# Patient Record
Sex: MALE | Race: OTHER
[De-identification: names, ages, dates, MRNs, and addresses within clinical notes are randomized per-mention and may not be internally consistent; named-entity substitution may affect disease eponyms.]

---

## 2020-01-08 ENCOUNTER — HOSPITAL ENCOUNTER (EMERGENCY)
Dept: HOSPITAL 8 - ED | Age: 65
Discharge: HOME | End: 2020-01-08
Payer: MEDICAID

## 2020-01-08 VITALS — DIASTOLIC BLOOD PRESSURE: 97 MMHG | SYSTOLIC BLOOD PRESSURE: 171 MMHG

## 2020-01-08 VITALS — HEIGHT: 71 IN | BODY MASS INDEX: 24.01 KG/M2 | WEIGHT: 171.52 LBS

## 2020-01-08 DIAGNOSIS — S70.11XA: ICD-10-CM

## 2020-01-08 DIAGNOSIS — S80.01XA: Primary | ICD-10-CM

## 2020-01-08 DIAGNOSIS — F17.200: ICD-10-CM
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
## 2020-01-08 DIAGNOSIS — I10: ICD-10-CM
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
## 2020-01-08 PROCEDURE — 99283 EMERGENCY DEPT VISIT LOW MDM: CPT

## 2020-01-15 ENCOUNTER — HOSPITAL ENCOUNTER (EMERGENCY)
Dept: HOSPITAL 8 - ED | Age: 65
Discharge: HOME | End: 2020-01-15
Payer: MEDICAID

## 2020-01-15 VITALS — BODY MASS INDEX: 23.58 KG/M2 | WEIGHT: 168.43 LBS | HEIGHT: 71 IN

## 2020-01-15 VITALS — SYSTOLIC BLOOD PRESSURE: 115 MMHG | DIASTOLIC BLOOD PRESSURE: 75 MMHG

## 2020-01-15 DIAGNOSIS — R19.7: ICD-10-CM

## 2020-01-15 DIAGNOSIS — R10.84: ICD-10-CM

## 2020-01-15 DIAGNOSIS — R11.2: Primary | ICD-10-CM

## 2020-01-15 DIAGNOSIS — I10: ICD-10-CM

## 2020-01-15 PROCEDURE — 99283 EMERGENCY DEPT VISIT LOW MDM: CPT

## 2020-01-15 NOTE — NUR
TO RM 19 FROM [facility]. C/O N/V/D SINCE SUNDAY. PT BELIEVES IT IS FROM THE MEAL HE 
AT THAT NIGHT.

## 2020-09-01 ENCOUNTER — HOSPITAL ENCOUNTER (OUTPATIENT)
Facility: MEDICAL CENTER | Age: 65
End: 2020-09-01
Attending: PHYSICIAN ASSISTANT
Payer: MEDICAID

## 2020-09-01 ENCOUNTER — OFFICE VISIT (OUTPATIENT)
Dept: URGENT CARE | Facility: CLINIC | Age: 65
End: 2020-09-01
Payer: MEDICAID

## 2020-09-01 VITALS
DIASTOLIC BLOOD PRESSURE: 74 MMHG | SYSTOLIC BLOOD PRESSURE: 106 MMHG | RESPIRATION RATE: 18 BRPM | HEART RATE: 108 BPM | TEMPERATURE: 96.6 F | OXYGEN SATURATION: 98 % | WEIGHT: 144 LBS | HEIGHT: 71 IN | BODY MASS INDEX: 20.16 KG/M2

## 2020-09-01 DIAGNOSIS — A53.9 SYPHILIS: ICD-10-CM

## 2020-09-01 DIAGNOSIS — R53.83 FATIGUE, UNSPECIFIED TYPE: ICD-10-CM

## 2020-09-01 DIAGNOSIS — Z20.2 POTENTIAL EXPOSURE TO STD: ICD-10-CM

## 2020-09-01 DIAGNOSIS — Z00.00 HEALTHCARE MAINTENANCE: ICD-10-CM

## 2020-09-01 DIAGNOSIS — R21 RASH OF GENITALIA: ICD-10-CM

## 2020-09-01 PROCEDURE — 99204 OFFICE O/P NEW MOD 45 MIN: CPT | Mod: 25 | Performed by: PHYSICIAN ASSISTANT

## 2020-09-01 PROCEDURE — 87529 HSV DNA AMP PROBE: CPT | Mod: 91

## 2020-09-01 SDOH — HEALTH STABILITY: MENTAL HEALTH: HOW OFTEN DO YOU HAVE A DRINK CONTAINING ALCOHOL?: NEVER

## 2020-09-01 ASSESSMENT — ENCOUNTER SYMPTOMS
CHILLS: 0
NAUSEA: 1
VOMITING: 0
CHANGE IN BOWEL HABIT: 0
COUGH: 0
FLANK PAIN: 0
FEVER: 0
FATIGUE: 1

## 2020-09-01 NOTE — PROGRESS NOTES
Subjective:   Alverto Duran is a 64 y.o. male who presents today with   Chief Complaint   Patient presents with   • Emesis     x 2 weeks nausea, diarrhea, chills,        Fatigue  This is a new problem. Episode onset: 2 weeks. Associated symptoms include fatigue and nausea. Pertinent negatives include no change in bowel habit, chest pain, chills, congestion, coughing, fever, urinary symptoms or vomiting. Nothing aggravates the symptoms. He has tried nothing for the symptoms. The treatment provided no relief.     Patient has multiple complaints today.  He does not have primary care set up at this time.  He states that he has had fatigue recently but also a lot of stress and anxiety secondary to being laid off and trying to find new lines of work.  He states that over the past couple of weeks he has had nausea with some diarrhea.  He attributes this to potentially the stress as he is not having any abdominal pain.  He denies any blood in the stool and no vomiting.  Patient states that he has been having unprotected sex and would also like to be checked for STDs today he states that he does have a genital ulcer at this time.  PMH:  has no past medical history on file.  MEDS:   Current Outpatient Medications:   •  mupirocin (BACTROBAN) 2 % Ointment, Apply 1 Application to affected area(s) 2 times a day., Disp: 22 g, Rfl: 0  ALLERGIES: No Known Allergies  SURGHX: No past surgical history on file.  SOCHX:  reports that he has been smoking cigarettes. He has never used smokeless tobacco. He reports current drug use. Drug: Methamphetamines. He reports that he does not drink alcohol.  FH: Reviewed with patient, not pertinent to this visit.       Review of Systems   Constitutional: Positive for fatigue. Negative for chills and fever.   HENT: Negative for congestion.    Respiratory: Negative for cough.    Cardiovascular: Negative for chest pain.   Gastrointestinal: Positive for nausea. Negative for change in bowel habit and  "vomiting.   Genitourinary: Negative for dysuria, flank pain, frequency, hematuria and urgency.   Skin:        Genital ulcer   All other systems reviewed and are negative.       Objective:   /74 (BP Location: Left arm, Patient Position: Sitting, BP Cuff Size: Adult)   Pulse (!) 108   Temp 35.9 °C (96.6 °F) (Temporal)   Resp 18   Ht 1.803 m (5' 11\")   Wt 65.3 kg (144 lb)   SpO2 98%   BMI 20.08 kg/m²   Physical Exam  Vitals signs and nursing note reviewed.   Constitutional:       General: He is not in acute distress.     Appearance: Normal appearance. He is well-developed. He is not ill-appearing or toxic-appearing.   HENT:      Head: Normocephalic and atraumatic.      Right Ear: Hearing, tympanic membrane and ear canal normal.      Left Ear: Hearing, tympanic membrane and ear canal normal.      Nose: Nose normal.      Mouth/Throat:      Mouth: Mucous membranes are moist.      Pharynx: No oropharyngeal exudate or posterior oropharyngeal erythema.   Eyes:      Pupils: Pupils are equal, round, and reactive to light.   Cardiovascular:      Rate and Rhythm: Normal rate and regular rhythm.      Heart sounds: Normal heart sounds.   Pulmonary:      Effort: Pulmonary effort is normal. No respiratory distress.      Breath sounds: No stridor. Wheezing (Mild diffuse) present. No rhonchi or rales.   Abdominal:      General: Bowel sounds are normal. There is no distension.      Tenderness: There is no abdominal tenderness. There is no guarding.   Genitourinary:         Comments: Genital ulcer to the right side of the shaft of the penis near the base of the head.  No drainage or discharge.  Musculoskeletal:      Comments: Normal movement in all 4 extremities   Skin:     General: Skin is warm and dry.   Neurological:      General: No focal deficit present.      Mental Status: He is alert.      Coordination: Coordination normal.   Psychiatric:      Comments: Tangential thought process today       Assessment/Plan: "   Assessment    1. Potential exposure to STD  - VIRAL CULTURE; Future  - HIV AG/AB COMBO ASSAY SCREENING; Future  - HEP C VIRUS ANTIBODY; Future  - CHLAMYDIA/GC PCR URINE OR SWAB; Future  - T.PALLIDUM AB EIA; Future    2. Healthcare maintenance  - REFERRAL TO FOLLOW-UP WITH PRIMARY CARE    3. Fatigue, unspecified type  - CBC WITH DIFFERENTIAL; Future  - Comp Metabolic Panel; Future  - TSH WITH REFLEX TO FT4; Future  - REFERRAL TO FOLLOW-UP WITH PRIMARY CARE    4. Rash of genitalia  - mupirocin (BACTROBAN) 2 % Ointment; Apply 1 Application to affected area(s) 2 times a day.  Dispense: 22 g; Refill: 0  We will follow-up with blood work and lab results and treat accordingly at that time.  Establish care with primary at this time for further evaluation.  Differential diagnosis, natural history, supportive care, and indications for immediate follow-up discussed.   Patient given instructions and understanding of medications and treatment.    If not improving in 3-5 days, F/U with PCP or return to UC if symptoms worsen.    Patient agreeable to plan.      Please note that this dictation was created using voice recognition software. I have made every reasonable attempt to correct obvious errors, but I expect that there are errors of grammar and possibly content that I did not discover before finalizing the note.    Param Weeks PA-C

## 2020-09-03 LAB
HSV1 DNA SPEC QL NAA+PROBE: NEGATIVE
HSV2 DNA SPEC QL NAA+PROBE: NEGATIVE
SPECIMEN SOURCE: NORMAL

## 2020-09-04 ENCOUNTER — HOSPITAL ENCOUNTER (OUTPATIENT)
Dept: LAB | Facility: MEDICAL CENTER | Age: 65
End: 2020-09-04
Attending: PHYSICIAN ASSISTANT
Payer: MEDICAID

## 2020-09-04 DIAGNOSIS — Z20.2 POTENTIAL EXPOSURE TO STD: ICD-10-CM

## 2020-09-04 DIAGNOSIS — R53.83 FATIGUE, UNSPECIFIED TYPE: ICD-10-CM

## 2020-09-04 LAB
ALBUMIN SERPL BCP-MCNC: 4.1 G/DL (ref 3.2–4.9)
ALBUMIN/GLOB SERPL: 1.4 G/DL
ALP SERPL-CCNC: 65 U/L (ref 30–99)
ALT SERPL-CCNC: 18 U/L (ref 2–50)
ANION GAP SERPL CALC-SCNC: 13 MMOL/L (ref 7–16)
AST SERPL-CCNC: 15 U/L (ref 12–45)
BASOPHILS # BLD AUTO: 0.7 % (ref 0–1.8)
BASOPHILS # BLD: 0.03 K/UL (ref 0–0.12)
BILIRUB SERPL-MCNC: 0.2 MG/DL (ref 0.1–1.5)
BUN SERPL-MCNC: 15 MG/DL (ref 8–22)
CALCIUM SERPL-MCNC: 9.8 MG/DL (ref 8.5–10.5)
CHLORIDE SERPL-SCNC: 106 MMOL/L (ref 96–112)
CO2 SERPL-SCNC: 23 MMOL/L (ref 20–33)
CREAT SERPL-MCNC: 1.29 MG/DL (ref 0.5–1.4)
EOSINOPHIL # BLD AUTO: 0.03 K/UL (ref 0–0.51)
EOSINOPHIL NFR BLD: 0.7 % (ref 0–6.9)
ERYTHROCYTE [DISTWIDTH] IN BLOOD BY AUTOMATED COUNT: 49.5 FL (ref 35.9–50)
GLOBULIN SER CALC-MCNC: 3 G/DL (ref 1.9–3.5)
GLUCOSE SERPL-MCNC: 108 MG/DL (ref 65–99)
HCT VFR BLD AUTO: 45.6 % (ref 42–52)
HCV AB SER QL: NORMAL
HGB BLD-MCNC: 14.3 G/DL (ref 14–18)
HIV 1+2 AB+HIV1 P24 AG SERPL QL IA: NORMAL
IMM GRANULOCYTES # BLD AUTO: 0.01 K/UL (ref 0–0.11)
IMM GRANULOCYTES NFR BLD AUTO: 0.2 % (ref 0–0.9)
LYMPHOCYTES # BLD AUTO: 1.72 K/UL (ref 1–4.8)
LYMPHOCYTES NFR BLD: 38.8 % (ref 22–41)
MCH RBC QN AUTO: 27 PG (ref 27–33)
MCHC RBC AUTO-ENTMCNC: 31.4 G/DL (ref 33.7–35.3)
MCV RBC AUTO: 86 FL (ref 81.4–97.8)
MONOCYTES # BLD AUTO: 0.4 K/UL (ref 0–0.85)
MONOCYTES NFR BLD AUTO: 9 % (ref 0–13.4)
NEUTROPHILS # BLD AUTO: 2.24 K/UL (ref 1.82–7.42)
NEUTROPHILS NFR BLD: 50.6 % (ref 44–72)
NRBC # BLD AUTO: 0 K/UL
NRBC BLD-RTO: 0 /100 WBC
PLATELET # BLD AUTO: 258 K/UL (ref 164–446)
PMV BLD AUTO: 11.6 FL (ref 9–12.9)
POTASSIUM SERPL-SCNC: 4.4 MMOL/L (ref 3.6–5.5)
PROT SERPL-MCNC: 7.1 G/DL (ref 6–8.2)
RBC # BLD AUTO: 5.3 M/UL (ref 4.7–6.1)
SODIUM SERPL-SCNC: 142 MMOL/L (ref 135–145)
TSH SERPL DL<=0.005 MIU/L-ACNC: 0.97 UIU/ML (ref 0.38–5.33)
WBC # BLD AUTO: 4.4 K/UL (ref 4.8–10.8)

## 2020-09-04 PROCEDURE — 86592 SYPHILIS TEST NON-TREP QUAL: CPT

## 2020-09-04 PROCEDURE — 87389 HIV-1 AG W/HIV-1&-2 AB AG IA: CPT

## 2020-09-04 PROCEDURE — 86803 HEPATITIS C AB TEST: CPT

## 2020-09-04 PROCEDURE — 87591 N.GONORRHOEAE DNA AMP PROB: CPT

## 2020-09-04 PROCEDURE — 85025 COMPLETE CBC W/AUTO DIFF WBC: CPT

## 2020-09-04 PROCEDURE — 80053 COMPREHEN METABOLIC PANEL: CPT

## 2020-09-04 PROCEDURE — 84443 ASSAY THYROID STIM HORMONE: CPT

## 2020-09-04 PROCEDURE — 86593 SYPHILIS TEST NON-TREP QUANT: CPT

## 2020-09-04 PROCEDURE — 87491 CHLMYD TRACH DNA AMP PROBE: CPT

## 2020-09-04 PROCEDURE — 36415 COLL VENOUS BLD VENIPUNCTURE: CPT

## 2020-09-04 PROCEDURE — 86780 TREPONEMA PALLIDUM: CPT

## 2020-09-05 ENCOUNTER — TELEPHONE (OUTPATIENT)
Dept: URGENT CARE | Facility: PHYSICIAN GROUP | Age: 65
End: 2020-09-05

## 2020-09-05 LAB
C TRACH DNA SPEC QL NAA+PROBE: NEGATIVE
N GONORRHOEA DNA SPEC QL NAA+PROBE: NEGATIVE
RPR SER QL: REACTIVE
RPR SER-TITR: NORMAL {TITER}
SPECIMEN SOURCE: NORMAL
TREPONEMA PALLIDUM IGG+IGM AB [PRESENCE] IN SERUM OR PLASMA BY IMMUNOASSAY: REACTIVE

## 2020-09-05 RX ORDER — DOXYCYCLINE HYCLATE 100 MG
100 TABLET ORAL 2 TIMES DAILY
Qty: 28 TAB | Refills: 0 | Status: SHIPPED | OUTPATIENT
Start: 2020-09-05 | End: 2020-09-19

## 2020-09-05 NOTE — TELEPHONE ENCOUNTER
Called and discussed result positive syphilis with patient and my recommendation is that he go to the health department for treatment at this time.  Given that it is a weekend and he would have to wait until Monday we will go ahead and start him on oral doxycycline at this time patient is agreeable with this plan.  He will follow-up with health department on Monday.  Patient is understanding and agreeable with this plan.

## 2021-01-14 ENCOUNTER — HOSPITAL ENCOUNTER (EMERGENCY)
Facility: MEDICAL CENTER | Age: 66
End: 2021-01-14
Attending: EMERGENCY MEDICINE
Payer: MEDICARE

## 2021-01-14 VITALS
BODY MASS INDEX: 23.1 KG/M2 | HEIGHT: 71 IN | TEMPERATURE: 98 F | SYSTOLIC BLOOD PRESSURE: 161 MMHG | WEIGHT: 165 LBS | DIASTOLIC BLOOD PRESSURE: 90 MMHG | OXYGEN SATURATION: 96 % | RESPIRATION RATE: 19 BRPM | HEART RATE: 78 BPM

## 2021-01-14 DIAGNOSIS — R11.2 NON-INTRACTABLE VOMITING WITH NAUSEA, UNSPECIFIED VOMITING TYPE: ICD-10-CM

## 2021-01-14 DIAGNOSIS — E86.0 DEHYDRATION: ICD-10-CM

## 2021-01-14 LAB
ALBUMIN SERPL BCP-MCNC: 4 G/DL (ref 3.2–4.9)
ALBUMIN/GLOB SERPL: 1.5 G/DL
ALP SERPL-CCNC: 64 U/L (ref 30–99)
ALT SERPL-CCNC: 20 U/L (ref 2–50)
ANION GAP SERPL CALC-SCNC: 7 MMOL/L (ref 7–16)
AST SERPL-CCNC: 20 U/L (ref 12–45)
BASOPHILS # BLD AUTO: 0.2 % (ref 0–1.8)
BASOPHILS # BLD: 0.01 K/UL (ref 0–0.12)
BILIRUB SERPL-MCNC: 0.4 MG/DL (ref 0.1–1.5)
BUN SERPL-MCNC: 21 MG/DL (ref 8–22)
CALCIUM SERPL-MCNC: 9.4 MG/DL (ref 8.5–10.5)
CHLORIDE SERPL-SCNC: 103 MMOL/L (ref 96–112)
CO2 SERPL-SCNC: 26 MMOL/L (ref 20–33)
CREAT SERPL-MCNC: 1.13 MG/DL (ref 0.5–1.4)
EKG IMPRESSION: NORMAL
EOSINOPHIL # BLD AUTO: 0.02 K/UL (ref 0–0.51)
EOSINOPHIL NFR BLD: 0.4 % (ref 0–6.9)
ERYTHROCYTE [DISTWIDTH] IN BLOOD BY AUTOMATED COUNT: 48 FL (ref 35.9–50)
GLOBULIN SER CALC-MCNC: 2.7 G/DL (ref 1.9–3.5)
GLUCOSE SERPL-MCNC: 119 MG/DL (ref 65–99)
HCT VFR BLD AUTO: 44.4 % (ref 42–52)
HGB BLD-MCNC: 13.9 G/DL (ref 14–18)
IMM GRANULOCYTES # BLD AUTO: 0.01 K/UL (ref 0–0.11)
IMM GRANULOCYTES NFR BLD AUTO: 0.2 % (ref 0–0.9)
LIPASE SERPL-CCNC: 18 U/L (ref 11–82)
LYMPHOCYTES # BLD AUTO: 0.42 K/UL (ref 1–4.8)
LYMPHOCYTES NFR BLD: 8.5 % (ref 22–41)
MCH RBC QN AUTO: 27.4 PG (ref 27–33)
MCHC RBC AUTO-ENTMCNC: 31.3 G/DL (ref 33.7–35.3)
MCV RBC AUTO: 87.6 FL (ref 81.4–97.8)
MONOCYTES # BLD AUTO: 0.25 K/UL (ref 0–0.85)
MONOCYTES NFR BLD AUTO: 5 % (ref 0–13.4)
NEUTROPHILS # BLD AUTO: 4.26 K/UL (ref 1.82–7.42)
NEUTROPHILS NFR BLD: 85.7 % (ref 44–72)
NRBC # BLD AUTO: 0 K/UL
NRBC BLD-RTO: 0 /100 WBC
PLATELET # BLD AUTO: 211 K/UL (ref 164–446)
PMV BLD AUTO: 10.8 FL (ref 9–12.9)
POTASSIUM SERPL-SCNC: 4 MMOL/L (ref 3.6–5.5)
PROT SERPL-MCNC: 6.7 G/DL (ref 6–8.2)
RBC # BLD AUTO: 5.07 M/UL (ref 4.7–6.1)
SODIUM SERPL-SCNC: 136 MMOL/L (ref 135–145)
WBC # BLD AUTO: 5 K/UL (ref 4.8–10.8)

## 2021-01-14 PROCEDURE — 93005 ELECTROCARDIOGRAM TRACING: CPT | Performed by: EMERGENCY MEDICINE

## 2021-01-14 PROCEDURE — 700105 HCHG RX REV CODE 258: Performed by: EMERGENCY MEDICINE

## 2021-01-14 PROCEDURE — 83690 ASSAY OF LIPASE: CPT

## 2021-01-14 PROCEDURE — 99284 EMERGENCY DEPT VISIT MOD MDM: CPT

## 2021-01-14 PROCEDURE — 85025 COMPLETE CBC W/AUTO DIFF WBC: CPT

## 2021-01-14 PROCEDURE — 80053 COMPREHEN METABOLIC PANEL: CPT

## 2021-01-14 PROCEDURE — 93005 ELECTROCARDIOGRAM TRACING: CPT

## 2021-01-14 RX ORDER — SODIUM CHLORIDE, SODIUM LACTATE, POTASSIUM CHLORIDE, CALCIUM CHLORIDE 600; 310; 30; 20 MG/100ML; MG/100ML; MG/100ML; MG/100ML
1000 INJECTION, SOLUTION INTRAVENOUS ONCE
Status: COMPLETED | OUTPATIENT
Start: 2021-01-14 | End: 2021-01-14

## 2021-01-14 RX ORDER — ONDANSETRON 4 MG/1
4 TABLET, FILM COATED ORAL EVERY 4 HOURS PRN
Qty: 15 TAB | Refills: 0 | Status: SHIPPED | OUTPATIENT
Start: 2021-01-14 | End: 2022-01-01

## 2021-01-14 RX ADMIN — SODIUM CHLORIDE, POTASSIUM CHLORIDE, SODIUM LACTATE AND CALCIUM CHLORIDE 1000 ML: 600; 310; 30; 20 INJECTION, SOLUTION INTRAVENOUS at 06:17

## 2021-01-14 ASSESSMENT — FIBROSIS 4 INDEX: FIB4 SCORE: 0.89

## 2021-01-14 NOTE — ED TRIAGE NOTES
"Chief Complaint   Patient presents with   • N/V     Pt arrives via EMS with complaint of N/V since 2030 last night. Pt reports 5 episodes of vomiting with chills overnight.     /81   Pulse 79   Temp 37.2 °C (98.9 °F) (Temporal)   Resp 18   Ht 1.803 m (5' 11\")   Wt 74.8 kg (165 lb)   SpO2 95%   BMI 23.01 kg/m²     "

## 2021-01-14 NOTE — ED PROVIDER NOTES
"ED Provider Note    Scribed for Layo Hewitt M.D. by Corky Marcos. 1/14/2021, 6:05 AM.    Primary care provider: Pcp Pt States None  Means of arrival: EMS  History obtained from: Patient  History limited by: None    CHIEF COMPLAINT  Chief Complaint   Patient presents with   • N/V       HPI  Alverto Duran is a 65 y.o. male who presents to the Emergency Department brought in by EMS for acute, moderate vomiting onset 4 hours ago. Patient reports that he last ate around 8PM last night, and earlier this morning started to experience significant nausea and vomiting. He was given Zofran by EMS which provided mild alleviation. Patient additionally reports right flank pain which has resolved and chills, but denies any chills, abdominal pain, or a headache.     REVIEW OF SYSTEMS  As above otherwise all other systems are negative    PAST MEDICAL HISTORY       SURGICAL HISTORY  patient denies any surgical history    SOCIAL HISTORY  Social History     Tobacco Use   • Smoking status: Current Every Day Smoker     Types: Cigarettes   • Smokeless tobacco: Never Used   Substance Use Topics   • Alcohol use: Never     Frequency: Never   • Drug use: Yes     Types: Methamphetamines      Social History     Substance and Sexual Activity   Drug Use Yes   • Types: Methamphetamines       FAMILY HISTORY  No family history on file.    CURRENT MEDICATIONS  Current Outpatient Medications   Medication Instructions   • mupirocin (BACTROBAN) 2 % Ointment 1 Application, Topical, 2 TIMES DAILY         ALLERGIES  No Known Allergies    PHYSICAL EXAM  VITAL SIGNS: /81   Pulse 79   Temp 37.2 °C (98.9 °F) (Temporal)   Resp 18   Ht 1.803 m (5' 11\")   Wt 74.8 kg (165 lb)   SpO2 95%   BMI 23.01 kg/m²     Constitutional: Well developed, Well nourished, No acute distress, Non-toxic appearance.   HENT: Dry mucous membranes. Normocephalic, Atraumatic, Bilateral external ears normal, No oral exudates, Nose normal.   Eyes:conjunctiva is normal, " there are no signs of exudate.   Neck: Supple, no meningeal signs..   Cardiovascular: Regular rate and rhythm without murmurs gallops or rubs.   Thorax & Lungs: No respiratory distress. Breathing comfortably. Lungs are clear to auscultation bilaterally, there are no wheezes no rales. Chest wall is nontender.  Abdomen: Soft, nontender, nondistended. Bowel sounds are present.   Skin: Warm, Dry, No erythema,   Back: No tenderness, No CVA tenderness.  Musculoskeletal: Good range of motion in all major joints. No tenderness to palpation or major deformities noted. Intact distal pulses, no clubbing, no cyanosis, no edema,   Neurologic: Alert & oriented x 3, Moving all extremities. No gross abnormalities.    Psychiatric: Affect normal, Judgment normal, Mood normal.     LABS  Results for orders placed or performed during the hospital encounter of 01/14/21   CBC WITH DIFFERENTIAL   Result Value Ref Range    WBC 5.0 4.8 - 10.8 K/uL    RBC 5.07 4.70 - 6.10 M/uL    Hemoglobin 13.9 (L) 14.0 - 18.0 g/dL    Hematocrit 44.4 42.0 - 52.0 %    MCV 87.6 81.4 - 97.8 fL    MCH 27.4 27.0 - 33.0 pg    MCHC 31.3 (L) 33.7 - 35.3 g/dL    RDW 48.0 35.9 - 50.0 fL    Platelet Count 211 164 - 446 K/uL    MPV 10.8 9.0 - 12.9 fL    Neutrophils-Polys 85.70 (H) 44.00 - 72.00 %    Lymphocytes 8.50 (L) 22.00 - 41.00 %    Monocytes 5.00 0.00 - 13.40 %    Eosinophils 0.40 0.00 - 6.90 %    Basophils 0.20 0.00 - 1.80 %    Immature Granulocytes 0.20 0.00 - 0.90 %    Nucleated RBC 0.00 /100 WBC    Neutrophils (Absolute) 4.26 1.82 - 7.42 K/uL    Lymphs (Absolute) 0.42 (L) 1.00 - 4.80 K/uL    Monos (Absolute) 0.25 0.00 - 0.85 K/uL    Eos (Absolute) 0.02 0.00 - 0.51 K/uL    Baso (Absolute) 0.01 0.00 - 0.12 K/uL    Immature Granulocytes (abs) 0.01 0.00 - 0.11 K/uL    NRBC (Absolute) 0.00 K/uL   COMP METABOLIC PANEL   Result Value Ref Range    Sodium 136 135 - 145 mmol/L    Potassium 4.0 3.6 - 5.5 mmol/L    Chloride 103 96 - 112 mmol/L    Co2 26 20 - 33 mmol/L     Anion Gap 7.0 7.0 - 16.0    Glucose 119 (H) 65 - 99 mg/dL    Bun 21 8 - 22 mg/dL    Creatinine 1.13 0.50 - 1.40 mg/dL    Calcium 9.4 8.5 - 10.5 mg/dL    AST(SGOT) 20 12 - 45 U/L    ALT(SGPT) 20 2 - 50 U/L    Alkaline Phosphatase 64 30 - 99 U/L    Total Bilirubin 0.4 0.1 - 1.5 mg/dL    Albumin 4.0 3.2 - 4.9 g/dL    Total Protein 6.7 6.0 - 8.2 g/dL    Globulin 2.7 1.9 - 3.5 g/dL    A-G Ratio 1.5 g/dL   LIPASE   Result Value Ref Range    Lipase 18 11 - 82 U/L   ESTIMATED GFR   Result Value Ref Range    GFR If African American >60 >60 mL/min/1.73 m 2    GFR If Non African American >60 >60 mL/min/1.73 m 2   EKG   Result Value Ref Range    Report       [facility] Emergency Dept.    Test Date:  2021  Pt Name:    JOSE POSADA                Department: ER  MRN:        [id number]                      Room:       [facility]  Gender:     Male                         Technician: [id number]  :        1955                   Requested By:ER TRIAGE PROTOCOL  Order #:    [id number]                    Reading MD:    Measurements  Intervals                                Axis  Rate:       82                           P:          69  MN:         172                          QRS:        -32  QRSD:       88                           T:          59  QT:         368  QTc:        430    Interpretive Statements  SINUS RHYTHM  PAIRED VENTRICULAR PREMATURE COMPLEXES  LEFT AXIS DEVIATION  ABNRM R PROG, CONSIDER ASMI OR LEAD PLACEMENT  No previous ECG available for comparison    6:16 AM SIGNED BY ALEX STRICKLAND M.D.         All labs reviewed by me.    EKG  Interpreted by me as indicated above.       COURSE & MEDICAL DECISION MAKING  Pertinent Labs & Imaging studies reviewed. (See chart for details)    6:05 AM - Patient seen and examined at bedside. Patient will be treated with LR bolus. Ordered CBC w/ diff, CMP, lipase, and EKG to evaluate his symptoms. The differential diagnoses include but are not limited to: food  poisoning, gastroenteritis.      7:12 AM - Patient was reevaluated at bedside; he reports feeling better and is tolerating oral fluids. Discussed lab results with the patient and informed them they are reassuring. Return precautions were discussed with the patient, and they were cleared for discharge at this time. Patient was understanding and agreeable to discharge.     HYDRATION: Based on the patient's presentation of Acute Vomiting and Dehydration the patient was given IV fluids. IV Hydration was used because oral hydration was not adequate alone. Upon recheck following hydration, the patient was feeling improved.    Decision Making:  She presents for evaluation.  Clinically the patient appears well he did get a fluid hydration as described above with nausea medications.  Laboratory studies were drawn and are un concerning on initial evaluation as well as the EKG for the potential of coronary causes for his nausea.  At this point the patient is other wise improved after the fluids and nausea medication he is able to tolerate p.o. fluids he could very well have been just food poisoning versus viral gastroenteritis.  This point given a prescription for nausea medications.  The patient is to follow-up with her primary care physician for recheck in 1 week return as needed.    The patient will return for new or worsening symptoms and is stable at the time of discharge.    The patient is referred to a primary physician for blood pressure management, diabetic screening, and for all other preventative health concerns.    DISPOSITION:  Patient will be discharged home in stable condition.    FOLLOW UP:  [address] [id number] [phone]        [address] [id number] [phone]          OUTPATIENT MEDICATIONS:  Discharge Medication List as of 1/14/2021  7:50 AM      START taking these medications    Details   ondansetron  (ZOFRAN) 4 MG Tab tablet Take 1 Tab by mouth every four hours as needed for Nausea/Vomiting., Disp-15 Tab, R-0, Normal              FINAL IMPRESSION  1. Non-intractable vomiting with nausea, unspecified vomiting type    2. Dehydration          Corky IRIZARRY (Scribe), am scribing for, and in the presence of, Layo Hewitt M.D..    Electronically signed by: Corky Marcos (Megibe), 1/14/2021    ILayo M.D. personally performed the services described in this documentation, as scribed by Corky Marcos in my presence, and it is both accurate and complete.    The note accurately reflects work and decisions made by me.  Layo Hewitt M.D.  1/14/2021  1:15 PM

## 2021-01-14 NOTE — ED NOTES
Pt given discharge instructions and instructed on where to  prescription and care at home. Pt verbalized understanding. RN to answer any questions pt had. Pt instructed how to call [facility] for a ride to the shelter. VSS. Pt ambulated out to front [facility].

## 2021-03-03 DIAGNOSIS — Z23 NEED FOR VACCINATION: ICD-10-CM

## 2021-03-16 ENCOUNTER — HOSPITAL ENCOUNTER (EMERGENCY)
Facility: MEDICAL CENTER | Age: 66
End: 2021-03-17
Attending: EMERGENCY MEDICINE
Payer: MEDICARE

## 2021-03-16 DIAGNOSIS — R20.2 PARESTHESIAS: ICD-10-CM

## 2021-03-16 LAB
ANION GAP SERPL CALC-SCNC: 6 MMOL/L (ref 7–16)
BASOPHILS # BLD AUTO: 0.5 % (ref 0–1.8)
BASOPHILS # BLD: 0.03 K/UL (ref 0–0.12)
BUN SERPL-MCNC: 18 MG/DL (ref 8–22)
CALCIUM SERPL-MCNC: 9.3 MG/DL (ref 8.5–10.5)
CHLORIDE SERPL-SCNC: 106 MMOL/L (ref 96–112)
CO2 SERPL-SCNC: 26 MMOL/L (ref 20–33)
CREAT SERPL-MCNC: 1.08 MG/DL (ref 0.5–1.4)
EOSINOPHIL # BLD AUTO: 0.07 K/UL (ref 0–0.51)
EOSINOPHIL NFR BLD: 1.2 % (ref 0–6.9)
ERYTHROCYTE [DISTWIDTH] IN BLOOD BY AUTOMATED COUNT: 48.5 FL (ref 35.9–50)
GLUCOSE SERPL-MCNC: 96 MG/DL (ref 65–99)
HCT VFR BLD AUTO: 40.9 % (ref 42–52)
HGB BLD-MCNC: 13 G/DL (ref 14–18)
IMM GRANULOCYTES # BLD AUTO: 0.01 K/UL (ref 0–0.11)
IMM GRANULOCYTES NFR BLD AUTO: 0.2 % (ref 0–0.9)
LYMPHOCYTES # BLD AUTO: 2.26 K/UL (ref 1–4.8)
LYMPHOCYTES NFR BLD: 38.5 % (ref 22–41)
MCH RBC QN AUTO: 27.8 PG (ref 27–33)
MCHC RBC AUTO-ENTMCNC: 31.8 G/DL (ref 33.7–35.3)
MCV RBC AUTO: 87.4 FL (ref 81.4–97.8)
MONOCYTES # BLD AUTO: 0.49 K/UL (ref 0–0.85)
MONOCYTES NFR BLD AUTO: 8.3 % (ref 0–13.4)
NEUTROPHILS # BLD AUTO: 3.01 K/UL (ref 1.82–7.42)
NEUTROPHILS NFR BLD: 51.3 % (ref 44–72)
NRBC # BLD AUTO: 0 K/UL
NRBC BLD-RTO: 0 /100 WBC
PLATELET # BLD AUTO: 262 K/UL (ref 164–446)
PMV BLD AUTO: 10.4 FL (ref 9–12.9)
POTASSIUM SERPL-SCNC: 4 MMOL/L (ref 3.6–5.5)
RBC # BLD AUTO: 4.68 M/UL (ref 4.7–6.1)
SODIUM SERPL-SCNC: 138 MMOL/L (ref 135–145)
WBC # BLD AUTO: 5.9 K/UL (ref 4.8–10.8)

## 2021-03-16 PROCEDURE — 85025 COMPLETE CBC W/AUTO DIFF WBC: CPT

## 2021-03-16 PROCEDURE — 80048 BASIC METABOLIC PNL TOTAL CA: CPT

## 2021-03-16 PROCEDURE — 99283 EMERGENCY DEPT VISIT LOW MDM: CPT

## 2021-03-16 PROCEDURE — 36415 COLL VENOUS BLD VENIPUNCTURE: CPT

## 2021-03-16 ASSESSMENT — LIFESTYLE VARIABLES
TOTAL SCORE: 0
HAVE PEOPLE ANNOYED YOU BY CRITICIZING YOUR DRINKING: NO
DO YOU DRINK ALCOHOL: YES
CONSUMPTION TOTAL: INCOMPLETE
HAVE YOU EVER FELT YOU SHOULD CUT DOWN ON YOUR DRINKING: NO
EVER FELT BAD OR GUILTY ABOUT YOUR DRINKING: NO
EVER HAD A DRINK FIRST THING IN THE MORNING TO STEADY YOUR NERVES TO GET RID OF A HANGOVER: NO

## 2021-03-16 ASSESSMENT — FIBROSIS 4 INDEX: FIB4 SCORE: 1.38

## 2021-03-17 VITALS
WEIGHT: 144.18 LBS | RESPIRATION RATE: 15 BRPM | TEMPERATURE: 97.3 F | DIASTOLIC BLOOD PRESSURE: 83 MMHG | HEART RATE: 64 BPM | BODY MASS INDEX: 20.19 KG/M2 | SYSTOLIC BLOOD PRESSURE: 125 MMHG | OXYGEN SATURATION: 97 % | HEIGHT: 71 IN

## 2021-03-17 NOTE — ED TRIAGE NOTES
"Chief Complaint   Patient presents with   • Numbness     pt has numbness in R handx2 weeks, pt has tingling in his fingers.         Pt walk in tonight for above complaint. Pt aox4, no signs of distress. Pt able to carry backpack into triage room. Pt able to close fist. Educated pt on triage process and to notify if there is any change.        /99   Pulse 80   Temp 36.2 °C (97.2 °F) (Temporal)   Resp 18   Ht 1.803 m (5' 11\")   Wt 65.4 kg (144 lb 2.9 oz)   SpO2 95%   BMI 20.11 kg/m²     "

## 2021-03-17 NOTE — ED NOTES
Discharge education provided. Patient verbalizes understanding. Patient A/Ox4 and ambulatory to lobby with a steady gait. Patient discharged home to self care.

## 2021-03-17 NOTE — ED PROVIDER NOTES
ER Provider Note     Scribed for Emory Bolaños M.D. by Sarah Lyman. 3/16/2021, 9:54 PM.    Primary Care Provider: Pcp Pt States None  Means of Arrival: walk in   History obtained from: Patient  History limited by: None     CHIEF COMPLAINT  Chief Complaint   Patient presents with    Numbness     pt has numbness in R handx2 weeks, pt has tingling in his fingers.       HPI  Alverto Duran is a 65 y.o. male who presents to the Emergency Department with numbness in his right hand onset 2 weeks ago. The patient states that his fingertips felt tingly and then numb. Patient states his hand feels weak and has difficulty grabbing things but denies any trauma to his hand.He endorses a cough that also started a couple weeks ago and occasionally produces yellow mucous. Patient endorses tactile fever today, chills, and mild shortness of breath.  Patient has a family history of diabetes but does not know if he is also diabetic. Patient denies any nausea, vomiting, diarrhea, or leg weakness. Patient does not smoke and drinks occasionally every week. Patient also uses meth occasionally.      PPE Note: I personally donned full PPE for all patient encounters during this visit, including being clean-shaven with an N95 respirator mask, gloves, gown, and goggles.     Scribe remained outside the patient's room and did not have any contact with the patient for the duration of patient encounter.       REVIEW OF SYSTEMS  See HPI for further details. All other systems are negative.     PAST MEDICAL HISTORY   has a past medical history of Glaucoma.    SURGICAL HISTORY  patient denies any surgical history    SOCIAL HISTORY  Social History     Tobacco Use    Smoking status: Current Every Day Smoker     Packs/day: 0.50     Types: Cigarettes    Smokeless tobacco: Never Used   Substance Use Topics    Alcohol use: Never    Drug use: Yes     Types: Methamphetamines, Inhaled     Comment: crystal meth      Social History     Substance and  "Sexual Activity   Drug Use Yes    Types: Methamphetamines, Inhaled    Comment: crystal meth       FAMILY HISTORY  History reviewed. No pertinent family history.    CURRENT MEDICATIONS  Home Medications    **Home medications have not yet been reviewed for this encounter**         ALLERGIES  No Known Allergies    PHYSICAL EXAM  VITAL SIGNS: /99   Pulse 80   Temp 36.2 °C (97.2 °F) (Temporal)   Resp 18   Ht 1.803 m (5' 11\")   Wt 65.4 kg (144 lb 2.9 oz)   SpO2 95%   BMI 20.11 kg/m²      Constitutional: Alert in no apparent distress.  HENT: No signs of trauma, Bilateral external ears normal, Nose normal.   Eyes: Pupils are equal and reactive, Conjunctiva normal, Non-icteric.   Neck: Normal range of motion, No tenderness, Supple, No stridor.   Lymphatic: No lymphadenopathy noted.   Cardiovascular: Regular rate and rhythm, no palpable thrill  Thorax & Lungs: No respiratory distress,  No chest tenderness.   Abdomen: Bowel sounds normal, Soft, No tenderness, No masses, No pulsatile masses. No peritoneal signs.  Skin: Warm, Dry, No erythema, No rash.   Back: No bony tenderness, No CVA tenderness.   Extremities: 5/5 strength and sensation to right hand, Normal radial, median, and ulnar distributions, No apparent trauma, pulses intact, No edema, No tenderness, No cyanosis.  Musculoskeletal: Good range of motion in all major joints. No tenderness to palpation or major deformities noted.   Neurologic: Alert , Normal motor function, Normal sensory function, No focal deficits noted.   Psychiatric: Affect normal, Judgment normal, Mood normal.     DIAGNOSTIC STUDIES / PROCEDURES      LABS  Labs Reviewed   CBC WITH DIFFERENTIAL - Abnormal; Notable for the following components:       Result Value    RBC 4.68 (*)     Hemoglobin 13.0 (*)     Hematocrit 40.9 (*)     MCHC 31.8 (*)     All other components within normal limits   BASIC METABOLIC PANEL - Abnormal; Notable for the following components:    Anion Gap 6.0 (*)     All " other components within normal limits   ESTIMATED GFR     All labs reviewed by me.      COURSE & MEDICAL DECISION MAKING  Pertinent Labs & Imaging studies reviewed. (See chart for details)    This is a 65 y.o. male that presents with what appears to be paresthesias.  There is no focal neurologic deficits.  There is no strength or sensation deficits.  We will get electrolytes to evaluate for this being because of potential paresthesia.  Will reassess after this..     9:54 PM - Patient seen and examined at bedside. Ordered CBC w/ Differential and BMP.      11:40 PM - Patient was reevaluated at bedside. Discussed lab results with the patient and informed them that they were stable for discharge. ED return precautions were discussed. Patient verbalizes understanding and agreement to this plan of care.     The patient will return for new or worsening symptoms and is stable at the time of discharge.    The patient is referred to a primary physician for blood pressure management, diabetic screening, and for all other preventative health concerns.    There is no electrolyte derangements.  The patient's paresthesias seem to have improved.  We will discharge the patient home with strict return precautions and follow-up.  I believe this is carpal tunnel syndrome there is no neck pathology.      DISPOSITION:  Patient will be discharged home in stable condition.    FOLLOW UP:  [address] [id number] [phone]  Go in 2 days        FINAL IMPRESSION  1. Paresthesias          Sarah IRIZARRY (Scribjennifer), am scribing for, and in the presence of, Emory Bolaños M.D..    Electronically signed by: Sarah Lyman (Marichuy), 3/16/2021    IEmory M.D. personally performed the services described in this documentation, as scribed by Sarah Lyman in my presence, and it is both accurate and complete. C    The note accurately reflects work and decisions made by me.  Emory DIAZ  SHARAD Bolaños  3/17/2021  2:29 AM

## 2022-01-01 ENCOUNTER — ANESTHESIA (OUTPATIENT)
Dept: SURGERY | Facility: MEDICAL CENTER | Age: 67
DRG: 023 | End: 2022-01-01
Payer: MEDICARE

## 2022-01-01 ENCOUNTER — APPOINTMENT (OUTPATIENT)
Dept: RADIOLOGY | Facility: MEDICAL CENTER | Age: 67
DRG: 023 | End: 2022-01-01
Attending: NURSE PRACTITIONER
Payer: MEDICARE

## 2022-01-01 ENCOUNTER — APPOINTMENT (OUTPATIENT)
Dept: RADIOLOGY | Facility: MEDICAL CENTER | Age: 67
DRG: 023 | End: 2022-01-01
Attending: STUDENT IN AN ORGANIZED HEALTH CARE EDUCATION/TRAINING PROGRAM
Payer: MEDICARE

## 2022-01-01 ENCOUNTER — PATIENT OUTREACH (OUTPATIENT)
Dept: HEALTH INFORMATION MANAGEMENT | Facility: OTHER | Age: 67
End: 2022-01-01

## 2022-01-01 ENCOUNTER — APPOINTMENT (OUTPATIENT)
Dept: RADIOLOGY | Facility: MEDICAL CENTER | Age: 67
DRG: 023 | End: 2022-01-01
Attending: INTERNAL MEDICINE
Payer: MEDICARE

## 2022-01-01 ENCOUNTER — APPOINTMENT (OUTPATIENT)
Dept: URGENT CARE | Facility: CLINIC | Age: 67
End: 2022-01-01
Payer: MEDICAID

## 2022-01-01 ENCOUNTER — APPOINTMENT (OUTPATIENT)
Dept: CARDIOLOGY | Facility: MEDICAL CENTER | Age: 67
DRG: 023 | End: 2022-01-01
Attending: STUDENT IN AN ORGANIZED HEALTH CARE EDUCATION/TRAINING PROGRAM
Payer: MEDICARE

## 2022-01-01 ENCOUNTER — HOSPITAL ENCOUNTER (INPATIENT)
Facility: MEDICAL CENTER | Age: 67
LOS: 28 days | DRG: 023 | End: 2022-07-19
Attending: EMERGENCY MEDICINE | Admitting: FAMILY MEDICINE
Payer: MEDICARE

## 2022-01-01 ENCOUNTER — APPOINTMENT (OUTPATIENT)
Dept: RADIOLOGY | Facility: MEDICAL CENTER | Age: 67
DRG: 023 | End: 2022-01-01
Attending: NEUROLOGICAL SURGERY
Payer: MEDICARE

## 2022-01-01 ENCOUNTER — APPOINTMENT (OUTPATIENT)
Dept: RADIOLOGY | Facility: MEDICAL CENTER | Age: 67
DRG: 023 | End: 2022-01-01
Attending: EMERGENCY MEDICINE
Payer: MEDICARE

## 2022-01-01 ENCOUNTER — ANESTHESIA EVENT (OUTPATIENT)
Dept: SURGERY | Facility: MEDICAL CENTER | Age: 67
DRG: 023 | End: 2022-01-01
Payer: MEDICARE

## 2022-01-01 ENCOUNTER — HOSPITAL ENCOUNTER (OUTPATIENT)
Dept: RADIOLOGY | Facility: MEDICAL CENTER | Age: 67
End: 2022-06-23
Attending: STUDENT IN AN ORGANIZED HEALTH CARE EDUCATION/TRAINING PROGRAM

## 2022-01-01 ENCOUNTER — APPOINTMENT (OUTPATIENT)
Dept: RADIOLOGY | Facility: MEDICAL CENTER | Age: 67
DRG: 023 | End: 2022-01-01
Attending: FAMILY MEDICINE
Payer: MEDICARE

## 2022-01-01 ENCOUNTER — OFFICE VISIT (OUTPATIENT)
Dept: URGENT CARE | Facility: CLINIC | Age: 67
End: 2022-01-01
Payer: MEDICARE

## 2022-01-01 ENCOUNTER — HOSPITAL ENCOUNTER (EMERGENCY)
Facility: MEDICAL CENTER | Age: 67
End: 2022-01-04
Attending: EMERGENCY MEDICINE
Payer: MEDICARE

## 2022-01-01 VITALS
TEMPERATURE: 97.9 F | HEART RATE: 76 BPM | BODY MASS INDEX: 20.62 KG/M2 | OXYGEN SATURATION: 96 % | SYSTOLIC BLOOD PRESSURE: 129 MMHG | DIASTOLIC BLOOD PRESSURE: 70 MMHG | WEIGHT: 147.27 LBS | RESPIRATION RATE: 18 BRPM | HEIGHT: 71 IN

## 2022-01-01 VITALS
WEIGHT: 151.68 LBS | SYSTOLIC BLOOD PRESSURE: 101 MMHG | HEIGHT: 70 IN | TEMPERATURE: 97.1 F | HEART RATE: 85 BPM | RESPIRATION RATE: 16 BRPM | DIASTOLIC BLOOD PRESSURE: 57 MMHG | BODY MASS INDEX: 21.71 KG/M2 | OXYGEN SATURATION: 95 %

## 2022-01-01 VITALS
HEART RATE: 106 BPM | DIASTOLIC BLOOD PRESSURE: 76 MMHG | WEIGHT: 150 LBS | OXYGEN SATURATION: 99 % | HEIGHT: 71 IN | TEMPERATURE: 97.3 F | RESPIRATION RATE: 14 BRPM | SYSTOLIC BLOOD PRESSURE: 134 MMHG | BODY MASS INDEX: 21 KG/M2

## 2022-01-01 DIAGNOSIS — L84 CORN OF FOOT: ICD-10-CM

## 2022-01-01 DIAGNOSIS — R20.2 PARESTHESIAS IN RIGHT HAND: ICD-10-CM

## 2022-01-01 DIAGNOSIS — R51.9 NONINTRACTABLE HEADACHE, UNSPECIFIED CHRONICITY PATTERN, UNSPECIFIED HEADACHE TYPE: ICD-10-CM

## 2022-01-01 DIAGNOSIS — M21.619 BUNION: ICD-10-CM

## 2022-01-01 DIAGNOSIS — I16.0 HYPERTENSIVE URGENCY: ICD-10-CM

## 2022-01-01 DIAGNOSIS — M79.671 BILATERAL FOOT PAIN: ICD-10-CM

## 2022-01-01 DIAGNOSIS — I63.542 ACUTE CEREBROVASCULAR ACCIDENT (CVA) DUE TO OCCLUSION OF LEFT CEREBELLAR ARTERY (HCC): ICD-10-CM

## 2022-01-01 DIAGNOSIS — E86.0 DEHYDRATION: ICD-10-CM

## 2022-01-01 DIAGNOSIS — R42 DIZZINESS: ICD-10-CM

## 2022-01-01 DIAGNOSIS — M79.672 BILATERAL FOOT PAIN: ICD-10-CM
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
## 2022-01-01 DIAGNOSIS — E87.6 HYPOKALEMIA: ICD-10-CM

## 2022-01-01 DIAGNOSIS — Z99.11 ON MECHANICALLY ASSISTED VENTILATION (HCC): ICD-10-CM

## 2022-01-01 DIAGNOSIS — E83.51 HYPOCALCEMIA: ICD-10-CM

## 2022-01-01 LAB
ABO + RH BLD: NORMAL
ABO GROUP BLD: NORMAL
ALBUMIN SERPL BCP-MCNC: 3.1 G/DL (ref 3.2–4.9)
ALBUMIN SERPL BCP-MCNC: 3.1 G/DL (ref 3.2–4.9)
ALBUMIN SERPL BCP-MCNC: 4.3 G/DL (ref 3.2–4.9)
ALBUMIN/GLOB SERPL: 1.1 G/DL
ALBUMIN/GLOB SERPL: 1.2 G/DL
ALBUMIN/GLOB SERPL: 1.5 G/DL
ALP SERPL-CCNC: 52 U/L (ref 30–99)
ALP SERPL-CCNC: 57 U/L (ref 30–99)
ALP SERPL-CCNC: 76 U/L (ref 30–99)
ALT SERPL-CCNC: 10 U/L (ref 2–50)
ALT SERPL-CCNC: 11 U/L (ref 2–50)
ALT SERPL-CCNC: 15 U/L (ref 2–50)
AMMONIA PLAS-SCNC: 13 UMOL/L (ref 11–45)
AMPHET UR QL SCN: POSITIVE
ANION GAP SERPL CALC-SCNC: 10 MMOL/L (ref 7–16)
ANION GAP SERPL CALC-SCNC: 11 MMOL/L (ref 7–16)
ANION GAP SERPL CALC-SCNC: 12 MMOL/L (ref 7–16)
ANION GAP SERPL CALC-SCNC: 13 MMOL/L (ref 7–16)
ANION GAP SERPL CALC-SCNC: 14 MMOL/L (ref 7–16)
ANION GAP SERPL CALC-SCNC: 7 MMOL/L (ref 7–16)
ANION GAP SERPL CALC-SCNC: 8 MMOL/L (ref 7–16)
ANION GAP SERPL CALC-SCNC: 9 MMOL/L (ref 7–16)
ANION GAP SERPL CALC-SCNC: 9 MMOL/L (ref 7–16)
APPEARANCE UR: CLEAR
APPEARANCE UR: CLEAR
APTT PPP: 27.6 SEC (ref 24.7–36)
APTT PPP: 30.4 SEC (ref 24.7–36)
AST SERPL-CCNC: 10 U/L (ref 12–45)
AST SERPL-CCNC: 12 U/L (ref 12–45)
AST SERPL-CCNC: 15 U/L (ref 12–45)
BACTERIA #/AREA URNS HPF: NEGATIVE /HPF
BACTERIA #/AREA URNS HPF: NEGATIVE /HPF
BACTERIA BLD CULT: NORMAL
BACTERIA CSF CULT: NORMAL
BACTERIA UR CULT: NORMAL
BACTERIA UR CULT: NORMAL
BARBITURATES UR QL SCN: NEGATIVE
BASE EXCESS BLDA CALC-SCNC: -1 MMOL/L (ref -4–3)
BASE EXCESS BLDA CALC-SCNC: -5 MMOL/L (ref -4–3)
BASE EXCESS BLDA CALC-SCNC: 0 MMOL/L (ref -4–3)
BASE EXCESS BLDA CALC-SCNC: 0 MMOL/L (ref -4–3)
BASE EXCESS BLDA CALC-SCNC: 1 MMOL/L (ref -4–3)
BASE EXCESS BLDA CALC-SCNC: 2 MMOL/L (ref -4–3)
BASOPHILS # BLD AUTO: 0.1 % (ref 0–1.8)
BASOPHILS # BLD AUTO: 0.2 % (ref 0–1.8)
BASOPHILS # BLD AUTO: 0.3 % (ref 0–1.8)
BASOPHILS # BLD AUTO: 0.4 % (ref 0–1.8)
BASOPHILS # BLD AUTO: 0.5 % (ref 0–1.8)
BASOPHILS # BLD AUTO: 0.7 % (ref 0–1.8)
BASOPHILS # BLD: 0.01 K/UL (ref 0–0.12)
BASOPHILS # BLD: 0.02 K/UL (ref 0–0.12)
BASOPHILS # BLD: 0.03 K/UL (ref 0–0.12)
BASOPHILS # BLD: 0.04 K/UL (ref 0–0.12)
BASOPHILS # BLD: 0.05 K/UL (ref 0–0.12)
BENZODIAZ UR QL SCN: NEGATIVE
BILIRUB SERPL-MCNC: 0.2 MG/DL (ref 0.1–1.5)
BILIRUB SERPL-MCNC: 0.2 MG/DL (ref 0.1–1.5)
BILIRUB SERPL-MCNC: 0.3 MG/DL (ref 0.1–1.5)
BILIRUB UR QL STRIP.AUTO: NEGATIVE
BILIRUB UR QL STRIP.AUTO: NEGATIVE
BLD GP AB SCN SERPL QL: NORMAL
BODY TEMPERATURE: ABNORMAL DEGREES
BREATHS SETTING VENT: 18
BREATHS SETTING VENT: 18
BREATHS SETTING VENT: 20
BREATHS SETTING VENT: 22
BUN SERPL-MCNC: 14 MG/DL (ref 8–22)
BUN SERPL-MCNC: 14 MG/DL (ref 8–22)
BUN SERPL-MCNC: 15 MG/DL (ref 8–22)
BUN SERPL-MCNC: 16 MG/DL (ref 8–22)
BUN SERPL-MCNC: 16 MG/DL (ref 8–22)
BUN SERPL-MCNC: 17 MG/DL (ref 8–22)
BUN SERPL-MCNC: 17 MG/DL (ref 8–22)
BUN SERPL-MCNC: 18 MG/DL (ref 8–22)
BUN SERPL-MCNC: 19 MG/DL (ref 8–22)
BUN SERPL-MCNC: 22 MG/DL (ref 8–22)
BUN SERPL-MCNC: 22 MG/DL (ref 8–22)
BUN SERPL-MCNC: 24 MG/DL (ref 8–22)
BUN SERPL-MCNC: 28 MG/DL (ref 8–22)
BUN SERPL-MCNC: 29 MG/DL (ref 8–22)
BUN SERPL-MCNC: 33 MG/DL (ref 8–22)
BUN SERPL-MCNC: 34 MG/DL (ref 8–22)
BUN SERPL-MCNC: 35 MG/DL (ref 8–22)
BURR CELLS/RBC NFR CSF MANUAL: <1 %
BZE UR QL SCN: NEGATIVE
CA-I SERPL-SCNC: 1.2 MMOL/L (ref 1.1–1.3)
CA-I SERPL-SCNC: 1.3 MMOL/L (ref 1.1–1.3)
CA-I SERPL-SCNC: 1.3 MMOL/L (ref 1.1–1.3)
CALCIUM SERPL-MCNC: 10 MG/DL (ref 8.5–10.5)
CALCIUM SERPL-MCNC: 7 MG/DL (ref 8.5–10.5)
CALCIUM SERPL-MCNC: 8.1 MG/DL (ref 8.5–10.5)
CALCIUM SERPL-MCNC: 8.2 MG/DL (ref 8.5–10.5)
CALCIUM SERPL-MCNC: 8.3 MG/DL (ref 8.5–10.5)
CALCIUM SERPL-MCNC: 8.4 MG/DL (ref 8.5–10.5)
CALCIUM SERPL-MCNC: 8.4 MG/DL (ref 8.5–10.5)
CALCIUM SERPL-MCNC: 8.5 MG/DL (ref 8.5–10.5)
CALCIUM SERPL-MCNC: 8.7 MG/DL (ref 8.5–10.5)
CALCIUM SERPL-MCNC: 8.8 MG/DL (ref 8.5–10.5)
CALCIUM SERPL-MCNC: 9 MG/DL (ref 8.5–10.5)
CALCIUM SERPL-MCNC: 9 MG/DL (ref 8.5–10.5)
CALCIUM SERPL-MCNC: 9.1 MG/DL (ref 8.5–10.5)
CALCIUM SERPL-MCNC: 9.1 MG/DL (ref 8.5–10.5)
CALCIUM SERPL-MCNC: 9.2 MG/DL (ref 8.5–10.5)
CALCIUM SERPL-MCNC: 9.3 MG/DL (ref 8.5–10.5)
CALCIUM SERPL-MCNC: 9.4 MG/DL (ref 8.5–10.5)
CALCIUM SERPL-MCNC: 9.5 MG/DL (ref 8.5–10.5)
CALCIUM SERPL-MCNC: 9.7 MG/DL (ref 8.5–10.5)
CALCIUM SERPL-MCNC: 9.8 MG/DL (ref 8.5–10.5)
CALCIUM SERPL-MCNC: 9.8 MG/DL (ref 8.5–10.5)
CALCIUM SERPL-MCNC: 9.9 MG/DL (ref 8.5–10.5)
CANNABINOIDS UR QL SCN: NEGATIVE
CHLORIDE SERPL-SCNC: 101 MMOL/L (ref 96–112)
CHLORIDE SERPL-SCNC: 101 MMOL/L (ref 96–112)
CHLORIDE SERPL-SCNC: 104 MMOL/L (ref 96–112)
CHLORIDE SERPL-SCNC: 106 MMOL/L (ref 96–112)
CHLORIDE SERPL-SCNC: 106 MMOL/L (ref 96–112)
CHLORIDE SERPL-SCNC: 107 MMOL/L (ref 96–112)
CHLORIDE SERPL-SCNC: 107 MMOL/L (ref 96–112)
CHLORIDE SERPL-SCNC: 109 MMOL/L (ref 96–112)
CHLORIDE SERPL-SCNC: 109 MMOL/L (ref 96–112)
CHLORIDE SERPL-SCNC: 110 MMOL/L (ref 96–112)
CHLORIDE SERPL-SCNC: 112 MMOL/L (ref 96–112)
CHLORIDE SERPL-SCNC: 113 MMOL/L (ref 96–112)
CHLORIDE SERPL-SCNC: 113 MMOL/L (ref 96–112)
CHLORIDE SERPL-SCNC: 114 MMOL/L (ref 96–112)
CHLORIDE SERPL-SCNC: 114 MMOL/L (ref 96–112)
CHLORIDE SERPL-SCNC: 115 MMOL/L (ref 96–112)
CHLORIDE SERPL-SCNC: 116 MMOL/L (ref 96–112)
CHLORIDE SERPL-SCNC: 116 MMOL/L (ref 96–112)
CHLORIDE SERPL-SCNC: 120 MMOL/L (ref 96–112)
CHLORIDE SERPL-SCNC: 121 MMOL/L (ref 96–112)
CHLORIDE SERPL-SCNC: 122 MMOL/L (ref 96–112)
CHLORIDE SERPL-SCNC: 98 MMOL/L (ref 96–112)
CHLORIDE SERPL-SCNC: 98 MMOL/L (ref 96–112)
CHOLEST SERPL-MCNC: 125 MG/DL (ref 100–199)
CLARITY CSF: ABNORMAL
CO2 BLDA-SCNC: 23 MMOL/L (ref 20–33)
CO2 BLDA-SCNC: 25 MMOL/L (ref 20–33)
CO2 BLDA-SCNC: 27 MMOL/L (ref 20–33)
CO2 SERPL-SCNC: 18 MMOL/L (ref 20–33)
CO2 SERPL-SCNC: 19 MMOL/L (ref 20–33)
CO2 SERPL-SCNC: 19 MMOL/L (ref 20–33)
CO2 SERPL-SCNC: 20 MMOL/L (ref 20–33)
CO2 SERPL-SCNC: 20 MMOL/L (ref 20–33)
CO2 SERPL-SCNC: 21 MMOL/L (ref 20–33)
CO2 SERPL-SCNC: 22 MMOL/L (ref 20–33)
CO2 SERPL-SCNC: 23 MMOL/L (ref 20–33)
CO2 SERPL-SCNC: 24 MMOL/L (ref 20–33)
CO2 SERPL-SCNC: 25 MMOL/L (ref 20–33)
CO2 SERPL-SCNC: 25 MMOL/L (ref 20–33)
CO2 SERPL-SCNC: 26 MMOL/L (ref 20–33)
CO2 SERPL-SCNC: 27 MMOL/L (ref 20–33)
COLOR CSF: ABNORMAL
COLOR SPUN CSF: COLORLESS
COLOR UR: YELLOW
COLOR UR: YELLOW
CREAT SERPL-MCNC: 0.66 MG/DL (ref 0.5–1.4)
CREAT SERPL-MCNC: 0.84 MG/DL (ref 0.5–1.4)
CREAT SERPL-MCNC: 0.84 MG/DL (ref 0.5–1.4)
CREAT SERPL-MCNC: 0.85 MG/DL (ref 0.5–1.4)
CREAT SERPL-MCNC: 0.86 MG/DL (ref 0.5–1.4)
CREAT SERPL-MCNC: 0.86 MG/DL (ref 0.5–1.4)
CREAT SERPL-MCNC: 0.87 MG/DL (ref 0.5–1.4)
CREAT SERPL-MCNC: 0.89 MG/DL (ref 0.5–1.4)
CREAT SERPL-MCNC: 0.9 MG/DL (ref 0.5–1.4)
CREAT SERPL-MCNC: 0.9 MG/DL (ref 0.5–1.4)
CREAT SERPL-MCNC: 0.91 MG/DL (ref 0.5–1.4)
CREAT SERPL-MCNC: 0.92 MG/DL (ref 0.5–1.4)
CREAT SERPL-MCNC: 0.93 MG/DL (ref 0.5–1.4)
CREAT SERPL-MCNC: 0.93 MG/DL (ref 0.5–1.4)
CREAT SERPL-MCNC: 0.94 MG/DL (ref 0.5–1.4)
CREAT SERPL-MCNC: 0.96 MG/DL (ref 0.5–1.4)
CREAT SERPL-MCNC: 1 MG/DL (ref 0.5–1.4)
CREAT SERPL-MCNC: 1.02 MG/DL (ref 0.5–1.4)
CREAT SERPL-MCNC: 1.03 MG/DL (ref 0.5–1.4)
CREAT SERPL-MCNC: 1.03 MG/DL (ref 0.5–1.4)
CREAT SERPL-MCNC: 1.05 MG/DL (ref 0.5–1.4)
CREAT SERPL-MCNC: 1.08 MG/DL (ref 0.5–1.4)
CREAT SERPL-MCNC: 1.12 MG/DL (ref 0.5–1.4)
CREAT SERPL-MCNC: 1.12 MG/DL (ref 0.5–1.4)
CREAT SERPL-MCNC: 1.18 MG/DL (ref 0.5–1.4)
CRP SERPL HS-MCNC: 1.65 MG/DL (ref 0–0.75)
CRP SERPL HS-MCNC: 5.36 MG/DL (ref 0–0.75)
CRP SERPL HS-MCNC: 7.87 MG/DL (ref 0–0.75)
CSF COMMENTS 1658: ABNORMAL
DELSYS IDSYS: ABNORMAL
EKG IMPRESSION: NORMAL
END TIDAL CARBON DIOXIDE IECO2: 29 MMHG
END TIDAL CARBON DIOXIDE IECO2: 30 MMHG
END TIDAL CARBON DIOXIDE IECO2: 38 MMHG
END TIDAL CARBON DIOXIDE IECO2: 41 MMHG
EOSINOPHIL # BLD AUTO: 0 K/UL (ref 0–0.51)
EOSINOPHIL # BLD AUTO: 0.12 K/UL (ref 0–0.51)
EOSINOPHIL # BLD AUTO: 0.15 K/UL (ref 0–0.51)
EOSINOPHIL # BLD AUTO: 0.21 K/UL (ref 0–0.51)
EOSINOPHIL NFR BLD: 0 % (ref 0–6.9)
EOSINOPHIL NFR BLD: 1.1 % (ref 0–6.9)
EOSINOPHIL NFR BLD: 1.9 % (ref 0–6.9)
EOSINOPHIL NFR BLD: 2 % (ref 0–6.9)
EPI CELLS #/AREA URNS HPF: NEGATIVE /HPF
EPI CELLS #/AREA URNS HPF: NEGATIVE /HPF
ERYTHROCYTE [DISTWIDTH] IN BLOOD BY AUTOMATED COUNT: 43 FL (ref 35.9–50)
ERYTHROCYTE [DISTWIDTH] IN BLOOD BY AUTOMATED COUNT: 43.3 FL (ref 35.9–50)
ERYTHROCYTE [DISTWIDTH] IN BLOOD BY AUTOMATED COUNT: 43.9 FL (ref 35.9–50)
ERYTHROCYTE [DISTWIDTH] IN BLOOD BY AUTOMATED COUNT: 43.9 FL (ref 35.9–50)
ERYTHROCYTE [DISTWIDTH] IN BLOOD BY AUTOMATED COUNT: 44.7 FL (ref 35.9–50)
ERYTHROCYTE [DISTWIDTH] IN BLOOD BY AUTOMATED COUNT: 44.8 FL (ref 35.9–50)
ERYTHROCYTE [DISTWIDTH] IN BLOOD BY AUTOMATED COUNT: 44.9 FL (ref 35.9–50)
ERYTHROCYTE [DISTWIDTH] IN BLOOD BY AUTOMATED COUNT: 45.4 FL (ref 35.9–50)
ERYTHROCYTE [DISTWIDTH] IN BLOOD BY AUTOMATED COUNT: 45.5 FL (ref 35.9–50)
ERYTHROCYTE [DISTWIDTH] IN BLOOD BY AUTOMATED COUNT: 45.9 FL (ref 35.9–50)
ERYTHROCYTE [DISTWIDTH] IN BLOOD BY AUTOMATED COUNT: 46.5 FL (ref 35.9–50)
ERYTHROCYTE [DISTWIDTH] IN BLOOD BY AUTOMATED COUNT: 46.9 FL (ref 35.9–50)
ERYTHROCYTE [DISTWIDTH] IN BLOOD BY AUTOMATED COUNT: 47.3 FL (ref 35.9–50)
ERYTHROCYTE [DISTWIDTH] IN BLOOD BY AUTOMATED COUNT: 47.7 FL (ref 35.9–50)
ETHANOL BLD-MCNC: <10.1 MG/DL
FLUAV RNA SPEC QL NAA+PROBE: NEGATIVE
FLUAV RNA SPEC QL NAA+PROBE: NEGATIVE
FLUBV RNA SPEC QL NAA+PROBE: NEGATIVE
FLUBV RNA SPEC QL NAA+PROBE: NEGATIVE
GFR SERPLBLD CREATININE-BSD FMLA CKD-EPI: 103 ML/MIN/1.73 M 2
GFR SERPLBLD CREATININE-BSD FMLA CKD-EPI: 68 ML/MIN/1.73 M 2
GFR SERPLBLD CREATININE-BSD FMLA CKD-EPI: 72 ML/MIN/1.73 M 2
GFR SERPLBLD CREATININE-BSD FMLA CKD-EPI: 72 ML/MIN/1.73 M 2
GFR SERPLBLD CREATININE-BSD FMLA CKD-EPI: 75 ML/MIN/1.73 M 2
GFR SERPLBLD CREATININE-BSD FMLA CKD-EPI: 78 ML/MIN/1.73 M 2
GFR SERPLBLD CREATININE-BSD FMLA CKD-EPI: 80 ML/MIN/1.73 M 2
GFR SERPLBLD CREATININE-BSD FMLA CKD-EPI: 80 ML/MIN/1.73 M 2
GFR SERPLBLD CREATININE-BSD FMLA CKD-EPI: 81 ML/MIN/1.73 M 2
GFR SERPLBLD CREATININE-BSD FMLA CKD-EPI: 83 ML/MIN/1.73 M 2
GFR SERPLBLD CREATININE-BSD FMLA CKD-EPI: 87 ML/MIN/1.73 M 2
GFR SERPLBLD CREATININE-BSD FMLA CKD-EPI: 89 ML/MIN/1.73 M 2
GFR SERPLBLD CREATININE-BSD FMLA CKD-EPI: 90 ML/MIN/1.73 M 2
GFR SERPLBLD CREATININE-BSD FMLA CKD-EPI: 90 ML/MIN/1.73 M 2
GFR SERPLBLD CREATININE-BSD FMLA CKD-EPI: 91 ML/MIN/1.73 M 2
GFR SERPLBLD CREATININE-BSD FMLA CKD-EPI: 93 ML/MIN/1.73 M 2
GFR SERPLBLD CREATININE-BSD FMLA CKD-EPI: 94 ML/MIN/1.73 M 2
GFR SERPLBLD CREATININE-BSD FMLA CKD-EPI: 95 ML/MIN/1.73 M 2
GFR SERPLBLD CREATININE-BSD FMLA CKD-EPI: 96 ML/MIN/1.73 M 2
GFR SERPLBLD CREATININE-BSD FMLA CKD-EPI: 96 ML/MIN/1.73 M 2
GLOBULIN SER CALC-MCNC: 2.1 G/DL (ref 1.9–3.5)
GLOBULIN SER CALC-MCNC: 2.8 G/DL (ref 1.9–3.5)
GLOBULIN SER CALC-MCNC: 3.5 G/DL (ref 1.9–3.5)
GLUCOSE BLD STRIP.AUTO-MCNC: 102 MG/DL (ref 65–99)
GLUCOSE BLD STRIP.AUTO-MCNC: 95 MG/DL (ref 65–99)
GLUCOSE BLD STRIP.AUTO-MCNC: 98 MG/DL (ref 65–99)
GLUCOSE CSF-MCNC: 86 MG/DL (ref 40–80)
GLUCOSE SERPL-MCNC: 105 MG/DL (ref 65–99)
GLUCOSE SERPL-MCNC: 111 MG/DL (ref 65–99)
GLUCOSE SERPL-MCNC: 114 MG/DL (ref 65–99)
GLUCOSE SERPL-MCNC: 116 MG/DL (ref 65–99)
GLUCOSE SERPL-MCNC: 117 MG/DL (ref 65–99)
GLUCOSE SERPL-MCNC: 119 MG/DL (ref 65–99)
GLUCOSE SERPL-MCNC: 119 MG/DL (ref 65–99)
GLUCOSE SERPL-MCNC: 121 MG/DL (ref 65–99)
GLUCOSE SERPL-MCNC: 125 MG/DL (ref 65–99)
GLUCOSE SERPL-MCNC: 125 MG/DL (ref 65–99)
GLUCOSE SERPL-MCNC: 126 MG/DL (ref 65–99)
GLUCOSE SERPL-MCNC: 131 MG/DL (ref 65–99)
GLUCOSE SERPL-MCNC: 132 MG/DL (ref 65–99)
GLUCOSE SERPL-MCNC: 134 MG/DL (ref 65–99)
GLUCOSE SERPL-MCNC: 136 MG/DL (ref 65–99)
GLUCOSE SERPL-MCNC: 137 MG/DL (ref 65–99)
GLUCOSE SERPL-MCNC: 140 MG/DL (ref 65–99)
GLUCOSE SERPL-MCNC: 149 MG/DL (ref 65–99)
GLUCOSE SERPL-MCNC: 154 MG/DL (ref 65–99)
GLUCOSE SERPL-MCNC: 154 MG/DL (ref 65–99)
GLUCOSE SERPL-MCNC: 160 MG/DL (ref 65–99)
GLUCOSE SERPL-MCNC: 161 MG/DL (ref 65–99)
GLUCOSE SERPL-MCNC: 98 MG/DL (ref 65–99)
GLUCOSE UR STRIP.AUTO-MCNC: NEGATIVE MG/DL
GLUCOSE UR STRIP.AUTO-MCNC: NEGATIVE MG/DL
GRAM STN SPEC: NORMAL
GRAM STN SPEC: NORMAL
HCO3 BLDA-SCNC: 21.6 MMOL/L (ref 17–25)
HCO3 BLDA-SCNC: 21.9 MMOL/L (ref 17–25)
HCO3 BLDA-SCNC: 22.1 MMOL/L (ref 17–25)
HCO3 BLDA-SCNC: 22.2 MMOL/L (ref 17–25)
HCO3 BLDA-SCNC: 24.2 MMOL/L (ref 17–25)
HCO3 BLDA-SCNC: 26.3 MMOL/L (ref 17–25)
HCT VFR BLD AUTO: 39.3 % (ref 42–52)
HCT VFR BLD AUTO: 40.3 % (ref 42–52)
HCT VFR BLD AUTO: 40.6 % (ref 42–52)
HCT VFR BLD AUTO: 41.3 % (ref 42–52)
HCT VFR BLD AUTO: 42.6 % (ref 42–52)
HCT VFR BLD AUTO: 42.9 % (ref 42–52)
HCT VFR BLD AUTO: 43.9 % (ref 42–52)
HCT VFR BLD AUTO: 44.7 % (ref 42–52)
HCT VFR BLD AUTO: 44.7 % (ref 42–52)
HCT VFR BLD AUTO: 45.5 % (ref 42–52)
HCT VFR BLD AUTO: 45.8 % (ref 42–52)
HCT VFR BLD AUTO: 46.8 % (ref 42–52)
HCT VFR BLD AUTO: 47.5 % (ref 42–52)
HCT VFR BLD AUTO: 48.4 % (ref 42–52)
HDLC SERPL-MCNC: 45 MG/DL
HGB BLD-MCNC: 12.4 G/DL (ref 14–18)
HGB BLD-MCNC: 13 G/DL (ref 14–18)
HGB BLD-MCNC: 13.1 G/DL (ref 14–18)
HGB BLD-MCNC: 13.3 G/DL (ref 14–18)
HGB BLD-MCNC: 13.7 G/DL (ref 14–18)
HGB BLD-MCNC: 13.8 G/DL (ref 14–18)
HGB BLD-MCNC: 14.2 G/DL (ref 14–18)
HGB BLD-MCNC: 14.3 G/DL (ref 14–18)
HGB BLD-MCNC: 14.4 G/DL (ref 14–18)
HGB BLD-MCNC: 14.6 G/DL (ref 14–18)
HGB BLD-MCNC: 14.8 G/DL (ref 14–18)
HGB BLD-MCNC: 15.3 G/DL (ref 14–18)
HGB BLD-MCNC: 15.7 G/DL (ref 14–18)
HGB BLD-MCNC: 16.3 G/DL (ref 14–18)
HIV 1+2 AB+HIV1 P24 AG SERPL QL IA: NORMAL
HOROWITZ INDEX BLDA+IHG-RTO: 337 MM[HG]
HOROWITZ INDEX BLDA+IHG-RTO: 347 MM[HG]
HOROWITZ INDEX BLDA+IHG-RTO: 353 MM[HG]
HOROWITZ INDEX BLDA+IHG-RTO: 363 MM[HG]
HOROWITZ INDEX BLDA+IHG-RTO: 367 MM[HG]
HOROWITZ INDEX BLDA+IHG-RTO: 403 MM[HG]
HYALINE CASTS #/AREA URNS LPF: ABNORMAL /LPF
IMM GRANULOCYTES # BLD AUTO: 0.02 K/UL (ref 0–0.11)
IMM GRANULOCYTES # BLD AUTO: 0.03 K/UL (ref 0–0.11)
IMM GRANULOCYTES # BLD AUTO: 0.05 K/UL (ref 0–0.11)
IMM GRANULOCYTES # BLD AUTO: 0.05 K/UL (ref 0–0.11)
IMM GRANULOCYTES # BLD AUTO: 0.06 K/UL (ref 0–0.11)
IMM GRANULOCYTES # BLD AUTO: 0.08 K/UL (ref 0–0.11)
IMM GRANULOCYTES # BLD AUTO: 0.08 K/UL (ref 0–0.11)
IMM GRANULOCYTES # BLD AUTO: 0.09 K/UL (ref 0–0.11)
IMM GRANULOCYTES NFR BLD AUTO: 0.3 % (ref 0–0.9)
IMM GRANULOCYTES NFR BLD AUTO: 0.4 % (ref 0–0.9)
IMM GRANULOCYTES NFR BLD AUTO: 0.5 % (ref 0–0.9)
IMM GRANULOCYTES NFR BLD AUTO: 0.6 % (ref 0–0.9)
IMM GRANULOCYTES NFR BLD AUTO: 0.7 % (ref 0–0.9)
INR PPP: 1.06 (ref 0.87–1.13)
INR PPP: 1.18 (ref 0.87–1.13)
INR PPP: 1.34 (ref 0.87–1.13)
KETONES UR STRIP.AUTO-MCNC: ABNORMAL MG/DL
KETONES UR STRIP.AUTO-MCNC: NEGATIVE MG/DL
LACTATE SERPL-SCNC: 2 MMOL/L (ref 0.5–2)
LACTATE SERPL-SCNC: 2.3 MMOL/L (ref 0.5–2)
LACTATE SERPL-SCNC: 2.4 MMOL/L (ref 0.5–2)
LDLC SERPL CALC-MCNC: 74 MG/DL
LEUKOCYTE ESTERASE UR QL STRIP.AUTO: ABNORMAL
LEUKOCYTE ESTERASE UR QL STRIP.AUTO: NEGATIVE
LIPASE SERPL-CCNC: 9 U/L (ref 11–82)
LV EJECT FRACT  99904: 70
LV EJECT FRACT MOD 4C 99902: 64.4
LYMPHOCYTES # BLD AUTO: 1.14 K/UL (ref 1–4.8)
LYMPHOCYTES # BLD AUTO: 1.27 K/UL (ref 1–4.8)
LYMPHOCYTES # BLD AUTO: 1.62 K/UL (ref 1–4.8)
LYMPHOCYTES # BLD AUTO: 1.64 K/UL (ref 1–4.8)
LYMPHOCYTES # BLD AUTO: 1.73 K/UL (ref 1–4.8)
LYMPHOCYTES # BLD AUTO: 1.96 K/UL (ref 1–4.8)
LYMPHOCYTES # BLD AUTO: 2.17 K/UL (ref 1–4.8)
LYMPHOCYTES # BLD AUTO: 2.22 K/UL (ref 1–4.8)
LYMPHOCYTES # BLD AUTO: 2.27 K/UL (ref 1–4.8)
LYMPHOCYTES # BLD AUTO: 2.58 K/UL (ref 1–4.8)
LYMPHOCYTES NFR BLD: 11 % (ref 22–41)
LYMPHOCYTES NFR BLD: 14.7 % (ref 22–41)
LYMPHOCYTES NFR BLD: 16.5 % (ref 22–41)
LYMPHOCYTES NFR BLD: 18 % (ref 22–41)
LYMPHOCYTES NFR BLD: 20 % (ref 22–41)
LYMPHOCYTES NFR BLD: 23 % (ref 22–41)
LYMPHOCYTES NFR BLD: 23.7 % (ref 22–41)
LYMPHOCYTES NFR BLD: 30.2 % (ref 22–41)
LYMPHOCYTES NFR BLD: 9.4 % (ref 22–41)
LYMPHOCYTES NFR BLD: 9.5 % (ref 22–41)
LYMPHOCYTES NFR CSF: 25 %
MAGNESIUM SERPL-MCNC: 1.6 MG/DL (ref 1.5–2.5)
MAGNESIUM SERPL-MCNC: 2 MG/DL (ref 1.5–2.5)
MAGNESIUM SERPL-MCNC: 2.2 MG/DL (ref 1.5–2.5)
MAGNESIUM SERPL-MCNC: 2.2 MG/DL (ref 1.5–2.5)
MAGNESIUM SERPL-MCNC: 2.3 MG/DL (ref 1.5–2.5)
MAGNESIUM SERPL-MCNC: 2.4 MG/DL (ref 1.5–2.5)
MAGNESIUM SERPL-MCNC: 2.4 MG/DL (ref 1.5–2.5)
MCH RBC QN AUTO: 27.1 PG (ref 27–33)
MCH RBC QN AUTO: 27.1 PG (ref 27–33)
MCH RBC QN AUTO: 27.2 PG (ref 27–33)
MCH RBC QN AUTO: 27.2 PG (ref 27–33)
MCH RBC QN AUTO: 27.4 PG (ref 27–33)
MCH RBC QN AUTO: 27.5 PG (ref 27–33)
MCH RBC QN AUTO: 27.5 PG (ref 27–33)
MCH RBC QN AUTO: 27.6 PG (ref 27–33)
MCH RBC QN AUTO: 27.6 PG (ref 27–33)
MCH RBC QN AUTO: 27.7 PG (ref 27–33)
MCH RBC QN AUTO: 27.7 PG (ref 27–33)
MCH RBC QN AUTO: 27.8 PG (ref 27–33)
MCHC RBC AUTO-ENTMCNC: 31.6 G/DL (ref 33.7–35.3)
MCHC RBC AUTO-ENTMCNC: 31.9 G/DL (ref 33.7–35.3)
MCHC RBC AUTO-ENTMCNC: 31.9 G/DL (ref 33.7–35.3)
MCHC RBC AUTO-ENTMCNC: 32 G/DL (ref 33.7–35.3)
MCHC RBC AUTO-ENTMCNC: 32.2 G/DL (ref 33.7–35.3)
MCHC RBC AUTO-ENTMCNC: 32.2 G/DL (ref 33.7–35.3)
MCHC RBC AUTO-ENTMCNC: 32.3 G/DL (ref 33.7–35.3)
MCHC RBC AUTO-ENTMCNC: 32.4 G/DL (ref 33.7–35.3)
MCHC RBC AUTO-ENTMCNC: 32.5 G/DL (ref 33.7–35.3)
MCHC RBC AUTO-ENTMCNC: 32.7 G/DL (ref 33.7–35.3)
MCHC RBC AUTO-ENTMCNC: 33.1 G/DL (ref 33.7–35.3)
MCHC RBC AUTO-ENTMCNC: 33.7 G/DL (ref 33.7–35.3)
MCV RBC AUTO: 82 FL (ref 81.4–97.8)
MCV RBC AUTO: 83.6 FL (ref 81.4–97.8)
MCV RBC AUTO: 84 FL (ref 81.4–97.8)
MCV RBC AUTO: 84.1 FL (ref 81.4–97.8)
MCV RBC AUTO: 84.1 FL (ref 81.4–97.8)
MCV RBC AUTO: 84.7 FL (ref 81.4–97.8)
MCV RBC AUTO: 85.1 FL (ref 81.4–97.8)
MCV RBC AUTO: 85.1 FL (ref 81.4–97.8)
MCV RBC AUTO: 85.2 FL (ref 81.4–97.8)
MCV RBC AUTO: 85.3 FL (ref 81.4–97.8)
MCV RBC AUTO: 86 FL (ref 81.4–97.8)
MCV RBC AUTO: 86.1 FL (ref 81.4–97.8)
MCV RBC AUTO: 86.3 FL (ref 81.4–97.8)
MCV RBC AUTO: 86.5 FL (ref 81.4–97.8)
METHADONE UR QL SCN: NEGATIVE
MICRO URNS: ABNORMAL
MICRO URNS: ABNORMAL
MODE IMODE: ABNORMAL
MONOCYTES # BLD AUTO: 0.56 K/UL (ref 0–0.85)
MONOCYTES # BLD AUTO: 0.64 K/UL (ref 0–0.85)
MONOCYTES # BLD AUTO: 0.86 K/UL (ref 0–0.85)
MONOCYTES # BLD AUTO: 0.95 K/UL (ref 0–0.85)
MONOCYTES # BLD AUTO: 1.29 K/UL (ref 0–0.85)
MONOCYTES # BLD AUTO: 1.46 K/UL (ref 0–0.85)
MONOCYTES # BLD AUTO: 1.47 K/UL (ref 0–0.85)
MONOCYTES # BLD AUTO: 1.53 K/UL (ref 0–0.85)
MONOCYTES # BLD AUTO: 1.57 K/UL (ref 0–0.85)
MONOCYTES # BLD AUTO: 1.85 K/UL (ref 0–0.85)
MONOCYTES NFR BLD AUTO: 10.4 % (ref 0–13.4)
MONOCYTES NFR BLD AUTO: 12.6 % (ref 0–13.4)
MONOCYTES NFR BLD AUTO: 13.1 % (ref 0–13.4)
MONOCYTES NFR BLD AUTO: 14.7 % (ref 0–13.4)
MONOCYTES NFR BLD AUTO: 15.1 % (ref 0–13.4)
MONOCYTES NFR BLD AUTO: 7.6 % (ref 0–13.4)
MONOCYTES NFR BLD AUTO: 7.9 % (ref 0–13.4)
MONOCYTES NFR BLD AUTO: 8 % (ref 0–13.4)
MONOCYTES NFR BLD AUTO: 8.7 % (ref 0–13.4)
MONOCYTES NFR BLD AUTO: 8.7 % (ref 0–13.4)
MONONUC CELLS NFR CSF: 8 %
NEUTROPHILS # BLD AUTO: 10.98 K/UL (ref 1.82–7.42)
NEUTROPHILS # BLD AUTO: 11.1 K/UL (ref 1.82–7.42)
NEUTROPHILS # BLD AUTO: 15.02 K/UL (ref 1.82–7.42)
NEUTROPHILS # BLD AUTO: 4.25 K/UL (ref 1.82–7.42)
NEUTROPHILS # BLD AUTO: 5.19 K/UL (ref 1.82–7.42)
NEUTROPHILS # BLD AUTO: 5.21 K/UL (ref 1.82–7.42)
NEUTROPHILS # BLD AUTO: 6.78 K/UL (ref 1.82–7.42)
NEUTROPHILS # BLD AUTO: 7.12 K/UL (ref 1.82–7.42)
NEUTROPHILS # BLD AUTO: 7.9 K/UL (ref 1.82–7.42)
NEUTROPHILS # BLD AUTO: 9.17 K/UL (ref 1.82–7.42)
NEUTROPHILS NFR BLD: 58 % (ref 44–72)
NEUTROPHILS NFR BLD: 61 % (ref 44–72)
NEUTROPHILS NFR BLD: 65.5 % (ref 44–72)
NEUTROPHILS NFR BLD: 68.2 % (ref 44–72)
NEUTROPHILS NFR BLD: 69.6 % (ref 44–72)
NEUTROPHILS NFR BLD: 73.7 % (ref 44–72)
NEUTROPHILS NFR BLD: 74.3 % (ref 44–72)
NEUTROPHILS NFR BLD: 75.6 % (ref 44–72)
NEUTROPHILS NFR BLD: 76.8 % (ref 44–72)
NEUTROPHILS NFR BLD: 82 % (ref 44–72)
NEUTROPHILS NFR CSF: 67 %
NITRITE UR QL STRIP.AUTO: NEGATIVE
NITRITE UR QL STRIP.AUTO: NEGATIVE
NRBC # BLD AUTO: 0 K/UL
NRBC BLD-RTO: 0 /100 WBC
O2/TOTAL GAS SETTING VFR VENT: 100 %
O2/TOTAL GAS SETTING VFR VENT: 30 %
OPIATES UR QL SCN: NEGATIVE
OXYCODONE UR QL SCN: NEGATIVE
PCO2 BLDA: 29 MMHG (ref 26–37)
PCO2 BLDA: 30.1 MMHG (ref 26–37)
PCO2 BLDA: 30.6 MMHG (ref 26–37)
PCO2 BLDA: 33.4 MMHG (ref 26–37)
PCO2 BLDA: 38.3 MMHG (ref 26–37)
PCO2 BLDA: 48.4 MMHG (ref 26–37)
PCO2 TEMP ADJ BLDA: 27.9 MMHG (ref 26–37)
PCO2 TEMP ADJ BLDA: 29.4 MMHG (ref 26–37)
PCO2 TEMP ADJ BLDA: 30 MMHG (ref 26–37)
PCO2 TEMP ADJ BLDA: 32.2 MMHG (ref 26–37)
PCO2 TEMP ADJ BLDA: 38 MMHG (ref 26–37)
PCO2 TEMP ADJ BLDA: 45.3 MMHG (ref 26–37)
PCP UR QL SCN: NEGATIVE
PEEP END EXPIRATORY PRESSURE IPEEP: 8 CMH20
PH BLDA: 7.26 [PH] (ref 7.4–7.5)
PH BLDA: 7.45 [PH] (ref 7.4–7.5)
PH BLDA: 7.46 [PH] (ref 7.4–7.5)
PH BLDA: 7.47 [PH] (ref 7.4–7.5)
PH BLDA: 7.47 [PH] (ref 7.4–7.5)
PH BLDA: 7.49 [PH] (ref 7.4–7.5)
PH TEMP ADJ BLDA: 7.29 [PH] (ref 7.4–7.5)
PH TEMP ADJ BLDA: 7.45 [PH] (ref 7.4–7.5)
PH TEMP ADJ BLDA: 7.46 [PH] (ref 7.4–7.5)
PH TEMP ADJ BLDA: 7.48 [PH] (ref 7.4–7.5)
PH TEMP ADJ BLDA: 7.48 [PH] (ref 7.4–7.5)
PH TEMP ADJ BLDA: 7.5 [PH] (ref 7.4–7.5)
PH UR STRIP.AUTO: 5 [PH] (ref 5–8)
PH UR STRIP.AUTO: 6 [PH] (ref 5–8)
PHOSPHATE SERPL-MCNC: 1.3 MG/DL (ref 2.5–4.5)
PHOSPHATE SERPL-MCNC: 2.1 MG/DL (ref 2.5–4.5)
PHOSPHATE SERPL-MCNC: 2.5 MG/DL (ref 2.5–4.5)
PHOSPHATE SERPL-MCNC: 2.6 MG/DL (ref 2.5–4.5)
PHOSPHATE SERPL-MCNC: 2.7 MG/DL (ref 2.5–4.5)
PHOSPHATE SERPL-MCNC: 2.8 MG/DL (ref 2.5–4.5)
PHOSPHATE SERPL-MCNC: 2.9 MG/DL (ref 2.5–4.5)
PLATELET # BLD AUTO: 223 K/UL (ref 164–446)
PLATELET # BLD AUTO: 224 K/UL (ref 164–446)
PLATELET # BLD AUTO: 234 K/UL (ref 164–446)
PLATELET # BLD AUTO: 246 K/UL (ref 164–446)
PLATELET # BLD AUTO: 246 K/UL (ref 164–446)
PLATELET # BLD AUTO: 275 K/UL (ref 164–446)
PLATELET # BLD AUTO: 311 K/UL (ref 164–446)
PLATELET # BLD AUTO: 376 K/UL (ref 164–446)
PLATELET # BLD AUTO: 415 K/UL (ref 164–446)
PLATELET # BLD AUTO: 422 K/UL (ref 164–446)
PLATELET # BLD AUTO: 474 K/UL (ref 164–446)
PLATELET # BLD AUTO: 480 K/UL (ref 164–446)
PLATELET # BLD AUTO: 520 K/UL (ref 164–446)
PLATELET # BLD AUTO: 522 K/UL (ref 164–446)
PMV BLD AUTO: 10.5 FL (ref 9–12.9)
PMV BLD AUTO: 10.8 FL (ref 9–12.9)
PMV BLD AUTO: 10.8 FL (ref 9–12.9)
PMV BLD AUTO: 10.9 FL (ref 9–12.9)
PMV BLD AUTO: 11 FL (ref 9–12.9)
PMV BLD AUTO: 11 FL (ref 9–12.9)
PMV BLD AUTO: 11.1 FL (ref 9–12.9)
PMV BLD AUTO: 11.3 FL (ref 9–12.9)
PMV BLD AUTO: 11.4 FL (ref 9–12.9)
PMV BLD AUTO: 11.6 FL (ref 9–12.9)
PMV BLD AUTO: 11.8 FL (ref 9–12.9)
PMV BLD AUTO: 11.9 FL (ref 9–12.9)
PMV BLD AUTO: 11.9 FL (ref 9–12.9)
PMV BLD AUTO: 12.1 FL (ref 9–12.9)
PO2 BLDA: 101 MMHG (ref 64–87)
PO2 BLDA: 104 MMHG (ref 64–87)
PO2 BLDA: 106 MMHG (ref 64–87)
PO2 BLDA: 109 MMHG (ref 64–87)
PO2 BLDA: 110 MMHG (ref 64–87)
PO2 BLDA: 403 MMHG (ref 64–87)
PO2 TEMP ADJ BLDA: 107 MMHG (ref 64–87)
PO2 TEMP ADJ BLDA: 108 MMHG (ref 64–87)
PO2 TEMP ADJ BLDA: 398 MMHG (ref 64–87)
PO2 TEMP ADJ BLDA: 97 MMHG (ref 64–87)
PO2 TEMP ADJ BLDA: 98 MMHG (ref 64–87)
PO2 TEMP ADJ BLDA: 99 MMHG (ref 64–87)
POTASSIUM SERPL-SCNC: 3.1 MMOL/L (ref 3.6–5.5)
POTASSIUM SERPL-SCNC: 3.6 MMOL/L (ref 3.6–5.5)
POTASSIUM SERPL-SCNC: 3.7 MMOL/L (ref 3.6–5.5)
POTASSIUM SERPL-SCNC: 3.7 MMOL/L (ref 3.6–5.5)
POTASSIUM SERPL-SCNC: 3.8 MMOL/L (ref 3.6–5.5)
POTASSIUM SERPL-SCNC: 3.9 MMOL/L (ref 3.6–5.5)
POTASSIUM SERPL-SCNC: 4 MMOL/L (ref 3.6–5.5)
POTASSIUM SERPL-SCNC: 4.1 MMOL/L (ref 3.6–5.5)
POTASSIUM SERPL-SCNC: 4.1 MMOL/L (ref 3.6–5.5)
POTASSIUM SERPL-SCNC: 4.2 MMOL/L (ref 3.6–5.5)
POTASSIUM SERPL-SCNC: 4.2 MMOL/L (ref 3.6–5.5)
POTASSIUM SERPL-SCNC: 4.3 MMOL/L (ref 3.6–5.5)
POTASSIUM SERPL-SCNC: 4.4 MMOL/L (ref 3.6–5.5)
POTASSIUM SERPL-SCNC: 4.5 MMOL/L (ref 3.6–5.5)
POTASSIUM SERPL-SCNC: 4.6 MMOL/L (ref 3.6–5.5)
POTASSIUM SERPL-SCNC: 4.9 MMOL/L (ref 3.6–5.5)
POTASSIUM SERPL-SCNC: 5.1 MMOL/L (ref 3.6–5.5)
PREALB SERPL-MCNC: 11.1 MG/DL (ref 18–38)
PREALB SERPL-MCNC: 18.6 MG/DL (ref 18–38)
PROCALCITONIN SERPL-MCNC: 1.44 NG/ML
PROPOXYPH UR QL SCN: NEGATIVE
PROT CSF-MCNC: 30 MG/DL (ref 15–45)
PROT SERPL-MCNC: 5.2 G/DL (ref 6–8.2)
PROT SERPL-MCNC: 5.9 G/DL (ref 6–8.2)
PROT SERPL-MCNC: 7.8 G/DL (ref 6–8.2)
PROT UR QL STRIP: 100 MG/DL
PROT UR QL STRIP: NEGATIVE MG/DL
PROTHROMBIN TIME: 13.5 SEC (ref 12–14.6)
PROTHROMBIN TIME: 14.7 SEC (ref 12–14.6)
PROTHROMBIN TIME: 16.3 SEC (ref 12–14.6)
PTH-INTACT SERPL-MCNC: 23.5 PG/ML (ref 14–72)
RBC # BLD AUTO: 4.57 M/UL (ref 4.7–6.1)
RBC # BLD AUTO: 4.67 M/UL (ref 4.7–6.1)
RBC # BLD AUTO: 4.77 M/UL (ref 4.7–6.1)
RBC # BLD AUTO: 4.85 M/UL (ref 4.7–6.1)
RBC # BLD AUTO: 5.03 M/UL (ref 4.7–6.1)
RBC # BLD AUTO: 5.03 M/UL (ref 4.7–6.1)
RBC # BLD AUTO: 5.17 M/UL (ref 4.7–6.1)
RBC # BLD AUTO: 5.19 M/UL (ref 4.7–6.1)
RBC # BLD AUTO: 5.22 M/UL (ref 4.7–6.1)
RBC # BLD AUTO: 5.38 M/UL (ref 4.7–6.1)
RBC # BLD AUTO: 5.41 M/UL (ref 4.7–6.1)
RBC # BLD AUTO: 5.57 M/UL (ref 4.7–6.1)
RBC # BLD AUTO: 5.68 M/UL (ref 4.7–6.1)
RBC # BLD AUTO: 5.9 M/UL (ref 4.7–6.1)
RBC # CSF: ABNORMAL CELLS/UL
RBC # URNS HPF: ABNORMAL /HPF
RBC # URNS HPF: ABNORMAL /HPF
RBC UR QL AUTO: ABNORMAL
RBC UR QL AUTO: NEGATIVE
RH BLD: NORMAL
RPR SER QL: REACTIVE
RPR SER-TITR: ABNORMAL {TITER}
RSV RNA SPEC QL NAA+PROBE: NEGATIVE
RSV RNA SPEC QL NAA+PROBE: NEGATIVE
SAO2 % BLDA: 100 % (ref 93–99)
SAO2 % BLDA: 97 % (ref 93–99)
SAO2 % BLDA: 98 % (ref 93–99)
SAO2 % BLDA: 98 % (ref 93–99)
SAO2 % BLDA: 99 % (ref 93–99)
SAO2 % BLDA: 99 % (ref 93–99)
SARS-COV-2 RNA RESP QL NAA+PROBE: NOTDETECTED
SARS-COV-2 RNA RESP QL NAA+PROBE: NOTDETECTED
SCCMEC + MECA PNL NOSE NAA+PROBE: POSITIVE
SIGNIFICANT IND 70042: NORMAL
SITE SITE: NORMAL
SODIUM SERPL-SCNC: 134 MMOL/L (ref 135–145)
SODIUM SERPL-SCNC: 135 MMOL/L (ref 135–145)
SODIUM SERPL-SCNC: 136 MMOL/L (ref 135–145)
SODIUM SERPL-SCNC: 137 MMOL/L (ref 135–145)
SODIUM SERPL-SCNC: 138 MMOL/L (ref 135–145)
SODIUM SERPL-SCNC: 140 MMOL/L (ref 135–145)
SODIUM SERPL-SCNC: 140 MMOL/L (ref 135–145)
SODIUM SERPL-SCNC: 141 MMOL/L (ref 135–145)
SODIUM SERPL-SCNC: 143 MMOL/L (ref 135–145)
SODIUM SERPL-SCNC: 144 MMOL/L (ref 135–145)
SODIUM SERPL-SCNC: 146 MMOL/L (ref 135–145)
SODIUM SERPL-SCNC: 147 MMOL/L (ref 135–145)
SODIUM SERPL-SCNC: 148 MMOL/L (ref 135–145)
SODIUM SERPL-SCNC: 149 MMOL/L (ref 135–145)
SODIUM SERPL-SCNC: 149 MMOL/L (ref 135–145)
SODIUM SERPL-SCNC: 151 MMOL/L (ref 135–145)
SOURCE SOURCE: NORMAL
SP GR UR STRIP.AUTO: 1.02
SP GR UR STRIP.AUTO: 1.02
SPECIMEN DRAWN FROM PATIENT: ABNORMAL
SPECIMEN SOURCE: NORMAL
SPECIMEN SOURCE: NORMAL
SPECIMEN VOL CSF: 6 ML
T PALLIDUM AB SER QL AGGL: REACTIVE
T PALLIDUM AB SER QL IA: REACTIVE
T4 FREE SERPL-MCNC: 0.81 NG/DL (ref 0.93–1.7)
TIDAL VOLUME IVT: 460 ML
TRIGL SERPL-MCNC: 31 MG/DL (ref 0–149)
TRIGL SERPL-MCNC: 45 MG/DL (ref 0–149)
TRIGL SERPL-MCNC: 65 MG/DL (ref 0–149)
TROPONIN T SERPL-MCNC: 20 NG/L (ref 6–19)
TROPONIN T SERPL-MCNC: <6 NG/L (ref 6–19)
TSH SERPL DL<=0.005 MIU/L-ACNC: 0.3 UIU/ML (ref 0.38–5.33)
TUBE # CSF: 1
UROBILINOGEN UR STRIP.AUTO-MCNC: 0.2 MG/DL
UROBILINOGEN UR STRIP.AUTO-MCNC: 1 MG/DL
VDRL CSF QL: NON REACTIVE
WBC # BLD AUTO: 10.9 K/UL (ref 4.8–10.8)
WBC # BLD AUTO: 11.3 K/UL (ref 4.8–10.8)
WBC # BLD AUTO: 12 K/UL (ref 4.8–10.8)
WBC # BLD AUTO: 13 K/UL (ref 4.8–10.8)
WBC # BLD AUTO: 14.1 K/UL (ref 4.8–10.8)
WBC # BLD AUTO: 14.3 K/UL (ref 4.8–10.8)
WBC # BLD AUTO: 14.7 K/UL (ref 4.8–10.8)
WBC # BLD AUTO: 14.8 K/UL (ref 4.8–10.8)
WBC # BLD AUTO: 18.3 K/UL (ref 4.8–10.8)
WBC # BLD AUTO: 7.1 K/UL (ref 4.8–10.8)
WBC # BLD AUTO: 7.3 K/UL (ref 4.8–10.8)
WBC # BLD AUTO: 8.5 K/UL (ref 4.8–10.8)
WBC # BLD AUTO: 9.1 K/UL (ref 4.8–10.8)
WBC # BLD AUTO: 9.9 K/UL (ref 4.8–10.8)
WBC # CSF: 24 CELLS/UL (ref 0–10)
WBC #/AREA URNS HPF: ABNORMAL /HPF
WBC #/AREA URNS HPF: ABNORMAL /HPF

## 2022-01-01 PROCEDURE — 99999 PR NO CHARGE: CPT | Performed by: FAMILY MEDICINE

## 2022-01-01 PROCEDURE — 0241U HCHG SARS-COV-2 COVID-19 NFCT DS RESP RNA 4 TRGT MIC: CPT

## 2022-01-01 PROCEDURE — 770020 HCHG ROOM/CARE - TELE (206)

## 2022-01-01 PROCEDURE — 86900 BLOOD TYPING SEROLOGIC ABO: CPT

## 2022-01-01 PROCEDURE — 99291 CRITICAL CARE FIRST HOUR: CPT | Performed by: NURSE PRACTITIONER

## 2022-01-01 PROCEDURE — 700105 HCHG RX REV CODE 258: Performed by: NURSE PRACTITIONER

## 2022-01-01 PROCEDURE — 99231 SBSQ HOSP IP/OBS SF/LOW 25: CPT | Performed by: INTERNAL MEDICINE

## 2022-01-01 PROCEDURE — 86140 C-REACTIVE PROTEIN: CPT

## 2022-01-01 PROCEDURE — 85730 THROMBOPLASTIN TIME PARTIAL: CPT

## 2022-01-01 PROCEDURE — 83735 ASSAY OF MAGNESIUM: CPT

## 2022-01-01 PROCEDURE — 700105 HCHG RX REV CODE 258: Performed by: INTERNAL MEDICINE

## 2022-01-01 PROCEDURE — 99231 SBSQ HOSP IP/OBS SF/LOW 25: CPT | Mod: GC | Performed by: FAMILY MEDICINE

## 2022-01-01 PROCEDURE — 700102 HCHG RX REV CODE 250 W/ 637 OVERRIDE(OP): Performed by: STUDENT IN AN ORGANIZED HEALTH CARE EDUCATION/TRAINING PROGRAM

## 2022-01-01 PROCEDURE — A9270 NON-COVERED ITEM OR SERVICE: HCPCS | Performed by: STUDENT IN AN ORGANIZED HEALTH CARE EDUCATION/TRAINING PROGRAM

## 2022-01-01 PROCEDURE — 84439 ASSAY OF FREE THYROXINE: CPT

## 2022-01-01 PROCEDURE — 99292 CRITICAL CARE ADDL 30 MIN: CPT | Performed by: INTERNAL MEDICINE

## 2022-01-01 PROCEDURE — A9270 NON-COVERED ITEM OR SERVICE: HCPCS | Performed by: NURSE PRACTITIONER

## 2022-01-01 PROCEDURE — 700111 HCHG RX REV CODE 636 W/ 250 OVERRIDE (IP): Performed by: INTERNAL MEDICINE

## 2022-01-01 PROCEDURE — A9270 NON-COVERED ITEM OR SERVICE: HCPCS | Performed by: FAMILY MEDICINE

## 2022-01-01 PROCEDURE — 87641 MR-STAPH DNA AMP PROBE: CPT

## 2022-01-01 PROCEDURE — 770022 HCHG ROOM/CARE - ICU (200)

## 2022-01-01 PROCEDURE — 700105 HCHG RX REV CODE 258: Performed by: STUDENT IN AN ORGANIZED HEALTH CARE EDUCATION/TRAINING PROGRAM

## 2022-01-01 PROCEDURE — 00U20KZ SUPPLEMENT DURA MATER WITH NONAUTOLOGOUS TISSUE SUBSTITUTE, OPEN APPROACH: ICD-10-PCS | Performed by: NEUROLOGICAL SURGERY

## 2022-01-01 PROCEDURE — 85025 COMPLETE CBC W/AUTO DIFF WBC: CPT

## 2022-01-01 PROCEDURE — 87389 HIV-1 AG W/HIV-1&-2 AB AG IA: CPT

## 2022-01-01 PROCEDURE — 770001 HCHG ROOM/CARE - MED/SURG/GYN PRIV*

## 2022-01-01 PROCEDURE — 85610 PROTHROMBIN TIME: CPT

## 2022-01-01 PROCEDURE — 96365 THER/PROPH/DIAG IV INF INIT: CPT

## 2022-01-01 PROCEDURE — 00210 ANES INTRACRANIAL PX NOS: CPT | Performed by: ANESTHESIOLOGY

## 2022-01-01 PROCEDURE — 82803 BLOOD GASES ANY COMBINATION: CPT

## 2022-01-01 PROCEDURE — 86780 TREPONEMA PALLIDUM: CPT | Mod: 91

## 2022-01-01 PROCEDURE — 84100 ASSAY OF PHOSPHORUS: CPT

## 2022-01-01 PROCEDURE — 700101 HCHG RX REV CODE 250

## 2022-01-01 PROCEDURE — 700101 HCHG RX REV CODE 250: Performed by: ANESTHESIOLOGY

## 2022-01-01 PROCEDURE — 99232 SBSQ HOSP IP/OBS MODERATE 35: CPT | Performed by: PSYCHIATRY & NEUROLOGY

## 2022-01-01 PROCEDURE — 51798 US URINE CAPACITY MEASURE: CPT

## 2022-01-01 PROCEDURE — 93005 ELECTROCARDIOGRAM TRACING: CPT | Performed by: INTERNAL MEDICINE

## 2022-01-01 PROCEDURE — C1729 CATH, DRAINAGE: HCPCS

## 2022-01-01 PROCEDURE — 84484 ASSAY OF TROPONIN QUANT: CPT

## 2022-01-01 PROCEDURE — 700111 HCHG RX REV CODE 636 W/ 250 OVERRIDE (IP): Performed by: NURSE PRACTITIONER

## 2022-01-01 PROCEDURE — 97530 THERAPEUTIC ACTIVITIES: CPT

## 2022-01-01 PROCEDURE — 99232 SBSQ HOSP IP/OBS MODERATE 35: CPT | Mod: GC | Performed by: FAMILY MEDICINE

## 2022-01-01 PROCEDURE — 94799 UNLISTED PULMONARY SVC/PX: CPT

## 2022-01-01 PROCEDURE — 03HY32Z INSERTION OF MONITORING DEVICE INTO UPPER ARTERY, PERCUTANEOUS APPROACH: ICD-10-PCS | Performed by: STUDENT IN AN ORGANIZED HEALTH CARE EDUCATION/TRAINING PROGRAM

## 2022-01-01 PROCEDURE — 700102 HCHG RX REV CODE 250 W/ 637 OVERRIDE(OP): Performed by: NURSE PRACTITIONER

## 2022-01-01 PROCEDURE — 700102 HCHG RX REV CODE 250 W/ 637 OVERRIDE(OP): Performed by: INTERNAL MEDICINE

## 2022-01-01 PROCEDURE — 99233 SBSQ HOSP IP/OBS HIGH 50: CPT | Performed by: PSYCHIATRY & NEUROLOGY

## 2022-01-01 PROCEDURE — 74230 X-RAY XM SWLNG FUNCJ C+: CPT

## 2022-01-01 PROCEDURE — 80048 BASIC METABOLIC PNL TOTAL CA: CPT

## 2022-01-01 PROCEDURE — 82962 GLUCOSE BLOOD TEST: CPT

## 2022-01-01 PROCEDURE — 86592 SYPHILIS TEST NON-TREP QUAL: CPT

## 2022-01-01 PROCEDURE — 81001 URINALYSIS AUTO W/SCOPE: CPT

## 2022-01-01 PROCEDURE — 700111 HCHG RX REV CODE 636 W/ 250 OVERRIDE (IP): Performed by: STUDENT IN AN ORGANIZED HEALTH CARE EDUCATION/TRAINING PROGRAM

## 2022-01-01 PROCEDURE — 99233 SBSQ HOSP IP/OBS HIGH 50: CPT | Performed by: INTERNAL MEDICINE

## 2022-01-01 PROCEDURE — 36556 INSERT NON-TUNNEL CV CATH: CPT | Mod: RT | Performed by: NURSE PRACTITIONER

## 2022-01-01 PROCEDURE — 37799 UNLISTED PX VASCULAR SURGERY: CPT

## 2022-01-01 PROCEDURE — 700102 HCHG RX REV CODE 250 W/ 637 OVERRIDE(OP): Performed by: EMERGENCY MEDICINE

## 2022-01-01 PROCEDURE — A9270 NON-COVERED ITEM OR SERVICE: HCPCS | Performed by: INTERNAL MEDICINE

## 2022-01-01 PROCEDURE — 00NC0ZZ RELEASE CEREBELLUM, OPEN APPROACH: ICD-10-PCS | Performed by: NEUROLOGICAL SURGERY

## 2022-01-01 PROCEDURE — 70496 CT ANGIOGRAPHY HEAD: CPT

## 2022-01-01 PROCEDURE — 96375 TX/PRO/DX INJ NEW DRUG ADDON: CPT

## 2022-01-01 PROCEDURE — 99285 EMERGENCY DEPT VISIT HI MDM: CPT

## 2022-01-01 PROCEDURE — 93306 TTE W/DOPPLER COMPLETE: CPT | Mod: 26 | Performed by: INTERNAL MEDICINE

## 2022-01-01 PROCEDURE — 700101 HCHG RX REV CODE 250: Performed by: NURSE PRACTITIONER

## 2022-01-01 PROCEDURE — 70450 CT HEAD/BRAIN W/O DYE: CPT

## 2022-01-01 PROCEDURE — 160031 HCHG SURGERY MINUTES - 1ST 30 MINS LEVEL 5: Performed by: NEUROLOGICAL SURGERY

## 2022-01-01 PROCEDURE — 700117 HCHG RX CONTRAST REV CODE 255: Performed by: NURSE PRACTITIONER

## 2022-01-01 PROCEDURE — 71045 X-RAY EXAM CHEST 1 VIEW: CPT

## 2022-01-01 PROCEDURE — 93970 EXTREMITY STUDY: CPT | Mod: 26,GZ | Performed by: INTERNAL MEDICINE

## 2022-01-01 PROCEDURE — 99292 CRITICAL CARE ADDL 30 MIN: CPT | Performed by: NURSE PRACTITIONER

## 2022-01-01 PROCEDURE — 83605 ASSAY OF LACTIC ACID: CPT

## 2022-01-01 PROCEDURE — 82330 ASSAY OF CALCIUM: CPT

## 2022-01-01 PROCEDURE — 87040 BLOOD CULTURE FOR BACTERIA: CPT | Mod: 91

## 2022-01-01 PROCEDURE — C9803 HOPD COVID-19 SPEC COLLECT: HCPCS | Performed by: STUDENT IN AN ORGANIZED HEALTH CARE EDUCATION/TRAINING PROGRAM

## 2022-01-01 PROCEDURE — 93010 ELECTROCARDIOGRAM REPORT: CPT | Performed by: INTERNAL MEDICINE

## 2022-01-01 PROCEDURE — 99497 ADVNCD CARE PLAN 30 MIN: CPT | Performed by: INTERNAL MEDICINE

## 2022-01-01 PROCEDURE — 93880 EXTRACRANIAL BILAT STUDY: CPT | Mod: 26 | Performed by: INTERNAL MEDICINE

## 2022-01-01 PROCEDURE — 110454 HCHG SHELL REV 250: Performed by: NEUROLOGICAL SURGERY

## 2022-01-01 PROCEDURE — 99233 SBSQ HOSP IP/OBS HIGH 50: CPT | Performed by: STUDENT IN AN ORGANIZED HEALTH CARE EDUCATION/TRAINING PROGRAM

## 2022-01-01 PROCEDURE — 700102 HCHG RX REV CODE 250 W/ 637 OVERRIDE(OP): Performed by: FAMILY MEDICINE

## 2022-01-01 PROCEDURE — 31500 INSERT EMERGENCY AIRWAY: CPT | Performed by: STUDENT IN AN ORGANIZED HEALTH CARE EDUCATION/TRAINING PROGRAM

## 2022-01-01 PROCEDURE — 36415 COLL VENOUS BLD VENIPUNCTURE: CPT

## 2022-01-01 PROCEDURE — 31500 INSERT EMERGENCY AIRWAY: CPT

## 2022-01-01 PROCEDURE — 70553 MRI BRAIN STEM W/O & W/DYE: CPT

## 2022-01-01 PROCEDURE — 99232 SBSQ HOSP IP/OBS MODERATE 35: CPT | Mod: GC | Performed by: STUDENT IN AN ORGANIZED HEALTH CARE EDUCATION/TRAINING PROGRAM

## 2022-01-01 PROCEDURE — 160009 HCHG ANES TIME/MIN: Performed by: NEUROLOGICAL SURGERY

## 2022-01-01 PROCEDURE — 80061 LIPID PANEL: CPT

## 2022-01-01 PROCEDURE — 700105 HCHG RX REV CODE 258: Performed by: ANESTHESIOLOGY

## 2022-01-01 PROCEDURE — 84134 ASSAY OF PREALBUMIN: CPT

## 2022-01-01 PROCEDURE — 61107 TDH PNXR IMPLT VENTR CATH: CPT

## 2022-01-01 PROCEDURE — 92610 EVALUATE SWALLOWING FUNCTION: CPT

## 2022-01-01 PROCEDURE — 99231 SBSQ HOSP IP/OBS SF/LOW 25: CPT | Performed by: PSYCHIATRY & NEUROLOGY

## 2022-01-01 PROCEDURE — A9576 INJ PROHANCE MULTIPACK: HCPCS | Performed by: STUDENT IN AN ORGANIZED HEALTH CARE EDUCATION/TRAINING PROGRAM

## 2022-01-01 PROCEDURE — 93306 TTE W/DOPPLER COMPLETE: CPT

## 2022-01-01 PROCEDURE — 80048 BASIC METABOLIC PNL TOTAL CA: CPT | Mod: 91

## 2022-01-01 PROCEDURE — 97535 SELF CARE MNGMENT TRAINING: CPT

## 2022-01-01 PROCEDURE — 99291 CRITICAL CARE FIRST HOUR: CPT | Performed by: STUDENT IN AN ORGANIZED HEALTH CARE EDUCATION/TRAINING PROGRAM

## 2022-01-01 PROCEDURE — 80053 COMPREHEN METABOLIC PANEL: CPT

## 2022-01-01 PROCEDURE — 99233 SBSQ HOSP IP/OBS HIGH 50: CPT | Mod: GC | Performed by: FAMILY MEDICINE

## 2022-01-01 PROCEDURE — 700101 HCHG RX REV CODE 250: Performed by: STUDENT IN AN ORGANIZED HEALTH CARE EDUCATION/TRAINING PROGRAM

## 2022-01-01 PROCEDURE — 99152 MOD SED SAME PHYS/QHP 5/>YRS: CPT

## 2022-01-01 PROCEDURE — 94003 VENT MGMT INPAT SUBQ DAY: CPT

## 2022-01-01 PROCEDURE — 82140 ASSAY OF AMMONIA: CPT

## 2022-01-01 PROCEDURE — 99232 SBSQ HOSP IP/OBS MODERATE 35: CPT | Performed by: NURSE PRACTITIONER

## 2022-01-01 PROCEDURE — 84145 PROCALCITONIN (PCT): CPT

## 2022-01-01 PROCEDURE — 87205 SMEAR GRAM STAIN: CPT

## 2022-01-01 PROCEDURE — 00U207Z SUPPLEMENT DURA MATER WITH AUTOLOGOUS TISSUE SUBSTITUTE, OPEN APPROACH: ICD-10-PCS | Performed by: NEUROLOGICAL SURGERY

## 2022-01-01 PROCEDURE — 36620 INSERTION CATHETER ARTERY: CPT | Performed by: ANESTHESIOLOGY

## 2022-01-01 PROCEDURE — 160048 HCHG OR STATISTICAL LEVEL 1-5: Performed by: NEUROLOGICAL SURGERY

## 2022-01-01 PROCEDURE — 99223 1ST HOSP IP/OBS HIGH 75: CPT | Performed by: INTERNAL MEDICINE

## 2022-01-01 PROCEDURE — 97162 PT EVAL MOD COMPLEX 30 MIN: CPT

## 2022-01-01 PROCEDURE — A9576 INJ PROHANCE MULTIPACK: HCPCS | Performed by: NURSE PRACTITIONER

## 2022-01-01 PROCEDURE — 84478 ASSAY OF TRIGLYCERIDES: CPT

## 2022-01-01 PROCEDURE — 82077 ASSAY SPEC XCP UR&BREATH IA: CPT

## 2022-01-01 PROCEDURE — 700111 HCHG RX REV CODE 636 W/ 250 OVERRIDE (IP): Performed by: NEUROLOGICAL SURGERY

## 2022-01-01 PROCEDURE — 700111 HCHG RX REV CODE 636 W/ 250 OVERRIDE (IP): Performed by: EMERGENCY MEDICINE

## 2022-01-01 PROCEDURE — 700111 HCHG RX REV CODE 636 W/ 250 OVERRIDE (IP): Performed by: FAMILY MEDICINE

## 2022-01-01 PROCEDURE — 700101 HCHG RX REV CODE 250: Performed by: INTERNAL MEDICINE

## 2022-01-01 PROCEDURE — 86593 SYPHILIS TEST NON-TREP QUANT: CPT

## 2022-01-01 PROCEDURE — 92526 ORAL FUNCTION THERAPY: CPT

## 2022-01-01 PROCEDURE — 87086 URINE CULTURE/COLONY COUNT: CPT

## 2022-01-01 PROCEDURE — 93970 EXTREMITY STUDY: CPT | Mod: 26 | Performed by: INTERNAL MEDICINE

## 2022-01-01 PROCEDURE — 70498 CT ANGIOGRAPHY NECK: CPT

## 2022-01-01 PROCEDURE — 84443 ASSAY THYROID STIM HORMONE: CPT

## 2022-01-01 PROCEDURE — 93005 ELECTROCARDIOGRAM TRACING: CPT

## 2022-01-01 PROCEDURE — 86850 RBC ANTIBODY SCREEN: CPT

## 2022-01-01 PROCEDURE — 93880 EXTRACRANIAL BILAT STUDY: CPT

## 2022-01-01 PROCEDURE — 009630Z DRAINAGE OF CEREBRAL VENTRICLE WITH DRAINAGE DEVICE, PERCUTANEOUS APPROACH: ICD-10-PCS | Performed by: NEUROLOGICAL SURGERY

## 2022-01-01 PROCEDURE — 87070 CULTURE OTHR SPECIMN AEROBIC: CPT

## 2022-01-01 PROCEDURE — 99292 CRITICAL CARE ADDL 30 MIN: CPT | Mod: 25 | Performed by: STUDENT IN AN ORGANIZED HEALTH CARE EDUCATION/TRAINING PROGRAM

## 2022-01-01 PROCEDURE — 92611 MOTION FLUOROSCOPY/SWALLOW: CPT

## 2022-01-01 PROCEDURE — 5A1945Z RESPIRATORY VENTILATION, 24-96 CONSECUTIVE HOURS: ICD-10-PCS | Performed by: STUDENT IN AN ORGANIZED HEALTH CARE EDUCATION/TRAINING PROGRAM

## 2022-01-01 PROCEDURE — 99282 EMERGENCY DEPT VISIT SF MDM: CPT

## 2022-01-01 PROCEDURE — 92950 HEART/LUNG RESUSCITATION CPR: CPT

## 2022-01-01 PROCEDURE — 89051 BODY FLUID CELL COUNT: CPT

## 2022-01-01 PROCEDURE — 110371 HCHG SHELL REV 272: Performed by: NEUROLOGICAL SURGERY

## 2022-01-01 PROCEDURE — 93970 EXTREMITY STUDY: CPT

## 2022-01-01 PROCEDURE — 99291 CRITICAL CARE FIRST HOUR: CPT | Performed by: PSYCHIATRY & NEUROLOGY

## 2022-01-01 PROCEDURE — 99232 SBSQ HOSP IP/OBS MODERATE 35: CPT | Mod: 25 | Performed by: INTERNAL MEDICINE

## 2022-01-01 PROCEDURE — 80307 DRUG TEST PRSMV CHEM ANLYZR: CPT

## 2022-01-01 PROCEDURE — 86901 BLOOD TYPING SEROLOGIC RH(D): CPT

## 2022-01-01 PROCEDURE — 99153 MOD SED SAME PHYS/QHP EA: CPT

## 2022-01-01 PROCEDURE — 160042 HCHG SURGERY MINUTES - EA ADDL 1 MIN LEVEL 5: Performed by: NEUROLOGICAL SURGERY

## 2022-01-01 PROCEDURE — C9803 HOPD COVID-19 SPEC COLLECT: HCPCS | Performed by: INTERNAL MEDICINE

## 2022-01-01 PROCEDURE — 99291 CRITICAL CARE FIRST HOUR: CPT | Performed by: INTERNAL MEDICINE

## 2022-01-01 PROCEDURE — 94760 N-INVAS EAR/PLS OXIMETRY 1: CPT

## 2022-01-01 PROCEDURE — 94002 VENT MGMT INPAT INIT DAY: CPT

## 2022-01-01 PROCEDURE — 84157 ASSAY OF PROTEIN OTHER: CPT

## 2022-01-01 PROCEDURE — 83970 ASSAY OF PARATHORMONE: CPT

## 2022-01-01 PROCEDURE — 93005 ELECTROCARDIOGRAM TRACING: CPT | Performed by: EMERGENCY MEDICINE

## 2022-01-01 PROCEDURE — 85027 COMPLETE CBC AUTOMATED: CPT

## 2022-01-01 PROCEDURE — 82945 GLUCOSE OTHER FLUID: CPT

## 2022-01-01 PROCEDURE — 97166 OT EVAL MOD COMPLEX 45 MIN: CPT

## 2022-01-01 PROCEDURE — 72125 CT NECK SPINE W/O DYE: CPT

## 2022-01-01 PROCEDURE — 86780 TREPONEMA PALLIDUM: CPT

## 2022-01-01 PROCEDURE — 99213 OFFICE O/P EST LOW 20 MIN: CPT | Performed by: PHYSICIAN ASSISTANT

## 2022-01-01 PROCEDURE — 36556 INSERT NON-TUNNEL CV CATH: CPT

## 2022-01-01 PROCEDURE — 770000 HCHG ROOM/CARE - INTERMEDIATE ICU *

## 2022-01-01 PROCEDURE — 83690 ASSAY OF LIPASE: CPT

## 2022-01-01 PROCEDURE — 700117 HCHG RX CONTRAST REV CODE 255: Performed by: STUDENT IN AN ORGANIZED HEALTH CARE EDUCATION/TRAINING PROGRAM

## 2022-01-01 PROCEDURE — 93005 ELECTROCARDIOGRAM TRACING: CPT | Performed by: STUDENT IN AN ORGANIZED HEALTH CARE EDUCATION/TRAINING PROGRAM

## 2022-01-01 PROCEDURE — 700111 HCHG RX REV CODE 636 W/ 250 OVERRIDE (IP): Performed by: ANESTHESIOLOGY

## 2022-01-01 PROCEDURE — 02HV33Z INSERTION OF INFUSION DEVICE INTO SUPERIOR VENA CAVA, PERCUTANEOUS APPROACH: ICD-10-PCS | Performed by: STUDENT IN AN ORGANIZED HEALTH CARE EDUCATION/TRAINING PROGRAM

## 2022-01-01 PROCEDURE — 700105 HCHG RX REV CODE 258: Performed by: EMERGENCY MEDICINE

## 2022-01-01 PROCEDURE — A9270 NON-COVERED ITEM OR SERVICE: HCPCS | Performed by: EMERGENCY MEDICINE

## 2022-01-01 PROCEDURE — 97163 PT EVAL HIGH COMPLEX 45 MIN: CPT

## 2022-01-01 PROCEDURE — 36620 INSERTION CATHETER ARTERY: CPT

## 2022-01-01 PROCEDURE — 99291 CRITICAL CARE FIRST HOUR: CPT | Mod: 25 | Performed by: STUDENT IN AN ORGANIZED HEALTH CARE EDUCATION/TRAINING PROGRAM

## 2022-01-01 PROCEDURE — 700101 HCHG RX REV CODE 250: Performed by: NEUROLOGICAL SURGERY

## 2022-01-01 PROCEDURE — 0BH18EZ INSERTION OF ENDOTRACHEAL AIRWAY INTO TRACHEA, VIA NATURAL OR ARTIFICIAL OPENING ENDOSCOPIC: ICD-10-PCS | Performed by: STUDENT IN AN ORGANIZED HEALTH CARE EDUCATION/TRAINING PROGRAM

## 2022-01-01 PROCEDURE — 36620 INSERTION CATHETER ARTERY: CPT | Performed by: NURSE PRACTITIONER

## 2022-01-01 PROCEDURE — 302214 INTUBATION BOX: Performed by: STUDENT IN AN ORGANIZED HEALTH CARE EDUCATION/TRAINING PROGRAM

## 2022-01-01 PROCEDURE — 83605 ASSAY OF LACTIC ACID: CPT | Mod: 91

## 2022-01-01 PROCEDURE — 700105 HCHG RX REV CODE 258: Performed by: NEUROLOGICAL SURGERY

## 2022-01-01 DEVICE — SEALANT DURAL AUTOSPRAY ADHERUS (5EA/PK): Type: IMPLANTABLE DEVICE | Site: CRANIAL | Status: FUNCTIONAL

## 2022-01-01 DEVICE — DUREPAIR 4X5: Type: IMPLANTABLE DEVICE | Site: CRANIAL | Status: FUNCTIONAL

## 2022-01-01 RX ORDER — LISINOPRIL 10 MG/1
10 TABLET ORAL ONCE
Status: COMPLETED | OUTPATIENT
Start: 2022-01-01 | End: 2022-01-01

## 2022-01-01 RX ORDER — MANNITOL 20 G/100ML
INJECTION, SOLUTION INTRAVENOUS PRN
Status: DISCONTINUED | OUTPATIENT
Start: 2022-01-01 | End: 2022-01-01 | Stop reason: SURG

## 2022-01-01 RX ORDER — POLYETHYLENE GLYCOL 3350 17 G/17G
1 POWDER, FOR SOLUTION ORAL
Status: DISCONTINUED | OUTPATIENT
Start: 2022-01-01 | End: 2022-01-01

## 2022-01-01 RX ORDER — CALCIUM GLUCONATE 20 MG/ML
2 INJECTION, SOLUTION INTRAVENOUS ONCE
Status: COMPLETED | OUTPATIENT
Start: 2022-01-01 | End: 2022-01-01

## 2022-01-01 RX ORDER — ONDANSETRON 4 MG/1
4 TABLET, ORALLY DISINTEGRATING ORAL EVERY 4 HOURS PRN
Status: DISCONTINUED | OUTPATIENT
Start: 2022-01-01 | End: 2022-01-01

## 2022-01-01 RX ORDER — BISACODYL 10 MG
10 SUPPOSITORY, RECTAL RECTAL
Status: DISCONTINUED | OUTPATIENT
Start: 2022-01-01 | End: 2022-01-01

## 2022-01-01 RX ORDER — HYDRALAZINE HYDROCHLORIDE 20 MG/ML
20 INJECTION INTRAMUSCULAR; INTRAVENOUS EVERY 6 HOURS PRN
Status: DISCONTINUED | OUTPATIENT
Start: 2022-01-01 | End: 2022-01-01

## 2022-01-01 RX ORDER — METOPROLOL TARTRATE 1 MG/ML
5 INJECTION, SOLUTION INTRAVENOUS
Status: DISCONTINUED | OUTPATIENT
Start: 2022-01-01 | End: 2022-01-01

## 2022-01-01 RX ORDER — SODIUM CHLORIDE 1 G/1
2 TABLET ORAL
Status: DISCONTINUED | OUTPATIENT
Start: 2022-01-01 | End: 2022-01-01

## 2022-01-01 RX ORDER — ACETAMINOPHEN 500 MG
1000 TABLET ORAL ONCE
Status: COMPLETED | OUTPATIENT
Start: 2022-01-01 | End: 2022-01-01

## 2022-01-01 RX ORDER — TAMSULOSIN HYDROCHLORIDE 0.4 MG/1
0.4 CAPSULE ORAL
Status: DISCONTINUED | OUTPATIENT
Start: 2022-01-01 | End: 2022-01-01

## 2022-01-01 RX ORDER — METOPROLOL TARTRATE 1 MG/ML
INJECTION, SOLUTION INTRAVENOUS
Status: COMPLETED
Start: 2022-01-01 | End: 2022-01-01

## 2022-01-01 RX ORDER — AMOXICILLIN AND CLAVULANATE POTASSIUM 875; 125 MG/1; MG/1
1 TABLET, FILM COATED ORAL EVERY 12 HOURS
Status: DISCONTINUED | OUTPATIENT
Start: 2022-01-01 | End: 2022-01-01

## 2022-01-01 RX ORDER — HYDRALAZINE HYDROCHLORIDE 20 MG/ML
20 INJECTION INTRAMUSCULAR; INTRAVENOUS ONCE
Status: ACTIVE | OUTPATIENT
Start: 2022-01-01 | End: 2022-01-01

## 2022-01-01 RX ORDER — LISINOPRIL 20 MG/1
40 TABLET ORAL
Status: DISCONTINUED | OUTPATIENT
Start: 2022-01-01 | End: 2022-01-01

## 2022-01-01 RX ORDER — SODIUM CHLORIDE 9 MG/ML
INJECTION, SOLUTION INTRAVENOUS CONTINUOUS
Status: DISCONTINUED | OUTPATIENT
Start: 2022-01-01 | End: 2022-01-01

## 2022-01-01 RX ORDER — NICOTINE 21 MG/24HR
21 PATCH, TRANSDERMAL 24 HOURS TRANSDERMAL
Status: DISCONTINUED | OUTPATIENT
Start: 2022-01-01 | End: 2022-01-01

## 2022-01-01 RX ORDER — OXYCODONE HYDROCHLORIDE 5 MG/1
2.5 TABLET ORAL
Status: DISCONTINUED | OUTPATIENT
Start: 2022-01-01 | End: 2022-01-01

## 2022-01-01 RX ORDER — FAMOTIDINE 20 MG/1
20 TABLET, FILM COATED ORAL EVERY 12 HOURS
Status: DISCONTINUED | OUTPATIENT
Start: 2022-01-01 | End: 2022-01-01

## 2022-01-01 RX ORDER — 3% SODIUM CHLORIDE 3 G/100ML
500 INJECTION, SOLUTION INTRAVENOUS ONCE
Status: COMPLETED | OUTPATIENT
Start: 2022-01-01 | End: 2022-01-01

## 2022-01-01 RX ORDER — ASPIRIN 81 MG/1
81 TABLET, CHEWABLE ORAL DAILY
Status: DISCONTINUED | OUTPATIENT
Start: 2022-01-01 | End: 2022-01-01

## 2022-01-01 RX ORDER — ATORVASTATIN CALCIUM 40 MG/1
40 TABLET, FILM COATED ORAL EVERY EVENING
Status: DISCONTINUED | OUTPATIENT
Start: 2022-01-01 | End: 2022-01-01

## 2022-01-01 RX ORDER — ROCURONIUM BROMIDE 10 MG/ML
INJECTION, SOLUTION INTRAVENOUS PRN
Status: DISCONTINUED | OUTPATIENT
Start: 2022-01-01 | End: 2022-01-01 | Stop reason: SURG

## 2022-01-01 RX ORDER — SODIUM CHLORIDE 9 MG/ML
1000 INJECTION, SOLUTION INTRAVENOUS ONCE
Status: COMPLETED | OUTPATIENT
Start: 2022-01-01 | End: 2022-01-01

## 2022-01-01 RX ORDER — POTASSIUM CHLORIDE 20 MEQ/1
40 TABLET, EXTENDED RELEASE ORAL ONCE
Status: COMPLETED | OUTPATIENT
Start: 2022-01-01 | End: 2022-01-01

## 2022-01-01 RX ORDER — SODIUM CHLORIDE, SODIUM LACTATE, POTASSIUM CHLORIDE, CALCIUM CHLORIDE 600; 310; 30; 20 MG/100ML; MG/100ML; MG/100ML; MG/100ML
INJECTION, SOLUTION INTRAVENOUS CONTINUOUS
Status: DISCONTINUED | OUTPATIENT
Start: 2022-01-01 | End: 2022-01-01

## 2022-01-01 RX ORDER — SODIUM CHLORIDE, SODIUM LACTATE, POTASSIUM CHLORIDE, CALCIUM CHLORIDE 600; 310; 30; 20 MG/100ML; MG/100ML; MG/100ML; MG/100ML
1000 INJECTION, SOLUTION INTRAVENOUS ONCE
Status: COMPLETED | OUTPATIENT
Start: 2022-01-01 | End: 2022-01-01

## 2022-01-01 RX ORDER — CEFAZOLIN SODIUM 1 G/3ML
INJECTION, POWDER, FOR SOLUTION INTRAMUSCULAR; INTRAVENOUS PRN
Status: DISCONTINUED | OUTPATIENT
Start: 2022-01-01 | End: 2022-01-01 | Stop reason: SURG

## 2022-01-01 RX ORDER — FUROSEMIDE 10 MG/ML
20 INJECTION INTRAMUSCULAR; INTRAVENOUS
Status: DISCONTINUED | OUTPATIENT
Start: 2022-01-01 | End: 2022-01-01

## 2022-01-01 RX ORDER — CLOTRIMAZOLE 1 %
1 CREAM (GRAM) TOPICAL 2 TIMES DAILY
Qty: 28 G | Refills: 2 | Status: SHIPPED | OUTPATIENT
Start: 2022-01-01 | End: 2022-01-01

## 2022-01-01 RX ORDER — MANNITOL 250 MG/ML
100 INJECTION, SOLUTION INTRAVENOUS ONCE
Status: COMPLETED | OUTPATIENT
Start: 2022-01-01 | End: 2022-01-01

## 2022-01-01 RX ORDER — DOXAZOSIN 2 MG/1
1 TABLET ORAL
Status: DISCONTINUED | OUTPATIENT
Start: 2022-01-01 | End: 2022-01-01

## 2022-01-01 RX ORDER — ATORVASTATIN CALCIUM 80 MG/1
80 TABLET, FILM COATED ORAL EVERY EVENING
Status: DISCONTINUED | OUTPATIENT
Start: 2022-01-01 | End: 2022-01-01

## 2022-01-01 RX ORDER — PANTOPRAZOLE SODIUM 40 MG/10ML
40 INJECTION, POWDER, LYOPHILIZED, FOR SOLUTION INTRAVENOUS DAILY
Status: DISCONTINUED | OUTPATIENT
Start: 2022-01-01 | End: 2022-01-01

## 2022-01-01 RX ORDER — OXYCODONE HYDROCHLORIDE 5 MG/1
5 TABLET ORAL
Status: DISCONTINUED | OUTPATIENT
Start: 2022-01-01 | End: 2022-01-01

## 2022-01-01 RX ORDER — LORAZEPAM 2 MG/ML
1 INJECTION INTRAMUSCULAR
Status: DISCONTINUED | OUTPATIENT
Start: 2022-01-01 | End: 2022-01-01 | Stop reason: HOSPADM

## 2022-01-01 RX ORDER — METOPROLOL TARTRATE 1 MG/ML
5 INJECTION, SOLUTION INTRAVENOUS ONCE
Status: COMPLETED | OUTPATIENT
Start: 2022-01-01 | End: 2022-01-01

## 2022-01-01 RX ORDER — OMEPRAZOLE 20 MG/1
20 CAPSULE, DELAYED RELEASE ORAL DAILY
Status: DISCONTINUED | OUTPATIENT
Start: 2022-01-01 | End: 2022-01-01

## 2022-01-01 RX ORDER — SODIUM CHLORIDE 1 G/1
2 TABLET ORAL 2 TIMES DAILY WITH MEALS
Status: DISCONTINUED | OUTPATIENT
Start: 2022-01-01 | End: 2022-01-01

## 2022-01-01 RX ORDER — HYDRALAZINE HYDROCHLORIDE 20 MG/ML
INJECTION INTRAMUSCULAR; INTRAVENOUS
Status: ACTIVE
Start: 2022-01-01 | End: 2022-01-01

## 2022-01-01 RX ORDER — LORAZEPAM 2 MG/ML
2-4 INJECTION INTRAMUSCULAR EVERY 4 HOURS PRN
Status: DISCONTINUED | OUTPATIENT
Start: 2022-01-01 | End: 2022-01-01

## 2022-01-01 RX ORDER — AMLODIPINE BESYLATE 5 MG/1
5 TABLET ORAL
Status: DISCONTINUED | OUTPATIENT
Start: 2022-01-01 | End: 2022-01-01

## 2022-01-01 RX ORDER — 3% SODIUM CHLORIDE 3 G/100ML
150 INJECTION, SOLUTION INTRAVENOUS CONTINUOUS
Status: ACTIVE | OUTPATIENT
Start: 2022-01-01 | End: 2022-01-01

## 2022-01-01 RX ORDER — METOPROLOL SUCCINATE 50 MG/1
50 TABLET, EXTENDED RELEASE ORAL
Status: DISCONTINUED | OUTPATIENT
Start: 2022-01-01 | End: 2022-01-01

## 2022-01-01 RX ORDER — ACETAMINOPHEN 325 MG/1
650 TABLET ORAL EVERY 6 HOURS PRN
Status: DISCONTINUED | OUTPATIENT
Start: 2022-01-01 | End: 2022-01-01

## 2022-01-01 RX ORDER — LEVOTHYROXINE SODIUM 0.05 MG/1
50 TABLET ORAL
Status: DISCONTINUED | OUTPATIENT
Start: 2022-01-01 | End: 2022-01-01

## 2022-01-01 RX ORDER — ONDANSETRON 4 MG/1
8 TABLET, ORALLY DISINTEGRATING ORAL EVERY 8 HOURS PRN
Status: DISCONTINUED | OUTPATIENT
Start: 2022-01-01 | End: 2022-01-01 | Stop reason: HOSPADM

## 2022-01-01 RX ORDER — ATORVASTATIN CALCIUM 40 MG/1
80 TABLET, FILM COATED ORAL EVERY EVENING
Status: DISCONTINUED | OUTPATIENT
Start: 2022-01-01 | End: 2022-01-01

## 2022-01-01 RX ORDER — NOREPINEPHRINE BITARTRATE 0.03 MG/ML
0-30 INJECTION, SOLUTION INTRAVENOUS CONTINUOUS
Status: DISCONTINUED | OUTPATIENT
Start: 2022-01-01 | End: 2022-01-01

## 2022-01-01 RX ORDER — ENALAPRILAT 1.25 MG/ML
1.25 INJECTION INTRAVENOUS EVERY 6 HOURS PRN
Status: DISCONTINUED | OUTPATIENT
Start: 2022-01-01 | End: 2022-01-01

## 2022-01-01 RX ORDER — NOREPINEPHRINE BITARTRATE 0.03 MG/ML
INJECTION, SOLUTION INTRAVENOUS
Status: ACTIVE
Start: 2022-01-01 | End: 2022-01-01

## 2022-01-01 RX ORDER — LORAZEPAM 2 MG/ML
1 INJECTION INTRAMUSCULAR ONCE
Status: COMPLETED | OUTPATIENT
Start: 2022-01-01 | End: 2022-01-01

## 2022-01-01 RX ORDER — POLYVINYL ALCOHOL 14 MG/ML
2 SOLUTION/ DROPS OPHTHALMIC EVERY 6 HOURS PRN
Status: DISCONTINUED | OUTPATIENT
Start: 2022-01-01 | End: 2022-01-01 | Stop reason: HOSPADM

## 2022-01-01 RX ORDER — FUROSEMIDE 40 MG/1
40 TABLET ORAL
Status: DISCONTINUED | OUTPATIENT
Start: 2022-01-01 | End: 2022-01-01

## 2022-01-01 RX ORDER — 3% SODIUM CHLORIDE 3 G/100ML
500 INJECTION, SOLUTION INTRAVENOUS CONTINUOUS
Status: DISCONTINUED | OUTPATIENT
Start: 2022-01-01 | End: 2022-01-01

## 2022-01-01 RX ORDER — AMOXICILLIN 250 MG
2 CAPSULE ORAL 2 TIMES DAILY
Status: DISCONTINUED | OUTPATIENT
Start: 2022-01-01 | End: 2022-01-01

## 2022-01-01 RX ORDER — ONDANSETRON 2 MG/ML
4 INJECTION INTRAMUSCULAR; INTRAVENOUS EVERY 4 HOURS PRN
Status: DISCONTINUED | OUTPATIENT
Start: 2022-01-01 | End: 2022-01-01

## 2022-01-01 RX ORDER — HYDRALAZINE HYDROCHLORIDE 20 MG/ML
10 INJECTION INTRAMUSCULAR; INTRAVENOUS EVERY 4 HOURS PRN
Status: DISCONTINUED | OUTPATIENT
Start: 2022-01-01 | End: 2022-01-01

## 2022-01-01 RX ORDER — MORPHINE SULFATE 100 MG/5ML
10 SOLUTION ORAL
Status: DISCONTINUED | OUTPATIENT
Start: 2022-01-01 | End: 2022-01-01 | Stop reason: HOSPADM

## 2022-01-01 RX ORDER — METOPROLOL TARTRATE 1 MG/ML
5 INJECTION, SOLUTION INTRAVENOUS
Status: COMPLETED | OUTPATIENT
Start: 2022-01-01 | End: 2022-01-01

## 2022-01-01 RX ORDER — SODIUM CHLORIDE 1 G/1
2 TABLET ORAL DAILY
Status: ACTIVE | OUTPATIENT
Start: 2022-01-01 | End: 2022-01-01

## 2022-01-01 RX ORDER — ROCURONIUM BROMIDE 10 MG/ML
50 INJECTION, SOLUTION INTRAVENOUS ONCE
Status: COMPLETED | OUTPATIENT
Start: 2022-01-01 | End: 2022-01-01

## 2022-01-01 RX ORDER — ATROPINE SULFATE 10 MG/ML
2 SOLUTION/ DROPS OPHTHALMIC EVERY 4 HOURS PRN
Status: DISCONTINUED | OUTPATIENT
Start: 2022-01-01 | End: 2022-01-01 | Stop reason: HOSPADM

## 2022-01-01 RX ORDER — LORAZEPAM 2 MG/ML
1 INJECTION INTRAMUSCULAR EVERY 4 HOURS PRN
Status: DISCONTINUED | OUTPATIENT
Start: 2022-01-01 | End: 2022-01-01

## 2022-01-01 RX ORDER — DILTIAZEM HYDROCHLORIDE 5 MG/ML
0.25 INJECTION INTRAVENOUS ONCE
Status: COMPLETED | OUTPATIENT
Start: 2022-01-01 | End: 2022-01-01

## 2022-01-01 RX ORDER — DOXYCYCLINE 100 MG/1
100 TABLET ORAL EVERY 12 HOURS
Status: DISCONTINUED | OUTPATIENT
Start: 2022-01-01 | End: 2022-01-01

## 2022-01-01 RX ORDER — PROTAMINE SULFATE 10 MG/ML
40 INJECTION, SOLUTION INTRAVENOUS ONCE
Status: DISPENSED | OUTPATIENT
Start: 2022-01-01 | End: 2022-01-01

## 2022-01-01 RX ORDER — PHENYLEPHRINE HCL IN 0.9% NACL 0.5 MG/5ML
SYRINGE (ML) INTRAVENOUS PRN
Status: DISCONTINUED | OUTPATIENT
Start: 2022-01-01 | End: 2022-01-01 | Stop reason: SURG

## 2022-01-01 RX ORDER — SODIUM CHLORIDE 9 MG/ML
INJECTION, SOLUTION INTRAVENOUS
Status: DISCONTINUED | OUTPATIENT
Start: 2022-01-01 | End: 2022-01-01 | Stop reason: SURG

## 2022-01-01 RX ORDER — METOCLOPRAMIDE 10 MG/1
10 TABLET ORAL ONCE
Status: COMPLETED | OUTPATIENT
Start: 2022-01-01 | End: 2022-01-01

## 2022-01-01 RX ORDER — SCOLOPAMINE TRANSDERMAL SYSTEM 1 MG/1
1 PATCH, EXTENDED RELEASE TRANSDERMAL
Status: DISCONTINUED | OUTPATIENT
Start: 2022-01-01 | End: 2022-01-01 | Stop reason: HOSPADM

## 2022-01-01 RX ORDER — DOCUSATE SODIUM 100 MG/1
100 CAPSULE, LIQUID FILLED ORAL EVERY 12 HOURS
Status: DISCONTINUED | OUTPATIENT
Start: 2022-01-01 | End: 2022-01-01 | Stop reason: HOSPADM

## 2022-01-01 RX ORDER — ENOXAPARIN SODIUM 100 MG/ML
40 INJECTION SUBCUTANEOUS DAILY
Status: DISCONTINUED | OUTPATIENT
Start: 2022-01-01 | End: 2022-01-01

## 2022-01-01 RX ORDER — SODIUM CHLORIDE 1 G/1
2 TABLET ORAL DAILY
Status: DISCONTINUED | OUTPATIENT
Start: 2022-01-01 | End: 2022-01-01

## 2022-01-01 RX ORDER — HALOPERIDOL 5 MG/ML
5 INJECTION INTRAMUSCULAR
Status: COMPLETED | OUTPATIENT
Start: 2022-01-01 | End: 2022-01-01

## 2022-01-01 RX ORDER — LORAZEPAM 2 MG/ML
1-2 INJECTION INTRAMUSCULAR ONCE
Status: COMPLETED | OUTPATIENT
Start: 2022-01-01 | End: 2022-01-01

## 2022-01-01 RX ORDER — ETOMIDATE 2 MG/ML
20 INJECTION INTRAVENOUS ONCE
Status: COMPLETED | OUTPATIENT
Start: 2022-01-01 | End: 2022-01-01

## 2022-01-01 RX ORDER — ACETAMINOPHEN 325 MG/1
650 TABLET ORAL EVERY 6 HOURS PRN
Status: DISCONTINUED | OUTPATIENT
Start: 2022-01-01 | End: 2022-01-01 | Stop reason: HOSPADM

## 2022-01-01 RX ORDER — SODIUM CHLORIDE, SODIUM LACTATE, POTASSIUM CHLORIDE, AND CALCIUM CHLORIDE .6; .31; .03; .02 G/100ML; G/100ML; G/100ML; G/100ML
30 INJECTION, SOLUTION INTRAVENOUS
Status: DISCONTINUED | OUTPATIENT
Start: 2022-01-01 | End: 2022-01-01

## 2022-01-01 RX ORDER — LORAZEPAM 2 MG/ML
1 CONCENTRATE ORAL
Status: DISCONTINUED | OUTPATIENT
Start: 2022-01-01 | End: 2022-01-01 | Stop reason: HOSPADM

## 2022-01-01 RX ORDER — LISINOPRIL 20 MG/1
20 TABLET ORAL
Status: DISCONTINUED | OUTPATIENT
Start: 2022-01-01 | End: 2022-01-01

## 2022-01-01 RX ORDER — HYDROMORPHONE HYDROCHLORIDE 1 MG/ML
0.25 INJECTION, SOLUTION INTRAMUSCULAR; INTRAVENOUS; SUBCUTANEOUS
Status: DISCONTINUED | OUTPATIENT
Start: 2022-01-01 | End: 2022-01-01

## 2022-01-01 RX ORDER — POTASSIUM CHLORIDE 20 MEQ/1
40 TABLET, EXTENDED RELEASE ORAL ONCE
Status: DISCONTINUED | OUTPATIENT
Start: 2022-01-01 | End: 2022-01-01

## 2022-01-01 RX ORDER — NICOTINE 21 MG/24HR
14 PATCH, TRANSDERMAL 24 HOURS TRANSDERMAL
Status: DISCONTINUED | OUTPATIENT
Start: 2022-01-01 | End: 2022-01-01

## 2022-01-01 RX ORDER — BUPIVACAINE HYDROCHLORIDE AND EPINEPHRINE 5; 5 MG/ML; UG/ML
INJECTION, SOLUTION EPIDURAL; INTRACAUDAL; PERINEURAL
Status: DISCONTINUED | OUTPATIENT
Start: 2022-01-01 | End: 2022-01-01 | Stop reason: HOSPADM

## 2022-01-01 RX ORDER — ONDANSETRON 2 MG/ML
8 INJECTION INTRAMUSCULAR; INTRAVENOUS EVERY 8 HOURS PRN
Status: DISCONTINUED | OUTPATIENT
Start: 2022-01-01 | End: 2022-01-01 | Stop reason: HOSPADM

## 2022-01-01 RX ORDER — SODIUM CHLORIDE 1 G/1
1 TABLET ORAL
Status: DISCONTINUED | OUTPATIENT
Start: 2022-01-01 | End: 2022-01-01

## 2022-01-01 RX ORDER — METOPROLOL TARTRATE 50 MG/1
50 TABLET, FILM COATED ORAL EVERY 8 HOURS
Status: DISCONTINUED | OUTPATIENT
Start: 2022-01-01 | End: 2022-01-01

## 2022-01-01 RX ADMIN — ENALAPRILAT 1.25 MG: 1.25 INJECTION INTRAVENOUS at 08:31

## 2022-01-01 RX ADMIN — SODIUM CHLORIDE 4 MILLION UNITS: 9 INJECTION, SOLUTION INTRAVENOUS at 21:04

## 2022-01-01 RX ADMIN — DIBASIC SODIUM PHOSPHATE, MONOBASIC POTASSIUM PHOSPHATE AND MONOBASIC SODIUM PHOSPHATE 500 MG: 852; 155; 130 TABLET ORAL at 13:03

## 2022-01-01 RX ADMIN — NYSTATIN 500000 UNITS: 100000 SUSPENSION ORAL at 09:11

## 2022-01-01 RX ADMIN — SENNOSIDES AND DOCUSATE SODIUM 2 TABLET: 50; 8.6 TABLET ORAL at 17:12

## 2022-01-01 RX ADMIN — POTASSIUM BICARBONATE 50 MEQ: 978 TABLET, EFFERVESCENT ORAL at 09:10

## 2022-01-01 RX ADMIN — SODIUM CHLORIDE 2 G: 1 TABLET ORAL at 13:03

## 2022-01-01 RX ADMIN — NICARDIPINE HYDROCHLORIDE 5 MG/HR: 25 INJECTION, SOLUTION INTRAVENOUS at 04:09

## 2022-01-01 RX ADMIN — CALCIUM GLUCONATE 2 G: 20 INJECTION, SOLUTION INTRAVENOUS at 10:27

## 2022-01-01 RX ADMIN — NICARDIPINE HYDROCHLORIDE 7.5 MG/HR: 25 INJECTION, SOLUTION INTRAVENOUS at 20:52

## 2022-01-01 RX ADMIN — SENNOSIDES AND DOCUSATE SODIUM 2 TABLET: 50; 8.6 TABLET ORAL at 17:50

## 2022-01-01 RX ADMIN — MAGNESIUM CITRATE 296 ML: 1.75 LIQUID ORAL at 15:09

## 2022-01-01 RX ADMIN — NICARDIPINE HYDROCHLORIDE 5 MG/HR: 25 INJECTION, SOLUTION INTRAVENOUS at 10:37

## 2022-01-01 RX ADMIN — APIXABAN 5 MG: 5 TABLET, FILM COATED ORAL at 17:50

## 2022-01-01 RX ADMIN — FENTANYL CITRATE 50 MCG: 0.05 INJECTION, SOLUTION INTRAMUSCULAR; INTRAVENOUS at 07:50

## 2022-01-01 RX ADMIN — ACETAMINOPHEN 1000 MG: 500 TABLET, FILM COATED ORAL at 11:04

## 2022-01-01 RX ADMIN — METOPROLOL TARTRATE 25 MG: 25 TABLET, FILM COATED ORAL at 17:50

## 2022-01-01 RX ADMIN — METOPROLOL TARTRATE 25 MG: 25 TABLET, FILM COATED ORAL at 17:40

## 2022-01-01 RX ADMIN — METOPROLOL TARTRATE 50 MG: 50 TABLET, FILM COATED ORAL at 14:09

## 2022-01-01 RX ADMIN — SODIUM CHLORIDE 2 G: 1 TABLET ORAL at 17:46

## 2022-01-01 RX ADMIN — ATORVASTATIN CALCIUM 40 MG: 40 TABLET, FILM COATED ORAL at 16:53

## 2022-01-01 RX ADMIN — AMLODIPINE BESYLATE 5 MG: 5 TABLET ORAL at 05:08

## 2022-01-01 RX ADMIN — SODIUM CHLORIDE 2 G: 1 TABLET ORAL at 17:59

## 2022-01-01 RX ADMIN — Medication 50 MCG/HR: at 22:41

## 2022-01-01 RX ADMIN — SENNOSIDES AND DOCUSATE SODIUM 2 TABLET: 50; 8.6 TABLET ORAL at 06:12

## 2022-01-01 RX ADMIN — PROPOFOL 20 MCG/KG/MIN: 10 INJECTION, EMULSION INTRAVENOUS at 20:07

## 2022-01-01 RX ADMIN — SODIUM CHLORIDE 4 MILLION UNITS: 9 INJECTION, SOLUTION INTRAVENOUS at 14:07

## 2022-01-01 RX ADMIN — ENALAPRILAT 1.25 MG: 1.25 INJECTION INTRAVENOUS at 20:32

## 2022-01-01 RX ADMIN — SODIUM CHLORIDE 4 MILLION UNITS: 9 INJECTION, SOLUTION INTRAVENOUS at 10:34

## 2022-01-01 RX ADMIN — SENNOSIDES AND DOCUSATE SODIUM 2 TABLET: 50; 8.6 TABLET ORAL at 05:50

## 2022-01-01 RX ADMIN — PROPOFOL 40 MCG/KG/MIN: 10 INJECTION, EMULSION INTRAVENOUS at 16:13

## 2022-01-01 RX ADMIN — SODIUM CHLORIDE 4 MILLION UNITS: 9 INJECTION, SOLUTION INTRAVENOUS at 02:28

## 2022-01-01 RX ADMIN — ENALAPRILAT 1.25 MG: 1.25 INJECTION INTRAVENOUS at 16:09

## 2022-01-01 RX ADMIN — SODIUM CHLORIDE 1000 ML: 9 INJECTION, SOLUTION INTRAVENOUS at 09:56

## 2022-01-01 RX ADMIN — SODIUM CHLORIDE 2 G: 1 TABLET ORAL at 17:00

## 2022-01-01 RX ADMIN — SODIUM CHLORIDE 4 MILLION UNITS: 9 INJECTION, SOLUTION INTRAVENOUS at 17:46

## 2022-01-01 RX ADMIN — METOPROLOL TARTRATE 50 MG: 50 TABLET, FILM COATED ORAL at 14:00

## 2022-01-01 RX ADMIN — SODIUM CHLORIDE 4 MILLION UNITS: 9 INJECTION, SOLUTION INTRAVENOUS at 21:17

## 2022-01-01 RX ADMIN — METOPROLOL TARTRATE 50 MG: 50 TABLET, FILM COATED ORAL at 22:00

## 2022-01-01 RX ADMIN — FUROSEMIDE 20 MG: 10 INJECTION, SOLUTION INTRAMUSCULAR; INTRAVENOUS at 17:50

## 2022-01-01 RX ADMIN — ATORVASTATIN CALCIUM 80 MG: 80 TABLET, FILM COATED ORAL at 17:09

## 2022-01-01 RX ADMIN — NICOTINE 14 MG: 14 PATCH TRANSDERMAL at 12:27

## 2022-01-01 RX ADMIN — NYSTATIN 500000 UNITS: 100000 SUSPENSION ORAL at 16:39

## 2022-01-01 RX ADMIN — SODIUM CHLORIDE 4 MILLION UNITS: 9 INJECTION, SOLUTION INTRAVENOUS at 22:12

## 2022-01-01 RX ADMIN — FENTANYL CITRATE 100 MCG: 0.05 INJECTION, SOLUTION INTRAMUSCULAR; INTRAVENOUS at 17:27

## 2022-01-01 RX ADMIN — LORAZEPAM 1 MG: 2 INJECTION INTRAMUSCULAR; INTRAVENOUS at 16:59

## 2022-01-01 RX ADMIN — METOPROLOL TARTRATE 25 MG: 25 TABLET, FILM COATED ORAL at 05:22

## 2022-01-01 RX ADMIN — METOPROLOL TARTRATE 50 MG: 25 TABLET, FILM COATED ORAL at 15:41

## 2022-01-01 RX ADMIN — SODIUM CHLORIDE 500 ML: 3 INJECTION, SOLUTION INTRAVENOUS at 09:53

## 2022-01-01 RX ADMIN — FUROSEMIDE 20 MG: 10 INJECTION, SOLUTION INTRAMUSCULAR; INTRAVENOUS at 05:09

## 2022-01-01 RX ADMIN — FENTANYL CITRATE 50 MCG: 0.05 INJECTION, SOLUTION INTRAMUSCULAR; INTRAVENOUS at 16:53

## 2022-01-01 RX ADMIN — SODIUM CHLORIDE 4 MILLION UNITS: 9 INJECTION, SOLUTION INTRAVENOUS at 01:35

## 2022-01-01 RX ADMIN — NYSTATIN 500000 UNITS: 100000 SUSPENSION ORAL at 22:09

## 2022-01-01 RX ADMIN — SODIUM CHLORIDE 4 MILLION UNITS: 9 INJECTION, SOLUTION INTRAVENOUS at 14:18

## 2022-01-01 RX ADMIN — SODIUM CHLORIDE 4 MILLION UNITS: 9 INJECTION, SOLUTION INTRAVENOUS at 18:00

## 2022-01-01 RX ADMIN — GADOTERIDOL 15 ML: 279.3 INJECTION, SOLUTION INTRAVENOUS at 17:49

## 2022-01-01 RX ADMIN — SODIUM CHLORIDE 4 MILLION UNITS: 9 INJECTION, SOLUTION INTRAVENOUS at 02:11

## 2022-01-01 RX ADMIN — FENTANYL CITRATE 100 MCG: 50 INJECTION, SOLUTION INTRAMUSCULAR; INTRAVENOUS at 10:41

## 2022-01-01 RX ADMIN — NYSTATIN 500000 UNITS: 100000 SUSPENSION ORAL at 13:27

## 2022-01-01 RX ADMIN — METOPROLOL TARTRATE 50 MG: 25 TABLET, FILM COATED ORAL at 05:16

## 2022-01-01 RX ADMIN — METOPROLOL TARTRATE 5 MG: 1 INJECTION, SOLUTION INTRAVENOUS at 03:23

## 2022-01-01 RX ADMIN — SODIUM CHLORIDE 4 MILLION UNITS: 9 INJECTION, SOLUTION INTRAVENOUS at 05:16

## 2022-01-01 RX ADMIN — FAMOTIDINE 20 MG: 20 TABLET ORAL at 17:07

## 2022-01-01 RX ADMIN — LISINOPRIL 20 MG: 20 TABLET ORAL at 06:01

## 2022-01-01 RX ADMIN — METOPROLOL TARTRATE 25 MG: 25 TABLET, FILM COATED ORAL at 17:46

## 2022-01-01 RX ADMIN — SENNOSIDES AND DOCUSATE SODIUM 2 TABLET: 50; 8.6 TABLET ORAL at 05:44

## 2022-01-01 RX ADMIN — SODIUM CHLORIDE 4 MILLION UNITS: 9 INJECTION, SOLUTION INTRAVENOUS at 10:01

## 2022-01-01 RX ADMIN — FUROSEMIDE 20 MG: 10 INJECTION, SOLUTION INTRAMUSCULAR; INTRAVENOUS at 18:17

## 2022-01-01 RX ADMIN — ENALAPRILAT 1.25 MG: 1.25 INJECTION INTRAVENOUS at 04:00

## 2022-01-01 RX ADMIN — METOPROLOL TARTRATE 25 MG: 25 TABLET, FILM COATED ORAL at 17:12

## 2022-01-01 RX ADMIN — SODIUM CHLORIDE 4 MILLION UNITS: 9 INJECTION, SOLUTION INTRAVENOUS at 14:09

## 2022-01-01 RX ADMIN — LISINOPRIL 20 MG: 20 TABLET ORAL at 06:33

## 2022-01-01 RX ADMIN — METOPROLOL TARTRATE 25 MG: 25 TABLET, FILM COATED ORAL at 05:33

## 2022-01-01 RX ADMIN — NYSTATIN 500000 UNITS: 100000 SUSPENSION ORAL at 10:01

## 2022-01-01 RX ADMIN — SODIUM CHLORIDE 2 G: 1 TABLET ORAL at 05:12

## 2022-01-01 RX ADMIN — SODIUM CHLORIDE 2 G: 1 TABLET ORAL at 06:34

## 2022-01-01 RX ADMIN — FENTANYL CITRATE 100 MCG: 0.05 INJECTION, SOLUTION INTRAMUSCULAR; INTRAVENOUS at 04:19

## 2022-01-01 RX ADMIN — LISINOPRIL 40 MG: 20 TABLET ORAL at 05:40

## 2022-01-01 RX ADMIN — METOPROLOL TARTRATE 5 MG: 1 INJECTION, SOLUTION INTRAVENOUS at 20:03

## 2022-01-01 RX ADMIN — NYSTATIN 500000 UNITS: 100000 SUSPENSION ORAL at 16:37

## 2022-01-01 RX ADMIN — SODIUM CHLORIDE, POTASSIUM CHLORIDE, SODIUM LACTATE AND CALCIUM CHLORIDE 1000 ML: 600; 310; 30; 20 INJECTION, SOLUTION INTRAVENOUS at 11:17

## 2022-01-01 RX ADMIN — APIXABAN 5 MG: 5 TABLET, FILM COATED ORAL at 17:45

## 2022-01-01 RX ADMIN — FUROSEMIDE 20 MG: 10 INJECTION, SOLUTION INTRAMUSCULAR; INTRAVENOUS at 06:01

## 2022-01-01 RX ADMIN — SODIUM CHLORIDE 2 G: 1 TABLET ORAL at 06:35

## 2022-01-01 RX ADMIN — NICOTINE 14 MG: 14 PATCH TRANSDERMAL at 06:16

## 2022-01-01 RX ADMIN — SODIUM CHLORIDE 4 MILLION UNITS: 9 INJECTION, SOLUTION INTRAVENOUS at 10:33

## 2022-01-01 RX ADMIN — SODIUM CHLORIDE 2 G: 1 TABLET ORAL at 16:53

## 2022-01-01 RX ADMIN — SENNOSIDES AND DOCUSATE SODIUM 2 TABLET: 50; 8.6 TABLET ORAL at 17:40

## 2022-01-01 RX ADMIN — SODIUM CHLORIDE 4 MILLION UNITS: 9 INJECTION, SOLUTION INTRAVENOUS at 21:34

## 2022-01-01 RX ADMIN — METOPROLOL TARTRATE 50 MG: 50 TABLET, FILM COATED ORAL at 13:20

## 2022-01-01 RX ADMIN — SENNOSIDES AND DOCUSATE SODIUM 2 TABLET: 50; 8.6 TABLET ORAL at 06:14

## 2022-01-01 RX ADMIN — METOPROLOL TARTRATE 5 MG: 1 INJECTION, SOLUTION INTRAVENOUS at 19:19

## 2022-01-01 RX ADMIN — ROCURONIUM BROMIDE 20 MG: 10 INJECTION, SOLUTION INTRAVENOUS at 12:10

## 2022-01-01 RX ADMIN — SODIUM CHLORIDE 2 G: 1 TABLET ORAL at 11:40

## 2022-01-01 RX ADMIN — LISINOPRIL 40 MG: 20 TABLET ORAL at 05:08

## 2022-01-01 RX ADMIN — SODIUM CHLORIDE: 9 INJECTION, SOLUTION INTRAVENOUS at 17:09

## 2022-01-01 RX ADMIN — FENTANYL CITRATE 50 MCG: 0.05 INJECTION, SOLUTION INTRAMUSCULAR; INTRAVENOUS at 10:40

## 2022-01-01 RX ADMIN — NICARDIPINE HYDROCHLORIDE 5 MG/HR: 25 INJECTION, SOLUTION INTRAVENOUS at 20:54

## 2022-01-01 RX ADMIN — SODIUM CHLORIDE 4 MILLION UNITS: 9 INJECTION, SOLUTION INTRAVENOUS at 22:42

## 2022-01-01 RX ADMIN — ACETAMINOPHEN 650 MG: 325 TABLET, FILM COATED ORAL at 08:20

## 2022-01-01 RX ADMIN — NOREPINEPHRINE BITARTRATE 10 MCG/MIN: 1 INJECTION INTRAVENOUS at 21:00

## 2022-01-01 RX ADMIN — SODIUM CHLORIDE 4 MILLION UNITS: 9 INJECTION, SOLUTION INTRAVENOUS at 09:54

## 2022-01-01 RX ADMIN — LISINOPRIL 40 MG: 20 TABLET ORAL at 06:14

## 2022-01-01 RX ADMIN — SODIUM CHLORIDE 4 MILLION UNITS: 9 INJECTION, SOLUTION INTRAVENOUS at 17:40

## 2022-01-01 RX ADMIN — FENTANYL CITRATE 100 MCG: 0.05 INJECTION, SOLUTION INTRAMUSCULAR; INTRAVENOUS at 23:06

## 2022-01-01 RX ADMIN — SENNOSIDES AND DOCUSATE SODIUM 2 TABLET: 50; 8.6 TABLET ORAL at 18:36

## 2022-01-01 RX ADMIN — ACETAMINOPHEN 650 MG: 325 TABLET, FILM COATED ORAL at 09:10

## 2022-01-01 RX ADMIN — AMLODIPINE BESYLATE 5 MG: 5 TABLET ORAL at 06:14

## 2022-01-01 RX ADMIN — POLYETHYLENE GLYCOL 3350 1 PACKET: 17 POWDER, FOR SOLUTION ORAL at 13:25

## 2022-01-01 RX ADMIN — AMLODIPINE BESYLATE 5 MG: 5 TABLET ORAL at 05:22

## 2022-01-01 RX ADMIN — SODIUM CHLORIDE 4 MILLION UNITS: 9 INJECTION, SOLUTION INTRAVENOUS at 06:35

## 2022-01-01 RX ADMIN — SENNOSIDES AND DOCUSATE SODIUM 2 TABLET: 50; 8.6 TABLET ORAL at 05:32

## 2022-01-01 RX ADMIN — Medication 100 MCG: at 12:13

## 2022-01-01 RX ADMIN — SENNOSIDES AND DOCUSATE SODIUM 2 TABLET: 50; 8.6 TABLET ORAL at 17:56

## 2022-01-01 RX ADMIN — SODIUM CHLORIDE 4 MILLION UNITS: 9 INJECTION, SOLUTION INTRAVENOUS at 01:50

## 2022-01-01 RX ADMIN — AMLODIPINE BESYLATE 5 MG: 5 TABLET ORAL at 06:08

## 2022-01-01 RX ADMIN — ATORVASTATIN CALCIUM 80 MG: 80 TABLET, FILM COATED ORAL at 17:41

## 2022-01-01 RX ADMIN — LORAZEPAM 2 MG: 2 INJECTION INTRAMUSCULAR; INTRAVENOUS at 15:43

## 2022-01-01 RX ADMIN — SODIUM CHLORIDE 200 MEQ: 4 INJECTION, SOLUTION, CONCENTRATE INTRAVENOUS at 20:59

## 2022-01-01 RX ADMIN — NICARDIPINE HYDROCHLORIDE 7.5 MG/HR: 25 INJECTION, SOLUTION INTRAVENOUS at 05:58

## 2022-01-01 RX ADMIN — FENTANYL CITRATE 100 MCG: 0.05 INJECTION, SOLUTION INTRAMUSCULAR; INTRAVENOUS at 10:05

## 2022-01-01 RX ADMIN — FUROSEMIDE 20 MG: 10 INJECTION, SOLUTION INTRAMUSCULAR; INTRAVENOUS at 06:10

## 2022-01-01 RX ADMIN — NYSTATIN 500000 UNITS: 100000 SUSPENSION ORAL at 21:37

## 2022-01-01 RX ADMIN — NYSTATIN 500000 UNITS: 100000 SUSPENSION ORAL at 20:57

## 2022-01-01 RX ADMIN — FENTANYL CITRATE 100 MCG: 0.05 INJECTION, SOLUTION INTRAMUSCULAR; INTRAVENOUS at 23:05

## 2022-01-01 RX ADMIN — FENTANYL CITRATE 100 MCG: 0.05 INJECTION, SOLUTION INTRAMUSCULAR; INTRAVENOUS at 14:25

## 2022-01-01 RX ADMIN — SENNOSIDES AND DOCUSATE SODIUM 2 TABLET: 50; 8.6 TABLET ORAL at 06:08

## 2022-01-01 RX ADMIN — LISINOPRIL 20 MG: 20 TABLET ORAL at 05:15

## 2022-01-01 RX ADMIN — SODIUM CHLORIDE 4 MILLION UNITS: 9 INJECTION, SOLUTION INTRAVENOUS at 09:29

## 2022-01-01 RX ADMIN — SODIUM CHLORIDE 4 MILLION UNITS: 9 INJECTION, SOLUTION INTRAVENOUS at 13:46

## 2022-01-01 RX ADMIN — SODIUM CHLORIDE 4 MILLION UNITS: 9 INJECTION, SOLUTION INTRAVENOUS at 21:00

## 2022-01-01 RX ADMIN — NYSTATIN 500000 UNITS: 100000 SUSPENSION ORAL at 16:58

## 2022-01-01 RX ADMIN — METOPROLOL TARTRATE 5 MG: 1 INJECTION, SOLUTION INTRAVENOUS at 05:15

## 2022-01-01 RX ADMIN — ATORVASTATIN CALCIUM 40 MG: 40 TABLET, FILM COATED ORAL at 18:36

## 2022-01-01 RX ADMIN — NYSTATIN 500000 UNITS: 100000 SUSPENSION ORAL at 21:58

## 2022-01-01 RX ADMIN — APIXABAN 5 MG: 5 TABLET, FILM COATED ORAL at 05:51

## 2022-01-01 RX ADMIN — FUROSEMIDE 20 MG: 10 INJECTION, SOLUTION INTRAMUSCULAR; INTRAVENOUS at 06:08

## 2022-01-01 RX ADMIN — LISINOPRIL 40 MG: 20 TABLET ORAL at 05:22

## 2022-01-01 RX ADMIN — ACETAMINOPHEN 650 MG: 325 TABLET, FILM COATED ORAL at 14:23

## 2022-01-01 RX ADMIN — NYSTATIN 500000 UNITS: 100000 SUSPENSION ORAL at 16:52

## 2022-01-01 RX ADMIN — METOPROLOL TARTRATE 25 MG: 25 TABLET, FILM COATED ORAL at 06:15

## 2022-01-01 RX ADMIN — CEFAZOLIN 2 G: 330 INJECTION, POWDER, FOR SOLUTION INTRAMUSCULAR; INTRAVENOUS at 10:45

## 2022-01-01 RX ADMIN — TAMSULOSIN HYDROCHLORIDE 0.4 MG: 0.4 CAPSULE ORAL at 11:31

## 2022-01-01 RX ADMIN — OMEPRAZOLE 40 MG: KIT at 06:22

## 2022-01-01 RX ADMIN — NICARDIPINE HYDROCHLORIDE 7.5 MG/HR: 25 INJECTION, SOLUTION INTRAVENOUS at 02:11

## 2022-01-01 RX ADMIN — HYDRALAZINE HYDROCHLORIDE 10 MG: 20 INJECTION INTRAMUSCULAR; INTRAVENOUS at 16:27

## 2022-01-01 RX ADMIN — NYSTATIN 500000 UNITS: 100000 SUSPENSION ORAL at 14:56

## 2022-01-01 RX ADMIN — LISINOPRIL 40 MG: 20 TABLET ORAL at 05:20

## 2022-01-01 RX ADMIN — SODIUM CHLORIDE 4 MILLION UNITS: 9 INJECTION, SOLUTION INTRAVENOUS at 14:31

## 2022-01-01 RX ADMIN — SODIUM CHLORIDE 4 MILLION UNITS: 9 INJECTION, SOLUTION INTRAVENOUS at 02:46

## 2022-01-01 RX ADMIN — GADOTERIDOL 15 ML: 279.3 INJECTION, SOLUTION INTRAVENOUS at 16:41

## 2022-01-01 RX ADMIN — METOPROLOL TARTRATE 25 MG: 25 TABLET, FILM COATED ORAL at 05:44

## 2022-01-01 RX ADMIN — ATORVASTATIN CALCIUM 40 MG: 40 TABLET, FILM COATED ORAL at 17:46

## 2022-01-01 RX ADMIN — POTASSIUM BICARBONATE 25 MEQ: 978 TABLET, EFFERVESCENT ORAL at 07:53

## 2022-01-01 RX ADMIN — POTASSIUM PHOSPHATE, MONOBASIC AND POTASSIUM PHOSPHATE, DIBASIC 30 MMOL: 224; 236 INJECTION, SOLUTION, CONCENTRATE INTRAVENOUS at 15:52

## 2022-01-01 RX ADMIN — ROCURONIUM BROMIDE 50 MG: 10 INJECTION, SOLUTION INTRAVENOUS at 10:41

## 2022-01-01 RX ADMIN — METOPROLOL TARTRATE 50 MG: 50 TABLET, FILM COATED ORAL at 06:34

## 2022-01-01 RX ADMIN — ROCURONIUM BROMIDE 50 MG: 10 INJECTION, SOLUTION INTRAVENOUS at 19:54

## 2022-01-01 RX ADMIN — SODIUM CHLORIDE 4 MILLION UNITS: 9 INJECTION, SOLUTION INTRAVENOUS at 02:32

## 2022-01-01 RX ADMIN — FUROSEMIDE 40 MG: 40 TABLET ORAL at 06:17

## 2022-01-01 RX ADMIN — PROPOFOL 30 MCG/KG/MIN: 10 INJECTION, EMULSION INTRAVENOUS at 23:04

## 2022-01-01 RX ADMIN — NYSTATIN 500000 UNITS: 100000 SUSPENSION ORAL at 17:51

## 2022-01-01 RX ADMIN — FENTANYL CITRATE 50 MCG: 0.05 INJECTION, SOLUTION INTRAMUSCULAR; INTRAVENOUS at 02:34

## 2022-01-01 RX ADMIN — LISINOPRIL 40 MG: 20 TABLET ORAL at 06:08

## 2022-01-01 RX ADMIN — NICARDIPINE HYDROCHLORIDE 7.5 MG/HR: 25 INJECTION, SOLUTION INTRAVENOUS at 17:40

## 2022-01-01 RX ADMIN — ENALAPRILAT 1.25 MG: 1.25 INJECTION INTRAVENOUS at 01:27

## 2022-01-01 RX ADMIN — SODIUM CHLORIDE 3 MILLION UNITS: 9 INJECTION, SOLUTION INTRAVENOUS at 18:40

## 2022-01-01 RX ADMIN — FENTANYL CITRATE 50 MCG: 0.05 INJECTION, SOLUTION INTRAMUSCULAR; INTRAVENOUS at 00:04

## 2022-01-01 RX ADMIN — SODIUM CHLORIDE 4 MILLION UNITS: 9 INJECTION, SOLUTION INTRAVENOUS at 21:53

## 2022-01-01 RX ADMIN — SODIUM CHLORIDE 4 MILLION UNITS: 9 INJECTION, SOLUTION INTRAVENOUS at 06:01

## 2022-01-01 RX ADMIN — OXYCODONE 5 MG: 5 TABLET ORAL at 17:56

## 2022-01-01 RX ADMIN — NICOTINE TRANSDERMAL SYSTEM 21 MG: 21 PATCH, EXTENDED RELEASE TRANSDERMAL at 04:14

## 2022-01-01 RX ADMIN — LISINOPRIL 40 MG: 20 TABLET ORAL at 06:17

## 2022-01-01 RX ADMIN — ENOXAPARIN SODIUM 40 MG: 40 INJECTION SUBCUTANEOUS at 18:40

## 2022-01-01 RX ADMIN — METOPROLOL TARTRATE 5 MG: 1 INJECTION, SOLUTION INTRAVENOUS at 04:09

## 2022-01-01 RX ADMIN — ACETAMINOPHEN 650 MG: 325 TABLET, FILM COATED ORAL at 17:46

## 2022-01-01 RX ADMIN — METOPROLOL TARTRATE 25 MG: 25 TABLET, FILM COATED ORAL at 17:07

## 2022-01-01 RX ADMIN — SODIUM CHLORIDE 150 ML: 3 INJECTION, SOLUTION INTRAVENOUS at 08:27

## 2022-01-01 RX ADMIN — NYSTATIN 500000 UNITS: 100000 SUSPENSION ORAL at 17:40

## 2022-01-01 RX ADMIN — AMLODIPINE BESYLATE 5 MG: 5 TABLET ORAL at 06:18

## 2022-01-01 RX ADMIN — SODIUM CHLORIDE 4 MILLION UNITS: 9 INJECTION, SOLUTION INTRAVENOUS at 05:32

## 2022-01-01 RX ADMIN — SODIUM CHLORIDE 4 MILLION UNITS: 9 INJECTION, SOLUTION INTRAVENOUS at 23:25

## 2022-01-01 RX ADMIN — SODIUM CHLORIDE 4 MILLION UNITS: 9 INJECTION, SOLUTION INTRAVENOUS at 06:19

## 2022-01-01 RX ADMIN — METOCLOPRAMIDE 10 MG: 10 TABLET ORAL at 15:45

## 2022-01-01 RX ADMIN — FENTANYL CITRATE 100 MCG: 0.05 INJECTION, SOLUTION INTRAMUSCULAR; INTRAVENOUS at 21:03

## 2022-01-01 RX ADMIN — FAMOTIDINE 20 MG: 20 TABLET ORAL at 05:32

## 2022-01-01 RX ADMIN — NICARDIPINE HYDROCHLORIDE 7.5 MG/HR: 25 INJECTION, SOLUTION INTRAVENOUS at 00:12

## 2022-01-01 RX ADMIN — Medication 50 MCG/HR: at 22:40

## 2022-01-01 RX ADMIN — METOPROLOL TARTRATE 25 MG: 25 TABLET, FILM COATED ORAL at 16:53

## 2022-01-01 RX ADMIN — FUROSEMIDE 20 MG: 10 INJECTION, SOLUTION INTRAMUSCULAR; INTRAVENOUS at 17:45

## 2022-01-01 RX ADMIN — NICARDIPINE HYDROCHLORIDE 7.5 MG/HR: 25 INJECTION, SOLUTION INTRAVENOUS at 13:58

## 2022-01-01 RX ADMIN — SENNOSIDES AND DOCUSATE SODIUM 2 TABLET: 50; 8.6 TABLET ORAL at 06:34

## 2022-01-01 RX ADMIN — SODIUM CHLORIDE 4 MILLION UNITS: 9 INJECTION, SOLUTION INTRAVENOUS at 21:58

## 2022-01-01 RX ADMIN — ENALAPRILAT 1.25 MG: 1.25 INJECTION INTRAVENOUS at 16:20

## 2022-01-01 RX ADMIN — METOPROLOL TARTRATE 25 MG: 25 TABLET, FILM COATED ORAL at 05:40

## 2022-01-01 RX ADMIN — DIBASIC SODIUM PHOSPHATE, MONOBASIC POTASSIUM PHOSPHATE AND MONOBASIC SODIUM PHOSPHATE 500 MG: 852; 155; 130 TABLET ORAL at 10:09

## 2022-01-01 RX ADMIN — METOPROLOL TARTRATE 5 MG: 1 INJECTION, SOLUTION INTRAVENOUS at 21:00

## 2022-01-01 RX ADMIN — METOPROLOL TARTRATE 50 MG: 50 TABLET, FILM COATED ORAL at 06:01

## 2022-01-01 RX ADMIN — METOPROLOL TARTRATE 50 MG: 50 TABLET, FILM COATED ORAL at 14:06

## 2022-01-01 RX ADMIN — METOPROLOL TARTRATE 5 MG: 1 INJECTION, SOLUTION INTRAVENOUS at 03:45

## 2022-01-01 RX ADMIN — METOPROLOL TARTRATE 50 MG: 50 TABLET, FILM COATED ORAL at 22:42

## 2022-01-01 RX ADMIN — SENNOSIDES AND DOCUSATE SODIUM 2 TABLET: 50; 8.6 TABLET ORAL at 17:08

## 2022-01-01 RX ADMIN — METOPROLOL TARTRATE 25 MG: 25 TABLET, FILM COATED ORAL at 16:38

## 2022-01-01 RX ADMIN — NICOTINE 14 MG: 14 PATCH TRANSDERMAL at 05:21

## 2022-01-01 RX ADMIN — SODIUM CHLORIDE 4 MILLION UNITS: 9 INJECTION, SOLUTION INTRAVENOUS at 17:59

## 2022-01-01 RX ADMIN — SODIUM CHLORIDE 4 MILLION UNITS: 9 INJECTION, SOLUTION INTRAVENOUS at 09:12

## 2022-01-01 RX ADMIN — POTASSIUM CHLORIDE 40 MEQ: 1500 TABLET, EXTENDED RELEASE ORAL at 10:27

## 2022-01-01 RX ADMIN — METOPROLOL TARTRATE 25 MG: 25 TABLET, FILM COATED ORAL at 16:37

## 2022-01-01 RX ADMIN — SODIUM CHLORIDE 1 G: 1 TABLET ORAL at 11:51

## 2022-01-01 RX ADMIN — FAMOTIDINE 20 MG: 20 TABLET ORAL at 17:09

## 2022-01-01 RX ADMIN — FUROSEMIDE 20 MG: 10 INJECTION, SOLUTION INTRAMUSCULAR; INTRAVENOUS at 06:13

## 2022-01-01 RX ADMIN — NICARDIPINE HYDROCHLORIDE 2.5 MG/HR: 25 INJECTION, SOLUTION INTRAVENOUS at 22:51

## 2022-01-01 RX ADMIN — NYSTATIN 500000 UNITS: 100000 SUSPENSION ORAL at 21:53

## 2022-01-01 RX ADMIN — SODIUM CHLORIDE 4 MILLION UNITS: 9 INJECTION, SOLUTION INTRAVENOUS at 13:21

## 2022-01-01 RX ADMIN — LISINOPRIL 10 MG: 10 TABLET ORAL at 10:11

## 2022-01-01 RX ADMIN — SODIUM CHLORIDE 4 MILLION UNITS: 9 INJECTION, SOLUTION INTRAVENOUS at 05:09

## 2022-01-01 RX ADMIN — SODIUM CHLORIDE 4 MILLION UNITS: 9 INJECTION, SOLUTION INTRAVENOUS at 06:12

## 2022-01-01 RX ADMIN — DOCUSATE SODIUM 100 MG: 100 CAPSULE, LIQUID FILLED ORAL at 17:13

## 2022-01-01 RX ADMIN — NICARDIPINE HYDROCHLORIDE 7.5 MG/HR: 25 INJECTION, SOLUTION INTRAVENOUS at 00:34

## 2022-01-01 RX ADMIN — SODIUM CHLORIDE 2 G: 1 TABLET ORAL at 11:39

## 2022-01-01 RX ADMIN — NICARDIPINE HYDROCHLORIDE 7.5 MG/HR: 25 INJECTION, SOLUTION INTRAVENOUS at 10:42

## 2022-01-01 RX ADMIN — LORAZEPAM 1 MG: 2 INJECTION INTRAMUSCULAR; INTRAVENOUS at 02:21

## 2022-01-01 RX ADMIN — SODIUM CHLORIDE 1 G: 1 TABLET ORAL at 17:07

## 2022-01-01 RX ADMIN — SODIUM CHLORIDE 4 MILLION UNITS: 9 INJECTION, SOLUTION INTRAVENOUS at 14:56

## 2022-01-01 RX ADMIN — SENNOSIDES AND DOCUSATE SODIUM 2 TABLET: 50; 8.6 TABLET ORAL at 05:13

## 2022-01-01 RX ADMIN — LORAZEPAM 1 MG: 2 INJECTION INTRAMUSCULAR; INTRAVENOUS at 05:22

## 2022-01-01 RX ADMIN — FUROSEMIDE 40 MG: 40 TABLET ORAL at 05:51

## 2022-01-01 RX ADMIN — ACETAMINOPHEN 650 MG: 325 TABLET, FILM COATED ORAL at 12:46

## 2022-01-01 RX ADMIN — LISINOPRIL 20 MG: 20 TABLET ORAL at 11:09

## 2022-01-01 RX ADMIN — ETOMIDATE 20 MG: 2 INJECTION INTRAVENOUS at 19:54

## 2022-01-01 RX ADMIN — SENNOSIDES AND DOCUSATE SODIUM 2 TABLET: 50; 8.6 TABLET ORAL at 17:46

## 2022-01-01 RX ADMIN — SENNOSIDES AND DOCUSATE SODIUM 2 TABLET: 50; 8.6 TABLET ORAL at 05:15

## 2022-01-01 RX ADMIN — POTASSIUM BICARBONATE 25 MEQ: 978 TABLET, EFFERVESCENT ORAL at 08:22

## 2022-01-01 RX ADMIN — NICARDIPINE HYDROCHLORIDE 2.5 MG/HR: 25 INJECTION, SOLUTION INTRAVENOUS at 06:10

## 2022-01-01 RX ADMIN — NICOTINE 14 MG: 14 PATCH TRANSDERMAL at 05:49

## 2022-01-01 RX ADMIN — SODIUM CHLORIDE 4 MILLION UNITS: 9 INJECTION, SOLUTION INTRAVENOUS at 02:07

## 2022-01-01 RX ADMIN — ATORVASTATIN CALCIUM 80 MG: 80 TABLET, FILM COATED ORAL at 17:00

## 2022-01-01 RX ADMIN — APIXABAN 5 MG: 5 TABLET, FILM COATED ORAL at 06:18

## 2022-01-01 RX ADMIN — FENTANYL CITRATE 100 MCG: 0.05 INJECTION, SOLUTION INTRAMUSCULAR; INTRAVENOUS at 19:05

## 2022-01-01 RX ADMIN — DIBASIC SODIUM PHOSPHATE, MONOBASIC POTASSIUM PHOSPHATE AND MONOBASIC SODIUM PHOSPHATE 500 MG: 852; 155; 130 TABLET ORAL at 18:00

## 2022-01-01 RX ADMIN — FENTANYL CITRATE 50 MCG: 0.05 INJECTION, SOLUTION INTRAMUSCULAR; INTRAVENOUS at 19:55

## 2022-01-01 RX ADMIN — SODIUM CHLORIDE 2 G: 1 TABLET ORAL at 17:40

## 2022-01-01 RX ADMIN — SODIUM CHLORIDE 4 MILLION UNITS: 9 INJECTION, SOLUTION INTRAVENOUS at 02:13

## 2022-01-01 RX ADMIN — SODIUM CHLORIDE 4 MILLION UNITS: 9 INJECTION, SOLUTION INTRAVENOUS at 14:53

## 2022-01-01 RX ADMIN — ATORVASTATIN CALCIUM 40 MG: 40 TABLET, FILM COATED ORAL at 17:50

## 2022-01-01 RX ADMIN — METOPROLOL TARTRATE 25 MG: 25 TABLET, FILM COATED ORAL at 18:36

## 2022-01-01 RX ADMIN — SENNOSIDES AND DOCUSATE SODIUM 2 TABLET: 50; 8.6 TABLET ORAL at 05:22

## 2022-01-01 RX ADMIN — SODIUM CHLORIDE 4 MILLION UNITS: 9 INJECTION, SOLUTION INTRAVENOUS at 17:00

## 2022-01-01 RX ADMIN — ATORVASTATIN CALCIUM 80 MG: 80 TABLET, FILM COATED ORAL at 18:00

## 2022-01-01 RX ADMIN — SODIUM CHLORIDE 4 MILLION UNITS: 9 INJECTION, SOLUTION INTRAVENOUS at 02:12

## 2022-01-01 RX ADMIN — SODIUM CHLORIDE 4 MILLION UNITS: 9 INJECTION, SOLUTION INTRAVENOUS at 06:14

## 2022-01-01 RX ADMIN — NYSTATIN 500000 UNITS: 100000 SUSPENSION ORAL at 08:19

## 2022-01-01 RX ADMIN — ALTEPLASE 1 MG: 2.2 INJECTION, POWDER, LYOPHILIZED, FOR SOLUTION INTRAVENOUS at 16:07

## 2022-01-01 RX ADMIN — SODIUM CHLORIDE 3 MILLION UNITS: 9 INJECTION, SOLUTION INTRAVENOUS at 11:59

## 2022-01-01 RX ADMIN — ATORVASTATIN CALCIUM 40 MG: 40 TABLET, FILM COATED ORAL at 17:12

## 2022-01-01 RX ADMIN — ACETAMINOPHEN 650 MG: 325 TABLET, FILM COATED ORAL at 08:31

## 2022-01-01 RX ADMIN — IOHEXOL 70 ML: 350 INJECTION, SOLUTION INTRAVENOUS at 05:33

## 2022-01-01 RX ADMIN — METOPROLOL TARTRATE 25 MG: 25 TABLET, FILM COATED ORAL at 05:08

## 2022-01-01 RX ADMIN — METOPROLOL TARTRATE 25 MG: 25 TABLET, FILM COATED ORAL at 05:50

## 2022-01-01 RX ADMIN — SODIUM CHLORIDE 4 MILLION UNITS: 9 INJECTION, SOLUTION INTRAVENOUS at 10:12

## 2022-01-01 RX ADMIN — AMLODIPINE BESYLATE 5 MG: 5 TABLET ORAL at 05:51

## 2022-01-01 RX ADMIN — SODIUM CHLORIDE 2 G: 1 TABLET ORAL at 10:12

## 2022-01-01 RX ADMIN — SODIUM CHLORIDE: 9 INJECTION, SOLUTION INTRAVENOUS at 11:41

## 2022-01-01 RX ADMIN — HYDRALAZINE HYDROCHLORIDE 20 MG: 20 INJECTION INTRAMUSCULAR; INTRAVENOUS at 00:49

## 2022-01-01 RX ADMIN — METOPROLOL TARTRATE 25 MG: 25 TABLET, FILM COATED ORAL at 06:17

## 2022-01-01 RX ADMIN — SENNOSIDES AND DOCUSATE SODIUM 2 TABLET: 50; 8.6 TABLET ORAL at 17:49

## 2022-01-01 RX ADMIN — SODIUM CHLORIDE 4 MILLION UNITS: 9 INJECTION, SOLUTION INTRAVENOUS at 14:05

## 2022-01-01 RX ADMIN — FUROSEMIDE 20 MG: 10 INJECTION, SOLUTION INTRAMUSCULAR; INTRAVENOUS at 10:40

## 2022-01-01 RX ADMIN — LORAZEPAM 1 MG: 2 INJECTION INTRAMUSCULAR; INTRAVENOUS at 21:35

## 2022-01-01 RX ADMIN — MANNITOL 50 G: 20 INJECTION, SOLUTION INTRAVENOUS at 12:10

## 2022-01-01 RX ADMIN — LISINOPRIL 40 MG: 20 TABLET ORAL at 05:12

## 2022-01-01 RX ADMIN — ATORVASTATIN CALCIUM 40 MG: 40 TABLET, FILM COATED ORAL at 16:38

## 2022-01-01 RX ADMIN — METOPROLOL TARTRATE 50 MG: 50 TABLET, FILM COATED ORAL at 21:34

## 2022-01-01 RX ADMIN — FENTANYL CITRATE 50 MCG: 0.05 INJECTION, SOLUTION INTRAMUSCULAR; INTRAVENOUS at 00:11

## 2022-01-01 RX ADMIN — ATORVASTATIN CALCIUM 80 MG: 80 TABLET, FILM COATED ORAL at 17:49

## 2022-01-01 RX ADMIN — SODIUM CHLORIDE 4 MILLION UNITS: 9 INJECTION, SOLUTION INTRAVENOUS at 21:40

## 2022-01-01 RX ADMIN — FUROSEMIDE 20 MG: 10 INJECTION, SOLUTION INTRAMUSCULAR; INTRAVENOUS at 05:44

## 2022-01-01 RX ADMIN — APIXABAN 5 MG: 5 TABLET, FILM COATED ORAL at 17:12

## 2022-01-01 RX ADMIN — FENTANYL CITRATE 100 MCG: 0.05 INJECTION, SOLUTION INTRAMUSCULAR; INTRAVENOUS at 00:41

## 2022-01-01 RX ADMIN — NICARDIPINE HYDROCHLORIDE 5 MG/HR: 25 INJECTION, SOLUTION INTRAVENOUS at 06:42

## 2022-01-01 RX ADMIN — METOPROLOL TARTRATE 25 MG: 25 TABLET, FILM COATED ORAL at 06:08

## 2022-01-01 RX ADMIN — LORAZEPAM 1 MG: 2 INJECTION INTRAMUSCULAR; INTRAVENOUS at 15:03

## 2022-01-01 RX ADMIN — PROPOFOL 50 MG: 10 INJECTION, EMULSION INTRAVENOUS at 10:41

## 2022-01-01 RX ADMIN — SODIUM CHLORIDE 4 MILLION UNITS: 9 INJECTION, SOLUTION INTRAVENOUS at 17:09

## 2022-01-01 RX ADMIN — SODIUM CHLORIDE 3 MILLION UNITS: 9 INJECTION, SOLUTION INTRAVENOUS at 15:27

## 2022-01-01 RX ADMIN — SODIUM CHLORIDE 4 MILLION UNITS: 9 INJECTION, SOLUTION INTRAVENOUS at 10:39

## 2022-01-01 RX ADMIN — NICARDIPINE HYDROCHLORIDE 5 MG/HR: 25 INJECTION, SOLUTION INTRAVENOUS at 16:06

## 2022-01-01 RX ADMIN — LORAZEPAM 1 MG: 2 INJECTION INTRAMUSCULAR; INTRAVENOUS at 00:49

## 2022-01-01 RX ADMIN — ATORVASTATIN CALCIUM 40 MG: 40 TABLET, FILM COATED ORAL at 17:40

## 2022-01-01 RX ADMIN — ENALAPRILAT 1.25 MG: 1.25 INJECTION INTRAVENOUS at 16:03

## 2022-01-01 RX ADMIN — SENNOSIDES AND DOCUSATE SODIUM 2 TABLET: 50; 8.6 TABLET ORAL at 17:09

## 2022-01-01 RX ADMIN — NYSTATIN 500000 UNITS: 100000 SUSPENSION ORAL at 12:34

## 2022-01-01 RX ADMIN — LISINOPRIL 40 MG: 20 TABLET ORAL at 05:50

## 2022-01-01 RX ADMIN — OMEPRAZOLE 20 MG: 20 CAPSULE, DELAYED RELEASE ORAL at 05:50

## 2022-01-01 RX ADMIN — SODIUM CHLORIDE 4 MILLION UNITS: 9 INJECTION, SOLUTION INTRAVENOUS at 05:13

## 2022-01-01 RX ADMIN — ATORVASTATIN CALCIUM 40 MG: 40 TABLET, FILM COATED ORAL at 16:58

## 2022-01-01 RX ADMIN — SENNOSIDES AND DOCUSATE SODIUM 2 TABLET: 50; 8.6 TABLET ORAL at 16:53

## 2022-01-01 RX ADMIN — SODIUM CHLORIDE 4 MILLION UNITS: 9 INJECTION, SOLUTION INTRAVENOUS at 21:39

## 2022-01-01 RX ADMIN — SENNOSIDES AND DOCUSATE SODIUM 2 TABLET: 50; 8.6 TABLET ORAL at 17:51

## 2022-01-01 RX ADMIN — ENOXAPARIN SODIUM 40 MG: 40 INJECTION SUBCUTANEOUS at 17:56

## 2022-01-01 RX ADMIN — FENTANYL CITRATE 50 MCG: 0.05 INJECTION, SOLUTION INTRAMUSCULAR; INTRAVENOUS at 12:01

## 2022-01-01 RX ADMIN — SENNOSIDES AND DOCUSATE SODIUM 2 TABLET: 50; 8.6 TABLET ORAL at 05:08

## 2022-01-01 RX ADMIN — SODIUM CHLORIDE 4 MILLION UNITS: 9 INJECTION, SOLUTION INTRAVENOUS at 14:24

## 2022-01-01 RX ADMIN — NYSTATIN 500000 UNITS: 100000 SUSPENSION ORAL at 12:27

## 2022-01-01 RX ADMIN — Medication 100 MCG: at 12:08

## 2022-01-01 RX ADMIN — SODIUM CHLORIDE 2 G: 1 TABLET ORAL at 10:41

## 2022-01-01 RX ADMIN — METOPROLOL TARTRATE 50 MG: 50 TABLET, FILM COATED ORAL at 05:12

## 2022-01-01 RX ADMIN — METOPROLOL TARTRATE 25 MG: 25 TABLET, FILM COATED ORAL at 16:58

## 2022-01-01 RX ADMIN — SENNOSIDES AND DOCUSATE SODIUM 2 TABLET: 50; 8.6 TABLET ORAL at 16:37

## 2022-01-01 RX ADMIN — LISINOPRIL 10 MG: 10 TABLET ORAL at 11:50

## 2022-01-01 RX ADMIN — SODIUM CHLORIDE 4 MILLION UNITS: 9 INJECTION, SOLUTION INTRAVENOUS at 05:22

## 2022-01-01 RX ADMIN — SODIUM CHLORIDE 2 G: 1 TABLET ORAL at 17:45

## 2022-01-01 RX ADMIN — SCOPOLAMINE 1 PATCH: 1.5 PATCH, EXTENDED RELEASE TRANSDERMAL at 17:36

## 2022-01-01 RX ADMIN — POTASSIUM BICARBONATE 25 MEQ: 978 TABLET, EFFERVESCENT ORAL at 10:34

## 2022-01-01 RX ADMIN — FUROSEMIDE 20 MG: 10 INJECTION, SOLUTION INTRAMUSCULAR; INTRAVENOUS at 06:34

## 2022-01-01 RX ADMIN — PROPOFOL 20 MCG/KG/MIN: 10 INJECTION, EMULSION INTRAVENOUS at 04:35

## 2022-01-01 RX ADMIN — SODIUM CHLORIDE 500 ML: 3 INJECTION, SOLUTION INTRAVENOUS at 21:38

## 2022-01-01 RX ADMIN — SENNOSIDES AND DOCUSATE SODIUM 2 TABLET: 50; 8.6 TABLET ORAL at 17:41

## 2022-01-01 RX ADMIN — NICOTINE 14 MG: 14 PATCH TRANSDERMAL at 05:40

## 2022-01-01 RX ADMIN — DILTIAZEM HYDROCHLORIDE 16.35 MG: 5 INJECTION INTRAVENOUS at 06:32

## 2022-01-01 RX ADMIN — FAMOTIDINE 20 MG: 20 TABLET ORAL at 06:12

## 2022-01-01 RX ADMIN — SODIUM CHLORIDE 1 G: 1 TABLET ORAL at 08:22

## 2022-01-01 RX ADMIN — FENTANYL CITRATE 50 MCG: 0.05 INJECTION, SOLUTION INTRAMUSCULAR; INTRAVENOUS at 20:32

## 2022-01-01 RX ADMIN — SODIUM CHLORIDE 4 MILLION UNITS: 9 INJECTION, SOLUTION INTRAVENOUS at 11:12

## 2022-01-01 RX ADMIN — Medication 100 MCG: at 12:04

## 2022-01-01 RX ADMIN — SODIUM CHLORIDE 4 MILLION UNITS: 9 INJECTION, SOLUTION INTRAVENOUS at 02:42

## 2022-01-01 RX ADMIN — FENTANYL CITRATE 100 MCG: 50 INJECTION, SOLUTION INTRAMUSCULAR; INTRAVENOUS at 20:16

## 2022-01-01 RX ADMIN — SODIUM CHLORIDE 4 MILLION UNITS: 9 INJECTION, SOLUTION INTRAVENOUS at 02:24

## 2022-01-01 RX ADMIN — LISINOPRIL 40 MG: 20 TABLET ORAL at 06:10

## 2022-01-01 RX ADMIN — SODIUM CHLORIDE 1 G: 1 TABLET ORAL at 11:04

## 2022-01-01 RX ADMIN — SENNOSIDES AND DOCUSATE SODIUM 2 TABLET: 50; 8.6 TABLET ORAL at 16:58

## 2022-01-01 RX ADMIN — LISINOPRIL 40 MG: 20 TABLET ORAL at 05:49

## 2022-01-01 RX ADMIN — AMLODIPINE BESYLATE 5 MG: 5 TABLET ORAL at 05:40

## 2022-01-01 RX ADMIN — ATORVASTATIN CALCIUM 80 MG: 80 TABLET, FILM COATED ORAL at 17:45

## 2022-01-01 RX ADMIN — AMLODIPINE BESYLATE 5 MG: 5 TABLET ORAL at 12:03

## 2022-01-01 RX ADMIN — METOPROLOL TARTRATE 50 MG: 50 TABLET, FILM COATED ORAL at 06:10

## 2022-01-01 RX ADMIN — FENTANYL CITRATE 100 MCG: 0.05 INJECTION, SOLUTION INTRAMUSCULAR; INTRAVENOUS at 01:50

## 2022-01-01 RX ADMIN — FUROSEMIDE 40 MG: 40 TABLET ORAL at 05:49

## 2022-01-01 RX ADMIN — HALOPERIDOL LACTATE 5 MG: 5 INJECTION, SOLUTION INTRAMUSCULAR at 02:22

## 2022-01-01 RX ADMIN — SENNOSIDES AND DOCUSATE SODIUM 2 TABLET: 50; 8.6 TABLET ORAL at 17:13

## 2022-01-01 RX ADMIN — SODIUM CHLORIDE 4 MILLION UNITS: 9 INJECTION, SOLUTION INTRAVENOUS at 17:45

## 2022-01-01 RX ADMIN — ATORVASTATIN CALCIUM 80 MG: 80 TABLET, FILM COATED ORAL at 17:07

## 2022-01-01 RX ADMIN — SODIUM CHLORIDE 4 MILLION UNITS: 9 INJECTION, SOLUTION INTRAVENOUS at 05:44

## 2022-01-01 RX ADMIN — SODIUM CHLORIDE: 9 INJECTION, SOLUTION INTRAVENOUS at 10:39

## 2022-01-01 RX ADMIN — FUROSEMIDE 20 MG: 10 INJECTION, SOLUTION INTRAMUSCULAR; INTRAVENOUS at 05:12

## 2022-01-01 RX ADMIN — SENNOSIDES AND DOCUSATE SODIUM 2 TABLET: 50; 8.6 TABLET ORAL at 06:17

## 2022-01-01 RX ADMIN — FUROSEMIDE 20 MG: 10 INJECTION, SOLUTION INTRAMUSCULAR; INTRAVENOUS at 05:22

## 2022-01-01 RX ADMIN — NICARDIPINE HYDROCHLORIDE 5 MG/HR: 25 INJECTION, SOLUTION INTRAVENOUS at 18:06

## 2022-01-01 RX ADMIN — POTASSIUM CHLORIDE 40 MEQ: 1500 TABLET, EXTENDED RELEASE ORAL at 06:34

## 2022-01-01 RX ADMIN — FAMOTIDINE 20 MG: 10 INJECTION INTRAVENOUS at 05:44

## 2022-01-01 RX ADMIN — ENALAPRILAT 1.25 MG: 1.25 INJECTION INTRAVENOUS at 11:33

## 2022-01-01 RX ADMIN — SODIUM CHLORIDE 4 MILLION UNITS: 9 INJECTION, SOLUTION INTRAVENOUS at 11:04

## 2022-01-01 RX ADMIN — METOPROLOL TARTRATE 50 MG: 50 TABLET, FILM COATED ORAL at 22:12

## 2022-01-01 RX ADMIN — METOPROLOL TARTRATE 25 MG: 25 TABLET, FILM COATED ORAL at 17:43

## 2022-01-01 RX ADMIN — METOPROLOL TARTRATE 25 MG: 25 TABLET, FILM COATED ORAL at 05:20

## 2022-01-01 RX ADMIN — SENNOSIDES AND DOCUSATE SODIUM 2 TABLET: 50; 8.6 TABLET ORAL at 17:59

## 2022-01-01 RX ADMIN — ATORVASTATIN CALCIUM 40 MG: 40 TABLET, FILM COATED ORAL at 16:37

## 2022-01-01 RX ADMIN — SODIUM CHLORIDE 4 MILLION UNITS: 9 INJECTION, SOLUTION INTRAVENOUS at 17:49

## 2022-01-01 RX ADMIN — MANNITOL 100 G: 12.5 INJECTION, SOLUTION INTRAVENOUS at 20:00

## 2022-01-01 RX ADMIN — METOPROLOL TARTRATE 25 MG: 25 TABLET, FILM COATED ORAL at 21:40

## 2022-01-01 ASSESSMENT — COGNITIVE AND FUNCTIONAL STATUS - GENERAL
SUGGESTED CMS G CODE MODIFIER DAILY ACTIVITY: CK
EATING MEALS: TOTAL
EATING MEALS: TOTAL
DRESSING REGULAR UPPER BODY CLOTHING: A LOT
CLIMB 3 TO 5 STEPS WITH RAILING: TOTAL
HELP NEEDED FOR BATHING: A LOT
DRESSING REGULAR UPPER BODY CLOTHING: A LITTLE
STANDING UP FROM CHAIR USING ARMS: A LOT
TURNING FROM BACK TO SIDE WHILE IN FLAT BAD: A LITTLE
SUGGESTED CMS G CODE MODIFIER DAILY ACTIVITY: CL
STANDING UP FROM CHAIR USING ARMS: A LOT
WALKING IN HOSPITAL ROOM: TOTAL
STANDING UP FROM CHAIR USING ARMS: A LOT
HELP NEEDED FOR BATHING: A LITTLE
PERSONAL GROOMING: A LITTLE
DRESSING REGULAR UPPER BODY CLOTHING: A LITTLE
MOBILITY SCORE: 14
TURNING FROM BACK TO SIDE WHILE IN FLAT BAD: A LITTLE
TURNING FROM BACK TO SIDE WHILE IN FLAT BAD: A LOT
DAILY ACTIVITIY SCORE: 6
TOILETING: A LITTLE
WALKING IN HOSPITAL ROOM: A LOT
EATING MEALS: A LOT
MOVING TO AND FROM BED TO CHAIR: A LITTLE
TOILETING: A LOT
HELP NEEDED FOR BATHING: TOTAL
WALKING IN HOSPITAL ROOM: A LOT
DRESSING REGULAR UPPER BODY CLOTHING: A LITTLE
SUGGESTED CMS G CODE MODIFIER MOBILITY: CL
DRESSING REGULAR LOWER BODY CLOTHING: A LOT
SUGGESTED CMS G CODE MODIFIER DAILY ACTIVITY: CL
MOVING TO AND FROM BED TO CHAIR: UNABLE
EATING MEALS: A LITTLE
MOBILITY SCORE: 8
SUGGESTED CMS G CODE MODIFIER MOBILITY: CL
TOILETING: A LOT
EATING MEALS: TOTAL
HELP NEEDED FOR BATHING: A LOT
DAILY ACTIVITIY SCORE: 14
MOBILITY SCORE: 9
WALKING IN HOSPITAL ROOM: TOTAL
MOVING FROM LYING ON BACK TO SITTING ON SIDE OF FLAT BED: UNABLE
STANDING UP FROM CHAIR USING ARMS: A LITTLE
DRESSING REGULAR UPPER BODY CLOTHING: A LITTLE
PERSONAL GROOMING: A LITTLE
PERSONAL GROOMING: TOTAL
DRESSING REGULAR LOWER BODY CLOTHING: TOTAL
CLIMB 3 TO 5 STEPS WITH RAILING: TOTAL
CLIMB 3 TO 5 STEPS WITH RAILING: TOTAL
SUGGESTED CMS G CODE MODIFIER MOBILITY: CL
STANDING UP FROM CHAIR USING ARMS: TOTAL
MOVING FROM LYING ON BACK TO SITTING ON SIDE OF FLAT BED: UNABLE
SUGGESTED CMS G CODE MODIFIER DAILY ACTIVITY: CK
PERSONAL GROOMING: A LITTLE
SUGGESTED CMS G CODE MODIFIER DAILY ACTIVITY: CN
SUGGESTED CMS G CODE MODIFIER MOBILITY: CM
PERSONAL GROOMING: A LITTLE
DAILY ACTIVITIY SCORE: 16
MOBILITY SCORE: 12
DRESSING REGULAR UPPER BODY CLOTHING: TOTAL
DRESSING REGULAR LOWER BODY CLOTHING: A LOT
MOVING FROM LYING ON BACK TO SITTING ON SIDE OF FLAT BED: A LITTLE
DRESSING REGULAR LOWER BODY CLOTHING: A LOT
MOBILITY SCORE: 12
DAILY ACTIVITIY SCORE: 12
MOBILITY SCORE: 13
DRESSING REGULAR LOWER BODY CLOTHING: A LOT
TOILETING: A LITTLE
STANDING UP FROM CHAIR USING ARMS: A LITTLE
WALKING IN HOSPITAL ROOM: A LOT
SUGGESTED CMS G CODE MODIFIER DAILY ACTIVITY: CK
WALKING IN HOSPITAL ROOM: A LOT
MOVING FROM LYING ON BACK TO SITTING ON SIDE OF FLAT BED: UNABLE
SUGGESTED CMS G CODE MODIFIER MOBILITY: CL
MOVING TO AND FROM BED TO CHAIR: A LOT
HELP NEEDED FOR BATHING: A LOT
CLIMB 3 TO 5 STEPS WITH RAILING: A LOT
HELP NEEDED FOR BATHING: A LOT
MOVING TO AND FROM BED TO CHAIR: A LOT
SUGGESTED CMS G CODE MODIFIER MOBILITY: CM
MOVING TO AND FROM BED TO CHAIR: UNABLE
CLIMB 3 TO 5 STEPS WITH RAILING: TOTAL
TOILETING: TOTAL
TURNING FROM BACK TO SIDE WHILE IN FLAT BAD: A LITTLE
MOVING FROM LYING ON BACK TO SITTING ON SIDE OF FLAT BED: UNABLE
MOVING FROM LYING ON BACK TO SITTING ON SIDE OF FLAT BED: UNABLE
MOVING TO AND FROM BED TO CHAIR: A LOT
TOILETING: A LOT
PERSONAL GROOMING: A LITTLE
DRESSING REGULAR LOWER BODY CLOTHING: A LITTLE
DAILY ACTIVITIY SCORE: 19
CLIMB 3 TO 5 STEPS WITH RAILING: TOTAL
DAILY ACTIVITIY SCORE: 13

## 2022-01-01 ASSESSMENT — PAIN DESCRIPTION - PAIN TYPE
TYPE: ACUTE PAIN
TYPE: ACUTE PAIN;SURGICAL PAIN
TYPE: ACUTE PAIN
TYPE: ACUTE PAIN;CHRONIC PAIN;SURGICAL PAIN
TYPE: ACUTE PAIN
TYPE: ACUTE PAIN;CHRONIC PAIN
TYPE: ACUTE PAIN
TYPE: ACUTE PAIN;SURGICAL PAIN
TYPE: ACUTE PAIN
TYPE: ACUTE PAIN;CHRONIC PAIN
TYPE: ACUTE PAIN
TYPE: SURGICAL PAIN
TYPE: ACUTE PAIN
TYPE: SURGICAL PAIN
TYPE: ACUTE PAIN
TYPE: ACUTE PAIN;CHRONIC PAIN
TYPE: ACUTE PAIN

## 2022-01-01 ASSESSMENT — FIBROSIS 4 INDEX
FIB4 SCORE: 1.07
FIB4 SCORE: 0.67
FIB4 SCORE: 0.48
FIB4 SCORE: 0.81
FIB4 SCORE: 0.48
FIB4 SCORE: 0.6
FIB4 SCORE: 0.67
FIB4 SCORE: 1.12
FIB4 SCORE: 1.13
FIB4 SCORE: 0.48
FIB4 SCORE: 0.48
FIB4 SCORE: 1.13
FIB4 SCORE: 0.48
FIB4 SCORE: 0.6
FIB4 SCORE: 1.13

## 2022-01-01 ASSESSMENT — CHA2DS2 SCORE
SEX: MALE
HYPERTENSION: YES
CHA2DS2 VASC SCORE: 4
VASCULAR DISEASE: NO
PRIOR STROKE OR TIA OR THROMBOEMBOLISM: YES
AGE 75 OR GREATER: NO
CHF OR LEFT VENTRICULAR DYSFUNCTION: NO
AGE 65 TO 74: YES
DIABETES: NO

## 2022-01-01 ASSESSMENT — ENCOUNTER SYMPTOMS
VOMITING: 0
WEAKNESS: 0
NAUSEA: 0
CHILLS: 0
ABDOMINAL PAIN: 0
DIARRHEA: 0
ARTHRALGIAS: 1
SENSORY CHANGE: 0
SHORTNESS OF BREATH: 0
TINGLING: 0
FEVER: 0
NUMBNESS: 0
COUGH: 0

## 2022-01-01 ASSESSMENT — LIFESTYLE VARIABLES
ALCOHOL_USE: NO
ON A TYPICAL DAY WHEN YOU DRINK ALCOHOL HOW MANY DRINKS DO YOU HAVE: 0
TOTAL SCORE: 0
HOW MANY TIMES IN THE PAST YEAR HAVE YOU HAD 5 OR MORE DRINKS IN A DAY: 0
EVER HAD A DRINK FIRST THING IN THE MORNING TO STEADY YOUR NERVES TO GET RID OF A HANGOVER: NO
TOTAL SCORE: 0
HAVE YOU EVER FELT YOU SHOULD CUT DOWN ON YOUR DRINKING: NO
AVERAGE NUMBER OF DAYS PER WEEK YOU HAVE A DRINK CONTAINING ALCOHOL: 0
EVER FELT BAD OR GUILTY ABOUT YOUR DRINKING: NO
TOTAL SCORE: 0
CONSUMPTION TOTAL: NEGATIVE
HAVE PEOPLE ANNOYED YOU BY CRITICIZING YOUR DRINKING: NO

## 2022-01-01 ASSESSMENT — PAIN SCALES - PAIN ASSESSMENT IN ADVANCED DEMENTIA (PAINAD)
BREATHING: NORMAL
BODYLANGUAGE: TENSE, DISTRESSED PACING, FIDGETING
CONSOLABILITY: DISTRACTED OR REASSURED BY VOICE/TOUCH
BODYLANGUAGE: TENSE, DISTRESSED PACING, FIDGETING
CONSOLABILITY: DISTRACTED OR REASSURED BY VOICE/TOUCH
FACIALEXPRESSION: FACIAL GRIMACING
TOTALSCORE: 5
NEGVOCALIZATION: OCCASIONAL MOAN/GROAN, LOW SPEECH, NEGATIVE/DISAPPROVING QUALITY
BODYLANGUAGE: TENSE, DISTRESSED PACING, FIDGETING
TOTALSCORE: 3
BREATHING: NORMAL
CONSOLABILITY: NO NEED TO CONSOLE
TOTALSCORE: 1
TOTALSCORE: 5
NEGVOCALIZATION: OCCASIONAL MOAN/GROAN, LOW SPEECH, NEGATIVE/DISAPPROVING QUALITY
BODYLANGUAGE: TENSE, DISTRESSED PACING, FIDGETING
NEGVOCALIZATION: OCCASIONAL MOAN/GROAN, LOW SPEECH, NEGATIVE/DISAPPROVING QUALITY
NEGVOCALIZATION: OCCASIONAL MOAN/GROAN, LOW SPEECH, NEGATIVE/DISAPPROVING QUALITY
BREATHING: NORMAL
FACIALEXPRESSION: SMILING OR INEXPRESSIVE
FACIALEXPRESSION: FACIAL GRIMACING
CONSOLABILITY: DISTRACTED OR REASSURED BY VOICE/TOUCH
CONSOLABILITY: NO NEED TO CONSOLE
TOTALSCORE: 4
BREATHING: NORMAL
FACIALEXPRESSION: SMILING OR INEXPRESSIVE
BODYLANGUAGE: TENSE, DISTRESSED PACING, FIDGETING
BREATHING: NORMAL
FACIALEXPRESSION: FACIAL GRIMACING

## 2022-01-01 ASSESSMENT — GAIT ASSESSMENTS
GAIT LEVEL OF ASSIST: UNABLE TO PARTICIPATE
GAIT LEVEL OF ASSIST: REFUSED
GAIT LEVEL OF ASSIST: UNABLE TO PARTICIPATE

## 2022-01-01 ASSESSMENT — PAIN SCALES - GENERAL: PAIN_LEVEL: 0

## 2022-01-04 NOTE — ED TRIAGE NOTES
"Chief Complaint   Patient presents with   • Numbness     x9 months he has been having right hand and right foot numbness. He does not have access to primary care       Patient to triage ambulatory with a steady gait, AAOx4, Appropriate precautions in place.     Explained wait time and triage process. Placed back in lobby. Told to notify ED tech or RN of any changes, verbalized understanding.    /77   Pulse 78   Temp 36.4 °C (97.6 °F) (Temporal)   Resp 16   Ht 1.803 m (5' 11\")   Wt 66.8 kg (147 lb 4.3 oz)   SpO2 100%   BMI 20.54 kg/m²     "

## 2022-01-05 NOTE — ED PROVIDER NOTES
ED Provider Note    Chief Complaint:   Right hand tingling, right hand weakness, right foot pain    HPI:  Alverto Duran is a very pleasant 66-year-old gentleman who presents to the emergency department for evaluation of right hand paresthesias and weakness.  Symptoms been going on for several months, he was seen in March 2022 for similar symptoms.  He describes intermittent paresthesias localized to the right index and right long finger as well as the thumb.  He does have good , but states at times he has to grasp objects a little more firmly to make sure that he can hold them securely.  He did try to follow-up with a primary care physician, and he would prefer to go to [facility], however he said difficulty securing an appointment there.  Additionally he has a small bunion or corn localized to the third digit on the right foot.  He states he has tried scraping the dried skin off, but this does cause some pain.  He is uncertain if he may be better shoes.  The symptoms been present for several months as well.  He does not have any new or acute symptoms, he is also requesting assistance with finding a primary care physician.  He states that the last visit she had was located on the other side of [facility] for where he lives, was not easily accessible by public transit.  He does have some repetitive use of the right upper extremity, as he works as a  at the [employer], but he also is left-hand-dominant and states that he uses his left hand little more than his right.     Review of Systems:  See HPI for pertinent positives and negatives.     Past Medical History:   has a past medical history of Glaucoma.    Social History:  Social History     Tobacco Use   • Smoking status: Current Every Day Smoker     Packs/day: 0.50     Types: Cigarettes   • Smokeless tobacco: Never Used   Vaping Use   • Vaping Use: Never used   Substance and Sexual Activity   • Alcohol use: Never   • Drug use: Yes  "    Types: Methamphetamines, Inhaled     Comment: crystal meth   • Sexual activity: Yes     Partners: Female       Surgical History:  patient denies any surgical history    Current Medications:  Home Medications     Reviewed by Ching Zaragoza R.N. (Registered Nurse) on 01/04/22 at 1338  Med List Status: Partial   Medication Last Dose Status   mupirocin (BACTROBAN) 2 % Ointment  Active   ondansetron (ZOFRAN) 4 MG Tab tablet  Active                Allergies:  No Known Allergies    Physical Exam:  Vital Signs: /70   Pulse 76   Temp 36.6 °C (97.9 °F) (Temporal)   Resp 18   Ht 1.803 m (5' 11\")   Wt 66.8 kg (147 lb 4.3 oz)   SpO2 96%   BMI 20.54 kg/m²   Constitutional: Alert, no acute distress  HENT: Atraumatic  Neck: Normal range of motion  Cardiovascular: Right hand warm, well perfused, 2+ radial pulse, normal capillary refill time in all 5 digits.  Right foot is warm and well perfused, 1+ DP pulse  Pulmonary: Normal work of breathing  Skin: Right hand with normal appearing overlying skin, no redness, no cellulitis, no evidence of abscess, no lacerations nor abrasions.  Right third toe with a small area of callused skin, possibly due to rubbing on his shoes.  There is some surrounding skin cracking, possibly consistent with an associated fungal infection.  Musculoskeletal: No bony deformity of the right hand or right foot, soft compartments throughout the right upper and right lower extremity.  Neurologic: Right upper and right lower extremity with with normal sensory and motor function    Medical records reviewed for continuity of care.  Mr. Duran was seen in this emergency department 3/16/2021 for evaluation of numbness in the right hand.  He also reported a subjective fever at this visit.  No acute findings identified, work-up was reassuring.  He was discharged home in stable condition.    MDM:  Mr. Duran presents to the emergency department today for evaluation of right hand wrist lesions and a right " foot callus as documented above.  Neither of these symptoms are acute, however has had some difficulty obtaining follow-up care.    I discussed his situation with our community health worker, tomorrow morning she will work to get him a follow-up appointment at [facility] which is just a few blocks from where he lives.    With a reduced hand pain, I did recommend wearing a splint, as his symptoms are consistent with carpal tunnel given the distribution of paresthesias.  Have also provided him with follow-up information for [facility], he is counseled to call the clinic in the morning schedule a follow-up appointment as well.  It appears that he may have an underlying fungal infection on his foot, he was prescribed Lotrimin, counseled to use this daily, and to follow-up with primary care to evaluate for improvement. Return precautions were discussed with the patient, and provided in written form with the patient's discharge instructions.     Personal protective equipment including N95 surgical respirator, goggles, and gloves were used during this encounter.       Disposition:  Discharge home in stable condition    Final Impression:  1. Bunion    2. Paresthesias in right hand        Electronically signed by: Sherie Bowles MD, 1/4/2022 5:29 PM

## 2022-01-05 NOTE — DISCHARGE INSTRUCTIONS
Please use the antifungal cream on your foot, the prescription was sent to your pharmacy at [facility].  Please let us know if you need that prescription changed.  Our community health worker is working on setting up a follow-up appointment for you at [facility].  If they cannot set you up with an appointment within the next 30 minutes, they will call tomorrow morning to set up an appointment for you at that time.  Please call the [facility] tomorrow to set up a follow-up appointment.  If you cannot set up a follow-up appointment tomorrow, please call the emergency department for assistance.  Return to the emergency department if you develop any new or worsening symptoms including worsening pain, numbness, weakness, or any further concerns.  With regard to your hand numbness, you may have carpal tunnel syndrome.  A brace may help your symptoms.  Please review your symptoms with your primary care physician, additionally, you may contact the orthopedic clinic listed above for a follow-up appointment to look at your hand.

## 2022-01-05 NOTE — ED NOTES
.Patient discharged in stable condition per order. Oral and written discharge instructions reviewed. Medications sent to home pharmacy. New medications reviewed. All belongings accounted for and taken with patient. Wristband cut off per protocol. Questions answered, and patient agrees with discharge plan. Patient escorted to [facility]. Instructed to [facility] with [facility]. Dressing placed to foot before DC

## 2022-05-11 NOTE — PROGRESS NOTES
"Subjective     Alverto Tyshawn Duran is a 66 y.o. male who presents with Foot Problem (X 1 year on bilateral feet.  He is only to stand on feet 3-4 hours.  Pt. Needs a referral to a PCP. )            Foot Problem  This is a chronic problem. Episode onset: > 1 year  The problem occurs constantly (they are interfering with his ability to work ). The problem has been unchanged. Associated symptoms include arthralgias (bilateral foot pain ). Pertinent negatives include no abdominal pain, chills, coughing, fever, nausea, numbness, vomiting or weakness. Exacerbated by: standing on his feet- bearing weight  He has tried nothing for the symptoms.    The patient is also needed a Physical. He Is interested in establishing with a PCP.     Past Medical History:   Diagnosis Date   • Glaucoma        No past surgical history on file.    No family history on file.    No Known Allergies    Medications, Allergies, and current problem list reviewed today in Epic      Review of Systems   Constitutional: Negative for chills, fever and malaise/fatigue.   Respiratory: Negative for cough and shortness of breath.    Gastrointestinal: Negative for abdominal pain, diarrhea, nausea and vomiting.   Musculoskeletal: Positive for arthralgias (bilateral foot pain ) and joint pain (bilateral foot pain ).   Neurological: Negative for tingling, sensory change, weakness and numbness.         All other systems reviewed and are negative.         Objective     /76   Pulse (!) 106   Temp 36.3 °C (97.3 °F) (Temporal)   Resp 14   Ht 1.803 m (5' 11\")   Wt 68 kg (150 lb)   SpO2 99%   BMI 20.92 kg/m²      Physical Exam  Constitutional:       General: He is not in acute distress.     Appearance: He is not ill-appearing.   HENT:      Head: Normocephalic and atraumatic.   Eyes:      Conjunctiva/sclera: Conjunctivae normal.   Cardiovascular:      Rate and Rhythm: Normal rate and regular rhythm.   Pulmonary:      Effort: Pulmonary effort is normal. No " respiratory distress.      Breath sounds: No stridor. No wheezing.   Musculoskeletal:        Feet:    Skin:     General: Skin is warm and dry.   Neurological:      General: No focal deficit present.      Mental Status: He is alert and oriented to person, place, and time.   Psychiatric:         Mood and Affect: Mood normal.         Behavior: Behavior normal.         Thought Content: Thought content normal.         Judgment: Judgment normal.                             Assessment & Plan        1. Bilateral foot pain  Referral to establish with [facility] PCP    Referral to Podiatry       2. [place] of foot  Referral to establish with [facility] PCP    Referral to Podiatry           Patient referred to Podiatry and to establish with PCP for Physical and healthcare maintenance.     Differential diagnoses, Supportive care, and indications for immediate follow-up discussed with patient.   Pathogenesis of diagnosis discussed including typical length and natural progression.   Instructed to return to clinic or nearest emergency department for any change in condition, further concerns, or worsening of symptoms.    The patient demonstrated a good understanding and agreed with the treatment plan.    Kathe Sebastian P.A.-C.

## 2022-06-21 PROBLEM — I16.0 HYPERTENSIVE URGENCY: Status: ACTIVE | Noted: 2022-01-01

## 2022-06-21 PROBLEM — Z72.0 TOBACCO USE: Status: ACTIVE | Noted: 2022-01-01

## 2022-06-21 PROBLEM — E87.6 HYPOKALEMIA: Status: ACTIVE | Noted: 2022-01-01

## 2022-06-21 PROBLEM — F15.10 METHAMPHETAMINE USE (HCC): Status: ACTIVE | Noted: 2022-01-01

## 2022-06-21 PROBLEM — R26.81 GAIT INSTABILITY: Status: ACTIVE | Noted: 2022-01-01

## 2022-06-21 NOTE — ED PROVIDER NOTES
ED Provider Note    ER PROVIDER NOTE        CHIEF COMPLAINT  Chief Complaint   Patient presents with   • T-5000 GLF     Pt developed sudden onset of dizziness yesterday and has had multiple falls. Seen at [place] for same s/s yesterday discharged with DX of headache/htn    • Dizziness   • Neck Pain   • Headache       HPI  Alverto Duran is a 66 y.o. male who presents to the emergency department complaining of headache, neck pain and dizziness.  Patient reports that he has had some issues with balance and dizziness where he feels like the room is spinning, he thinks over the last 3-4 days although is not sure.  2 days ago he fell because of this and hit his head.  Since that point he has had headache as well as neck pain that is not improving.  He reports no LOC, no nausea or vomiting although he does report he has not been eating or drinking very well, no focal weakness numbness or tingling although he does report some generalized weakness.  No visual symptoms.  No recent fevers chills cough or congestion.  No chest pain, palpitations or shortness of breath.    He reports he does not take any medications or blood thinners on a regular basis    REVIEW OF SYSTEMS  Pertinent positives include headache, neck pain, fall. Pertinent negatives include no vomiting. See HPI for details. All other systems reviewed and are negative.    PAST MEDICAL HISTORY   has a past medical history of Glaucoma.    SURGICAL HISTORY  patient denies any surgical history    FAMILY HISTORY  No family history on file.    SOCIAL HISTORY  Social History     Socioeconomic History   • Marital status: Single   Tobacco Use   • Smoking status: Current Every Day Smoker     Packs/day: 0.50     Types: Cigarettes   • Smokeless tobacco: Never Used   Vaping Use   • Vaping Use: Never used   Substance and Sexual Activity   • Alcohol use: Never   • Drug use: Yes     Types: Methamphetamines, Inhaled     Comment: crystal meth   • Sexual activity: Yes      "Partners: Female      Social History     Substance and Sexual Activity   Drug Use Yes   • Types: Methamphetamines, Inhaled    Comment: crystal meth       CURRENT MEDICATIONS  Home Medications     Reviewed by Ezequiel Valentine (Pharmacy Tech) on 06/21/22 at 1123  Med List Status: Complete   Medication Last Dose Status        Patient Edgar Taking any Medications                       ALLERGIES  No Known Allergies    PHYSICAL EXAM  VITAL SIGNS: BP (!) 182/81   Pulse (!) 55   Temp 36.7 °C (98 °F) (Temporal)   Resp 12   Ht 1.803 m (5' 11\")   Wt 70.3 kg (155 lb)   SpO2 99%   BMI 21.62 kg/m²   Pulse ox interpretation: I interpret this pulse ox as normal.    Constitutional: Alert in no apparent distress.  HENT: No signs of trauma, Bilateral external ears normal, Nose normal.  Mucous membranes mildly dry  Eyes: Pupils are equal and reactive, Conjunctiva normal, Non-icteric.   Neck: Normal range of motion, No tenderness, Supple, No stridor. .   Cardiovascular: Regular rate and rhythm, no murmurs.   Thorax & Lungs: Normal breath sounds, No respiratory distress, No wheezing, No chest tenderness.   Abdomen: Bowel sounds normal, Soft, No tenderness, No masses, No pulsatile masses. No peritoneal signs.  Skin: Warm, Dry, No erythema, No rash.   Back: No bony tenderness, No CVA tenderness.   Extremities: Intact distal pulses, No edema, No tenderness, No cyanosis, Negative Cynthia's sign.  Musculoskeletal: Good range of motion in all major joints. No tenderness to palpation or major deformities noted.   Neurologic: Alert, cranial nerves intact, speech is appropriate or not slurred, upper extremities bilaterally exhibit no drift, no nystagmus, 5 out of 5 strength with bilateral bicep/tricep/, sensation intact to light touch throughout upper extremities. Lower extremities strength 5 out of 5 thigh extension/flexion/abduction/adduction, knee extension/flexion, dorsiflexion plantar flexion. No clonus.  2+ patella reflexes.  " sensation intact to light touch.  Patient is unable to ambulate NIH 0  Psychiatric: Affect normal, Judgment normal, Mood normal.       DIAGNOSTIC STUDIES / PROCEDURES    EKG  Results for orders placed or performed during the hospital encounter of 06/21/22   CBC WITH DIFFERENTIAL   Result Value Ref Range    WBC 7.1 4.8 - 10.8 K/uL    RBC 4.77 4.70 - 6.10 M/uL    Hemoglobin 13.1 (L) 14.0 - 18.0 g/dL    Hematocrit 40.6 (L) 42.0 - 52.0 %    MCV 85.1 81.4 - 97.8 fL    MCH 27.5 27.0 - 33.0 pg    MCHC 32.3 (L) 33.7 - 35.3 g/dL    RDW 45.4 35.9 - 50.0 fL    Platelet Count 246 164 - 446 K/uL    MPV 10.8 9.0 - 12.9 fL    Neutrophils-Polys 73.70 (H) 44.00 - 72.00 %    Lymphocytes 18.00 (L) 22.00 - 41.00 %    Monocytes 7.90 0.00 - 13.40 %    Eosinophils 0.00 0.00 - 6.90 %    Basophils 0.10 0.00 - 1.80 %    Immature Granulocytes 0.30 0.00 - 0.90 %    Nucleated RBC 0.00 /100 WBC    Neutrophils (Absolute) 5.19 1.82 - 7.42 K/uL    Lymphs (Absolute) 1.27 1.00 - 4.80 K/uL    Monos (Absolute) 0.56 0.00 - 0.85 K/uL    Eos (Absolute) 0.00 0.00 - 0.51 K/uL    Baso (Absolute) 0.01 0.00 - 0.12 K/uL    Immature Granulocytes (abs) 0.02 0.00 - 0.11 K/uL    NRBC (Absolute) 0.00 K/uL   COMP METABOLIC PANEL   Result Value Ref Range    Sodium 141 135 - 145 mmol/L    Potassium 3.1 (L) 3.6 - 5.5 mmol/L    Chloride 110 96 - 112 mmol/L    Co2 21 20 - 33 mmol/L    Anion Gap 10.0 7.0 - 16.0    Glucose 105 (H) 65 - 99 mg/dL    Bun 16 8 - 22 mg/dL    Creatinine 0.66 0.50 - 1.40 mg/dL    Calcium 7.0 (L) 8.5 - 10.5 mg/dL    AST(SGOT) 10 (L) 12 - 45 U/L    ALT(SGPT) 11 2 - 50 U/L    Alkaline Phosphatase 52 30 - 99 U/L    Total Bilirubin 0.2 0.1 - 1.5 mg/dL    Albumin 3.1 (L) 3.2 - 4.9 g/dL    Total Protein 5.2 (L) 6.0 - 8.2 g/dL    Globulin 2.1 1.9 - 3.5 g/dL    A-G Ratio 1.5 g/dL   TROPONIN   Result Value Ref Range    Troponin T <6 6 - 19 ng/L   LIPASE   Result Value Ref Range    Lipase 9 (L) 11 - 82 U/L   MAGNESIUM   Result Value Ref Range    Magnesium  1.6 1.5 - 2.5 mg/dL   ESTIMATED GFR   Result Value Ref Range    GFR (CKD-EPI) 103 >60 mL/min/1.73 m 2   EKG   Result Value Ref Range    Report       [facility] Emergency Dept.    Test Date:  2022  Pt Name:    JOSE POSADA                Department: ER  MRN:        [id number]                      Room:       [facility]  Gender:     Male                         Technician: [id number]  :        1955                   Requested By:ER TRIAGE PROTOCOL  Order #:    [id number]                    Reading MD: KEISHA HUBBARD MD    Measurements  Intervals                                Axis  Rate:       61                           P:          73  VA:         168                          QRS:        16  QRSD:       94                           T:          51  QT:         428  QTc:        431    Interpretive Statements  SINUS RHYTHM  Compared to ECG 2021 05:51:13  Ventricular premature complex(es) no longer present  Left-axis deviation no longer present  Electronically Signed On 2022 8:24:40 PDT by KEISHA HUBBARD MD           RADIOLOGY  CT-HEAD W/O   Final Result      1.  No evidence of acute hemorrhage, mass or large territorial infarction   2.  LEFT cerebellar and RIGHT frontal encephalomalacia   3.  Mild atrophy   4.  Mild white matter changes         CT-CSPINE WITHOUT PLUS RECONS   Final Result      1.  No acute fracture or traumatic listhesis in the cervical spine.   2.  Emphysema in the lung apices.      MR-BRAIN-WITH & W/O    (Results Pending)   US-CAROTID DOPPLER BILAT    (Results Pending)   EC-ECHOCARDIOGRAM COMPLETE W/O CONT    (Results Pending)     The radiologist's interpretation of all radiological studies have been reviewed and images independently viewed by me.    COURSE & MEDICAL DECISION MAKING  Nursing notes, VS, PMSFHx reviewed in chart.    8:06 AM Patient seen and examined at bedside.  Ordered for CT, cbc, cmp, mg, troponin, ECG to evaluate his symptoms.     9:03  AM  Patient's metabolic panel has returned, with significant hypocalcemia as well as mild hypok.  Have ordered for repletion    11:13 AM  Patient is reevaluated, c-collar is cleared, he is still hypertensive, although he still reports no history, will start on lisinopril, he is somewhat unsteady on his feet will be given fluids, reevaluation    Given the need for IV fluids and his unsteady gait at this time, patient will be placed in ED observation at 11:14 AM on 6/21/2022.  For fluid replacement and ambulatory trial    1:15 PM  Patient is reevaluated, he is still having difficulty ambulating and with his balance, at this point we will plan for hospitalization    1:40 PM  Case is discussed with hospitalist for admission    Patient is discharged from ED observation at 1:40 PM on 6/21/2022 with disposition of admission to the hospital in stable condition      HYDRATION: Based on the patient's presentation of Dehydration the patient was given IV fluids. IV Hydration was used because oral hydration was not as rapid as required. Upon recheck following hydration, the patient was unchanged.    Decision Making:  This is a 66 y.o. male presenting with dizziness and balance issues over the last 3 days as well as a fall resulting in headache and neck pain.  Regarding his fall, with his headache and neck pain I did obtain CT which shows no evidence of traumatic intracranial injury or spinal fracture intracranial bleed or other acute process at this time, he does have some encephalomalacia.  He has however had persistent dizziness and difficulty walking preceding the fall.  At this point given his persistent symptoms, some concern for potential central process such as CVA and patient will be hospitalized for further evaluation as he cannot walk.  He would be well out of any window for alteplase or IR intervention as he has had symptoms over 3 days.  He was mildly hypokalemic and given oral potassium, as well as hypocalcemic and  given calcium replacement as well    Patient is admitted in stable condition    FINAL IMPRESSION  1. Fall, initial encounter    2. Dehydration    3. Nonintractable headache, unspecified chronicity pattern, unspecified headache type    4. Dizziness    5. Hypokalemia    6. Hypocalcemia          The note accurately reflects work and decisions made by me.  Timo Lino M.D.  6/21/2022  3:07 PM

## 2022-06-21 NOTE — H&P
[facility] MEDICINE HISTORY AND PHYSICAL     PATIENT ID:  NAME:  Alverto Duran  MRN:               [id number]  YOB: 1955    Date of Admission:   6/21/2022     Attending:   Leatha Timmons MD    Resident:   Roro Still MD PGY-2    Primary Care Physician:    None    CC:    Dizziness, headaches    HPI:   Alverto Duran is a 66 y.o. male without documented medical history who presented with dizziness and frequent falls with associated head trauma, as well as severe headache. History limited as patient was not cooperative with line of questioning and rather somnolent. Per chart review, he went to [place] ER two days ago for left-sided headache that was deemed to be tension headache. No imaging was completed at that time. He was discharged home from the ER. Patient returns today, brought in by ambulance, complaining of multiple falls and headache. He reports no LOC, but headache has been present since this morning. Denies N/V/D, chest pain.    ERCourse:  - CT Head shows no acute abnormalities, evidence of encephalomalacia in left cerebellar and right frontal areas  - CT neck without acute fracture. Emphysema noted in lung apices  - Labs show hypokalemia to 3.1, repleted by ERP with 40meq  - VS notable for BP of 200s/90s, bradycardic to 40s-50s. EKG with sinus rhythm prior to bradycardia.  - Meds given: Lisinopril 10mg, 1L LR bolus, 40 mEq potassium chloride, 2g IV calcium gluconate, tylenol 1g  - The plan was to send patient home from ER as lab work and imaging was relatively unremarkable; however, patient showed persistent gait instability with 5 falls, 4 of which were stabilized by RN, 1 of which resulted in light head trauma. Therefore, inpatient admission was recommended for CVA workup.    REVIEW OF SYSTEMS:   Ten systems reviewed and were negative except as noted in the HPI.                PAST MEDICAL HISTORY:  Past Medical History:   Diagnosis Date   • Glaucoma        PAST SURGICAL  HISTORY:  History reviewed. No pertinent surgical history.    FAMILY HISTORY:  No family history on file.    SOCIAL HISTORY:   Per chart review, works as a  at the [employer]  Lives with male romantic partner  Denies EtOH use, drug use  Smokes 10 cigarettes a day    DIET:   Orders Placed This Encounter   Procedures   • Diet Order Diet: Regular     Standing Status:   Standing     Number of Occurrences:   1     Order Specific Question:   Diet:     Answer:   Regular [1]       ALLERGIES:  No Known Allergies    OUTPATIENT MEDICATIONS:    Current Facility-Administered Medications:   •  potassium chloride SA (Kdur) tablet 40 mEq, 40 mEq, Oral, Once, Roro Still M.D.  •  senna-docusate (PERICOLACE or SENOKOT S) 8.6-50 MG per tablet 2 Tablet, 2 Tablet, Oral, BID **AND** polyethylene glycol/lytes (MIRALAX) PACKET 1 Packet, 1 Packet, Oral, QDAY PRN **AND** magnesium hydroxide (MILK OF MAGNESIA) suspension 30 mL, 30 mL, Oral, QDAY PRN **AND** bisacodyl (DULCOLAX) suppository 10 mg, 10 mg, Rectal, QDAY PRN, Leatha Timmons M.D.  •  enoxaparin (Lovenox) inj 40 mg, 40 mg, Subcutaneous, DAILY AT 1800, Leatha Timmons M.D.  •  hydrALAZINE (APRESOLINE) injection 10 mg, 10 mg, Intravenous, Q4HRS PRN, Leatha Timmons M.D.  •  enalaprilat (Vasotec) injection 1.25 mg 1 mL, 1.25 mg, Intravenous, Q6HRS PRN, Leatha Timmons M.D.  •  ondansetron (ZOFRAN) syringe/vial injection 4 mg, 4 mg, Intravenous, Q4HRS PRN, Leatha Timmons M.D.  •  ondansetron (ZOFRAN ODT) dispertab 4 mg, 4 mg, Oral, Q4HRS PRN, Leatha Timmons M.D.  •  nicotine (NICODERM) 21 MG/24HR 21 mg, 21 mg, Transdermal, Daily-0600 **AND** Nicotine Replacement Patient Education Materials, , , Once **AND** nicotine polacrilex (NICORETTE) 2 MG piece 2 mg, 2 mg, Oral, Q HOUR PRN, Leatha Timmons M.D.  No current outpatient medications on file.    PHYSICAL EXAM:  Vitals:    06/21/22 1117 06/21/22 1201 06/21/22 1301 06/21/22 1401    BP: (!) 196/82 (!) 194/84 (!) 186/87 (!) 182/81   Pulse: (!) 50 (!) 42 (!) 47 (!) 55   Resp: (!) 9 (!) 31 17 12   Temp:       TempSrc:       SpO2: 98% 96% 97% 99%   Weight:       Height:       , Temp (24hrs), Av.7 °C (98 °F), Min:36.7 °C (98 °F), Max:36.7 °C (98 °F)  , Pulse Oximetry: 99 %, O2 Delivery Device: None - Room Air      General: Pt resting in NAD, somnolent, not cooperative with questions  Skin:  Pink, warm and dry.  No rashes  HEENT: NC/AT. PERRL. EOMI. MMM. No nasal discharge. Oropharynx nonerythematous without exudate/plaques  Neck:  Supple without lymphadenopathy or rigidity. No JVD   Lungs:  Symmetrical.  CTAB with no W/R/R.  Good air movement   Cardiovascular:  S1/S2 RRR without M/R/G.  Abdomen:  Abdomen is soft NT/ND. +BS. No masses noted.  Extremities:  Full range of motion. No gross deformities noted. 2+ pulses in all extremities. No C/C/E       LAB TESTS:   Recent Labs     22  0753   WBC 7.1   RBC 4.77   HEMOGLOBIN 13.1*   HEMATOCRIT 40.6*   MCV 85.1   MCH 27.5   RDW 45.4   PLATELETCT 246   MPV 10.8   NEUTSPOLYS 73.70*   LYMPHOCYTES 18.00*   MONOCYTES 7.90   EOSINOPHILS 0.00   BASOPHILS 0.10         Recent Labs     22  0753   SODIUM 141   POTASSIUM 3.1*   CHLORIDE 110   CO2 21   BUN 16   CREATININE 0.66   CALCIUM 7.0*   MAGNESIUM 1.6   ALBUMIN 3.1*       CULTURES:   Results     Procedure Component Value Units Date/Time    COV-2, FLU A/B, AND RSV BY PCR (2-4 HOURS CEPHEID): Collect NP swab in [facility] [[id number]]     Order Status: Sent Specimen: Respirate from Nasopharyngeal           IMAGING:   CT-HEAD W/O   Final Result      1.  No evidence of acute hemorrhage, mass or large territorial infarction   2.  LEFT cerebellar and RIGHT frontal encephalomalacia   3.  Mild atrophy   4.  Mild white matter changes         CT-CSPINE WITHOUT PLUS RECONS   Final Result      1.  No acute fracture or traumatic listhesis in the cervical spine.   2.  Emphysema in the lung apices.      MR-BRAIN-WITH  & W/O    (Results Pending)   US-CAROTID DOPPLER BILAT    (Results Pending)   EC-ECHOCARDIOGRAM COMPLETE W/O CONT    (Results Pending)       CONSULTS:   None    ASSESSMENT/PLAN:   66 y.o. male without significant medical history admitted for CVA workup. Methamphetamine use evident on UDS. Gait instability.    # Dizziness  # Gait instability   - No reported syncope. Limited history as patient not cooperative. Reported falls with ataxic/unsteady gait in the ER. Patient was initially paced on ED observation for fluid replacement and ambulatory trial, which he failed with recurrent fall.  - Differential at this time is wide-ranging including TIA, CVA, alcohol abuse with Wernicke's, dehydration secondary to poor PO intake, drug abuse, BPPV, acute infection  - CT head shows right frontal and left cerebellar encephalomalacia likely from head trauma. 2019 CT head shows this right frontal old infarct, but no left cerebellar findings. CT spine without acute trauma    Plan:  - Diagnostic EtOH  - Echo  - Covid testing  - More comprehensive physical exam tomorrow as it was initially limited by lack of cooperation   - MRI brain  - US carotids  - Lipid panel  - Fall precautions    # Methamphetamine Use  - Patient denied drug use when questioned. UDS positive for methamphetamines. This is certainly contributing to above and below sxs.  - Offer drug counseling when patient is more lucid    # Hypertensive Urgency  # Headache  No history of HTN in referencing previous ER notes. Patient without established primary care. Went to [place] ER 2 days ago and sent home with dx of tension headache. No CT imaging completed at that time.  - Allow for permissive HTN to SBP of 180, anti-hypertensives for SBP >180 in case acute CVA  - He would be well out of any window for alteplase or IR intervention as he has had symptoms for over 3 days  - Consider neurology consult if persistent ataxic gait    - PRN analgesics    # Hypocalcemia  Corrected to  7.7 with albumin of 3.1. S/p 2g calcium gluconate IV in ER. No evidence of CKD.  - Recheck tomorrow  - Order PTH    # Hypokalemia  Repleted in ER. Secondary to insensible losses? Magnesium normal. EKG without acute changes.  - CTM    # Tobacco Use, 1/2 pack a day  - CT neck with evidence of emphysema   - Nicotine patch ordered  - Clarify pack years tomorrow    # Hx of positive syphilis, 2020  # RPR titer of 1:1 in 2020  - Patient reports he was not treated, but according to documentation, he was given course of doxycycline. Unclear if PCN allergy  - Re-order HIV, RPR    Core Measures:  Lines: PIV  Diet: Regular  Abx: None  DVT Ppx: Lovenox  GI Ppx: None  PCP: None   CODE STATUS: Full    Dispo: Inpatient for gait instability, workup for CVA.    Roro Still M.D.  PGY-2  [facility]

## 2022-06-21 NOTE — ED NOTES
Patient attempted to walk to the bathroom but is unsteady on his feet. He was assisted back to bed by RN and is using urinal while sitting on side of bed

## 2022-06-21 NOTE — ED NOTES
Patient was attempting to get up to use urinal with RN assistance, he started to fall forward, RN took hold of patient but he did fall forward and hit his head on the ground. No LOC. Patient was immediately helped back to bed and instructed that he will not be getting out of bed and he will have to use urinal in stretcher. VSS, he is AAOx4 at this time, no signs of trauma noted

## 2022-06-21 NOTE — ED NOTES
Fluids finished on patient. We attempted to have patient stand and walk, he remains unsteady on his feet and started to fall. Patient was caught by staff and assisted back into be. CAMILA Lino made aware

## 2022-06-21 NOTE — ED TRIAGE NOTES
Pt to rm R8 bib remsa .  Chief Complaint   Patient presents with   • T-5000 GLF     Pt developed sudden onset of dizziness yesterday and has had multiple falls. Seen at [place] for same s/s yesterday discharged with DX of headache/htn    • Dizziness   • Neck Pain   • Headache

## 2022-06-22 PROBLEM — A53.9 SYPHILIS: Status: ACTIVE | Noted: 2022-01-01

## 2022-06-22 PROBLEM — Z71.89 GOALS OF CARE, COUNSELING/DISCUSSION: Status: ACTIVE | Noted: 2022-01-01

## 2022-06-22 PROBLEM — Z99.11 ON MECHANICALLY ASSISTED VENTILATION (HCC): Status: ACTIVE | Noted: 2022-01-01

## 2022-06-22 PROBLEM — I63.542: Status: ACTIVE | Noted: 2022-01-01

## 2022-06-22 NOTE — PROGRESS NOTES
"Haldol 5mg IVP dose at 0222 only had a sedating effect for 15 minutes, and then patient became more increasingly agitated.     0400: Patient stopped answering orientation questions, and replied with \"I don't know.\" On-call resident notified.     0522: 1mg Ativan IVP given secondary to increasing agitation and patient constantly trying to get out of restraints.    "

## 2022-06-22 NOTE — CARE PLAN
The patient is Stable - Low risk of patient condition declining or worsening    Shift Goals  Clinical Goals: Safety, free from falls  Patient Goals: GLENDY  Family Goals: GLENDY    Progress made toward(s) clinical / shift goals:        Problem: Knowledge Deficit - Standard  Goal: Patient and family/care givers will demonstrate understanding of plan of care, disease process/condition, diagnostic tests and medications  Outcome: Progressing     Problem: Pain - Standard  Goal: Alleviation of pain or a reduction in pain to the patient’s comfort goal  Outcome: Progressing     Problem: Fall Risk  Goal: Patient will remain free from falls  Outcome: Progressing     Problem: Safety - Medical Restraint  Goal: Remains free of injury from restraints (Restraint for Interference with Medical Device)  Outcome: Progressing     Problem: Skin Integrity  Goal: Skin integrity is maintained or improved  Outcome: Progressing     Patient is not progressing towards the following goals:

## 2022-06-22 NOTE — PROGRESS NOTES
[facility] MEDICINE PROGRESS NOTE     Attending:   Emory Jorge MD    Resident:   Roro Still MD    PATIENT:   Alverto Duran; [id number]; 1955    ID:   66 y.o. male without significant medical history admitted for CVA workup. Methamphetamine use evident on UDS. Patient now unresponsive, possibly oversedated. Will treat for neurosyphilis as history is very limited.     SUBJECTIVE:   Patient very agitated overnight, 1 dose of 5mg Haldol given, which increased agitation. Soft restraints and 1:1 sitter ordered. 1mg ativan given.    Patient completely unresponsive this morning. Unarousable even with sternal rub.    Spoke to friend Timo, with whom he lives, who was able to provide limited supplementary history:   - Timo states that the patient does smoke meth, but unclear how often  - Patient went to [facility] ER 3 days ago and after coming home, he was very unsteady on his feet and Timo had to grab him under his armpits to steady him. Afterward, patient slept for 2 days straight. Apparently this is very atypical behavior for patient, and Timo has no clue what could have precipitated this.  - Timo denies that patient drinks alcohol    OBJECTIVE:  Vitals:    06/21/22 1947 06/22/22 0020 06/22/22 0414 06/22/22 0822   BP: (!) 176/67 (!) 171/84 (!) 174/84 (!) 177/86   Pulse: 74 (!) 57 (!) 56 (!) 54   Resp: 16 16 16 17   Temp: 36.9 °C (98.5 °F) 37.1 °C (98.7 °F) 37.3 °C (99.2 °F) 37.4 °C (99.3 °F)   TempSrc: Temporal Temporal Temporal Temporal   SpO2: 96% 96% 95% 96%   Weight:       Height:           Intake/Output Summary (Last 24 hours) at 6/22/2022 1137  Last data filed at 6/22/2022 0400  Gross per 24 hour   Intake 1100 ml   Output 500 ml   Net 600 ml       PHYSICAL EXAM:  General: Un-arousable, in soft restraints, occasionally sits up and groans, eyes closed  HEENT: Sluggish pupillary reflex  Cardiovascular: RRR without murmurs, rubs, heaves. Normal capillary refill   Respiratory: CTAB, no tachypnea or  retractions   Abdomen: normal bowel sounds, soft, nontender, nondistended, no masses, no organomegaly   EXT:  TORRES, no edema  Skin: No erythema/lesions   Neuro: As above, obtunded       LABS:  Recent Labs     06/21/22  0753   WBC 7.1   RBC 4.77   HEMOGLOBIN 13.1*   HEMATOCRIT 40.6*   MCV 85.1   MCH 27.5   RDW 45.4   PLATELETCT 246   MPV 10.8   NEUTSPOLYS 73.70*   LYMPHOCYTES 18.00*   MONOCYTES 7.90   EOSINOPHILS 0.00   BASOPHILS 0.10     Recent Labs     06/21/22  0753 06/22/22  0150   SODIUM 141 134*   POTASSIUM 3.1* 3.8   CHLORIDE 110 98   CO2 21 24   BUN 16 15   CREATININE 0.66 0.86   CALCIUM 7.0* 10.0   MAGNESIUM 1.6  --    ALBUMIN 3.1* 4.3     Estimated GFR/CRCL = Estimated Creatinine Clearance: 76.1 mL/min (by C-G formula based on SCr of 0.86 mg/dL).  Recent Labs     06/21/22  0753 06/22/22  0150   GLUCOSE 105* 121*     Recent Labs     06/21/22  0753 06/22/22  0150   ASTSGOT 10* 15   ALTSGPT 11 15   TBILIRUBIN 0.2 0.3   ALKPHOSPHAT 52 76   GLOBULIN 2.1 3.5             No results for input(s): INR, APTT, FIBRINOGEN in the last 72 hours.    Invalid input(s): DIMER    MICROBIOLOGY:   No results found for: BLOODCULTU, BLDCULT, BCHOLD     IMAGING:   CT-HEAD W/O   Final Result      1.  No evidence of acute hemorrhage, mass or large territorial infarction   2.  LEFT cerebellar and RIGHT frontal encephalomalacia   3.  Mild atrophy   4.  Mild white matter changes         CT-CSPINE WITHOUT PLUS RECONS   Final Result      1.  No acute fracture or traumatic listhesis in the cervical spine.   2.  Emphysema in the lung apices.      US-CAROTID DOPPLER BILAT    (Results Pending)   EC-ECHOCARDIOGRAM COMPLETE W/O CONT    (Results Pending)   MR-BRAIN-WITH & W/O    (Results Pending)       CULTURES:   Results     Procedure Component Value Units Date/Time    COV-2, FLU A/B, AND RSV BY PCR (2-4 HOURS CEPHEID): Collect NP swab in [facility] [[id number]] Collected: 06/21/22 2480    Order Status: Completed Specimen: Respirate from Nasopharyngeal  "Updated: 06/21/22 1640     Influenza virus A RNA Negative     Influenza virus B, PCR Negative     RSV, PCR Negative     SARS-CoV-2 by PCR NotDetected     Comment: PATIENTS: Important information regarding your results and instructions can  be found at [email or web address]   \"After your  Covid-19 Test\"    [facility] providers: PLEASE REFER TO DE-ESCALATION AND RETESTING PROTOCOL  on [email or web address]    **The [employer] GeneXpert Xpress SARS-CoV-2 RT-PCR Test has been made  available for use under the Emergency Use Authorization (EUA) only.          SARS-CoV-2 Source NP Swab          MEDS:  Current Facility-Administered Medications   Medication Last Admin   • enalaprilat (Vasotec) injection 1.25 mg 1 mL     • amLODIPine (NORVASC) tablet 5 mg     • LORazepam (ATIVAN) injection 1 mg     • penicillin G potassium 3 Million Units in  mL IVPB     • senna-docusate (PERICOLACE or SENOKOT S) 8.6-50 MG per tablet 2 Tablet 2 Tablet at 06/21/22 1756    And   • polyethylene glycol/lytes (MIRALAX) PACKET 1 Packet      And   • magnesium hydroxide (MILK OF MAGNESIA) suspension 30 mL      And   • bisacodyl (DULCOLAX) suppository 10 mg     • enoxaparin (Lovenox) inj 40 mg 40 mg at 06/21/22 1756   • ondansetron (ZOFRAN) syringe/vial injection 4 mg     • ondansetron (ZOFRAN ODT) dispertab 4 mg     • nicotine (NICODERM) 21 MG/24HR 21 mg 21 mg at 06/22/22 0414    And   • nicotine polacrilex (NICORETTE) 2 MG piece 2 mg     • acetaminophen (Tylenol) tablet 650 mg     • Pharmacy Consult Request ...Pain Management Review 1 Each     • oxyCODONE immediate-release (ROXICODONE) tablet 2.5 mg      Or   • oxyCODONE immediate-release (ROXICODONE) tablet 5 mg 5 mg at 06/21/22 1756    Or   • HYDROmorphone (Dilaudid) injection 0.25 mg         PROBLEM LIST:  Problem Noted   Hypertensive Urgency 6/21/2022   Methamphetamine Use (Hcc) 6/21/2022   Gait Instability 6/21/2022   Hypokalemia 6/21/2022   Tobacco Use 6/21/2022 "       ASSESSMENT/PLAN: 66 y.o. male without significant medical history admitted for CVA workup. Methamphetamine use evident on UDS. Patient now unresponsive, possibly oversedated. Will treat for neurosyphilis as history is very limited.     # Dizziness  # Gait instability   # Obtunded  - No reported syncope. Limited history as patient not cooperative. Reported falls with ataxic/unsteady gait in the ER. Patient was initially paced on ED observation for fluid replacement and ambulatory trial, which he failed with recurrent fall.  - Differential at this time is wide-ranging including TIA, CVA, alcohol abuse with Wernicke's, dehydration secondary to poor PO intake, drug abuse, BPPV, acute infection  - CT head shows right frontal and left cerebellar encephalomalacia likely from head trauma. 2019 CT head shows this right frontal old infarct, but no left cerebellar findings. CT spine without acute trauma  - 6/22: Patient obtunded secondary to over-sedation (only given 1mg ativan and 5mg haldol) vs. Methamphetamine withdrawal. Spoke to friend, Timo, with whom he lives, and Timo states he does not drink EtOH. Patient's blood alcohol level is <10. Reported multiple falls at home in the last 2 days, which is a significant change from baseline.  - Covid negative, lipid panel wnl     Plan:  - Stat MRI brain, may need sedation  - NPO as risk for aspiration  - Consulted neurology, recommended treatment for neurosyphilis (as below) and to expedite MRI. Consider LP if no improvement in somnolence throughout the day.  - Echo  - Continue soft restraints  - US carotids    # Methamphetamine Use  - Patient denied drug use when questioned. UDS positive for methamphetamines. This is certainly contributing to above and below sxs.  - Offer drug counseling when patient is more lucid  - Per Timo, patient is a regular user, but unable to quantify frequency     # Hypertensive Urgency  # Headache  No history of HTN in referencing previous ER  notes. Patient without established primary care. Went to [place] ER 2 days ago and sent home with dx of tension headache. No CT imaging completed at that time.  - Allow for permissive HTN in case of CVA  - He would be well out of any window for alteplase or IR intervention as he has had symptoms for over 3 days  - PRN analgesics     # Hypocalcemia - resolved  Corrected to 7.7 with albumin of 3.1. S/p 2g calcium gluconate IV in ER. No evidence of CKD.  - PTH, ionized calcium normal     # Hypokalemia - resolved  Repleted in ER.    - CTM     # Tobacco Use, 1/2 pack a day  - CT neck with evidence of emphysema   - Nicotine patch ordered  - Clarify pack years tomorrow     # Hx of positive syphilis, 2020  # RPR titer of 1:1 in 2020  - Patient reports he was not treated, but according to documentation, he was given course of doxycycline. Unclear if PCN allergy  - RPR reactive, HIV NR  - Will treat for neurosyphilis with 3M units of Pen G q4h for 10 days     Core Measures:  Lines: PIV  Diet: NPO  Abx: None  DVT Ppx: Lovenox  GI Ppx: None  PCP: None   CODE STATUS: Full     Dispo: Inpatient for obtunded state, gait instability workup, MRI brain pending. Appreciate neurology consultation.    Roro Still MD   PGY-2 Family Medicine Resident   [facility]

## 2022-06-22 NOTE — THERAPY
Missed Therapy     Patient Name: Alverto Duran  Age:  66 y.o., Sex:  male  Medical Record #: [id number]  Today's Date: 6/22/2022 06/22/22 1116   Interdisciplinary Plan of Care Collaboration   Collaboration Comments Attempted to initiate PT eval this date. RN reported pt received Ativan this AM and haldol overnight. Pt with inconsistent presentation alternating between unresponse to noxious stimuli then attempting to sit up then unresponsive. Pt groaning, but unable to verbalize any words. No visual scanning/tracking, or ability to fixate on therapist. Unable to follow direction/prompts from therapist, but inconsistently wiggled toes/demo'd thumbs up. Pt unable to demonstrate sufficient ability/alertness to safely follow direction from therapist to remove restraints and and trial mobility away from the bed. Will continue to follow and perform eval as appropriate.

## 2022-06-22 NOTE — PROGRESS NOTES
Pt scheduled for MRI later today, still pending ECHO and carotid US. Pt waxes and wanes with orientation, very fatigued & lethargic. Pt arouses to verbal stimulation and sternal rub. At best, orientation was to person, possibly place, and month; but only briefly. Pt appropriately stated birthday and first name, though unable to state last name. Pt has wake states of only 1-3 minutes and then requires more sternal rubbing for any response. Pt developed slurred speech through shift, MD is aware. NIHSS = 6 at 0800 assessment, NIHSS =2 at 1200 assessment. Will continue to attempt scoring of NIHSS. Pt remains in bilateral wrist restraints.

## 2022-06-22 NOTE — CARE PLAN
The patient is Stable - Low risk of patient condition declining or worsening    Shift Goals  Clinical Goals: Safety  Patient Goals: Go home  Family Goals: GLENDY    Progress made toward(s) clinical / shift goals: Assumed care of patient at 1915. Patient is A&Ox4, Q4hr neuro checks are in place. NIHSS = 0. Lap belt has been in place. Patient constantly tries to get up throughout shift, with unsteady gait. There was one occurrence of patient attempting to stand up, this RN ran in, while the patient was about to fall face forward. Fall was prevented. Patient failed telesitting. New orders for wrist restraints were received. PRN Haldol 5mg given, secondary to patient continuously still trying to get up while in restraints. Bed is low and locked, bed alarm is on, human sitter is now in place, call light is within reach, hourly rounding continues.     Patient is not progressing towards the following goals:    Problem: Knowledge Deficit - Standard  Goal: Patient and family/care givers will demonstrate understanding of plan of care, disease process/condition, diagnostic tests and medications  Outcome: Not Progressing  Note: Patient is non-compliant with education and instruction.

## 2022-06-22 NOTE — PROGRESS NOTES
4 Eyes Skin Assessment Completed by SHAREE Rincon and SHAREE Berg.    Head WDL  Ears WDL  Nose WDL  Mouth WDL  Neck WDL  Breast/Chest WDL  Shoulder Blades WDL  Spine WDL  (R) Arm/Elbow/Hand Abrasion  (L) Arm/Elbow/Hand Abrasion  Abdomen WDL  Groin WDL  Scrotum/Coccyx/Buttocks WDL  (R) Leg WDL  (L) Leg WDL  (R) Heel/Foot/Toe Callous on bottom of foot  (L) Heel/Foot/Toe Callous on bottom of foot          Devices In Places Tele Box, Blood Pressure Cuff, Pulse Ox and SCD's      Interventions In Place Pillows    Possible Skin Injury No    Pictures Uploaded Into Epic N/A  Wound Consult Placed N/A  RN Wound Prevention Protocol Ordered No

## 2022-06-22 NOTE — PROGRESS NOTES
Family Medicine Resident Progress Note:     0200: Notified by nursing staff that patient very unsteady on his feet, yet keeps trying to get out of bed. Ataxia existed on presentation as has not changed per nursing staff. Nursing staff placed patient in soft restraints as it became a patient safety issue and wanted to clarify order with MD. Order for soft restraints placed and also 1 dose of 5mg Haldol PRN was placed in the chart in case he tries to fight the restraints    0220: Patient evaluated at bedside. He was given the Haldol 5 mins prior and was unable to corporate with my questions/exam. Spoke with nursing staff directly. They confirmed he has had no new neurological symptoms and his NIHSS stroke score earlier this evening was 0. He was redirectable, answered questions appropriately, no new slurred speech, and was A&O x 4, all vitals wnl, afebrile, just was not cooperating with nursing staff. Approved of restraints and haldol as patient is a fall risk and uncooperative. 1 on 1 sitter arrived as I was leaving to provide additional assistance.     Steven Monterroso MD   PGY-2 FM Resident   [facility]

## 2022-06-23 NOTE — ASSESSMENT & PLAN NOTE
Large left cerebellar ischemic infarct with cerebral edema and mass-effect on fourth ventricle, cryptogenic  -EVD in placed a.m. 6/23/2022 for suboccipital decompression-status post mannitol and 23% bolus as well as 3% saline infusion-keep ICPs<20  -Post crani and C1 laminectomy  6/23/22   -MAP goal greater than 65; SBP goal to keep   -Nicardipine to keep SBP less than 160- Metoprolol increased to TID from BIDl; Added Lisinopril for HTN; prn antihypertensives   -Propofol, fentanyl  -Elevate head of bed, keep head centered-maximize blood flow  -Goal sodium 150-155- with Na tabs - 2 g 3 times daily to keep sodium 150-155- normalize tomorrow   -ECHO- EF 70%, unremarkable  -Atorvastatin 80 mg - LDL goal less than 70  -Hold ASA and anticoagulants  -post crani/evd until ok with NSG  -PCN for neurosyphillis  -PT/OT/SLP  -palliative consult  -EVD removed 6/28  -no ASA, VTE, or AC per neurosurgery for six weeks

## 2022-06-23 NOTE — DIETARY
"Nutrition Support Assessment:  Day 2 of admit.  Alverto Duran is a 66 y.o. male with admitting DX of Hypertensive urgency, acute CVA.     Current problem list:  1. Acute CVA d/t occlusion of L cerebellar artery  2. Syphilis  3. On mechanically assisted ventilation  4. GOC, counseling/discussion  5. Methamphetamine use  6. Hypokalemia     Assessment:  Estimated Nutritional Needs based on:   Height: 180.3 cm (5' 10.98\")  Weight: 70.3 kg (155 lb) - stand up scale  Weight to Use in Calculations: 70.3 kg (154 lb 15.7 oz)  Body mass index is 19.6 kg/m²., BMI classification: normal    Calculation/Equation: REE per MSJ x 1.2 = 1807 kcals  RMR per PSU (VE: 8.2 L/min and Tmax: 37.4 C) = 1669 kcals  Total Calories / day: 1700 - 1900  (Calories / k - 27)  Total Grams Protein / day: 70 - 84  (Grams Protein / k - 1.2)     Evaluation:   1. Consult received for TF.  2. Gastric cortrak placed and confirmed on KUB for enteral access.  3. Pt admitted for multiple falls and severe headache.   4. Pt is intubated on ventilator.  5. Pt is s/p EVD placement. Plan for craniotomy today for malformation decompression.  6. Skin: No wounds or edema noted to extremities.  7. Labs: glucose 119  8. Meds (on hold for surgery, prior meds listed): liptor, pepcid, penicillin with K, colace, tylenol prn, bowel protocol, 3% NaCl, fentanyl IV, propofol at 20 mcg/kg/min (7.6 ml/hr = 201 kcals/day)  9. Last BM: PTA  10. Standard formula indicated at this time.     Malnutrition Risk: Unable to fully assess.     Recommendations/Plan:  1. Start TF Fibersource HN at 25 ml/hr and advance per protocol to goal rate of 60 ml/hr to provide 1728 kcals, 78 gm protein, and 1166 ml free water per day.  2. Fluids per MD. TAM following.              "

## 2022-06-23 NOTE — DISCHARGE PLANNING
Case Management Discharge Planning    Admission Date: 6/21/2022  GMLOS: 2.2  ALOS: 2    6-Clicks ADL Score: 19  6-Clicks Mobility Score: 14  PT and/or OT Eval ordered: Yes, PT ordered  Post-acute Referrals Ordered: No, PT unable to eval. Pt. on Vent.  Post-acute Choice Obtained: No, pending PT recommendation  Has referral(s) been sent to post-acute provider:  No      Anticipated Discharge Dispo:  Pending PT recommendation.    DME Needed: No, defer to PT recommendation    Action(s) Taken: Updated Provider/Nurse on Discharge Plan    Escalations Completed: None    Medically Clear: No    Next Steps: Pt. Requiring Critical level of care. CM will continue to assist with discharge planning and barriers.    Barriers to Discharge: Medical clearance, Pending Placement, Pending PT Evaluation and Pending Insurance Authorization    Is the patient up for discharge tomorrow: No

## 2022-06-23 NOTE — PROCEDURES
Central Line Insertion    Date/Time: 6/22/2022 11:38 PM  Performed by: Ree Harmon  Authorized by: Ree Harmon     Consent:     Consent obtained:  Emergent situation    Consent given by: pt unable due to clinical condition, unable to reach family.    Risks discussed:  Arterial puncture, incorrect placement, nerve damage, bleeding, infection and pneumothorax    Alternatives discussed:  No treatment and delayed treatment  Pre-procedure details:     Hand hygiene: Hand hygiene performed prior to insertion      Sterile barrier technique: All elements of maximal sterile technique followed      Skin preparation:  ChloraPrep    Skin preparation agent: Skin preparation agent completely dried prior to procedure    Sedation:     Sedation type: see MAR for continuous sedation.  Anesthesia:     Anesthesia method:  Local infiltration    Local anesthetic:  Lidocaine 1% w/o epi  Procedure details:     Location:  R internal jugular    Patient position: HOB elevated slightly to prevent increased ICP.    Procedural supplies:  Triple lumen    Catheter size:  7 Fr    Landmarks identified: yes      Ultrasound guidance: yes      Sterile ultrasound techniques: Sterile gel and sterile probe covers were used      Number of attempts:  1    Successful placement: yes    Post-procedure details:     Post-procedure:  Dressing applied and line sutured    Assessment:  Blood return through all ports, no pneumothorax on x-ray, free fluid flow and placement verified by x-ray    Patient tolerance of procedure:  Tolerated well, no immediate complications  Comments:      The wire is accounted for, the line is good to use.

## 2022-06-23 NOTE — ANESTHESIA POSTPROCEDURE EVALUATION
Patient: Alverto Duran    Procedure Summary     Date: 06/23/22 Room / Location: [facility] OR 06 / SURGERY [facility]    Anesthesia Start: 1039 Anesthesia Stop: 1354    Procedures:       CRANIECTOMY, SUBOCCIPITAL, RESECTION CEREBELLUM (N/A Head)      LAMINECTOMY, SPINE, CERVICAL, POSTERIOR APPROACH C1 (Spine Cervical) Diagnosis: (left cerebellar stroke)    Surgeons: Donovan Rizzo M.D. Responsible Provider: Alcira Turner M.D.    Anesthesia Type: general ASA Status: 5          Final Anesthesia Type: general  Last vitals  BP   Blood Pressure : 123/79, Arterial BP: 140/69    Temp   36.4 °C (97.6 °F)    Pulse   95   Resp   18    SpO2   99 %      Anesthesia Post Evaluation    Patient location during evaluation: ICU  Patient participation: complete - patient cannot participate  Post-procedure mental status: sedated, currently unresponsive.  Pain score: 0 (unable to assess)    Airway patency: patent  Anesthetic complications: no  Cardiovascular status: hemodynamically stable  Respiratory status: acceptable, ventilator, intubated and ETT  Hydration status: acceptable  Comments: Returned back to ICU; Pt HD stable; case d/w Dr. YONAS Jimenes regarding intra-operative events and RN;     PONV: none          No complications documented.

## 2022-06-23 NOTE — PROGRESS NOTES
Critical Care Medicine Faculty Progress Note    Brief HPI/problem list:  66y M hx of meth abuse, tobacco abuse, prior right frontal stroke 2019, htn, RPR +, that presented [facility] ER for acute dizziness 6/20 and sent home. He presents 6/21 for re-evaluation and unable to ambulate thus was admitted after CT head with what was reported as left cerebellar and right frontal encephalomalacia and admitted to floor with MRI pending. He had worsening mental status and agitation given ativan and haldol. MRI shows 5pm 6/22 with acute PICA infarct with cerebellar edema compressing 4th ventricle and causing obstructive hydrocephalus and early herniation syndrome. Patient with cushing response and emergently transferred to ICU for intubation, 23% bolus, mannitol and emergent NSG, neurology consultation and EVD placement.     Daily exam: ill appearing on ventilated EVD on right, right central line, pupils with reactive, blinks, good cough, withdrawals in lowers, no withdrawal in uppers, lungs cta, heart tachy, abdomen soft, no rash    Daily Multi discipline Rounding Report:  Neuro: Q1 neuro, EVD at 10cm 3.5ml drains slow ICP 2-5 highest 15 cough gag corneal, moves right spont, withdrawal in lowers  HR: 100-120's  SBP: Map > 65   Tmax: afebrile  GI: Cortrac in place, held  UOP: 3L mannitol   Lines: right IJ central line, gurpreet hernandez  Resp: Vent day 2 18 460 8 30% EtCO2 correlates  Vte: contra  PPI/H2:pepcid  Antibx: PCN G for RPR +    Plan of care:  Follow up labs, echo, serial BMP with hypertonic rx sodium goal 150-155  EtCO2 check with abg today to goal 35  Passive leg raise +, bolus 150ml of hypertonic he dumpted significant post mannitol replace volume prior to surgery 500ml IV of 3% hypertonic saline and 1L NS over 1 hour  Plan for occipital crani this am discuss with NSG/Dr Rizzo  Hold aspirin with planned crani discuss timing of starting pending trajectory and bleeding risk    Patient remains in critical condition from  ventilator titration, hypertonic bolus and volume resus EVD drainage and discussion with NSG . Critical care time provided was 82 minutes. This excludes all separate billable procedures.     Please see [employer]/NP notes for additional documentation    Dickson Jimenes MD  Critical Care Medicine

## 2022-06-23 NOTE — PROGRESS NOTES
Critical Care Progress Note    Date of admission  6/21/2022    Chief Complaint  66 y.o. male admitted 6/21/2022 with headaches and frequent falls    Hospital Course  66 yom  with a pmhx  of meth use, tobacco use, prior right frontal stroke (2019), HTN, syphilis who initially presented to the hospital complaining of multiple falls and severe headaches.  He was seen at [place] ED sometime around 06/20/22 and discharged to home.  He then came to the ED at [facility] for the same complaint.  CT of head was read as encephalomalacia in the left cerebellar and right frontal areas.  He was admitted to the hospital and yesterday began having a decrease in LOC becoming sleepy then obtunded.  Follow-up MRI noted significant edema with compression of the brain stem and obstructive hydrocephalus.  He was emergently brought to ICU where where he underwent emergent intubated and bedside EVD placement . He was treated with mannitol and 23% saline and started on 3% saline infusion.      Interval Problem Update  Reviewed last 24 hour events:  decrease in LOC - intubated, central line, art line,  cor track placement last PM  Propofol, fentanyl   Every hour neurochecks  EVD placed early this a.m. 6/2322  Status post mannitol and 23% bolus- prior to crani  3% saline infusion- prior to crani  No anticoagulatant medication while EVD in place  ABX -penicillin G to cover syphilis    1500-post occipital Crani and C1 laminectomy by Dr. Rizzo.        Review of Systems  Review of Systems   Unable to perform ROS: Intubated        Vital Signs for last 24 hours   Temp:  [35.8 °C (96.4 °F)-36.4 °C (97.6 °F)] 35.9 °C (96.7 °F)  Pulse:  [] 80  Resp:  [2-65] 22  BP: ()/() 131/85  SpO2:  [94 %-100 %] 99 %    Hemodynamic parameters for last 24 hours       Respiratory Information for the last 24 hours  Vent Mode: APVCMV  Rate (breaths/min): 22  Vt Target (mL): 460  PEEP/CPAP: 8  MAP: 11  Control VTE (exp VT):  468    Physical Exam   Physical Exam  Vitals and nursing note reviewed.   Constitutional:       Interventions: He is sedated and intubated.   HENT:      Head: Normocephalic and atraumatic.      Comments: Right EVD in place  Neck:      Comments: RIJ 3 lumen WNL  Cardiovascular:      Rate and Rhythm: Normal rate and regular rhythm.      Pulses:           Radial pulses are 2+ on the right side and 2+ on the left side.        Dorsalis pedis pulses are 1+ on the right side and 1+ on the left side.   Pulmonary:      Effort: He is intubated.      Breath sounds: Normal breath sounds.   Abdominal:      General: Abdomen is flat. Bowel sounds are normal.      Palpations: Abdomen is soft.   Genitourinary:     Comments: Muse cath to down drain clear yellow urine  Skin:     General: Skin is warm.      Capillary Refill: Capillary refill takes 2 to 3 seconds.   Neurological:      Comments: Intubated and sedated  Bilateral pupils 2 mm very sluggish  + corneal  + cough and gag  Slight movement to bl lower extrem with painful stimulation   Psychiatric:      Comments: Intubated and sedated         Medications  Current Facility-Administered Medications   Medication Dose Route Frequency Provider Last Rate Last Admin   • LORazepam (ATIVAN) injection 2-4 mg  2-4 mg Intravenous Q4HRS PRN Dickson Jimenes M.D.       • phenylephrine (LAN-SYNEPHRINE) 40 mg in  mL infusion  0-300 mcg/min Intravenous Continuous Alcira Turner M.D. 0 mL/hr at 06/23/22 1030 0 mcg/min at 06/23/22 1030   • niCARdipine (CARDENE) 25 mg in  mL Infusion  0-15 mg/hr Intravenous Continuous Hannah Gee A.P.R.NBelkis       • NS infusion   Intravenous Continuous Hannah Gee A.P.R.N.       • enalaprilat (Vasotec) injection 1.25 mg 1 mL  1.25 mg Intravenous Q6HRS PRN Steven Monterroso M.D.   1.25 mg at 06/22/22 1620   • protamine injection 40 mg  40 mg Intravenous Once Gisella Cervantes M.D.       • hydrALAZINE (APRESOLINE) injection 20 mg  20 mg  Intravenous Once Dominic Damico M.D.       • Respiratory Therapy Consult   Nebulization Continuous RT Dominic Damico M.D.       • famotidine (PEPCID) tablet 20 mg  20 mg Enteral Tube Q12HRS Dominic Damico M.D.   20 mg at 06/23/22 0612    Or   • famotidine (PEPCID) injection 20 mg  20 mg Intravenous Q12HRS Dominic Damico M.D.       • MD Alert...ICU Electrolyte Replacement per Pharmacy   Other PHARMACY TO DOSE Dominic Damico M.D.       • lidocaine (XYLOCAINE) 1 % injection 2 mL  2 mL Tracheal Tube Q30 MIN PRN Dominic Damico M.D.       • propofol (DIPRIVAN) injection  0-80 mcg/kg/min Intravenous Continuous Dominic Damico M.D. 19.1 mL/hr at 06/23/22 1400 50 mcg/kg/min at 06/23/22 1400   • fentaNYL (SUBLIMAZE) injection 25 mcg  25 mcg Intravenous Q HOUR PRN Dominic Damico M.D.        Or   • fentaNYL (SUBLIMAZE) injection 50 mcg  50 mcg Intravenous Q HOUR PRN Dominic Damico M.D.        Or   • fentaNYL (SUBLIMAZE) injection 100 mcg  100 mcg Intravenous Q HOUR PRN Dominic Damico M.D.   100 mcg at 06/22/22 2306   • fentaNYL (SUBLIMAZE) 50 mcg/mL in 50mL   Intravenous Continuous Dominic Damico M.D. 1 mL/hr at 06/23/22 0659 50 mcg/hr at 06/23/22 0659   • sodium chloride 200 mEq in empty bag 50 mL ivpb  200 mEq Intravenous Q6HRS PRN Dominic Damico M.D.       • norepinephrine (Levophed) 8 mg in 250 mL NS infusion (premix)  0-30 mcg/min Intravenous Continuous Dominic Damico M.D.   Stopped at 06/23/22 0745   • atorvastatin (LIPITOR) tablet 80 mg  80 mg Enteral Tube Q EVENING Dominic Damico M.D.       • Pharmacy Consult: Enteral tube insertion - review meds/change route/product selection  1 Each Other PHARMACY TO DOSE Dominic Damico M.D.       • acetaminophen (Tylenol) tablet 650 mg  650 mg Enteral Tube Q6HRS PRN Dominic Damico M.D.       • ondansetron (ZOFRAN ODT) dispertab 4 mg  4 mg Enteral Tube Q4HRS PRN Dominic Damico M.D.       • senna-docusate (PERICOLACE or SENOKOT S) 8.6-50 MG per tablet 2 Tablet  2 Tablet Enteral Tube BID  Dominic Damico M.D.   2 Tablet at 06/23/22 0612    And   • polyethylene glycol/lytes (MIRALAX) PACKET 1 Packet  1 Packet Enteral Tube QDAY PRN Dominic Damico M.D.        And   • magnesium hydroxide (MILK OF MAGNESIA) suspension 30 mL  30 mL Enteral Tube QDAY PRN Dominic Damico M.D.        And   • bisacodyl (DULCOLAX) suppository 10 mg  10 mg Rectal QDAY PRN Dominic Damico M.D.       • penicillin G potassium 4 Million Units in  mL IVPB  4 Million Units Intravenous Q4HRS Dominic Damico M.D. 200 mL/hr at 06/23/22 1424 4 Million Units at 06/23/22 1424   • ondansetron (ZOFRAN) syringe/vial injection 4 mg  4 mg Intravenous Q4HRS PRN Leatha Timmons M.D.       • Pharmacy Consult Request ...Pain Management Review 1 Each  1 Each Other PHARMACY TO DOSE Roro Still M.D.           Fluids    Intake/Output Summary (Last 24 hours) at 6/23/2022 1602  Last data filed at 6/23/2022 1500  Gross per 24 hour   Intake 4995.8 ml   Output 3746.5 ml   Net 1249.3 ml       Laboratory  Recent Labs     06/23/22  0258 06/23/22  0825 06/23/22  1413   ISTATAPH 7.492 7.475 7.264*   ISTATAPCO2 29.0 30.1 48.4*   ISTATAPO2 104* 110* 106*   ISTATATCO2 23 23 23   [id number] 99 99 97   ISTATARTHCO3 22.2 22.1 21.9   ISTATARTBE 0 0 -5*   ISTATTEMP 97.0 F 97.7 F 95.9 F   ISTATFIO2 30 30 30   ISTATSPEC Arterial Arterial Arterial   ISTATAPHTC 7.505* 7.482 7.285*   [id number] 99* 107* 97*         Recent Labs     06/21/22  0753 06/22/22  0150 06/23/22  0310 06/23/22  0840 06/23/22  1405   SODIUM 141   < > 140 140 149*   POTASSIUM 3.1*   < > 4.2 4.1 4.6   CHLORIDE 110   < > 107 109 121*   CO2 21   < > 20 18* 20   BUN 16   < > 15 19 17   CREATININE 0.66   < > 1.00 1.02 1.05   MAGNESIUM 1.6  --   --  2.2 2.0   PHOSPHORUS  --   --   --  2.6  --    CALCIUM 7.0*   < > 9.0 9.0 8.1*    < > = values in this interval not displayed.     Recent Labs     06/21/22  0753 06/22/22  0150 06/23/22  0310 06/23/22  0840 06/23/22  1405   ALTSGPT 11 15  --   --   10   ASTSGOT 10* 15  --   --  12   ALKPHOSPHAT 52 76  --   --  57   TBILIRUBIN 0.2 0.3  --   --  0.2   LIPASE 9*  --   --   --   --    PREALBUMIN  --   --  18.6  --   --    GLUCOSE 105* 121* 116* 119* 125*     Recent Labs     06/21/22  0753 06/22/22  0150 06/22/22  0310 06/23/22  0840 06/23/22  1405   WBC 7.1  --  9.1 8.5 9.9   NEUTSPOLYS 73.70*  --   --  61.00 68.20   LYMPHOCYTES 18.00*  --   --  23.00 16.50*   MONOCYTES 7.90  --   --  15.10* 14.70*   EOSINOPHILS 0.00  --   --  0.00 0.00   BASOPHILS 0.10  --   --  0.50 0.30   ASTSGOT 10* 15  --   --  12   ALTSGPT 11 15  --   --  10   ALKPHOSPHAT 52 76  --   --  57   TBILIRUBIN 0.2 0.3  --   --  0.2     Recent Labs     06/22/22  0310 06/22/22  1937 06/23/22  0840 06/23/22  0925 06/23/22  1405   RBC 5.90  --  5.68  --  5.17   HEMOGLOBIN 16.3  --  15.7  --  14.3   HEMATOCRIT 48.4  --  47.5  --  44.7   PLATELETCT 311  --  275  --  246   PROTHROMBTM  --  13.5  --  14.7*  --    APTT  --  30.4  --  27.6  --    INR  --  1.06  --  1.18*  --        Imaging  X-Ray:  I have personally reviewed the images and compared with prior images.  CT:    Reviewed  MRI:   Reviewed    Assessment/Plan  * Acute cerebrovascular accident (CVA) due to occlusion of left cerebellar artery (HCC)- (present on admission)  Assessment & Plan   Large left cerebellar ischemic infarct with cerebral edema and mass-effect on fourth ventricle, cryptogenic  -EVD in placed a.m. 6/23/2022 for suboccipital decompression-status post mannitol and 23% bolus as well as 3% saline infusion-keep ICPs<20  -MAP goal greater than 65; SBP goal to keep   -Prior to Crani patient bolused with 150 of hypertonic saline as well as 1 L of normal saline to replace UOP loss post mannitol administration  -Nicardipine to keep SBP less than 160  -Propofol, fentanyl  -Elevate head of bed, keep head centered-maximize blood flow  -Goal sodium 150-155- post crani will be to normalize sodium   -CTA pending (though long outside of  any interventional window)  -ECHO-ordered and pending  -Atorvastatin 80 mg - LDL goal less than 70  -Hold ASA and anticoagulants  -post crani  -PT/OT/SLP when appropriat    Goals of care, counseling/discussion  Assessment & Plan  I attempted a few times to call his wife but was unable to get through.  I am not sure at this point if she is aware of his decompensation today or of his presumptive syphilis diagnosis.  We will keep him full code for now.    On mechanically assisted ventilation (HCC)  Assessment & Plan  Intubated date: 6/22  Goal saturation > 90%  Monitor ventilator waveforms and titrate flow/peep and volumes according.   Lung protective ventilation strategy  CXR PRN: monitor lung volumes and tube/line placement  VAP bundle prevention, oral care, post pyloric feeding  Head of bed > 30 degree  GI prophylaxis: H2 blocker  Daily awakening and SBT trials unless contraindicated  Assess / Treat pain  Assess / Treat delirium  Sedation Goal: RASS -3 to -4  Monitor for liberation / early mobility  Respiratory treatments: prn  ABCDEF Bundle     Syphilis  Assessment & Plan  -Treponemal test is positive- confirmatory testing ending  -Treat empirically with PCN G 4,000,000 units every 4 hours  -CSF to eval for neurosyphilis      Will likely need to notify health department (unclear if this has been done yet)      Hypokalemia- (present on admission)  Assessment & Plan  Continue to monitor daily labs and replace as needed  Electrolyte protocol    Methamphetamine use (HCC)- (present on admission)  Assessment & Plan  Counseling cessation when clinical trajectory is more clear and appropriate       VTE:  Contraindicated  Ulcer: H2 Antagonist  Lines: Central Line  Ongoing indication addressed, Arterial Line  Ongoing indication addressed, Muse Catheter  Ongoing indication addressed and right EVD    I have performed a physical exam and reviewed and updated ROS and Plan today (6/23/2022). In review of yesterday's note  (6/22/2022), there are no changes except as documented above.     Discussed patient condition and risk of morbidity and/or mortality with RN, RT, Pharmacy, Dietary, Charge nurse / hot rounds and neurology and neurosurgery  The patient remains critically ill.  Critical care time = 55 minutes in directly providing and coordinating critical care and extensive data review.  No time overlap and excludes procedures.

## 2022-06-23 NOTE — ASSESSMENT & PLAN NOTE
-Unable to contact wife.  Message on phone service that phone has been disconnected  -Case management and Palliative attempting to reach family/pt wife

## 2022-06-23 NOTE — PROCEDURES
"Arterial Line Insertion    Date/Time: 6/22/2022 9:00 PM  Performed by: Ree Harmon  Authorized by: Ree Harmon   Consent: The procedure was performed in an emergent situation.  Risks and benefits: risks, benefits and alternatives were discussed  Consent given by: pt unable due to clinical condition, unable to reach family.  Required items: required blood products, implants, devices, and special equipment available  Patient identity confirmed: arm band and provided demographic data  Time out: Immediately prior to procedure a \"time out\" was called to verify the correct patient, procedure, equipment, support staff and site/side marked as required.  Preparation: Patient was prepped and draped in the usual sterile fashion.  Indications: multiple ABGs, respiratory failure and hemodynamic monitoring  Location: right radial  Anesthesia: local infiltration    Anesthesia:  Local Anesthetic: lidocaine 1% without epinephrine  Anesthetic total: 0.5 mL    Sedation:  Patient sedated: yes  Sedatives: see MAR for details  Analgesia: see MAR for details  Vitals: Vital signs were monitored during sedation.    Lucas's test normal: yes  Needle gauge: 20  Seldinger technique: Seldinger technique used  Number of attempts: 1  Post-procedure: line sutured and dressing applied  Post-procedure CMS: normal  Patient tolerance: patient tolerated the procedure well with no immediate complications  Comments: The wire is accounted for, there was an appropriate waveform noted on the monitor              "

## 2022-06-23 NOTE — OP REPORT
NEUROSURGERY OPERATIVE NOTE    [unfilled] [unfilled]    Alverto Duran  1955  [id number]    PROCEDURE  1. Right frontal ventriculostomy    SURGEON:  Donovan Rizzo MD PhD  Assistant:  None    Anesthesia:  Propofol fentanyl    DIAGNOSIS:  Obstructive hydrocephalus     INDICATION: 66 year old male with several day old left cerebellar stroke, progressive decline to GCS 3.  EVD is being placed for csf diversion    PROCEDURE:  The patient was identified.  The patient's right scalp was prepped with hair clipping, Chlorhexidine, betadine scrub, and Chloroprep.  Sterile drapes were applied.  The planned incision was infiltrated with 0.5% Marcaine with epinephrine.  The skin was incised sharply and dissection carried to the skull. An Juan self retaining retractor was placed.  A twist craniostomy was created using the bit and hand drill provided in the Cranial Access kit.  The dura was opened seperately with the bit.  A standard 35 cm ventricular catheter was advanced to approximately 6 cm depth using standard landmarks.  Brisk flow of CSF was obtained under pressure.  The catheter was tunneled subcutaneously using the provided trocar, secured to the skin using 2-0 Nylon suture, and attached to a standard unrival EDM System.   The skin and dermis were reapproximated with 3-0 Nylon suture in an running manner.   Bacitracin ointment, Xerform gauze and a sterile dressing were placed.     FINDINGS:  CSF under pressure  SPECIMEN:  None  DRAINS:  Right frontal ventricular  EBL:  Minimal  COMPLICATION:  None apparent at end of procedure.

## 2022-06-23 NOTE — PROGRESS NOTES
"S:   Paged by radiology Dr. Sommer at 6:51 pm to notify me of critical MRI results of moderate to severe hydrocephalus with mild left cerebellar herniation. And large PICA territory CVA.     I immediately paged neurosurgery and placed stat neurosurgery consult. Discussed case with Dr. Rizzo. He recommended Lovenox reversal which was then ordered.     Ordered for nursing to call rapid response. Rapid response team at bedside very quickly. ICU Dr. Damico consulted who agreed to urgently transfer patient to ICU.      Patient began to develop intermittent bradycardia, continued to be hypertensive, and intermittently display cheyne-peralta breathing. Patient was intubated and central line was placed by Dr. Damico immediately upon presentation to ICU    O: BP (!) 179/125   Pulse (!) 114   Temp 36.9 °C (98.4 °F) (Temporal)   Resp (!) 27   Ht 1.803 m (5' 11\")   Wt 63.7 kg (140 lb 6.9 oz)   SpO2 100%   BMI 19.59 kg/m²      On initial evaluation:   General: thin appearing, obtunded and minimally responsive, alternating with breif periods of agitation  HEENT; Pupils asymmetric 2mm R and 3mm left, sluggishly reactive. Neck supple without rigidity  Resp: rapid shallow breathing with varying respiratory rate. Lungs clear to auscultation bilaterally with faint transmitted upper airway sounds.   CV: regular rate and rhythm, intermittently bradycardic. No obvious murmur  Abd: soft, non distended, no guarding or rebound  Ext: non edematous, pulses 2+ throughout    Neuro:   Mental status: obtunded, alternating with brief periods of agitation. Does not follow commands.   Language/Speech: not able to assess. Very little speech produced was not legible.   CN:  II: Pupils assymmetric 2mm R and 3mm L, sluggish and reactive  III, IV, VI: EOM preserved to head roll, no gaze preference or deviation, no nystagmus on my exam.   V: unable to assess sensation  VII: no obvious asymmetry  VIII: deferred  IX, X: uvula midline, palate raises " symmetrically  XI: unable to assess  XII: unable to assess  Motor: some effort against gravity in all extremities. Uncooperative with exam. Muscle bulk normal, tone alternating between flaccid and normal  Reflexes: oculocephalic present, gag present  Sensory: deffered   COORD: unable to sit up or stand. Abnormal station. Gait deferred    NIHSS: 25    A/P:   Acute PICA CVA with subsequent obstruction and moderate to severe hydrocephalus with L cerebellar tonsil herniation (mild). Severe autonomic dysregulation with bradycardia and severe hypertension as well as concerning changes in respiratory status are concerning for impending respiratory and circulatory failure.      - Stat neurosurgery consultation, Dr. Rizzo, Lovenox reversal  Protamine ordered, but not yet given prior to ICU transfer   - ICU consultation and transfer, with Dr. Damico   - Neurology Consultation with Dr. Dagoberto Muse placed    Labs ordered include:   PT/INR  ABG  CMP  CBC  Lactic acid

## 2022-06-23 NOTE — CARE PLAN
Problem: Ventilation  Goal: Ability to achieve and maintain unassisted ventilation or tolerate decreased levels of ventilator support  Description: Target End Date:  4 days     Document on Vent flowsheet    1.  Support and monitor invasive and noninvasive mechanical ventilation  2.  Monitor ventilator weaning response  3.  Perform ventilator associated pneumonia prevention interventions  4.  Manage ventilation therapy by monitoring diagnostic test results  Outcome: Progressing   Vent day 2  Patient is on APVCMV 20/460/8+/30%

## 2022-06-23 NOTE — ASSESSMENT & PLAN NOTE
Intubated date: 6/22  Extubated 6/25- 2L NC   Frequent NT SX  Assess for the need to reintubate for respiratory failure  Goal saturation > 90%  FU CXR  Head of bed > 30 degree  Assess / Treat delirium  Respiratory treatments: prn

## 2022-06-23 NOTE — THERAPY
Physical Therapy Contact Note    Patient Name: Alverto Duran  Age:  66 y.o., Sex:  male  Medical Record #: [id number]  Today's Date: 6/23/2022 06/23/22 0805   Interdisciplinary Plan of Care Collaboration   Collaboration Comments Attempted PT evaluation, per RN pt not medically stable to participate in PT, transferred to ICU and EVD placed last night.  Will continue to HOLD and re-attempt PT eval as able/appropriate.     Roro Jain, PT, DPT

## 2022-06-23 NOTE — HOSPITAL COURSE
Mr. Alverto Duran is a 66-year-old male with PMH significant for chronic methamphetamine use, tobacco dependence, HTN, right frontal CVA 2019, and syphilis (2020) who initially presented to [facility] ED around 6/20/22 with headaches and multiple falls and was discharged home.   He presented to [facility] ER on 6/21 with c/o dizziness, neck pain, headache, and falls.   CT head without contrast showed encephalomalacia in the left cerebellar and right frontal areas. He was admitted to the floor under the [facility] service.     Positive syphilis titer. Started on PCN for possible neurosyphilis.     On 6/22 he developed decreased LOC with obtundation. MRI showed significant edema with compression of the brain stem and obstructive hydrocephalus. He was transferred to ICU for intubation, EVD placement and hypertonic saline therapy.   6/23 - suboccipital craniectomy. New onset AFIB RVR.  6/24 - multiple DVT's BUE noted on US. Start AC when cleared by Neurosurgery.  6/25 - ID consult, recommend 10 day neurosyphilis course with IV PCN q4h (end date 7/3/22). Extubated  6/26 - EVD no output overnight. Alteplase by Dr. Mello.   6/27 - IV Metoprolol for AFIB 170's

## 2022-06-23 NOTE — CONSULTS
[facility]  Onsite Evaluation    Referral # [id number]    Date 6/23/22 @ 1132    Thank you for the referral of this patient. A chart review has been completed to determine suitability for organ donation.     Donation is an option.  - Upon meeting criteria for brain death only, this patient will be a potential candidate for organ donation, pending further evaluation.     [facility] will continue to follow this case. Please call us immediately with any problems, questions, or significant changes in the patient's status. Please call us immediately with any plans for brain death evaluation, family meetings or plans to withdraw care.     Organ donation should never be mentioned without prior collaboration with [facility] so that the conversation can be a planned, one-time coordinated event with the health care team.     Call [phone] with any immediate needs.    Thank you for your continued support of organ and tissue donation.

## 2022-06-23 NOTE — PROGRESS NOTES
Neurology Progress Note  Neurohospitalist Service, [facility]    Referring Physician: Dickson Jimenes M.D.      Interval History: No acute events overnight.  Patient taken this morning for suboccipital decompression.    Review of systems: In addition to what is detailed in the HPI and/or updated in the interval history, all other systems reviewed and are negative.    Past Medical History, Past Surgical History and Social History reviewed and unchanged from prior    Medications:    Current Facility-Administered Medications:   •  [MAR Hold] LORazepam (ATIVAN) injection 2-4 mg, 2-4 mg, Intravenous, Q4HRS PRN, Dickson Jimenes M.D.  •  phenylephrine (LAN-SYNEPHRINE) 40 mg in  mL infusion, 0-300 mcg/min, Intravenous, Continuous, Alcira Turner M.D.  •  bupivacaine-0.5%-epinephrine 1:391290 PF (MARCAINE/SENSORCAINE) injection, , , Intra-Op Once PRN, Donovan Rizzo M.D., 15 mL at 06/23/22 1153  •  ceFAZolin (ANCEF) 1 g in  mL IVPB, , , Intra-Op Continuous, Donovan Rizzo M.D., 1 g at 06/23/22 1154  •  thrombin (THROMBINAR) 5000 UNIT vial, , , Intra-Op Once PRN, Donovan Rizzo M.D., 5,000 Units at 06/23/22 1156  •  [MAR Hold] enalaprilat (Vasotec) injection 1.25 mg 1 mL, 1.25 mg, Intravenous, Q6HRS PRN, Steven Monterroso M.D., 1.25 mg at 06/22/22 1620  •  [MAR Hold] protamine injection 40 mg, 40 mg, Intravenous, Once, Gisella Cervantes M.D.  •  [MAR Hold] hydrALAZINE (APRESOLINE) injection 20 mg, 20 mg, Intravenous, Once, Dominic Damico M.D.  •  [MAR Hold] Respiratory Therapy Consult, , Nebulization, Continuous RT, Dominic Damico M.D.  •  [MAR Hold] famotidine (PEPCID) tablet 20 mg, 20 mg, Enteral Tube, Q12HRS, 20 mg at 06/23/22 0612 **OR** [MAR Hold] famotidine (PEPCID) injection 20 mg, 20 mg, Intravenous, Q12HRS, Dominic Damico M.D.  •  [MAR Hold] MD Alert...ICU Electrolyte Replacement per Pharmacy, , Other, PHARMACY TO DOSE, Dominic Damico M.D.  •  [MAR Hold]  lidocaine (XYLOCAINE) 1 % injection 2 mL, 2 mL, Tracheal Tube, Q30 MIN PRN, Dominic Damico M.D.  •  propofol (DIPRIVAN) injection, 0-80 mcg/kg/min, Intravenous, Continuous, Last Rate: 7.6 mL/hr at 06/23/22 0701, 20 mcg/kg/min at 06/23/22 0701 **AND** Triglycerides Starting now and then Every 3 Days, , , Every 3 Days (0300), Dominic Damico M.D.  •  [MAR Hold] fentaNYL (SUBLIMAZE) injection 25 mcg, 25 mcg, Intravenous, Q HOUR PRN **OR** [MAR Hold] fentaNYL (SUBLIMAZE) injection 50 mcg, 50 mcg, Intravenous, Q HOUR PRN **OR** [MAR Hold] fentaNYL (SUBLIMAZE) injection 100 mcg, 100 mcg, Intravenous, Q HOUR PRN, Dominic Damico M.D., 100 mcg at 06/22/22 2306  •  fentaNYL (SUBLIMAZE) 50 mcg/mL in 50mL, , Intravenous, Continuous, Dominic Damico M.D., Last Rate: 1 mL/hr at 06/23/22 0659, 50 mcg/hr at 06/23/22 0659  •  3% sodium chloride (HYPERTONIC SALINE) 500mL infusion 500 mL, 500 mL, Intravenous, Continuous, Dominic Damico M.D., Stopped at 06/23/22 1102  •  [MAR Hold] sodium chloride 200 mEq in empty bag 50 mL ivpb, 200 mEq, Intravenous, Q6HRS PRN, Dominic Damico M.D.  •  norepinephrine (Levophed) 8 mg in 250 mL NS infusion (premix), 0-30 mcg/min, Intravenous, Continuous, Dominic Damico M.D., Stopped at 06/23/22 0745  •  [MAR Hold] atorvastatin (LIPITOR) tablet 80 mg, 80 mg, Enteral Tube, Q EVENING, Dominic Damico M.D.  •  [MAR Hold] Pharmacy Consult: Enteral tube insertion - review meds/change route/product selection, 1 Each, Other, PHARMACY TO DOSE, Dominic Damico M.D.  •  [MAR Hold] acetaminophen (Tylenol) tablet 650 mg, 650 mg, Enteral Tube, Q6HRS PRN, Dominic Damico M.D.  •  [MAR Hold] ondansetron (ZOFRAN ODT) dispertab 4 mg, 4 mg, Enteral Tube, Q4HRS PRN, Dominic Damico M.D.  •  [MAR Hold] senna-docusate (PERICOLACE or SENOKOT S) 8.6-50 MG per tablet 2 Tablet, 2 Tablet, Enteral Tube, BID, 2 Tablet at 06/23/22 0612 **AND** [MAR Hold] polyethylene glycol/lytes (MIRALAX) PACKET 1 Packet, 1 Packet, Enteral Tube, QDAY PRN  **AND** [MAR Hold] magnesium hydroxide (MILK OF MAGNESIA) suspension 30 mL, 30 mL, Enteral Tube, QDAY PRN **AND** [MAR Hold] bisacodyl (DULCOLAX) suppository 10 mg, 10 mg, Rectal, QDAY PRN, Dominic Damico M.D.  •  [MAR Hold] penicillin G potassium 4 Million Units in  mL IVPB, 4 Million Units, Intravenous, Q4HRS, Dominic Damico M.D., Last Rate: 200 mL/hr at 06/23/22 0929, 4 Million Units at 06/23/22 0929  •  [MAR Hold] ondansetron (ZOFRAN) syringe/vial injection 4 mg, 4 mg, Intravenous, Q4HRS PRN, Leatha Timmons M.D.  •  Notify provider if pain remains uncontrolled, , , CONTINUOUS **AND** Use the Numeric Rating Scale (NRS), Mc-Baker Faces (WBF), or FLACC on regular floors and Critical-Care Pain Observation Tool (CPOT) on ICUs/Trauma to assess pain, , , CONTINUOUS **AND** Pulse Ox, , , CONTINUOUS **AND** [MAR Hold] Pharmacy Consult Request ...Pain Management Review 1 Each, 1 Each, Other, PHARMACY TO DOSE **AND** If patient difficult to arouse and/or has respiratory depression (respiratory rate of 10 or less), stop any opiates that are currently infusing and call a Rapid Response., , , CONTINUOUS, Roro Still M.D.    Facility-Administered Medications Ordered in Other Encounters:   •  NS infusion, , Intravenous, Intra-Op Continuous, Alcira Turner M.D., New Bag at 06/23/22 1141  •  ceFAZolin (ANCEF) injection, , Intravenous, PRN, Alcira Turner M.D., 2 g at 06/23/22 1045  •  fentaNYL (SUBLIMAZE) injection, , Intravenous, PRN, Alcira Turner M.D., 100 mcg at 06/23/22 1041  •  propofol (DIPRIVAN) injection, , Intravenous, PRN, Alcira Turner M.D., 50 mg at 06/23/22 1041  •  rocuronium (ZEMURON) injection, , Intravenous, PRN, Alcira Turner M.D., 20 mg at 06/23/22 1210  •  fentaNYL (SUBLIMAZE) injection, , Intravenous, Intra-Op Continuous, Alcira Turner M.D., Last Rate: 1 mL/hr at 06/23/22 1039, 50 mcg/hr at 06/23/22 1039  •  propofol (DIPRIVAN) injection, ,  "Intravenous, Intra-Op Continuous, Alcira Turner M.D., Stopped at 06/23/22 1050  •  mannitol 20% infusion, , Intravenous, PRN, Alcira Turner M.D., 50 g at 06/23/22 1210  •  phenylephrine (LAN-SYNEPHRINE) 100 mcg/mL inj (IV Push Syringe), , Intravenous, PRN, Alcira Turner M.D., 100 mcg at 06/23/22 1213    Physical Examination:   /79   Pulse 95   Temp 36.4 °C (97.6 °F) (Temporal)   Resp 18   Ht 1.803 m (5' 10.98\")   Wt 63.7 kg (140 lb 6.9 oz)   SpO2 99%   BMI 19.60 kg/m²       GEN - Intubated. NAD.  Neck: There is normal range of motion  CV: Regular rate   Extremities:  Warm, dry, and intact, without peripheral lower extremity edema    Neurologic:  Mental Status - Intubated. Unresponsive.  Cranial Nerves - Pupils equal, round, and reactive to light. Intact corneal, oculoceophalic, cough, and gag.  Motor - No purposeful movements seen on my exam. Tone flaccid.  Sensory -minimal response to noxious in any extremity.  DTRs - Globally reduced 1/4. Toes equivocal.  Gait and Coordination - Unable to assess due to patient condition.    Objective Data:    Labs:  Lab Results   Component Value Date/Time    PROTHROMBTM 14.7 (H) 06/23/2022 09:25 AM    INR 1.18 (H) 06/23/2022 09:25 AM      Lab Results   Component Value Date/Time    WBC 8.5 06/23/2022 08:40 AM    RBC 5.68 06/23/2022 08:40 AM    HEMOGLOBIN 15.7 06/23/2022 08:40 AM    HEMATOCRIT 47.5 06/23/2022 08:40 AM    MCV 83.6 06/23/2022 08:40 AM    MCH 27.6 06/23/2022 08:40 AM    MCHC 33.1 (L) 06/23/2022 08:40 AM    MPV 11.0 06/23/2022 08:40 AM    NEUTSPOLYS 61.00 06/23/2022 08:40 AM    LYMPHOCYTES 23.00 06/23/2022 08:40 AM    MONOCYTES 15.10 (H) 06/23/2022 08:40 AM    EOSINOPHILS 0.00 06/23/2022 08:40 AM    BASOPHILS 0.50 06/23/2022 08:40 AM      Lab Results   Component Value Date/Time    SODIUM 140 06/23/2022 08:40 AM    POTASSIUM 4.1 06/23/2022 08:40 AM    CHLORIDE 109 06/23/2022 08:40 AM    CO2 18 (L) 06/23/2022 08:40 AM    GLUCOSE 119 (H) " 06/23/2022 08:40 AM    BUN 19 06/23/2022 08:40 AM    CREATININE 1.02 06/23/2022 08:40 AM      Lab Results   Component Value Date/Time    CHOLSTRLTOT 125 06/21/2022 07:53 AM    LDL 74 06/21/2022 07:53 AM    HDL 45 06/21/2022 07:53 AM    TRIGLYCERIDE 65 06/23/2022 03:10 AM       Lab Results   Component Value Date/Time    ALKPHOSPHAT 76 06/22/2022 01:50 AM    ASTSGOT 15 06/22/2022 01:50 AM    ALTSGPT 15 06/22/2022 01:50 AM    TBILIRUBIN 0.3 06/22/2022 01:50 AM        Imaging/Testing:    I interpreted and/or reviewed the patient's neuroimaging    CT-CTA NECK WITH & W/O-POST PROCESSING   Final Result         1.  Severely hypoplastic right vertebral artery, central segment of the right vertebral artery is not visualized and may be occluded.         CT-CTA HEAD WITH & W/O-POST PROCESS   Final Result         1.  No large vessel occlusion or aneurysm identified   2.  Interval placement of right frontal approach ventriculostomy with postprocedural minimal hemorrhage and new pneumocephalus.   3.  Bilateral ventricular dilatation appears somewhat increased since prior study compatible with somewhat worsened hydrocephalus.   4.  Right frontal and left cerebellar encephalomalacia      DX-CHEST-PORTABLE (1 VIEW)   Final Result         1.  No acute cardiopulmonary disease.   2.  Trace bilateral pleural effusion      DX-ABDOMEN FOR TUBE PLACEMENT   Final Result         1.  Nonspecific bowel gas pattern.   2.  Dobbhoff tube tip terminates overlying the expected location of the gastric antrum.   3.  Trace bilateral pleural effusions      DX-CHEST-PORTABLE (1 VIEW)   Final Result         1.  No acute cardiopulmonary disease.   2.  Trace bilateral pleural effusions   3.  Atherosclerosis      MR-BRAIN-WITH & W/O   Final Result   Addendum 1 of 1   ADDENDUM:      The case was discussed by telephone (call report) with DR. DAVONTE VIEIRA    on call for [facility], at 1854 hours.      Final      1.  Interval development of moderate  obstructive hydrocephalus due to mass effect on the fourth ventricle. There is mild transependymal edema.   2.  Large area of acute infarction involving the left cerebellar hemisphere, inferior cerebellar vermis, and left cerebellar tonsil. PICA territory. No hemorrhagic transformation. This is the cause of the fourth ventricle effacement with obstructive    hydrocephalus. There is also mild left-sided cerebellar tonsillar herniation.   3.  Moderate supratentorial white matter disease most consistent with microvascular ischemic change.   4.  Old infarction right anterior frontal convexity.   5.  A Voalte message was sent to RICKEY HAYES at 1823 hours 6/22/2022. Prompt reply as of 6:41 PM not yet received. Efforts are ongoing to contact housestaff managing the patient at this time.   6.  Case was discussed (call report) with nurse SHASTA MAGILL at 6:47 PM. Request to initiate stat neurosurgery consult. Attempts to contact [facility] on-call housestaff are ongoing.      CT-HEAD W/O   Final Result      1.  No evidence of acute hemorrhage, mass or large territorial infarction   2.  LEFT cerebellar and RIGHT frontal encephalomalacia   3.  Mild atrophy   4.  Mild white matter changes         CT-CSPINE WITHOUT PLUS RECONS   Final Result      1.  No acute fracture or traumatic listhesis in the cervical spine.   2.  Emphysema in the lung apices.      US-CAROTID DOPPLER BILAT    (Results Pending)   EC-ECHOCARDIOGRAM COMPLETE W/O CONT    (Results Pending)       Assessment and Plan:    Alverto Duran is a 66 y.o. man with methamphetamine use who presented with a subacute left cerebellar ischemic infarction with significant cerebral edema, mass-effect upon the fourth ventricle, involving hydrocephalus.  An EVD was placed by neurosurgery for hydrocephalus, and the patient was taken the morning of 6/23/2022 for suboccipital decompression.  He has also been maintained with IV mannitol bolus and 3% hypertonic saline.    Problem  list:  1.  Large left cerebellar ischemic infarction with cerebral edema and mass-effect on the fourth ventricle, cryptogenic  2.  Cytotoxic cerebral edema  3.  Hydrocephalus  4.  Hypertension  5.  Methamphetamine use    Plan:  Observation -continue q1H neurochecks and vitals.  Vitals - Little utility of permissive hypertension at this point given the extent of completed stroke.  The patient's blood pressure was quite high, so I recommend gradually lowering no more than 15 to 25 %/day.  Telemetry monitoring.  Diet - Per tube.  SLP consultation when appropriate.  Treatment - Aspirin 81mg daily. Atorvastatin 80mg, or comparable high-intensity statin with a long-term LDL goal < 70.  Start 3% hypertonic saline for daily sodium goal 150-155.  Every 6 hours BMP. NSGY care for EVD/subocc decompression.  Vessel Imaging - CTA shows hypoplastic right vertebral artery, but no obvious pathology to account for the patient's presentation.  Stroke is cryptogenic at this time.  Laboratory studies - Check Lipid panel, A1C  Other imaging - ECHOcardiogram pending  Counselling - Tobacco cessation counseling, drug cessation counseling when appropriate  Dispo - PT/OT when appropriate to aid in disposition.       Upon my evaluation, this patient had a high probability of imminent or life-threatening deterioration due to cerebellar stroke with cerebral edema and hydrocephalus which required my direct attention, intervention, and personal management.  I personally provided 45 minutes of total critical care time outside of time spent on separately billable/documented procedures. Time includes: review of laboratory data, review of radiology studies, discussion with consultants, discussion with family/patient, monitoring for potential decompensation.  Interventions were performed as documented in the chart.  The evaluation of the patient, and recommended management, was discussed with the resident staff. I have performed a physical exam and  reviewed and updated ROS and Plan today (6/23/2022).     Emil Arenas D.O.  Neurohospitalist, Acute Care Services

## 2022-06-23 NOTE — PROGRESS NOTES
1950 100 mannitol     TO 1951 1954 20 ETOM 50 RONNIE      1956 8.0 ETT placed 25 @ teeth . Color change & bilat breath sounds noted.     1959 Dr. Rizzo at bedside  2000 Dr. Arenas at bedside

## 2022-06-23 NOTE — ASSESSMENT & PLAN NOTE
-Treponemal test is positive- confirmatory testing ending  -Treat empirically with PCN G 4,000,000 units every 4 hours  -Prior Syphilis infections  (2020) appears to have not been treated as ABX prescribed were never picked up   -CSF to eval for neurosyphilis- pending    -Health department notified  -ID consult for latent syphilis and treatment - Recommend completing  10-day for neurosyphilis with IV penicillin - 4,000,000 units every 4 hours with an end date of 7/3/22

## 2022-06-23 NOTE — RESPIRATORY CARE
Patient arrived in unit and intubated with 8.0 ETT, color changed with fogging in the tube. Bilateral sounds noted. Patient placed on vent APVCMV 20/460/8+/100%.  Dr elicia mora ABG will follow.

## 2022-06-23 NOTE — OP REPORT
NEUROSURGERY OPERATIVE NOTE  DATE:  3:20 PM 2022    PATIENT NAME:  Alverto Duran   1955 MRN [id number]      PROCEDURE:  1.  Typical craniectomy  2.  Paracranial graft harvest  3.  Dural augmentation with paracranial graft, Dura repair (Medtronic)  4.  C1 laminectomy    Surgeon:  Donovan Rizzo MD, PhD  Assistant: NORBERT Marlow.  Utilization of assistant was critical for the performance of this procedure.  To provide tissue retraction irrigation and suction allowing clear visualization underlying neurologic structures.    Anesthesia:  GETA    Diagnosis: Cerebellar or stroke with obstructive hydrocephalus    Indication: 66-year-old male with left cerebellar stroke.  He was transferred to the ICU emergently last night with a GCS of 3.  He was emergently intubated and ventricular drain placed for obstructive hydrocephalus secondary to the cerebellar stroke.  Initially he was a GCS of 3, he now has weak withdrawal of his lower extremities to deep stimulation.  Given improvement albeit quite small, suboccipital craniectomy and dural augmentation is planned to decompress the posterior fossa.    Procedure:  The patient was identified in the holding area, and the surgery site marked, consent was obtained.  The patient was brought back to the operating room and intubated by anesthesia service.  2 grams Ancef and 50 g mannitol were administered intravenously.  His head was secured in the Albrecht Riley pin head nguyen, and he was transferred to the OSI table in a prone manner.  His head was positioned in a flexed position with 2 finger breaths between the chin and sternum.  His occipital and neck region were prepped with hair clipping, chlorhexidine scrub, Betadine scrub, and ChloraPrep.  Sterile drapes were applied.  The midline incision was infiltrated 0.5% Marcaine with epinephrine.    The skin was incised sharply and dissection carried deeply using monopolar cautery.  Dissection was carried up above  the inion to obtain pericranial graft.  The adipose tissue was divided in a suprafascial manner using monopolar cautery.  Cerebellar retractors were placed.  The pericranium was harvested using sharp dissection and stored in antibiotic normal saline irrigation.  The suboccipital muscle was divided longitudinally using monopolar cautery just inferior to the superior nuchal line.  The suboccipital muscle was elevated off the suboccipital bone, C1 and C2 lamina using monopolar cautery.  Retractors were deepened to provide good exposure.  The dura was dissected free from underneath the C1 posterior arch.  Laminectomy was performed using Leksell and Kerrison rongeurs.  The foramen magnum was then dissected free of dense connective tissue using a small curved curette.  A craniectomy was then performed using the high-speed drill fit with a [employer] drill bit.  The skull was densely adherent to the dura, this was dissected free using a small curved curette and a Rosebud dissector.  The bone flap was removed.  He has not planned to be replaced.  The dura was then opened sharply just underneath the superior craniectomy edge, and then divided in the midline down below where the C1 posterior arch was.  Good decompression was noted.  The left cerebellum was not grossly hemorrhagic or edematous.  Pulsatile CSF flow was noted, the cerebellum posterior fossa and spinal cord were pulsatile and free of all compression.  The paracranial graft was then sewn into the inferior aspect of the dural opening using 4-0 Nurolon suture in a running manner.  The dura repair (Medtronic) dural graft was then sewn in position sealing the more superior region, as well as connecting to the top of the paracranial graft.  Normal saline was infiltrated into the dura just prior to placing the last suture.  Good hemostasis was noted.  Green dural sealant (Adherence) was sprayed over the suture line.  Surgicel and Gelfoam were placed over this.  The retractors  were removed.    The cervical and occipital muscle were reapproximated using 0 Vicryl suture in interrupted manner.  The dermis was reapproximated using 2-0 Vicryl suture in interrupted inverted manner.  The skin was reapproximated using staples.  A silver containing dressing was placed.  Final counts were correct.    FINDINGS: Successful suboccipital craniectomy, C1 laminectomy.  Good decompression of posterior fossa contents.  Pulsatile cerebellum and CSF flow noted.  SPECIMEN:  None  DRAINS: None  EBL:  30 CC  COMPLICATIONS:  None apparent at end of procedure.

## 2022-06-23 NOTE — PROCEDURES
Intubation    Date/Time: 6/22/2022 8:06 PM  Performed by: Dominic Damico M.D.  Authorized by: Dominic Damico M.D.     Consent:     Consent obtained:  Emergent situation  Pre-procedure details:     Patient status:  Unresponsive    Pretreatment meds: Etomidate.    Paralytics:  Rocuronium  Procedure details:     Preoxygenation:  Bag valve mask    CPR in progress: no      Intubation method:  Oral    Oral intubation technique:  Video-assisted    Laryngoscope type:  GlideScope    Laryngoscope size: S3 hyperangulated blade.    Cormack-Lehane Classification:  Grade 1    Tube size (mm):  8.0    Tube type:  Cuffed    Number of attempts:  1    Ventilation between attempts: no      Cricoid pressure: no      Tube visualized through cords: yes    Placement assessment:     Tube secured with:  ETT nguyen and adhesive tape    Breath sounds:  Equal and absent over the epigastrium    Placement verification: chest rise, condensation, CXR verification, direct visualization, equal breath sounds, ETCO2 detector and tube exhalation      CXR findings:  ETT in proper place  Post-procedure details:     Patient tolerance of procedure:  Tolerated well, no immediate complications

## 2022-06-23 NOTE — PROGRESS NOTES
Verified at 1400 with Dr. Jimenes to hold on restarting the 3% sodium until labs back.   Per Dr. Jimenes hold tube feeds for tonight and reassess in AM.

## 2022-06-23 NOTE — CONSULTS
Neurousurgery consultation  6/22/2022 8 pm  Referring MD:  Dr. Olmedo  Reason for referral:  Cerebellar stroke            Chief Complaint   Dizziness headache     HPI: Alverto Duran is a 67 yo male admitted yesterday morning with confusion, gait instability, dizziness and falls.  [facility] with similar symptoms and was discharged with a diagnosis of headache and hypertension.  He represented and was noted to be somewhat confused and agitated with some gait instability.  A CT head on admission was performed and unfortunately read as showing no acute intracranial processes, however does show a large left cerebellar ischemic infarct of subacute timing    He continued to decline yesterday, and was unarousable today.  MRI brain was performed and revealed a large left cerebellar ischemic infarct with effacement of the fourth ventricle and hydrocephalus     Review of systems: per h/p   Past Medical History:    has a past medical history of Glaucoma. Methamphetamine use syphillis     FHx:  family history is not on file.  Unable to obtain due to patient condition     SHx:  Per h/p     Allergies:  No Known Allergies     Medications:     Current Facility-Administered Medications:   •  enalaprilat (Vasotec) injection 1.25 mg 1 mL, 1.25 mg, Intravenous, Q6HRS PRN, Steven Monterroso M.D., 1.25 mg at 06/22/22 1620  •  amLODIPine (NORVASC) tablet 5 mg, 5 mg, Oral, Q DAY, Foster Shaw M.D.  •  LORazepam (ATIVAN) injection 1 mg, 1 mg, Intravenous, Q4HRS PRN, Roro Still M.D., 1 mg at 06/22/22 1503  •  penicillin G potassium 3 Million Units in  mL IVPB, 3 Million Units, Intravenous, Q4HRS, Roro Still M.D., Last Rate: 200 mL/hr at 06/22/22 1840, 3 Million Units at 06/22/22 1840  •  protamine injection 40 mg, 40 mg, Intravenous, Once, Gisella Cervantes M.D.  •  sodium chloride 200 mEq in empty bag 50 mL ivpb, 200 mEq, Intravenous, Once, Emil Arenas  D.O.  •  mannitol 25 % injection 100 g, 100 g, Intravenous, Once, Emil Arenas D.O.  •  HYDRALAZINE HCL 20 MG/ML INJ SOLN, , , ,   •  hydrALAZINE (APRESOLINE) injection 20 mg, 20 mg, Intravenous, Once, Dominic Damico M.D.  •  Respiratory Therapy Consult, , Nebulization, Continuous RT, Dominic Damico M.D.  •  famotidine (PEPCID) tablet 20 mg, 20 mg, Enteral Tube, Q12HRS **OR** famotidine (PEPCID) injection 20 mg, 20 mg, Intravenous, Q12HRS, Dominic Damico M.D.  •  MD Alert...ICU Electrolyte Replacement per Pharmacy, , Other, PHARMACY TO DOSE, Dominic Damico M.D.  •  lidocaine (XYLOCAINE) 1 % injection 2 mL, 2 mL, Tracheal Tube, Q30 MIN PRN, Dominic Damico M.D.  •  propofol (DIPRIVAN) injection, 0-80 mcg/kg/min, Intravenous, Continuous, Last Rate: 7.6 mL/hr at 06/22/22 2036, 20 mcg/kg/min at 06/22/22 2036 **AND** Triglycerides Starting now and then Every 3 Days, , , Every 3 Days (0300), Dominic Damico M.D.  •  fentaNYL (SUBLIMAZE) injection 25 mcg, 25 mcg, Intravenous, Q HOUR PRN **OR** fentaNYL (SUBLIMAZE) injection 50 mcg, 50 mcg, Intravenous, Q HOUR PRN **OR** fentaNYL (SUBLIMAZE) injection 100 mcg, 100 mcg, Intravenous, Q HOUR PRN, Dominic Damico M.D.  •  fentaNYL (SUBLIMAZE) 50 mcg/mL in 50mL, , Intravenous, Continuous, Dominic Damico M.D.  •  senna-docusate (PERICOLACE or SENOKOT S) 8.6-50 MG per tablet 2 Tablet, 2 Tablet, Oral, BID, 2 Tablet at 06/21/22 1756 **AND** polyethylene glycol/lytes (MIRALAX) PACKET 1 Packet, 1 Packet, Oral, QDAY PRN **AND** magnesium hydroxide (MILK OF MAGNESIA) suspension 30 mL, 30 mL, Oral, QDAY PRN **AND** bisacodyl (DULCOLAX) suppository 10 mg, 10 mg, Rectal, QDAY PRN, Leatha Timmons M.D.  •  ondansetron (ZOFRAN) syringe/vial injection 4 mg, 4 mg, Intravenous, Q4HRS PRN, Leatha Timmons M.D.  •  ondansetron (ZOFRAN ODT) dispertab 4 mg, 4 mg, Oral, Q4HRS PRN, Leatha Timmons M.D.  •  nicotine (NICODERM) 21 MG/24HR 21 mg, 21 mg, Transdermal, Daily-0600, 21 mg at  "06/22/22 0414 **AND** Nicotine Replacement Patient Education Materials, , , Once **AND** nicotine polacrilex (NICORETTE) 2 MG piece 2 mg, 2 mg, Oral, Q HOUR PRN, Leatha Timmons M.D.  •  acetaminophen (Tylenol) tablet 650 mg, 650 mg, Oral, Q6HRS PRN, Roro Still M.D.  •  Notify provider if pain remains uncontrolled, , , CONTINUOUS **AND** Use the Numeric Rating Scale (NRS), Mc-Baker Faces (WBF), or FLACC on regular floors and Critical-Care Pain Observation Tool (CPOT) on ICUs/Trauma to assess pain, , , CONTINUOUS **AND** Pulse Ox, , , CONTINUOUS **AND** Pharmacy Consult Request ...Pain Management Review 1 Each, 1 Each, Other, PHARMACY TO DOSE **AND** If patient difficult to arouse and/or has respiratory depression (respiratory rate of 10 or less), stop any opiates that are currently infusing and call a Rapid Response., , , CONTINUOUS, Roro Still M.D.  •  oxyCODONE immediate-release (ROXICODONE) tablet 2.5 mg, 2.5 mg, Oral, Q3HRS PRN **OR** oxyCODONE immediate-release (ROXICODONE) tablet 5 mg, 5 mg, Oral, Q3HRS PRN, 5 mg at 06/21/22 1756 **OR** HYDROmorphone (Dilaudid) injection 0.25 mg, 0.25 mg, Intravenous, Q3HRS PRN, Roro Still M.D.     Physical Examination:      BP (!) 179/125   Pulse (!) 114   Temp 36.9 °C (98.4 °F) (Temporal)   Resp (!) 27   Ht 1.803 m (5' 11\")   Wt 63.7 kg (140 lb 6.9 oz)   SpO2 100%   BMI 19.59 kg/m²      Intubated   No eye opening  Pupils 2 mm midline nonreactive  No motor response stimulation      Labs:        Lab Results   Component Value Date/Time     PROTHROMBTM 13.5 06/22/2022 07:37 PM     INR 1.06 06/22/2022 07:37 PM            Lab Results   Component Value Date/Time     WBC 7.1 06/21/2022 07:53 AM     RBC 4.77 06/21/2022 07:53 AM     HEMOGLOBIN 13.1 (L) 06/21/2022 07:53 AM     HEMATOCRIT 40.6 (L) 06/21/2022 07:53 AM     MCV 85.1 06/21/2022 07:53 AM     MCH 27.5 06/21/2022 07:53 AM     MCHC 32.3 (L) 06/21/2022 07:53 AM     MPV 10.8 " 06/21/2022 07:53 AM     NEUTSPOLYS 73.70 (H) 06/21/2022 07:53 AM     LYMPHOCYTES 18.00 (L) 06/21/2022 07:53 AM     MONOCYTES 7.90 06/21/2022 07:53 AM     EOSINOPHILS 0.00 06/21/2022 07:53 AM     BASOPHILS 0.10 06/21/2022 07:53 AM            Lab Results   Component Value Date/Time     SODIUM 134 (L) 06/22/2022 01:50 AM     POTASSIUM 3.8 06/22/2022 01:50 AM     CHLORIDE 98 06/22/2022 01:50 AM     CO2 24 06/22/2022 01:50 AM     GLUCOSE 121 (H) 06/22/2022 01:50 AM     BUN 15 06/22/2022 01:50 AM     CREATININE 0.86 06/22/2022 01:50 AM            Lab Results   Component Value Date/Time     CHOLSTRLTOT 125 06/21/2022 07:53 AM     LDL 74 06/21/2022 07:53 AM     HDL 45 06/21/2022 07:53 AM     TRIGLYCERIDE 31 06/21/2022 07:53 AM             Lab Results   Component Value Date/Time     ALKPHOSPHAT 76 06/22/2022 01:50 AM     ASTSGOT 15 06/22/2022 01:50 AM     ALTSGPT 15 06/22/2022 01:50 AM     TBILIRUBIN 0.3 06/22/2022 01:50 AM         Imaging/Testing:     I interpreted and/or reviewed the patient's neuroimaging     MR-BRAIN-WITH & W/O                              1.  Interval development of moderate obstructive hydrocephalus due to mass effect on the fourth ventricle. There is mild transependymal edema.   2.  Large area of acute infarction involving the left cerebellar hemisphere, inferior cerebellar vermis, and left cerebellar tonsil. PICA territory. No hemorrhagic transformation. This is the cause of the fourth ventricle effacement with obstructive    hydrocephalus. There is also mild left-sided cerebellar tonsillar herniation.   3.  Moderate supratentorial white matter disease most consistent with microvascular ischemic change.   4.  Old infarction right anterior frontal convexity.   5.  A Voalte message was sent to RICKEY HAYES at 1823 hours 6/22/2022. Prompt reply as of 6:41 PM not yet received. Efforts are ongoing to contact housestaff managing the patient at this time.   6.  Case was discussed (call report) with  nurse SHASTA MAGILL at 6:47 PM. Request to initiate stat neurosurgery consult. Attempts to contact [facility] on-call housestaff are ongoing.       CT-HEAD W/O   Final Result       1.  No evidence of acute hemorrhage, mass or large territorial infarction   2.  LEFT cerebellar and RIGHT frontal encephalomalacia   3.  Mild atrophy   4.  Mild white matter changes           CT-CSPINE WITHOUT PLUS RECONS   Final Result       1.  No acute fracture or traumatic listhesis in the cervical spine.   2.  Emphysema in the lung apices.         Neurology Initial Consult H&P  Neurohospitalist Service, [facility]       HPI: Alverto Duran is a 66 y.o. man who presented with gait instability with dizziness and falls.  According to documentation and discussion with family medicine residents, the patient presented 2 or 3 days ago with similar symptoms and was discharged with a diagnosis of headache and hypertension.  He represented and was noted to be somewhat confused and agitated with some gait instability.  A CT head on admission was performed and unfortunately read as showing no acute intracranial processes, however does show a large left cerebellar ischemic infarct of subacute timing.     The patient was admitted where screening labs revealed a UDS showing amphetamine use and a positive titer for syphilis.  The patient was started on penicillin for possible neurosyphilis and an MRI brain was planned to better delineate his mental status changes and gait changes.     The patient did continue to display agitation and received some Haldol which made him intermittently somnolent throughout the day.  By the evening, his MRI brain was performed and revealed a large left cerebellar ischemic infarct with effacement of the fourth ventricle and early signs of brainstem compression.     Neurology was immediately consulted, as well as neurosurgery.  The patient was intubated and transferred to the ICU.  He was given 23%  hypertonic saline bolus as well as 100 g of mannitol.     Review of systems: In addition to what is detailed in the HPI above, all other systems reviewed and are negative.     Past Medical History:    has a past medical history of Glaucoma.     FHx:  family history is not on file.  Unable to obtain due to patient condition     SHx:   reports that he has been smoking cigarettes. He has been smoking about 0.50 packs per day. He has never used smokeless tobacco. He reports current drug use. Drugs: Methamphetamines and Inhaled. He reports that he does not drink alcohol.     Allergies:  No Known Allergies     Medications:     Current Facility-Administered Medications:   •  enalaprilat (Vasotec) injection 1.25 mg 1 mL, 1.25 mg, Intravenous, Q6HRS PRN, Steven Monterroso M.D., 1.25 mg at 06/22/22 1620  •  amLODIPine (NORVASC) tablet 5 mg, 5 mg, Oral, Q DAY, Foster Shaw M.D.  •  LORazepam (ATIVAN) injection 1 mg, 1 mg, Intravenous, Q4HRS PRN, Roro Still M.D., 1 mg at 06/22/22 1503  •  penicillin G potassium 3 Million Units in  mL IVPB, 3 Million Units, Intravenous, Q4HRS, Roro Still M.D., Last Rate: 200 mL/hr at 06/22/22 1840, 3 Million Units at 06/22/22 1840  •  protamine injection 40 mg, 40 mg, Intravenous, Once, Gisella Cervantes M.D.  •  sodium chloride 200 mEq in empty bag 50 mL ivpb, 200 mEq, Intravenous, Once, Emil S Arenas, D.O.  •  mannitol 25 % injection 100 g, 100 g, Intravenous, Once, Emil S Arenas, D.O.  •  HYDRALAZINE HCL 20 MG/ML INJ SOLN, , , ,   •  hydrALAZINE (APRESOLINE) injection 20 mg, 20 mg, Intravenous, Once, Dominic Damico M.D.  •  Respiratory Therapy Consult, , Nebulization, Continuous RT, Dominic Damico M.D.  •  famotidine (PEPCID) tablet 20 mg, 20 mg, Enteral Tube, Q12HRS **OR** famotidine (PEPCID) injection 20 mg, 20 mg, Intravenous, Q12HRS, Dominic Damico M.D.  •  MD Alert...ICU Electrolyte Replacement per Pharmacy, , Other, PHARMACY TO DOSE, Dominic  SHARAD Damico  •  lidocaine (XYLOCAINE) 1 % injection 2 mL, 2 mL, Tracheal Tube, Q30 MIN PRN, Dominic Damico M.D.  •  propofol (DIPRIVAN) injection, 0-80 mcg/kg/min, Intravenous, Continuous, Last Rate: 7.6 mL/hr at 06/22/22 2036, 20 mcg/kg/min at 06/22/22 2036 **AND** Triglycerides Starting now and then Every 3 Days, , , Every 3 Days (0300), Dominic Damico M.D.  •  fentaNYL (SUBLIMAZE) injection 25 mcg, 25 mcg, Intravenous, Q HOUR PRN **OR** fentaNYL (SUBLIMAZE) injection 50 mcg, 50 mcg, Intravenous, Q HOUR PRN **OR** fentaNYL (SUBLIMAZE) injection 100 mcg, 100 mcg, Intravenous, Q HOUR PRN, Dominic Damico M.D.  •  fentaNYL (SUBLIMAZE) 50 mcg/mL in 50mL, , Intravenous, Continuous, Dominic Damico M.D.  •  senna-docusate (PERICOLACE or SENOKOT S) 8.6-50 MG per tablet 2 Tablet, 2 Tablet, Oral, BID, 2 Tablet at 06/21/22 1756 **AND** polyethylene glycol/lytes (MIRALAX) PACKET 1 Packet, 1 Packet, Oral, QDAY PRN **AND** magnesium hydroxide (MILK OF MAGNESIA) suspension 30 mL, 30 mL, Oral, QDAY PRN **AND** bisacodyl (DULCOLAX) suppository 10 mg, 10 mg, Rectal, QDAY PRN, Leatha Timmons M.D.  •  ondansetron (ZOFRAN) syringe/vial injection 4 mg, 4 mg, Intravenous, Q4HRS PRN, Leatha Timmons M.D.  •  ondansetron (ZOFRAN ODT) dispertab 4 mg, 4 mg, Oral, Q4HRS PRN, Leatha Timmons M.D.  •  nicotine (NICODERM) 21 MG/24HR 21 mg, 21 mg, Transdermal, Daily-0600, 21 mg at 06/22/22 2144 **AND** Nicotine Replacement Patient Education Materials, , , Once **AND** nicotine polacrilex (NICORETTE) 2 MG piece 2 mg, 2 mg, Oral, Q HOUR PRN, Leatha Timmons M.D.  •  acetaminophen (Tylenol) tablet 650 mg, 650 mg, Oral, Q6HRS PRN, Roro Still M.D.  •  Notify provider if pain remains uncontrolled, , , CONTINUOUS **AND** Use the Numeric Rating Scale (NRS), Mc-Baker Faces (WBF), or FLACC on regular floors and Critical-Care Pain Observation Tool (CPOT) on ICUs/Trauma to assess pain, , , CONTINUOUS **AND** Pulse Ox, , ,  CONTINUOUS **AND** Pharmacy Consult Request ...Pain Management Review 1 Each, 1 Each, Other, PHARMACY TO DOSE **AND** If patient difficult to arouse and/or has respiratory depression (respiratory rate of 10 or less), stop any opiates that are currently infusing and call a Rapid Response., , , CONTINUOUS, Roro Still M.D.  •  oxyCODONE immediate-release (ROXICODONE) tablet 2.5 mg, 2.5 mg, Oral, Q3HRS PRN **OR** oxyCODONE immediate-release (ROXICODONE) tablet 5 mg, 5 mg, Oral, Q3HRS PRN, 5 mg at 06/21/22 1756 **OR** HYDROmorphone (Dilaudid) injection 0.25 mg, 0.25 mg, Intravenous, Q3HRS PRN, Roro Still M.D.       Neurologic:  Mental Status - Intubated, sedated. Unresponsive.    Objective Data:     Labs:        Lab Results   Component Value Date/Time     PROTHROMBTM 13.5 06/22/2022 07:37 PM     INR 1.06 06/22/2022 07:37 PM            Lab Results   Component Value Date/Time     WBC 7.1 06/21/2022 07:53 AM     RBC 4.77 06/21/2022 07:53 AM     HEMOGLOBIN 13.1 (L) 06/21/2022 07:53 AM     HEMATOCRIT 40.6 (L) 06/21/2022 07:53 AM     MCV 85.1 06/21/2022 07:53 AM     MCH 27.5 06/21/2022 07:53 AM     MCHC 32.3 (L) 06/21/2022 07:53 AM     MPV 10.8 06/21/2022 07:53 AM     NEUTSPOLYS 73.70 (H) 06/21/2022 07:53 AM     LYMPHOCYTES 18.00 (L) 06/21/2022 07:53 AM     MONOCYTES 7.90 06/21/2022 07:53 AM     EOSINOPHILS 0.00 06/21/2022 07:53 AM     BASOPHILS 0.10 06/21/2022 07:53 AM            Lab Results   Component Value Date/Time     SODIUM 134 (L) 06/22/2022 01:50 AM     POTASSIUM 3.8 06/22/2022 01:50 AM     CHLORIDE 98 06/22/2022 01:50 AM     CO2 24 06/22/2022 01:50 AM     GLUCOSE 121 (H) 06/22/2022 01:50 AM     BUN 15 06/22/2022 01:50 AM     CREATININE 0.86 06/22/2022 01:50 AM            Lab Results   Component Value Date/Time     CHOLSTRLTOT 125 06/21/2022 07:53 AM     LDL 74 06/21/2022 07:53 AM     HDL 45 06/21/2022 07:53 AM     TRIGLYCERIDE 31 06/21/2022 07:53 AM             Lab Results   Component Value  Date/Time     ALKPHOSPHAT 76 06/22/2022 01:50 AM     ASTSGOT 15 06/22/2022 01:50 AM     ALTSGPT 15 06/22/2022 01:50 AM     TBILIRUBIN 0.3 06/22/2022 01:50 AM         Imaging/Testing:     I interpreted and/or reviewed the patient's neuroimaging     MR-BRAIN-WITH & W/O   Final Result   Addendum 1 of 1   ADDENDUM:       The case was discussed by telephone (call report) with DR. DAVONTE VIEIRA    on call for [facility], at 1854 hours.       Final       1.  Interval development of moderate obstructive hydrocephalus due to mass effect on the fourth ventricle. There is mild transependymal edema.   2.  Large area of acute infarction involving the left cerebellar hemisphere, inferior cerebellar vermis, and left cerebellar tonsil. PICA territory. No hemorrhagic transformation. This is the cause of the fourth ventricle effacement with obstructive    hydrocephalus. There is also mild left-sided cerebellar tonsillar herniation.   3.  Moderate supratentorial white matter disease most consistent with microvascular ischemic change.   4.  Old infarction right anterior frontal convexity.   5.  A Voalte message was sent to RICKEY HAYES at 1823 hours 6/22/2022. Prompt reply as of 6:41 PM not yet received. Efforts are ongoing to contact housestaff managing the patient at this time.   6.  Case was discussed (call report) with nurse SHASTA MAGILL at 6:47 PM. Request to initiate stat neurosurgery consult. Attempts to contact [facility] on-call housestaff are ongoing.       CT-HEAD W/O   Final Result       1.  No evidence of acute hemorrhage, mass or large territorial infarction   2.  LEFT cerebellar and RIGHT frontal encephalomalacia   3.  Mild atrophy   4.  Mild white matter changes           CT-CSPINE WITHOUT PLUS RECONS   Final Result       1.  No acute fracture or traumatic listhesis in the cervical spine.   2.  Emphysema in the lung apices.         AP:  66 year old male GCS 3 with 2 mm nonreactive pupils, left well defined  left cerebellar, right frontal stroke; the cerebellar stroke is resulting in 4th ventricle obstruction and hydrocephalus.  The stroke was well demarcarted on initial CT at admission.  Given his low GCS mckenzie hydrocephalus, external ventricular drain will be placed emergently.  Suboccipital craniectomy could be considered however it is unlikely to result in significant improvement unless he improves with CSF diversion given GCS 3.    Stroke workup initiated  Keep Na 145-150; mannitol being administered, can change to hypertonic saline when central line in place  Keep HPB >30 degrees  Keep eucapnic  No anticoagulatant medication while EVD in place

## 2022-06-23 NOTE — CONSULTS
Critical Care History & Physical    Date of consult: 06/22/22    Referring Physician  Dominic Damico M.D.    Reason for Consultation  Chief Complaint   Patient presents with   • T-5000 GLF     Pt developed sudden onset of dizziness yesterday and has had multiple falls. Seen at [facility] for same s/s yesterday discharged with DX of headache/htn    • Dizziness   • Neck Pain   • Headache       History of Presenting Illness  66 y.o. male with a history of meth use and potentially syphilis presenting to the hospital initially with multiple falls at home and severe headache.  Was also seen at [facility] around the 20th of this month and sent home.  In the emergency department here CT was read as having evidence of encephalomalacia in the left cerebellar and right frontal areas but no other labs were abnormal.  He was admitted to the hospital where a follow-up MRI obtained this evening (6/22) noted severe left cerebellar stroke.  Over the course of the day his mental status continued to worsen and he became obtunded, MRI noted significant edema with compression of the brainstem and obstructive hydrocephalus.    He was brought to the ICU emergently, intubated on arrival and neurosurgery came immediately to the bedside to place an EVD.  Discussions were had about suboccipital craniectomy but the decision was made to defer for now.  He was given 1 g/kg mannitol, 23.4% saline and started on a 3% saline infusion.    Code Status  Full Code    Review of Systems  Review of Systems   Unable to perform ROS: Acuity of condition       Past Medical History   has a past medical history of Glaucoma.    Surgical History   has no past surgical history on file.    Family History  family history is not on file.    Social History   reports that he has been smoking cigarettes. He has been smoking about 0.50 packs per day. He has never used smokeless tobacco. He reports current drug use. Drugs: Methamphetamines and Inhaled. He reports that  he does not drink alcohol.    Medications  Home Medications     Reviewed by Ezequiel Valentine (Pharmacy Tech) on 06/21/22 at 1123  Med List Status: Complete   Medication Last Dose Status        Patient Edgar Taking any Medications                       Allergies  No Known Allergies      Vital Signs last 24 hours  Temp:  [36.8 °C (98.2 °F)-37.4 °C (99.3 °F)] 36.9 °C (98.4 °F)  Pulse:  [] 114  Resp:  [2-27] 2  BP: (171-207)/() 179/125  SpO2:  [94 %-100 %] 100 %      Physical Exam  Physical Exam  Vitals and nursing note reviewed. Exam conducted with a chaperone present.   HENT:      Head: Normocephalic.      Mouth/Throat:      Mouth: Mucous membranes are moist.   Eyes:      Pupils: Pupils are unequal.   Cardiovascular:      Rate and Rhythm: Regular rhythm. Bradycardia present.      Pulses: Normal pulses.   Pulmonary:      Effort: Pulmonary effort is normal. No respiratory distress.   Abdominal:      General: There is no distension.      Palpations: Abdomen is soft.      Tenderness: There is no abdominal tenderness.   Musculoskeletal:         General: Normal range of motion.      Cervical back: Normal range of motion.   Skin:     General: Skin is warm and dry.      Capillary Refill: Capillary refill takes less than 2 seconds.   Neurological:      Mental Status: He is lethargic.      GCS: GCS eye subscore is 2. GCS verbal subscore is 2. GCS motor subscore is 4.           Fluids    Intake/Output Summary (Last 24 hours) at 6/22/2022 2157  Last data filed at 6/22/2022 0400  Gross per 24 hour   Intake --   Output 500 ml   Net -500 ml         Laboratory  Recent Results (from the past 48 hour(s))   EKG    Collection Time: 06/21/22  7:46 AM   Result Value Ref Range    Report       [facility]    Test Date:  2022-06-21  Pt Name:    JOSE POSADA                Department: ER  MRN:        [id number]                      Room:       RD 08  Gender:     Male                          Technician: [id number]  :        [id number]                   Requested By:ER TRIAGE PROTOCOL  Order #:    [id number]                    Reading MD: KEISHA HUBBARD MD    Measurements  Intervals                                Axis  Rate:       61                           P:          73  FL:         168                          QRS:        16  QRSD:       94                           T:          51  QT:         428  QTc:        431    Interpretive Statements  SINUS RHYTHM  Compared to ECG 2021 05:51:13  Ventricular premature complex(es) no longer present  Left-axis deviation no longer present  Electronically Signed On 2022 8:24:40 PDT by KEISHA HUBBARD MD     CBC WITH DIFFERENTIAL    Collection Time: 22  7:53 AM   Result Value Ref Range    WBC 7.1 4.8 - 10.8 K/uL    RBC 4.77 4.70 - 6.10 M/uL    Hemoglobin 13.1 (L) 14.0 - 18.0 g/dL    Hematocrit 40.6 (L) 42.0 - 52.0 %    MCV 85.1 81.4 - 97.8 fL    MCH 27.5 27.0 - 33.0 pg    MCHC 32.3 (L) 33.7 - 35.3 g/dL    RDW 45.4 35.9 - 50.0 fL    Platelet Count 246 164 - 446 K/uL    MPV 10.8 9.0 - 12.9 fL    Neutrophils-Polys 73.70 (H) 44.00 - 72.00 %    Lymphocytes 18.00 (L) 22.00 - 41.00 %    Monocytes 7.90 0.00 - 13.40 %    Eosinophils 0.00 0.00 - 6.90 %    Basophils 0.10 0.00 - 1.80 %    Immature Granulocytes 0.30 0.00 - 0.90 %    Nucleated RBC 0.00 /100 WBC    Neutrophils (Absolute) 5.19 1.82 - 7.42 K/uL    Lymphs (Absolute) 1.27 1.00 - 4.80 K/uL    Monos (Absolute) 0.56 0.00 - 0.85 K/uL    Eos (Absolute) 0.00 0.00 - 0.51 K/uL    Baso (Absolute) 0.01 0.00 - 0.12 K/uL    Immature Granulocytes (abs) 0.02 0.00 - 0.11 K/uL    NRBC (Absolute) 0.00 K/uL   COMP METABOLIC PANEL    Collection Time: 22  7:53 AM   Result Value Ref Range    Sodium 141 135 - 145 mmol/L    Potassium 3.1 (L) 3.6 - 5.5 mmol/L    Chloride 110 96 - 112 mmol/L    Co2 21 20 - 33 mmol/L    Anion Gap 10.0 7.0 - 16.0    Glucose 105 (H) 65 - 99 mg/dL    Bun 16 8 - 22 mg/dL    Creatinine 0.66  0.50 - 1.40 mg/dL    Calcium 7.0 (L) 8.5 - 10.5 mg/dL    AST(SGOT) 10 (L) 12 - 45 U/L    ALT(SGPT) 11 2 - 50 U/L    Alkaline Phosphatase 52 30 - 99 U/L    Total Bilirubin 0.2 0.1 - 1.5 mg/dL    Albumin 3.1 (L) 3.2 - 4.9 g/dL    Total Protein 5.2 (L) 6.0 - 8.2 g/dL    Globulin 2.1 1.9 - 3.5 g/dL    A-G Ratio 1.5 g/dL   TROPONIN    Collection Time: 06/21/22  7:53 AM   Result Value Ref Range    Troponin T <6 6 - 19 ng/L   LIPASE    Collection Time: 06/21/22  7:53 AM   Result Value Ref Range    Lipase 9 (L) 11 - 82 U/L   MAGNESIUM    Collection Time: 06/21/22  7:53 AM   Result Value Ref Range    Magnesium 1.6 1.5 - 2.5 mg/dL   ESTIMATED GFR    Collection Time: 06/21/22  7:53 AM   Result Value Ref Range    GFR (CKD-EPI) 103 >60 mL/min/1.73 m 2   T.PALLIDUM AB EIA    Collection Time: 06/21/22  7:53 AM   Result Value Ref Range    Syphilis, Treponemal Qual Reactive (A) Non-Reactive   HIV AG/AB COMBO ASSAY SCREENING    Collection Time: 06/21/22  7:53 AM   Result Value Ref Range    HIV Ag/Ab Combo Assay Non-Reactive Non Reactive   Lipid Profile    Collection Time: 06/21/22  7:53 AM   Result Value Ref Range    Cholesterol,Tot 125 100 - 199 mg/dL    Triglycerides 31 0 - 149 mg/dL    HDL 45 >=40 mg/dL    LDL 74 <100 mg/dL   COV-2, FLU A/B, AND RSV BY PCR (2-4 HOURS CEPHEID): Collect NP swab in VTM    Collection Time: 06/21/22  1:58 PM    Specimen: Nasopharyngeal; Respirate   Result Value Ref Range    Influenza virus A RNA Negative Negative    Influenza virus B, PCR Negative Negative    RSV, PCR Negative Negative    SARS-CoV-2 by PCR NotDetected     SARS-CoV-2 Source NP Swab    URINE DRUG SCREEN    Collection Time: 06/21/22  2:53 PM   Result Value Ref Range    Amphetamines Urine Positive (A) Negative    Barbiturates Negative Negative    Benzodiazepines Negative Negative    Cocaine Metabolite Negative Negative    Methadone Negative Negative    Opiates Negative Negative    Oxycodone Negative Negative    Phencyclidine -Pcp Negative  Negative    Propoxyphene Negative Negative    Cannabinoid Metab Negative Negative   DIAGNOSTIC ALCOHOL    Collection Time: 06/21/22  7:42 PM   Result Value Ref Range    Diagnostic Alcohol <10.1 <10.1 mg/dL   PTH WITH IONIZED CALCIUM    Collection Time: 06/21/22  7:42 PM   Result Value Ref Range    Pth, Intact 23.5 14.0 - 72.0 pg/mL    Ionized Calcium 1.3 1.1 - 1.3 mmol/L   Comp Metabolic Panel    Collection Time: 06/22/22  1:50 AM   Result Value Ref Range    Sodium 134 (L) 135 - 145 mmol/L    Potassium 3.8 3.6 - 5.5 mmol/L    Chloride 98 96 - 112 mmol/L    Co2 24 20 - 33 mmol/L    Anion Gap 12.0 7.0 - 16.0    Glucose 121 (H) 65 - 99 mg/dL    Bun 15 8 - 22 mg/dL    Creatinine 0.86 0.50 - 1.40 mg/dL    Calcium 10.0 8.5 - 10.5 mg/dL    AST(SGOT) 15 12 - 45 U/L    ALT(SGPT) 15 2 - 50 U/L    Alkaline Phosphatase 76 30 - 99 U/L    Total Bilirubin 0.3 0.1 - 1.5 mg/dL    Albumin 4.3 3.2 - 4.9 g/dL    Total Protein 7.8 6.0 - 8.2 g/dL    Globulin 3.5 1.9 - 3.5 g/dL    A-G Ratio 1.2 g/dL   ESTIMATED GFR    Collection Time: 06/22/22  1:50 AM   Result Value Ref Range    GFR (CKD-EPI) 95 >60 mL/min/1.73 m 2   POCT glucose device results    Collection Time: 06/22/22  7:19 PM   Result Value Ref Range    POC Glucose, Blood 98 65 - 99 mg/dL   Prothrombin Time    Collection Time: 06/22/22  7:37 PM   Result Value Ref Range    PT 13.5 12.0 - 14.6 sec    INR 1.06 0.87 - 1.13   APTT    Collection Time: 06/22/22  7:37 PM   Result Value Ref Range    APTT 30.4 24.7 - 36.0 sec         Imaging  DX-CHEST-PORTABLE (1 VIEW)   Final Result         1.  No acute cardiopulmonary disease.   2.  Trace bilateral pleural effusions   3.  Atherosclerosis      MR-BRAIN-WITH & W/O   Final Result   Addendum 1 of 1   ADDENDUM:      The case was discussed by telephone (call report) with DR. DAVONTE VIEIRA    on call for [facility], at 1854 hours.      Final      1.  Interval development of moderate obstructive hydrocephalus due to mass effect on the  fourth ventricle. There is mild transependymal edema.   2.  Large area of acute infarction involving the left cerebellar hemisphere, inferior cerebellar vermis, and left cerebellar tonsil. PICA territory. No hemorrhagic transformation. This is the cause of the fourth ventricle effacement with obstructive    hydrocephalus. There is also mild left-sided cerebellar tonsillar herniation.   3.  Moderate supratentorial white matter disease most consistent with microvascular ischemic change.   4.  Old infarction right anterior frontal convexity.   5.  A Voalte message was sent to RICKEY HAYES at 1823 hours 6/22/2022. Prompt reply as of 6:41 PM not yet received. Efforts are ongoing to contact housestaff managing the patient at this time.   6.  Case was discussed (call report) with nurse SHASTA MAGILL at 6:47 PM. Request to initiate stat neurosurgery consult. Attempts to contact [facility] on-call housestaff are ongoing.      CT-HEAD W/O   Final Result      1.  No evidence of acute hemorrhage, mass or large territorial infarction   2.  LEFT cerebellar and RIGHT frontal encephalomalacia   3.  Mild atrophy   4.  Mild white matter changes         CT-CSPINE WITHOUT PLUS RECONS   Final Result      1.  No acute fracture or traumatic listhesis in the cervical spine.   2.  Emphysema in the lung apices.      US-CAROTID DOPPLER BILAT    (Results Pending)   EC-ECHOCARDIOGRAM COMPLETE W/O CONT    (Results Pending)   DX-CHEST-PORTABLE (1 VIEW)    (Results Pending)   CT-CTA NECK WITH & W/O-POST PROCESSING    (Results Pending)   CT-CTA HEAD WITH & W/O-POST PROCESS    (Results Pending)         Assessment/Plan  * Acute cerebrovascular accident (CVA) due to occlusion of left cerebellar artery (HCC)- (present on admission)  Assessment & Plan  Recently seen at [place] and admitted yesterday morning  Stroke was not initially noted on CT head on arrival  Found on MRI brain this evening    Unfortunately patient had worsening mental status  throughout the day which now was noted to be secondary to edema and brainstem compression with hydrocephalus    Emergently brought to the ICU and intubated for inability to protect airway  Neurosurgery placed EVD  CVC/arterial line placed  Discussion was had regarding suboccipital craniotomy, neurosurgery is deferring for now    Plan:  -EVD in place, keep ICPs<20  -Propofol, fentanyl, as needed BP medications  -Elevate head of bed, keep head centered  -S/p 1 g/kg mannitol  -S/p 23.4% saline  -3% saline infusion  -Goal sodium 150-155  -Neurosurgery following  - CTA pending (though long outside of any interventional window)  - ECHO  - ASA / Statin    On mechanically assisted ventilation (HCC)  Assessment & Plan  Intubated date: 6/22  Goal saturation > 90%  Monitor ventilator waveforms and titrate flow/peep and volumes according.   Lung protective ventilation strategy  CXR PRN: monitor lung volumes and tube/line placement  VAP bundle prevention, oral care, post pyloric feeding  Head of bed > 30 degree  GI prophylaxis: H2 blocker  Daily awakening and SBT trials unless contraindicated  Assess / Treat pain  Assess / Treat delirium  Sedation Goal: RASS -3 to -4  Monitor for liberation / early mobility  Respiratory treatments: prn  ABCDEF Bundle     Syphilis  Assessment & Plan  Treponemal test is positive  Awaiting confirmatory testing  Treat empirically  -PCN G 4,000,000 units every 4 hours    Will likely need to notify health department (unclear if this has been done yet)  Would also talk with ID tomorrow, they may also be able to help with reporting    Goals of care, counseling/discussion  Assessment & Plan  I attempted a few times to call his wife but was unable to get through.  I am not sure at this point if she is aware of his decompensation today or of his presumptive syphilis diagnosis.  We will keep him full code for now.    Methamphetamine use (HCC)- (present on admission)  Assessment & Plan  Counseling cessation  when clinical trajectory is more clear      DVT prophylaxis: Hold per neurosurgery, was getting lovenox - protamine given at their request for EVD and possible craniectomy  PUD prophylaxis: H2 blocker  Glycemic control: SSI if needed  Nutrition: NPO for now, nutrition consult for TF  Lines: CVC and Art placed 6/22, EVD placed 6/22  Muse: Placed 6/21     Discussed patient condition and risk of morbidity and/or mortality with Hospitalist, RN, RT, Pharmacy, , Charge nurse / hot rounds and neurology and neurosurgery.      The patient remains critically ill.  Critical care time = 81 minutes in directly providing and coordinating critical care and extensive data review.  No time overlap and excludes procedures.

## 2022-06-23 NOTE — PROGRESS NOTES
Neurosurgery Progress Note    Subjective:  EVD placed overnight, remains intubated    Exam:  Weak withdraw bilateral lower extremities with strong stimulation  Pupils 2 mm nonreactive midline  No upper extremity movement with stimulation    BP  Min: 134/70  Max: 207/122  Pulse  Av.2  Min: 53  Max: 117  Resp  Av.5  Min: 2  Max: 65  Temp  Av.8 °C (98.3 °F)  Min: 36.8 °C (98.2 °F)  Max: 36.9 °C (98.4 °F)  SpO2  Av.4 %  Min: 94 %  Max: 100 %    ICP  Av.4 MM HG  Min: -1 MM HG  Max: 15 MM HG    Recent Labs     22  0753 22  0310 22  0840   WBC 7.1 9.1 8.5   RBC 4.77 5.90 5.68   HEMOGLOBIN 13.1* 16.3 15.7   HEMATOCRIT 40.6* 48.4 47.5   MCV 85.1 82.0 83.6   MCH 27.5 27.6 27.6   MCHC 32.3* 33.7 33.1*   RDW 45.4 43.0 43.9   PLATELETCT 246 311 275   MPV 10.8 10.9 11.0     Recent Labs     22  0753 22  0150 22  0310   SODIUM 141 134* 140   POTASSIUM 3.1* 3.8 4.2   CHLORIDE 110 98 107   CO2 21 24 20   GLUCOSE 105* 121* 116*   BUN 16 15 15   CREATININE 0.66 0.86 1.00   CALCIUM 7.0* 10.0 9.0     Recent Labs     22   APTT 30.4   INR 1.06           Intake/Output                       22 07 - 22 0659 22 07 - 2259      Total  Total                 Intake    I.V.  --  238 238  232.4  -- 232.4    Propofol Volume -- 55.3 55.3 45.1 -- 45.1    Norepinephrine Volume -- 51.7 51.7 49 -- 49    Volume (mL) (3% sodium chloride (HYPERTONIC SALINE) 500mL infusion 500 mL) -- 131 131 138.3 -- 138.3    NG/GT  --  50 50  --  -- --    Intake (mL) (Enteral Tube 22 Cortrak - Small Bowel/Transpyloric Left nare) -- 50 50 -- -- --    IV Piggyback  --  350 350  343.3  -- 343.3    Volume (mL) (penicillin G potassium 3 Million Units in  mL IVPB) -- 200 200 0 -- 0    Volume (mL) (sodium chloride 200 mEq in empty bag 50 mL ivpb) -- 50 50 -- -- --    Volume (mL) (penicillin G potassium 4 Million Units in  mL  IVPB) -- 100 100 343.3 -- 343.3    Total Intake -- 638 638 575.7 -- 575.7       Output    Urine  --  2695 2695  --  -- --    Output (mL) (Urethral Catheter) -- 2695 2695 -- -- --    Drains  --  3.5 3.5  --  -- --    Output (mL) (ICP/Ventriculostomy) -- 3.5 3.5 -- -- --    Total Output -- 2698.5 2698.5 -- -- --       Net I/O     -- -2060.5 -2060.5 575.7 -- 575.7            Intake/Output Summary (Last 24 hours) at 6/23/2022 0919  Last data filed at 6/23/2022 0725  Gross per 24 hour   Intake 1213.72 ml   Output 2698.5 ml   Net -1484.78 ml       $ Bladder Scan Results (mL): 900    • 3% sodium chloride  150 mL Continuous   • enalaprilat  1.25 mg Q6HRS PRN   • LORazepam  1 mg Q4HRS PRN   • protamine  40 mg Once   • hydrALAZINE  20 mg Once   • Respiratory Therapy Consult   Continuous RT   • famotidine  20 mg Q12HRS    Or   • famotidine  20 mg Q12HRS   • MD Alert...Adult ICU Electrolyte Replacement per Pharmacy   PHARMACY TO DOSE   • lidocaine  2 mL Q30 MIN PRN   • propofol  0-80 mcg/kg/min Continuous   • fentaNYL  25 mcg Q HOUR PRN    Or   • fentaNYL  50 mcg Q HOUR PRN    Or   • fentaNYL  100 mcg Q HOUR PRN   • fentaNYL   Continuous   • 3% sodium chloride  500 mL Continuous   • sodium chloride 23.4% ivpb  200 mEq Q6HRS PRN   • norepinephrine (Levophed) infusion  0-30 mcg/min Continuous   • atorvastatin  80 mg Q EVENING   • Pharmacy  1 Each PHARMACY TO DOSE   • acetaminophen  650 mg Q6HRS PRN   • ondansetron  4 mg Q4HRS PRN   • senna-docusate  2 Tablet BID    And   • polyethylene glycol/lytes  1 Packet QDAY PRN    And   • magnesium hydroxide  30 mL QDAY PRN    And   • bisacodyl  10 mg QDAY PRN   • aspirin  81 mg DAILY   • penicillin g  4 Million Units Q4HRS   • ondansetron  4 mg Q4HRS PRN   • Pharmacy Consult Request  1 Each PHARMACY TO DOSE       Assessment and Plan:  Hospital day #3 left cerebellar stroke  POD #1 rigth frontal EVD  Prophylactic anticoagulation: no         Start date/time: tbd  Some improvement in  examination with CSF diversion with weak bilateral lower extremity withdraw with strong chest stimulation.  Discussed with patient's wife; outcome extremely poor.  Option of suboccipital craniectomy/removing skull to allow brain to swell since he has some, albeit very minor, improvement with CSF drainage was discussed with wife.  Return to caring for himself unlikely despite any treatment was discussed.  She wishes to proceed with surgical craniectomy.  Risks benefits discussed.  All questions answered.

## 2022-06-23 NOTE — PROGRESS NOTES
Patient transferred to pre-op holding. Report given to Dr. Mcclellan. All drips running at time of transfer.

## 2022-06-23 NOTE — ANESTHESIA TIME REPORT
Anesthesia Start and Stop Event Times     Date Time Event    6/23/2022 10:04 AM Ready for Procedure    6/23/2022 10:39 AM Anesthesia Start    6/23/2022 01:54 PM Anesthesia Stop        Responsible Staff  06/23/22    Name Role Begin End    Alcira Turner M.D. Anesthesiologist 06/23/22 10:39 AM 06/23/22 01:54 PM        Overtime Reason:  overtime with call-back    Comments: Post 2nd call - returned for emergency craniotomy

## 2022-06-23 NOTE — ANESTHESIA PREPROCEDURE EVALUATION
Case: [id number] Date: 06/23/22    Procedure: CRANIOTOMY, SUBOCCIPITAL, FOR CHIARI MALFORMATION DECOMPRESSION    Location: SURGERY [facility]    Surgeons: Donovan Rizzo M.D.          Relevant Problems   NEURO   (positive) Acute cerebrovascular accident (CVA) due to occlusion of left cerebellar artery (HCC)      CARDIAC   (positive) Hypertensive urgency      Other   (positive) Goals of care, counseling/discussion   (positive) Hypokalemia   (positive) Methamphetamine use (HCC)   (positive) On mechanically assisted ventilation (HCC)       Physical Exam    Airway - unable to assess  TM distance: >3 FB       Cardiovascular - normal exam  Rhythm: regular  Rate: normal  (-) murmur     Dental - normal exam    Unable to assess dental       Pulmonary - normal exam  Comments: Vented   Abdominal    Neurological - sedated/unconcious                 Anesthesia Plan    ASA 5   ASA physical status 5 criteria: intracranial bleed with mass effect    Plan - general             Plan Factors:   Patient was not previously instructed to abstain from smoking on day of procedure.  Patient did not smoke on day of procedure.      Induction: intravenous    Postoperative Plan: Postoperative administration of opioids is intended.    Pertinent diagnostic labs and testing reviewed    Informed Consent:  Emergent - Consent given by clinician

## 2022-06-23 NOTE — PROGRESS NOTES
Patient underwent occipital crani and C1 laminectomy by Dr Rizzo.     Will keep SBP < 160 post operatively  We can normalize sodium goal with him now open  We will check electrolyte closely, post operative CBC.   Follow urine output and replace volume with NS s/p mannitol in OR.   Will wean sedation when he has recovery to switches since was not reversed post operatively.   Titrate EtCO2 to 35 which is correlating with ABG, PaO2 >70.     Appreciate excellent care from our neurosurgical colleagues to assists in saving this patient life.     Dickson Jimenes MD  Critical Care Medicine

## 2022-06-23 NOTE — CODE DOCUMENTATION
Dr Coyle notified of patient presentation and en route to pt bedside. Dr Olmedo in communication w/ Dr Stovall and dr coyle. New orders per dr stovall for lovenox reversal Protamine.

## 2022-06-24 PROBLEM — I82.623 ACUTE BILATERAL DEEP VEIN THROMBOSIS (DVT) OF UPPER EXTREMITIES (HCC): Status: ACTIVE | Noted: 2022-01-01

## 2022-06-24 NOTE — CARE PLAN
The patient is Watcher - Medium risk of patient condition declining or worsening    Shift Goals  Clinical Goals: Hemodynamic stability  Patient Goals: GLENDY  Family Goals: GLENDY    Progress made toward(s) clinical / shift goals:    Problem: Fall Risk  Goal: Patient will remain free from falls  Outcome: Progressing     Problem: Safety - Medical Restraint  Goal: Remains free of injury from restraints (Restraint for Interference with Medical Device)  Outcome: Progressing

## 2022-06-24 NOTE — PROGRESS NOTES
Sinus tachycardia with frequent ectopy prior to OR, 1400 Sinus rhythm with occasional PVC/PAC, 1853 sustained rate 144-165 afib.

## 2022-06-24 NOTE — PROGRESS NOTES
Update Dr. Curry, heart rate sustaining 145-160. EKG to bedside. On nicardipine drip 7.5, propofol 20, fentanyl 50, and ns 100.

## 2022-06-24 NOTE — CARE PLAN
The patient is Unstable - High likelihood or risk of patient condition declining or worsening    Shift Goals  Clinical Goals: Hemodynamic stability  Patient Goals: GLENDY  Family Goals: GLENDY    Progress made toward(s) clinical / shift goals:    Problem: Pain - Standard  Goal: Alleviation of pain or a reduction in pain to the patient’s comfort goal  Outcome: Progressing     Problem: Fall Risk  Goal: Patient will remain free from falls  Outcome: Progressing     Problem: Safety - Medical Restraint  Goal: Remains free of injury from restraints (Restraint for Interference with Medical Device)  Outcome: Progressing     Problem: Skin Integrity  Goal: Skin integrity is maintained or improved  Outcome: Progressing       Patient is not progressing towards the following goals:

## 2022-06-24 NOTE — CARE PLAN
Vent Day 3,    CMV 20, vt 460, Peep 8 cwp, Fio2 39%      Problem: Ventilation  Goal: Ability to achieve and maintain unassisted ventilation or tolerate decreased levels of ventilator support  Description: Target End Date:  4 days     Document on Vent flowsheet    1.  Support and monitor invasive and noninvasive mechanical ventilation  2.  Monitor ventilator weaning response  3.  Perform ventilator associated pneumonia prevention interventions  4.  Manage ventilation therapy by monitoring diagnostic test results  Outcome: Not Progressing

## 2022-06-24 NOTE — PROGRESS NOTES
Neurology Progress Note  Neurohospitalist Service, [facility] Neurosciences    Referring Physician: Dickson Jimenes M.D.    Chief Complaint   Patient presents with   • T-5000 GLF     Pt developed sudden onset of dizziness yesterday and has had multiple falls. Seen at [facility] for same s/s yesterday discharged with DX of headache/htn    • Dizziness   • Neck Pain   • Headache       HPI: Refer to initial documented Neurology H&P, as detailed in the patient's chart.    Interval History 6/24: Status post suboccipital craniectomy yesterday.    Review of systems: In addition to what is detailed in the HPI and/or updated in the interval history, all other systems reviewed and are negative.    Past Medical History:    has a past medical history of Glaucoma.    FHx:  family history is not on file.    SHx:   reports that he has been smoking cigarettes. He has been smoking about 0.50 packs per day. He has never used smokeless tobacco. He reports current drug use. Drugs: Methamphetamines and Inhaled. He reports that he does not drink alcohol.    Medications:    Current Facility-Administered Medications:   •  LORazepam (ATIVAN) injection 2-4 mg, 2-4 mg, Intravenous, Q4HRS PRN, Dickson Jimenes M.D.  •  phenylephrine (LAN-SYNEPHRINE) 40 mg in  mL infusion, 0-300 mcg/min, Intravenous, Continuous, Alcira Turner M.D., Last Rate: 0 mL/hr at 06/23/22 1030, 0 mcg/min at 06/23/22 1030  •  niCARdipine (CARDENE) 25 mg in  mL Infusion, 0-15 mg/hr, Intravenous, Continuous, VIRI SextonRANAYELI, Last Rate: 50 mL/hr at 06/24/22 0504, 5 mg/hr at 06/24/22 0504  •  metoprolol tartrate (LOPRESSOR) tablet 25 mg, 25 mg, Enteral Tube, TWICE DAILY, Sultan EDIE Curry M.D., 25 mg at 06/24/22 0533  •  enalaprilat (Vasotec) injection 1.25 mg 1 mL, 1.25 mg, Intravenous, Q6HRS PRN, Steven Monterroso M.D., 1.25 mg at 06/22/22 1620  •  Respiratory Therapy Consult, , Nebulization, Continuous RT, Dominic Damico M.D.  •  famotidine  (PEPCID) tablet 20 mg, 20 mg, Enteral Tube, Q12HRS, 20 mg at 06/24/22 0532 **OR** famotidine (PEPCID) injection 20 mg, 20 mg, Intravenous, Q12HRS, Dominic Damico M.D.  •  MD Alert...ICU Electrolyte Replacement per Pharmacy, , Other, PHARMACY TO DOSE, Dominic Damico M.D.  •  lidocaine (XYLOCAINE) 1 % injection 2 mL, 2 mL, Tracheal Tube, Q30 MIN PRN, Dominic Damico M.D.  •  propofol (DIPRIVAN) injection, 0-80 mcg/kg/min, Intravenous, Continuous, Stopped at 06/24/22 0841 **AND** Triglycerides Starting now and then Every 3 Days, , , Every 3 Days (0300), Dominic Damico M.D.  •  fentaNYL (SUBLIMAZE) injection 25 mcg, 25 mcg, Intravenous, Q HOUR PRN **OR** fentaNYL (SUBLIMAZE) injection 50 mcg, 50 mcg, Intravenous, Q HOUR PRN **OR** fentaNYL (SUBLIMAZE) injection 100 mcg, 100 mcg, Intravenous, Q HOUR PRN, Dominic Damico M.D., 100 mcg at 06/23/22 1905  •  fentaNYL (SUBLIMAZE) 50 mcg/mL in 50mL, , Intravenous, Continuous, Dominic Damico M.D., Last Rate: 1 mL/hr at 06/24/22 0715, 50 mcg/hr at 06/24/22 0715  •  sodium chloride 200 mEq in empty bag 50 mL ivpb, 200 mEq, Intravenous, Q6HRS PRN, Dominic Damico M.D.  •  norepinephrine (Levophed) 8 mg in 250 mL NS infusion (premix), 0-30 mcg/min, Intravenous, Continuous, Dominic Damico M.D., Stopped at 06/23/22 0745  •  atorvastatin (LIPITOR) tablet 80 mg, 80 mg, Enteral Tube, Q EVENING, Dominic Damico M.D., 80 mg at 06/23/22 1709  •  Pharmacy Consult: Enteral tube insertion - review meds/change route/product selection, 1 Each, Other, PHARMACY TO DOSE, Dominic Damico M.D.  •  acetaminophen (Tylenol) tablet 650 mg, 650 mg, Enteral Tube, Q6HRS PRN, Dominic Damico M.D.  •  ondansetron (ZOFRAN ODT) dispertab 4 mg, 4 mg, Enteral Tube, Q4HRS PRN, Dominic Damico M.D.  •  senna-docusate (PERICOLACE or SENOKOT S) 8.6-50 MG per tablet 2 Tablet, 2 Tablet, Enteral Tube, BID, 2 Tablet at 06/24/22 0532 **AND** polyethylene glycol/lytes (MIRALAX) PACKET 1 Packet, 1 Packet, Enteral Tube, QDAY PRN  **AND** magnesium hydroxide (MILK OF MAGNESIA) suspension 30 mL, 30 mL, Enteral Tube, QDAY PRN **AND** bisacodyl (DULCOLAX) suppository 10 mg, 10 mg, Rectal, QDAY PRN, Dominic Damico M.D.  •  penicillin G potassium 4 Million Units in  mL IVPB, 4 Million Units, Intravenous, Q4HRS, Dominic Damico M.D., Stopped at 06/24/22 0602  •  ondansetron (ZOFRAN) syringe/vial injection 4 mg, 4 mg, Intravenous, Q4HRS PRN, Leatha Timmons M.D.  •  Notify provider if pain remains uncontrolled, , , CONTINUOUS **AND** Use the Numeric Rating Scale (NRS), Mc-Baker Faces (WBF), or FLACC on regular floors and Critical-Care Pain Observation Tool (CPOT) on ICUs/Trauma to assess pain, , , CONTINUOUS **AND** Pulse Ox, , , CONTINUOUS **AND** Pharmacy Consult Request ...Pain Management Review 1 Each, 1 Each, Other, PHARMACY TO DOSE **AND** If patient difficult to arouse and/or has respiratory depression (respiratory rate of 10 or less), stop any opiates that are currently infusing and call a Rapid Response., , , CONTINUOUS, Roro Still M.D.    Physical Examination:     Vitals:    06/24/22 0615 06/24/22 0630 06/24/22 0645 06/24/22 0752   BP:       Pulse: 83 79 79 74   Resp: 20 20 19 (!) 24   Temp:       TempSrc:       SpO2: 98% 98% 98% 98%   Weight:       Height:               NEUROLOGICAL EXAM:     Patient is intubated on light sedation.  Not awake.  Not following commands.  Cranial nerves pupils are small but reactive midline.  No gaze preference.  No nystagmus.  Gag/cough is intact.  Corneal reflexes are intact.  Vestibular response appears to be intact.  No spontaneous movement in all 4 extremities.  Slight withdrawal to noxious stimuli in the bilateral lower extremities.  Upgoing toes bilaterally.  Gait and cerebellar testing was admitted due to clinical condition.    Objective Data:    Labs:  Lab Results   Component Value Date/Time    PROTHROMBTM 14.7 (H) 06/23/2022 09:25 AM    INR 1.18 (H) 06/23/2022 09:25 AM       Lab Results   Component Value Date/Time    WBC 12.0 (H) 06/24/2022 02:50 AM    RBC 5.03 06/24/2022 02:50 AM    HEMOGLOBIN 13.8 (L) 06/24/2022 02:50 AM    HEMATOCRIT 42.6 06/24/2022 02:50 AM    MCV 84.7 06/24/2022 02:50 AM    MCH 27.4 06/24/2022 02:50 AM    MCHC 32.4 (L) 06/24/2022 02:50 AM    MPV 10.5 06/24/2022 02:50 AM    NEUTSPOLYS 76.80 (H) 06/24/2022 02:50 AM    LYMPHOCYTES 9.50 (L) 06/24/2022 02:50 AM    MONOCYTES 13.10 06/24/2022 02:50 AM    EOSINOPHILS 0.00 06/24/2022 02:50 AM    BASOPHILS 0.20 06/24/2022 02:50 AM      Lab Results   Component Value Date/Time    SODIUM 149 (H) 06/24/2022 02:50 AM    POTASSIUM 3.9 06/24/2022 02:50 AM    CHLORIDE 120 (H) 06/24/2022 02:50 AM    CO2 19 (L) 06/24/2022 02:50 AM    GLUCOSE 114 (H) 06/24/2022 02:50 AM    BUN 14 06/24/2022 02:50 AM    CREATININE 0.85 06/24/2022 02:50 AM      Lab Results   Component Value Date/Time    CHOLSTRLTOT 125 06/21/2022 07:53 AM    LDL 74 06/21/2022 07:53 AM    HDL 45 06/21/2022 07:53 AM    TRIGLYCERIDE 45 06/24/2022 02:50 AM       Lab Results   Component Value Date/Time    ALKPHOSPHAT 57 06/23/2022 02:05 PM    ASTSGOT 12 06/23/2022 02:05 PM    ALTSGPT 10 06/23/2022 02:05 PM    TBILIRUBIN 0.2 06/23/2022 02:05 PM        Imaging/Testing:  US-CAROTID DOPPLER BILAT         US-EXTREMITY VENOUS UPPER BILAT   Final Result      DX-CHEST-PORTABLE (1 VIEW)   Final Result         1.  No acute cardiopulmonary disease.   2.  Trace right pleural effusion   3.  Atherosclerosis      CT-CTA NECK WITH & W/O-POST PROCESSING   Final Result         1.  Severely hypoplastic right vertebral artery, central segment of the right vertebral artery is not visualized and may be occluded.         CT-CTA HEAD WITH & W/O-POST PROCESS   Final Result         1.  No large vessel occlusion or aneurysm identified   2.  Interval placement of right frontal approach ventriculostomy with postprocedural minimal hemorrhage and new pneumocephalus.   3.  Bilateral ventricular dilatation  appears somewhat increased since prior study compatible with somewhat worsened hydrocephalus.   4.  Right frontal and left cerebellar encephalomalacia      DX-CHEST-PORTABLE (1 VIEW)   Final Result         1.  No acute cardiopulmonary disease.   2.  Trace bilateral pleural effusion      DX-ABDOMEN FOR TUBE PLACEMENT   Final Result         1.  Nonspecific bowel gas pattern.   2.  Dobbhoff tube tip terminates overlying the expected location of the gastric antrum.   3.  Trace bilateral pleural effusions      DX-CHEST-PORTABLE (1 VIEW)   Final Result         1.  No acute cardiopulmonary disease.   2.  Trace bilateral pleural effusions   3.  Atherosclerosis      MR-BRAIN-WITH & W/O   Final Result   Addendum 1 of 1   ADDENDUM:      The case was discussed by telephone (call report) with DR. DAVONTE VIEIRA    on call for [facility], at 1854 hours.      Final      1.  Interval development of moderate obstructive hydrocephalus due to mass effect on the fourth ventricle. There is mild transependymal edema.   2.  Large area of acute infarction involving the left cerebellar hemisphere, inferior cerebellar vermis, and left cerebellar tonsil. PICA territory. No hemorrhagic transformation. This is the cause of the fourth ventricle effacement with obstructive    hydrocephalus. There is also mild left-sided cerebellar tonsillar herniation.   3.  Moderate supratentorial white matter disease most consistent with microvascular ischemic change.   4.  Old infarction right anterior frontal convexity.   5.  A Voalte message was sent to RICKEY HAYES at 1823 hours 6/22/2022. Prompt reply as of 6:41 PM not yet received. Efforts are ongoing to contact housestaff managing the patient at this time.   6.  Case was discussed (call report) with nurse SHASTA MAGILL at 6:47 PM. Request to initiate stat neurosurgery consult. Attempts to contact [facility] on-call housestaff are ongoing.      CT-HEAD W/O   Final Result      1.  No evidence of  acute hemorrhage, mass or large territorial infarction   2.  LEFT cerebellar and RIGHT frontal encephalomalacia   3.  Mild atrophy   4.  Mild white matter changes         CT-CSPINE WITHOUT PLUS RECONS   Final Result      1.  No acute fracture or traumatic listhesis in the cervical spine.   2.  Emphysema in the lung apices.      EC-ECHOCARDIOGRAM COMPLETE W/O CONT    (Results Pending)         Assessment and Plan:    66-year-old male found to have a left lower cerebellar infarct status post EVD and suboccipital craniectomy yesterday.  Patient had mass-effect with hydrocephalus yesterday.  Today patient still intubated.  Minimal reactivity on examination.  Plan today is to continue with supportive therapy.  Unclear of the onset of the infarct based on the history provided.  Appears to be a at least 3 to 4 days ago.  At this time recommend continue with supportive care.  Patient's been maximal reviewed already.  Keep blood pressure in the normal range.  Try to keep sedation off.  Try to get a better examination.          Spent over 39 minutes of which is over half was done in the unit talking to the primary team concerning next course of action.  For now we will clinically monitor for now.  Patient maximally supported already.      This chart was partially generated using voice recognition technology and sound alike word replacement may be present, best efforts were made to make the chart accurate.    Beni Jackson MD  Board Certified Neurology, ABPN  t) [phone]

## 2022-06-24 NOTE — PROGRESS NOTES
Neurosurgery Progress Note    Subjective:  No acute events overnight. Taken to OR yesterday for craniectomy.   Echo completed this am.   Sedation off since 0900.   Seen in conjunction with Dr Rizzo.    Exam:  Eye opening with firm sternal rub.   PERRL. Midline.  +cough, gag  Vent  Does not follow any commands  Minimal w/d BLE. None BUE  No spontaneous movement  Ventric: 70cc/8hr    BP  Min: 117/78  Max: 167/85  Pulse  Av.8  Min: 53  Max: 124  Resp  Av.3  Min: 18  Max: 24  Temp  Av.2 °C (97.1 °F)  Min: 35.8 °C (96.4 °F)  Max: 36.4 °C (97.6 °F)  SpO2  Av.3 %  Min: 98 %  Max: 100 %    ICP  Av.2 MM HG  Min: -4 MM HG  Max: 8 MM HG    Recent Labs     22  0840 22  1405 22  0250   WBC 8.5 9.9 12.0*   RBC 5.68 5.17 5.03   HEMOGLOBIN 15.7 14.3 13.8*   HEMATOCRIT 47.5 44.7 42.6   MCV 83.6 86.5 84.7   MCH 27.6 27.7 27.4   MCHC 33.1* 32.0* 32.4*   RDW 43.9 46.9 46.5   PLATELETCT 275 246 223   MPV 11.0 11.1 10.5     Recent Labs     22  1405 22  1923 22  0250   SODIUM 149* 151* 149*   POTASSIUM 4.6 3.8 3.9   CHLORIDE 121* 122* 120*   CO2 20 19* 19*   GLUCOSE 125* 117* 114*   BUN 17 16 14   CREATININE 1.05 0.84 0.85   CALCIUM 8.1* 8.5 8.2*     Recent Labs     22  1937 22  0925   APTT 30.4 27.6   INR 1.06 1.18*           Intake/Output                       22 0700 - 22 0659 22 0700 - 22 0659     [id number] Total 5707-5278 6359-1639 Total                 Intake    I.V.  2912  1603.2 4515.2  --  -- --    Cardene Volume 0 287.5 287.5 -- -- --    Propofol Volume 142.6 115.7 258.4 -- -- --    Norepinephrine Volume 50.5 -- 50.5 -- -- --    Volume (mL) (3% sodium chloride (HYPERTONIC SALINE) 500mL infusion 500 mL) 183.8 -- 183.8 -- -- --    Volume (mL) (3% sodium chloride (HYPERTONIC SALINE) 500mL infusion 150 mL) 150 -- 150 -- -- --    Volume (mL) (3% sodium chloride (HYPERTONIC SALINE) 500mL infusion 500 mL) 500 -- 500 -- -- --     Volume (mL) (NS infusion) 1800 -- 1800 -- -- --    Volume (mL) (NS infusion) 85 1200 1285 -- -- --    Other  180  -- 180  --  -- --    Medications (PO/Enteral Liquids) 180 -- 180 -- -- --    NG/GT  50  0 50  --  -- --    Intake (mL) (Enteral Tube 06/23/22 Cortrak - Small Bowel/Transpyloric Left nare) 50 0 50 -- -- --    IV Piggyback  1746.7  293.3 2040  --  -- --    Volume (mL) (NS (BOLUS) infusion 1,000 mL) 1000 -- 1000 -- -- --    Volume (mL) (penicillin G potassium 3 Million Units in  mL IVPB) 0 -- 0 -- -- --    Volume (mL) (sodium chloride 200 mEq in empty bag 50 mL ivpb) 0 -- 0 -- -- --    Volume (mL) (sodium chloride 200 mEq in empty bag 50 mL ivpb) 0 -- 0 -- -- --    Volume (mL) (penicillin G potassium 4 Million Units in  mL IVPB) 646.7 293.3 940 -- -- --    Volume (mL) (ceFAZolin (ANCEF) 1 g in  mL IVPB) 100 -- 100 -- -- --    Total Intake 4888.7 1896.6 6785.2 -- -- --       Output    Urine  1600  1560 3160  --  -- --    Urine 750 -- 750 -- -- --    Output (mL) (Urethral Catheter) 850 1560 2410 -- -- --    Drains  23  60 83  --  -- --    Output (mL) (ICP/Ventriculostomy) 23 60 83 -- -- --    Blood  100  -- 100  --  -- --    Est. Blood Loss 100 -- 100 -- -- --    Total Output 1723 1620 3343 -- -- --       Net I/O     3165.7 276.6 3442.2 -- -- --            Intake/Output Summary (Last 24 hours) at 6/24/2022 0921  Last data filed at 6/24/2022 0600  Gross per 24 hour   Intake 6141.86 ml   Output 3268 ml   Net 2873.86 ml            • LORazepam  2-4 mg Q4HRS PRN   • phenylephrine infusion  0-300 mcg/min Continuous   • niCARdipine infusion  0-15 mg/hr Continuous   • metoprolol tartrate  25 mg TWICE DAILY   • enalaprilat  1.25 mg Q6HRS PRN   • Respiratory Therapy Consult   Continuous RT   • famotidine  20 mg Q12HRS    Or   • famotidine  20 mg Q12HRS   • MD Alert...Adult ICU Electrolyte Replacement per Pharmacy   PHARMACY TO DOSE   • lidocaine  2 mL Q30 MIN PRN   • propofol  0-80 mcg/kg/min  Continuous   • fentaNYL  25 mcg Q HOUR PRN    Or   • fentaNYL  50 mcg Q HOUR PRN    Or   • fentaNYL  100 mcg Q HOUR PRN   • fentaNYL   Continuous   • sodium chloride 23.4% ivpb  200 mEq Q6HRS PRN   • norepinephrine (Levophed) infusion  0-30 mcg/min Continuous   • atorvastatin  80 mg Q EVENING   • Pharmacy  1 Each PHARMACY TO DOSE   • acetaminophen  650 mg Q6HRS PRN   • ondansetron  4 mg Q4HRS PRN   • senna-docusate  2 Tablet BID    And   • polyethylene glycol/lytes  1 Packet QDAY PRN    And   • magnesium hydroxide  30 mL QDAY PRN    And   • bisacodyl  10 mg QDAY PRN   • penicillin g  4 Million Units Q4HRS   • ondansetron  4 mg Q4HRS PRN   • Pharmacy Consult Request  1 Each PHARMACY TO DOSE       Assessment and Plan:  Hospital day #4 left cerebellar stroke  POD #2 right frontal EVD  POD #1 craniectomy, C1 lami  Prophylactic anticoagulation: no         Start date/time: tbd    Plan:  Poor neuro exam off sedation almost 2 hours  Drain to remain over the weekend  Poor prognosis  Dr Rizzo discussed with Dr Jimenes

## 2022-06-24 NOTE — ASSESSMENT & PLAN NOTE
Acute, partially occlusive DVT to the R IJ with extension into the let innominate vein.    Acute, occlusive DVT in on of the right paired brachial veins within the distal bicept  Acute,  occlusive DVT in Right paired ulnar veins within proximal forearm  Occlusive superficial thrombophlebitis in a small segment of th right cephalic vein.   Acute partially occlusive DVT in left paired brachial veins within the elbow region. Possible small extension into radial an ulnar veins   -BLE US unremarkable  -no AC for 6 weeks per Neurosurgery

## 2022-06-24 NOTE — CONSULTS
"Reason for PC Consult: Advance Care Planning    Consulted by: Dr. Jimenes    Assessment:  General: 65 y/o male from home with worsening ataxia and falls related to gait disturbances. Pt had been at [facility] for HA and similar complaints but was discharged from the ER with no findings. On admission to [facility], he was found to have +syphilis titer and concerns for neuro-syphilis, as well as + for methamphetamines. MRI was eventually able to be completed, after CT was not terribly conclusive, and this showed \"left cerebellar ischemic infarct with effacement of the fourth ventricle and hydrocephalus.\" Pt developed neurological changes and required intubation, right frontal ventriculostomy drain placement 6/22, then craniectomy 6/23. ECHO completed today with EF 70%, no valve abnormalities. Pt with multiple blood clots in RIJ and BUE as well; unable to anticoagulate at this time. Unknown PMHx prior to this event.    Prior PC Consults: n/a    Social: Unknown at this time- call attempted to emergency contact/wife Gudelia but phone is out of service. Call attempted multiple times throughout the day.     Consults: Neurosurgery, neurology, critical care    Dyspnea: Yes- full vent support for airway protection  Last BM:  (prior to arrival)-    Pain: No-    Depression: Unable to determine-    Dementia: No;       Spiritual:  Is Shinto or spirituality important for coping with this illness? Unable to determine-    Has a  or spiritual provider visit been requested? Unable to determine    Palliative Performance Scale: 20%    Advance Directive: None-    DPOA: No- NOK is wife Gudelia  POLST: No-      Code Status: Full-      Outcome:  PC RN visited BS and received updates from RN, neurosurgery, and critical care. Pt off sedation for ~1 hour at the time of this RN's visit with minimal response to external stimuli. ECHO in process.    1200: Call attempted to wife Gudelia but phone was out of service. Number confirmed with BS " RN.    1400: Call attempted again with same response. Updated BS team to request updated number from Gudelia if she calls again.    Updated: BS team    Plan: follow up with spouse when reached by phone or if at BS    Thank you for allowing Palliative Care to support this patient and family. Contact [phone] for additional assistance, change in patient status, or with any questions/concerns.

## 2022-06-24 NOTE — PROGRESS NOTES
Critical Care Medicine Faculty Progress Note    Brief HPI/problem list:  66y M hx of meth abuse, tobacco abuse, prior right frontal stroke 2019, htn, RPR +, that presented [facility] ER for acute dizziness 6/20 and sent home. He presents 6/21 for re-evaluation and unable to ambulate thus was admitted after CT head with what was reported as left cerebellar and right frontal encephalomalacia and admitted to floor with MRI pending. He had worsening mental status and agitation given ativan and haldol. MRI shows 5pm 6/22 with acute PICA infarct with cerebellar edema compressing 4th ventricle and causing obstructive hydrocephalus and early herniation syndrome. Patient with cushing response and emergently transferred to ICU for intubation, 23% bolus, mannitol and emergent NSG, neurology consultation and EVD placement.     6/23 Occipital crani and C1 laminectomy by Dr Rizzo.     Daily exam: ill appearing on ventilated EVD on right, right central line, pupils small difficult to access reactivity, blinks, good cough, trace withdrawals in lowers, no withdrawal in uppers, lungs cta, heart irregular, abdomen soft, no rash    Daily Multi discipline Rounding Report:  Neuro: off sedation 9 am no withdrawal in uppers, perrl 2mm, c/g/c, ICP 3-5 2-10ml hr serosan, CPP   HR: snr 80-90s  SBP: nicardipine 5 for goal < 160  Tmax: afebrile  GI: TF started this am, BM pta  UOP: 1500ml  Lines: right ij central line, hernandez  Resp: vent day 3 20 460 8 30%   Vte: scd's  PPI/H2:pepcid  Antibx: PCN for rpr      Plan of care:  Wean sedation today can go to dex gtt if needed, serial neuro exam  Palliative care to support family through this difficult period and develop a relationship  Continue to check sodium to hold up for another day or so close to 150's  U/s with multipe DVT's will need to discuss with NSG anticoagulation risk would recommend getting ct head prior to any start.   Will continue sodium goals for another day 150's then will  wean continue to serial monitor for his cytotic edema rx from his pica stroke.   Follow up CSF studies for neurosyphilis pending results will discuss with ID    Patient remains in critical condition from ventilator titration, nicardipine gtt and weaning sedation. Critical care time provided was 48 minutes. This excludes all separate billable procedures.     Please see [employer]/NP notes for additional documentation    Dickson Jimenes MD  Critical Care Medicine

## 2022-06-24 NOTE — CARE PLAN
Problem: Ventilation  Goal: Ability to achieve and maintain unassisted ventilation or tolerate decreased levels of ventilator support  Description: Target End Date:  4 days     Document on Vent flowsheet    1.  Support and monitor invasive and noninvasive mechanical ventilation  2.  Monitor ventilator weaning response  3.  Perform ventilator associated pneumonia prevention interventions  4.  Manage ventilation therapy by monitoring diagnostic test results  Outcome: Not Met   Ventilator Daily Summary    Vent Day # 2 ETT 8.0@27    Ventilator settings changed this shift: Yes APVCMV 22/460/8/30%    Weaning trials: No    Respiratory Procedures: None    Plan: Continue current ventilator settings and wean mechanical ventilation as tolerated per physician orders.

## 2022-06-24 NOTE — PROGRESS NOTES
Critical Care Progress Note    Date of admission  6/21/2022    Chief Complaint  66 y.o. male admitted 6/21/2022 with headaches and frequent falls    Hospital Course  66 yom  with a pmhx  of meth use, tobacco use, prior right frontal stroke (2019), HTN, syphilis who initially presented to the hospital complaining of multiple falls and severe headaches.  He was seen at [place] ED sometime around 06/20/22 and discharged to home.  He then came to [facility]  ED on 6/21 for the same complaint.  CT of head was read as encephalomalacia in the left cerebellar and right frontal areas.  He was admitted to the hospital and yesterday  (6/22) began having a decrease in LOC becoming sleepy then obtunded.  Follow-up MRI noted significant edema with compression of the brain stem and obstructive hydrocephalus.  He was emergently brought to ICU where where he underwent emergent intubated and bedside EVD placement . He was treated with mannitol and 23% saline and started on 3% saline infusion.      Interval Problem Update  Reviewed last 24 hour events:    -S/P EVD placement  6/23/22  -S/P crani and C1 laminectomy ([place])  6/23/22  -Evd at 10 cm H20   I-CP-3-5  -CPP   -Propofol, fentanyl   -Nicardipine infusing to maintain SBP goal of less   than 160  - Multiple Upper extremity DVTs identified   -TF started this am   - CSF- sent and pending ( panel + ro neurosyphilis)   - ID consult     Review of Systems  Review of Systems   Unable to perform ROS: Intubated        Vital Signs for last 24 hours   Temp:  [35.9 °C (96.7 °F)-37.3 °C (99.2 °F)] 36.8 °C (98.2 °F)  Pulse:  [] 104  Resp:  [8-25] 20  BP: (117-156)/(74-91) 143/77  SpO2:  [96 %-100 %] 97 %    Hemodynamic parameters for last 24 hours       Respiratory Information for the last 24 hours  Vent Mode: APVCMV  Rate (breaths/min): 20  Vt Target (mL): 460  PEEP/CPAP: 8  MAP: 10  Control VTE (exp VT): 462    Physical Exam   Physical Exam  Vitals and nursing note  reviewed.   Constitutional:       Interventions: He is sedated and intubated.   HENT:      Head: Normocephalic.      Comments: Right EVD in place  Right IJ cath in place     Mouth/Throat:      Comments: ET tube in place and secured with nguyen  Neck:      Comments: RIJ 3 lumen WNL  Cardiovascular:      Rate and Rhythm: Normal rate and regular rhythm.      Pulses:           Radial pulses are 2+ on the right side and 2+ on the left side.        Dorsalis pedis pulses are 1+ on the right side and 1+ on the left side.   Pulmonary:      Effort: He is intubated.      Breath sounds: Normal breath sounds.   Chest:   Breasts: Breasts are symmetrical.       Abdominal:      General: Abdomen is flat. Bowel sounds are normal.      Palpations: Abdomen is soft.   Genitourinary:     Comments: Muse cath to down drain clear yellow urine  Skin:     General: Skin is warm.      Capillary Refill: Capillary refill takes less than 2 seconds.   Neurological:      Comments: Intubated and sedated  Bilateral pupils 2 mm  sluggish  + corneal  + cough and gag  Slight movement to  extrem with painful stimulation   Psychiatric:      Comments: Intubated and sedated         Medications  Current Facility-Administered Medications   Medication Dose Route Frequency Provider Last Rate Last Admin   • sodium chloride (SALT) tablet 1 g  1 g Enteral Tube TID WITH MEALS YAHAIRA Sexton.P.R.N.   1 g at 06/24/22 1151   • LORazepam (ATIVAN) injection 2-4 mg  2-4 mg Intravenous Q4HRS PRN Dickson Jimenes M.D.       • niCARdipine (CARDENE) 25 mg in  mL Infusion  0-15 mg/hr Intravenous Continuous YAHAIRA Sexton.P.R.N. 50 mL/hr at 06/24/22 1037 5 mg/hr at 06/24/22 1037   • metoprolol tartrate (LOPRESSOR) tablet 25 mg  25 mg Enteral Tube TWICE DAILY Sultan EDIE Curry M.D.   25 mg at 06/24/22 0533   • enalaprilat (Vasotec) injection 1.25 mg 1 mL  1.25 mg Intravenous Q6HRS PRN Dickson Jimenes M.D.   1.25 mg at 06/22/22 1620   • Respiratory Therapy Consult    Nebulization Continuous RT Dominic Damico M.D.       • famotidine (PEPCID) tablet 20 mg  20 mg Enteral Tube Q12HRS Dominic Damico M.D.   20 mg at 06/24/22 0532    Or   • famotidine (PEPCID) injection 20 mg  20 mg Intravenous Q12HRS Dominic Damico M.D.       • MD Alert...ICU Electrolyte Replacement per Pharmacy   Other PHARMACY TO DOSE Dominic Damico M.D.       • lidocaine (XYLOCAINE) 1 % injection 2 mL  2 mL Tracheal Tube Q30 MIN PRN Dominic Damico M.D.       • propofol (DIPRIVAN) injection  0-80 mcg/kg/min Intravenous Continuous Dominic Damico M.D.   Stopped at 06/24/22 0841   • fentaNYL (SUBLIMAZE) injection 25 mcg  25 mcg Intravenous Q HOUR PRN Dominic Damico M.D.        Or   • fentaNYL (SUBLIMAZE) injection 50 mcg  50 mcg Intravenous Q HOUR PRN Dominic Damico M.D.   50 mcg at 06/24/22 1201    Or   • fentaNYL (SUBLIMAZE) injection 100 mcg  100 mcg Intravenous Q HOUR PRN Dominic Damico M.D.   100 mcg at 06/24/22 1425   • fentaNYL (SUBLIMAZE) 50 mcg/mL in 50mL   Intravenous Continuous Dominic Damico M.D. 1 mL/hr at 06/24/22 0715 50 mcg/hr at 06/24/22 0715   • sodium chloride 200 mEq in empty bag 50 mL ivpb  200 mEq Intravenous Q6HRS PRN Dominic Damico M.D.       • norepinephrine (Levophed) 8 mg in 250 mL NS infusion (premix)  0-30 mcg/min Intravenous Continuous Dominic Damico M.D.   Stopped at 06/23/22 0745   • atorvastatin (LIPITOR) tablet 80 mg  80 mg Enteral Tube Q EVENING Dominic Damico M.D.   80 mg at 06/23/22 1709   • Pharmacy Consult: Enteral tube insertion - review meds/change route/product selection  1 Each Other PHARMACY TO DOSE Dominic Damico M.D.       • acetaminophen (Tylenol) tablet 650 mg  650 mg Enteral Tube Q6HRS PRN Dominic Damico M.D.       • ondansetron (ZOFRAN ODT) dispertab 4 mg  4 mg Enteral Tube Q4HRS PRN Dominic Welte, M.D.       • senna-docusate (PERICOLACE or SENOKOT S) 8.6-50 MG per tablet 2 Tablet  2 Tablet Enteral Tube BID Dominic Damico M.D.   2 Tablet at 06/24/22 0532    And   •  polyethylene glycol/lytes (MIRALAX) PACKET 1 Packet  1 Packet Enteral Tube QDAY PRN Dominic Damico M.D.        And   • magnesium hydroxide (MILK OF MAGNESIA) suspension 30 mL  30 mL Enteral Tube QDAY PRN Dominic Damico M.D.        And   • bisacodyl (DULCOLAX) suppository 10 mg  10 mg Rectal QDAY PRN Dominic Damico M.D.       • penicillin G potassium 4 Million Units in  mL IVPB  4 Million Units Intravenous Q4HRS Dominic Damico M.D. 200 mL/hr at 06/24/22 1431 4 Million Units at 06/24/22 1431   • ondansetron (ZOFRAN) syringe/vial injection 4 mg  4 mg Intravenous Q4HRS PRN Leatha Timmons M.D.       • Pharmacy Consult Request ...Pain Management Review 1 Each  1 Each Other PHARMACY TO DOSE Roro Still M.D.           Fluids    Intake/Output Summary (Last 24 hours) at 6/24/2022 1443  Last data filed at 6/24/2022 1300  Gross per 24 hour   Intake 3452.79 ml   Output 3263 ml   Net 189.79 ml       Laboratory  Recent Labs     06/23/22  0825 06/23/22  1413 06/24/22  0441   ISTATAPH 7.475 7.264* 7.456   ISTATAPCO2 30.1 48.4* 30.6   ISTATAPO2 110* 106* 101*   ISTATATCO2 23 23 23   [id number] 99 97 98   ISTATARTHCO3 22.1 21.9 21.6   ISTATARTBE 0 -5* -1   ISTATTEMP 97.7 F 95.9 F 97.8 F   ISTATFIO2 30 30 30   ISTATSPEC Arterial Arterial Arterial   ISTATAPHTC 7.482 7.285* 7.463   [id number] 107* 97* 98*         Recent Labs     06/23/22  0840 06/23/22  1405 06/23/22  1923 06/24/22  0250 06/24/22  0925   SODIUM 140 149* 151* 149* 147*   POTASSIUM 4.1 4.6 3.8 3.9 4.1   CHLORIDE 109 121* 122* 120* 116*   CO2 18* 20 19* 19* 23   BUN 19 17 16 14 14   CREATININE 1.02 1.05 0.84 0.85 1.03   MAGNESIUM 2.2 2.0  --   --   --    PHOSPHORUS 2.6  --   --   --   --    CALCIUM 9.0 8.1* 8.5 8.2* 8.3*     Recent Labs     06/22/22  0150 06/23/22  0310 06/23/22  0840 06/23/22  1405 06/23/22  1923 06/24/22  0250 06/24/22  0925   ALTSGPT 15  --   --  10  --   --   --    ASTSGOT 15  --   --  12  --   --   --    ALKPHOSPHAT 76  --   --   57  --   --   --    TBILIRUBIN 0.3  --   --  0.2  --   --   --    PREALBUMIN  --  18.6  --   --   --   --   --    GLUCOSE 121* 116*   < > 125* 117* 114* 149*    < > = values in this interval not displayed.     Recent Labs     06/22/22  0150 06/22/22  0310 06/23/22  0840 06/23/22  1405 06/24/22  0250   WBC  --    < > 8.5 9.9 12.0*   NEUTSPOLYS  --   --  61.00 68.20 76.80*   LYMPHOCYTES  --   --  23.00 16.50* 9.50*   MONOCYTES  --   --  15.10* 14.70* 13.10   EOSINOPHILS  --   --  0.00 0.00 0.00   BASOPHILS  --   --  0.50 0.30 0.20   ASTSGOT 15  --   --  12  --    ALTSGPT 15  --   --  10  --    ALKPHOSPHAT 76  --   --  57  --    TBILIRUBIN 0.3  --   --  0.2  --     < > = values in this interval not displayed.     Recent Labs     06/22/22  1937 06/23/22  0840 06/23/22  0925 06/23/22  1405 06/24/22  0250   RBC  --  5.68  --  5.17 5.03   HEMOGLOBIN  --  15.7  --  14.3 13.8*   HEMATOCRIT  --  47.5  --  44.7 42.6   PLATELETCT  --  275  --  246 223   PROTHROMBTM 13.5  --  14.7*  --   --    APTT 30.4  --  27.6  --   --    INR 1.06  --  1.18*  --   --        Imaging  X-Ray:  I have personally reviewed the images and compared with prior images.  Ultrasound:  Reviewed    Assessment/Plan  * Acute cerebrovascular accident (CVA) due to occlusion of left cerebellar artery (HCC)- (present on admission)  Assessment & Plan   Large left cerebellar ischemic infarct with cerebral edema and mass-effect on fourth ventricle, cryptogenic  -EVD in placed a.m. 6/23/2022 for suboccipital decompression-status post mannitol and 23% bolus as well as 3% saline infusion-keep ICPs<20  -Post crani and C1 laminectomy  6/23/22   -MAP goal greater than 65; SBP goal to keep   -Nicardipine to keep SBP less than 160  -Propofol, fentanyl  -Elevate head of bed, keep head centered-maximize blood flow  -Goal sodium 150-155- with Na tabs -  post crani will  Plan to normalize sodium  In another day   -CTA pending (though long outside of any interventional  window)  -ECHO- pending  -Wean sedation   -Atorvastatin 80 mg - LDL goal less than 70  -Hold ASA and anticoagulants  -post crani/evd   - CSF evaluation for neurosyphilis - pending   -PT/OT/SLP when appropriate  -palliative consult      Acute bilateral deep vein thrombosis (DVT) of upper extremities (HCC)  Assessment & Plan  Acute, partially occlusive DVT to the R IJ with extension into the let innominate vein.    Acute, occlusive DVT in on of the right paired brachial veins within the distal bicept  Acute,  occlusive DVT in Right paired ulnar veins within proximal forearm  Occlusive superficial thrombophlebitis in a small segment of th right cephalic vein.   Acute partially occlusive DVT in left paired brachial veins within the elbow region. Possible small extension into radial an ulnar veins   - Will follow up with lower extrem US- if Lower extrem with DVT then consider placement of filter  Unable to start anticoagulation at this time due to new crani and eVD placement - Once ok with NSG and neurology we will start anticoagulation- CT head prior to starting any  anticoagulation     Goals of care, counseling/discussion  Assessment & Plan  I attempted a few times to call his wife but was unable to get through.  I am not sure at this point if she is aware of his decompensation today or of his presumptive syphilis diagnosis.  We will keep him full code for now.    On mechanically assisted ventilation (HCC)  Assessment & Plan  Intubated date: 6/22  Goal saturation > 90%  Monitor ventilator waveforms and titrate flow/peep and volumes according.   Lung protective ventilation strategy  CXR PRN: monitor lung volumes and tube/line placement  VAP bundle prevention, oral care, post pyloric feeding  Head of bed > 30 degree  GI prophylaxis: H2 blocker  Daily awakening and SBT trials unless contraindicated  Assess / Treat pain  Assess / Treat delirium  Sedation Goal: RASS -3 to -4  Monitor for liberation / early  mobility  Respiratory treatments: prn  ABCDEF Bundle     Syphilis  Assessment & Plan  -Treponemal test is positive- confirmatory testing ending  -Treat empirically with PCN G 4,000,000 units every 4 hours  -Prior Syphilis infections  (2020) appears to have not been treated as ABX prescribed were never picked up   -CSF to eval for neurosyphilis- pending    -Health department notified  -ID consult for latent syphilis and treatment     Hypokalemia- (present on admission)  Assessment & Plan  Continue to monitor daily labs and replace as needed  Electrolyte protocol    Methamphetamine use (HCC)- (present on admission)  Assessment & Plan  Counseling cessation when clinical trajectory is more clear and appropriate       VTE:  Contraindicated  Ulcer: H2 Antagonist  Lines: Central Line  Ongoing indication addressed, Arterial Line  Ongoing indication addressed, Muse Catheter  Ongoing indication addressed and right EVD    I have performed a physical exam and reviewed and updated ROS and Plan today (6/24/2022). In review of yesterday's note (6/23/2022), there are no changes except as documented above.     Discussed patient condition and risk of morbidity and/or mortality with RN, RT, Pharmacy, Dietary, Charge nurse / hot rounds and neurology and neurosurgery  The patient remains critically ill.  Critical care time = 50 minutes in directly providing and coordinating critical care and extensive data review.  No time overlap and excludes procedures.

## 2022-06-24 NOTE — CARE PLAN
The patient is Unstable - High likelihood or risk of patient condition declining or worsening    Shift Goals  Clinical Goals: Q1 Neuro; Q1 drain & ICP  Patient Goals: GLENDY  Family Goals: GLENDY    Progress made toward(s) clinical / shift goals:  Went to OR today, unresponsive on sedation.     Patient is not progressing towards the following goals:      Problem: Knowledge Deficit - Standard  Goal: Patient and family/care givers will demonstrate understanding of plan of care, disease process/condition, diagnostic tests and medications  Outcome: Not Progressing    Family stated they would come in and were unable to make it in. Did speak to prior to OR.

## 2022-06-24 NOTE — THERAPY
Physical Therapy Contact Note    Patient Name: Alverto Duran  Age:  66 y.o., Sex:  male  Medical Record #: [id number]  Today's Date: 6/24/2022 06/24/22 1057   Interdisciplinary Plan of Care Collaboration   Collaboration Comments Per RN pt with minimal command follow and engagement, not appropriate to participate in therapy at this time.  Will continue to HOLD and re-attempt PT eval as able/appropriate when pt able to participate.     Roro Jain, PT, DPT

## 2022-06-25 PROBLEM — I48.91 NEW ONSET A-FIB (HCC): Status: ACTIVE | Noted: 2022-01-01

## 2022-06-25 NOTE — CARE PLAN
Vent Day 4    CMV 20, vt 460 cc, peep 8 Fio2 30%      Problem: Ventilation  Goal: Ability to achieve and maintain unassisted ventilation or tolerate decreased levels of ventilator support  Description: Target End Date:  4 days     Document on Vent flowsheet    1.  Support and monitor invasive and noninvasive mechanical ventilation  2.  Monitor ventilator weaning response  3.  Perform ventilator associated pneumonia prevention interventions  4.  Manage ventilation therapy by monitoring diagnostic test results  Outcome: Progressing

## 2022-06-25 NOTE — PROGRESS NOTES
Neurosurgery Progress Note    Subjective:  No acute events overnight. Ventriculostomy stopped draining in the early morning  Echo completed this am.   Patient is more awake and doing well on Tpiece  Tolerated extubation    Exam:  Eye opening spontaneously  PERRL. Midline.  +cough, gag  Vent  Does not follow any commands but moves all extremities. Attempts to sit up.   Minimal w/d BLE. None BUE     Ventriculostomy without drainage.     BP  Min: 122/62  Max: 168/72  Pulse  Av.6  Min: 77  Max: 106  Resp  Av.1  Min: 19  Max: 35  Temp  Av.1 °C (98.7 °F)  Min: 36.5 °C (97.7 °F)  Max: 37.6 °C (99.6 °F)  SpO2  Av.1 %  Min: 96 %  Max: 100 %    ICP  Av.1 MM HG  Min: 2 MM HG  Max: 10 MM HG    Recent Labs     22  1405 22  0250 22  0342   WBC 9.9 12.0* 14.7*   RBC 5.17 5.03 4.67*   HEMOGLOBIN 14.3 13.8* 13.0*   HEMATOCRIT 44.7 42.6 40.3*   MCV 86.5 84.7 86.3   MCH 27.7 27.4 27.8   MCHC 32.0* 32.4* 32.3*   RDW 46.9 46.5 47.7   PLATELETCT 246 223 224   MPV 11.1 10.5 11.0     Recent Labs     22  0206 22  0900 22  1412   SODIUM 147* 146* 146*   POTASSIUM 3.7 3.9 3.8   CHLORIDE 116* 114* 114*   CO2 24 24 24   GLUCOSE 154* 160* 134*   BUN 19 18 18   CREATININE 0.90 0.86 0.93   CALCIUM 8.7 8.4* 8.4*     Recent Labs     22  1937 22  0925   APTT 30.4 27.6   INR 1.06 1.18*           Intake/Output                       22 0700 - 22 0659 22 07 - 22 0659     [id number] Total [id number] 5981-2511 Total                 Intake    I.V.  988.7  782.5 1771.2  600  -- 600    Cardene Volume 600 782.5 1382.5 600 -- 600    Phenylephrine Volume 3 -- 3 -- -- --    Propofol Volume 14 -- 14 -- -- --    Norepinephrine Volume 0 -- 0 -- -- --    Volume (mL) (NS infusion) 371.7 -- 371.7 -- -- --    Other  310  -- 310  --  -- --    Medications (PO/Enteral Liquids) 310 -- 310 -- -- --    NG/GT  250  620 870  420  -- 420    Intake (mL) (Enteral Tube 22  Cortrak - Small Bowel/Transpyloric Left nare) 250 620 870 420 -- 420    IV Piggyback  206.7  813.3 1020  200  -- 200    Volume (mL) (penicillin G potassium 4 Million Units in  mL IVPB) 206.7 813.3 1020 200 -- 200    Enteral  --  120 120  --  -- --    Free Water / Tube Flush -- 120 120 -- -- --    Total Intake 1755.3 2335.8 4091.2 1220 -- 1220       Output    Urine  1440  1275 2715  930  -- 930    Output (mL) (Urethral Catheter) 1440 1275 2715 930 -- 930    Drains  82  30 112  0  -- 0    Output (mL) (ICP/Ventriculostomy) 82 30 112 0 -- 0    Stool  --  -- --  --  -- --    Number of Times Stooled -- 0 x 0 x -- -- --    Total Output 1522 1305 2827 930 -- 930       Net I/O     233.3 1030.8 1264.2 290 -- 290            Intake/Output Summary (Last 24 hours) at 6/25/2022 1640  Last data filed at 6/25/2022 1448  Gross per 24 hour   Intake 3855.84 ml   Output 2408 ml   Net 1447.84 ml            • lisinopril  20 mg Q DAY   • sodium chloride  2 g TID WITH MEALS   • fentaNYL  50 mcg Q HOUR PRN   • potassium phosphate IVPB (CUSTOM)  30 mmol Once   • metoprolol tartrate  50 mg TWICE DAILY   • LORazepam  2-4 mg Q4HRS PRN   • niCARdipine infusion  0-15 mg/hr Continuous   • enalaprilat  1.25 mg Q6HRS PRN   • Respiratory Therapy Consult   Continuous RT   • MD Alert...Adult ICU Electrolyte Replacement per Pharmacy   PHARMACY TO DOSE   • sodium chloride 23.4% ivpb  200 mEq Q6HRS PRN   • atorvastatin  80 mg Q EVENING   • Pharmacy  1 Each PHARMACY TO DOSE   • acetaminophen  650 mg Q6HRS PRN   • ondansetron  4 mg Q4HRS PRN   • senna-docusate  2 Tablet BID    And   • polyethylene glycol/lytes  1 Packet QDAY PRN    And   • magnesium hydroxide  30 mL QDAY PRN    And   • bisacodyl  10 mg QDAY PRN   • penicillin g  4 Million Units Q4HRS   • ondansetron  4 mg Q4HRS PRN   • Pharmacy Consult Request  1 Each PHARMACY TO DOSE       Assessment and Plan:  Hospital day #5 left cerebellar stroke  POD #2 right frontal EVD  POD #1 craniectomy, C1  lami  Prophylactic anticoagulation: no         Start date/time: tbd    Plan:  Poor neuro exam off sedation almost 2 hours  Drain to remain over the weekend. Irrigated and used 1 mg of alteplase   Has multiple medical issues but appears to be improving neurologically.     If no drainage overnight, will re check CT of the brain and possibly remove evd if he continues to improve.

## 2022-06-25 NOTE — PROGRESS NOTES
Neurology Progress Note  Neurohospitalist Service, [facility]    Referring Physician: Dickson Jimenes M.D.    Chief Complaint   Patient presents with   • T-5000 GLF     Pt developed sudden onset of dizziness yesterday and has had multiple falls. Seen at [facility] for same s/s yesterday discharged with DX of headache/htn    • Dizziness   • Neck Pain   • Headache       HPI: Refer to initial documented Neurology H&P, as detailed in the patient's chart.    Interval History 6/25: No new events overnight.    Review of systems: In addition to what is detailed in the HPI and/or updated in the interval history, all other systems reviewed and are negative.    Past Medical History:    has a past medical history of Glaucoma.    FHx:  family history is not on file.    SHx:   reports that he has been smoking cigarettes. He has been smoking about 0.50 packs per day. He has never used smokeless tobacco. He reports current drug use. Drugs: Methamphetamines and Inhaled. He reports that he does not drink alcohol.    Medications:    Current Facility-Administered Medications:   •  sodium chloride (SALT) tablet 1 g, 1 g, Enteral Tube, TID WITH MEALS, Hannah Gee, A.P.R.N., 1 g at 06/25/22 0822  •  LORazepam (ATIVAN) injection 2-4 mg, 2-4 mg, Intravenous, Q4HRS PRN, Dickson Jimenes M.D.  •  niCARdipine (CARDENE) 25 mg in  mL Infusion, 0-15 mg/hr, Intravenous, Continuous, Hannah Gee, A.P.R.N., Last Rate: 75 mL/hr at 06/25/22 0558, 7.5 mg/hr at 06/25/22 0558  •  metoprolol tartrate (LOPRESSOR) tablet 25 mg, 25 mg, Enteral Tube, TWICE DAILY, Sultan EDIE Curry M.D., 25 mg at 06/25/22 0544  •  enalaprilat (Vasotec) injection 1.25 mg 1 mL, 1.25 mg, Intravenous, Q6HRS PRN, Dickson Jimenes M.D., 1.25 mg at 06/25/22 0127  •  Respiratory Therapy Consult, , Nebulization, Continuous RT, Dominic Welte, M.D.  •  famotidine (PEPCID) tablet 20 mg, 20 mg, Enteral Tube, Q12HRS, 20 mg at 06/24/22 8448 **OR** famotidine  (PEPCID) injection 20 mg, 20 mg, Intravenous, Q12HRS, Dominic Damico M.D., 20 mg at 06/25/22 0544  •  MD Alert...ICU Electrolyte Replacement per Pharmacy, , Other, PHARMACY TO DOSE, Dominic Damico M.D.  •  lidocaine (XYLOCAINE) 1 % injection 2 mL, 2 mL, Tracheal Tube, Q30 MIN PRN, Dominic Damico M.D.  •  propofol (DIPRIVAN) injection, 0-80 mcg/kg/min, Intravenous, Continuous, Stopped at 06/24/22 0841 **AND** Triglycerides Starting now and then Every 3 Days, , , Every 3 Days (0300), Dominic Damico M.D.  •  fentaNYL (SUBLIMAZE) injection 25 mcg, 25 mcg, Intravenous, Q HOUR PRN **OR** fentaNYL (SUBLIMAZE) injection 50 mcg, 50 mcg, Intravenous, Q HOUR PRN, 50 mcg at 06/24/22 1201 **OR** fentaNYL (SUBLIMAZE) injection 100 mcg, 100 mcg, Intravenous, Q HOUR PRN, Dominic Damico M.D., 100 mcg at 06/25/22 0419  •  fentaNYL (SUBLIMAZE) 50 mcg/mL in 50mL, , Intravenous, Continuous, Dominic Damico M.D., Stopped at 06/25/22 0000  •  sodium chloride 200 mEq in empty bag 50 mL ivpb, 200 mEq, Intravenous, Q6HRS PRN, Dominic Damico M.D.  •  atorvastatin (LIPITOR) tablet 80 mg, 80 mg, Enteral Tube, Q EVENING, Dominic Damico M.D., 80 mg at 06/24/22 1707  •  Pharmacy Consult: Enteral tube insertion - review meds/change route/product selection, 1 Each, Other, PHARMACY TO DOSE, Dominic Damico M.D.  •  acetaminophen (Tylenol) tablet 650 mg, 650 mg, Enteral Tube, Q6HRS PRN, Dominic Damico M.D.  •  ondansetron (ZOFRAN ODT) dispertab 4 mg, 4 mg, Enteral Tube, Q4HRS PRN, Dominic Damico M.D.  •  senna-docusate (PERICOLACE or SENOKOT S) 8.6-50 MG per tablet 2 Tablet, 2 Tablet, Enteral Tube, BID, 2 Tablet at 06/25/22 0544 **AND** polyethylene glycol/lytes (MIRALAX) PACKET 1 Packet, 1 Packet, Enteral Tube, QDAY PRN **AND** magnesium hydroxide (MILK OF MAGNESIA) suspension 30 mL, 30 mL, Enteral Tube, QDAY PRN **AND** bisacodyl (DULCOLAX) suppository 10 mg, 10 mg, Rectal, QDAY PRN, Dominic Damico M.D.  •  penicillin G potassium 4 Million Units in NS  100 mL IVPB, 4 Million Units, Intravenous, Q4HRS, Dominic Damico M.D., Stopped at 06/25/22 0614  •  ondansetron (ZOFRAN) syringe/vial injection 4 mg, 4 mg, Intravenous, Q4HRS PRN, Leatha Timmons M.D.  •  Notify provider if pain remains uncontrolled, , , CONTINUOUS **AND** Use the Numeric Rating Scale (NRS), Mc-Baker Faces (WBF), or FLACC on regular floors and Critical-Care Pain Observation Tool (CPOT) on ICUs/Trauma to assess pain, , , CONTINUOUS **AND** Pulse Ox, , , CONTINUOUS **AND** Pharmacy Consult Request ...Pain Management Review 1 Each, 1 Each, Other, PHARMACY TO DOSE **AND** If patient difficult to arouse and/or has respiratory depression (respiratory rate of 10 or less), stop any opiates that are currently infusing and call a Rapid Response., , , CONTINUOUS, Roro Still M.D.    Physical Examination:     Vitals:    06/25/22 0400 06/25/22 0500 06/25/22 0600 06/25/22 0719   BP: (!) 146/70 (!) 140/65 139/64    Pulse: 90 86 79 82   Resp: 20 20 20 (!) 22   Temp: 37.1 °C (98.8 °F)  37.2 °C (98.9 °F)    TempSrc: Temporal  Temporal    SpO2: 97% 98% 97% 99%   Weight:       Height:               NEUROLOGICAL EXAM:     Intubated on no sedation.  He does have spontaneous movements in all 4 extremities now.  Will open his eyes to verbal cues.  Not follow any commands though.    Cranial nerves pupils are small but reactive midline.  No gaze preference.  No nystagmus.  Gag/cough is intact.  Corneal reflexes are intact.  Vestibular response appears to be intact.   withdraw the pain in all 4   upgoing toes bilaterally.  Gait and cerebellar testing was admitted due to clinical condition.    Objective Data:    Labs:  Lab Results   Component Value Date/Time    PROTHROMBTM 14.7 (H) 06/23/2022 09:25 AM    INR 1.18 (H) 06/23/2022 09:25 AM      Lab Results   Component Value Date/Time    WBC 14.7 (H) 06/25/2022 03:42 AM    RBC 4.67 (L) 06/25/2022 03:42 AM    HEMOGLOBIN 13.0 (L) 06/25/2022 03:42 AM    HEMATOCRIT  40.3 (L) 06/25/2022 03:42 AM    MCV 86.3 06/25/2022 03:42 AM    MCH 27.8 06/25/2022 03:42 AM    MCHC 32.3 (L) 06/25/2022 03:42 AM    MPV 11.0 06/25/2022 03:42 AM    NEUTSPOLYS 75.60 (H) 06/25/2022 03:42 AM    LYMPHOCYTES 11.00 (L) 06/25/2022 03:42 AM    MONOCYTES 12.60 06/25/2022 03:42 AM    EOSINOPHILS 0.00 06/25/2022 03:42 AM    BASOPHILS 0.20 06/25/2022 03:42 AM      Lab Results   Component Value Date/Time    SODIUM 147 (H) 06/25/2022 02:06 AM    POTASSIUM 3.7 06/25/2022 02:06 AM    CHLORIDE 116 (H) 06/25/2022 02:06 AM    CO2 24 06/25/2022 02:06 AM    GLUCOSE 154 (H) 06/25/2022 02:06 AM    BUN 19 06/25/2022 02:06 AM    CREATININE 0.90 06/25/2022 02:06 AM      Lab Results   Component Value Date/Time    CHOLSTRLTOT 125 06/21/2022 07:53 AM    LDL 74 06/21/2022 07:53 AM    HDL 45 06/21/2022 07:53 AM    TRIGLYCERIDE 45 06/24/2022 02:50 AM       Lab Results   Component Value Date/Time    ALKPHOSPHAT 57 06/23/2022 02:05 PM    ASTSGOT 12 06/23/2022 02:05 PM    ALTSGPT 10 06/23/2022 02:05 PM    TBILIRUBIN 0.2 06/23/2022 02:05 PM        Imaging/Testing:  EC-ECHOCARDIOGRAM COMPLETE W/O CONT   Final Result      US-CAROTID DOPPLER BILAT   Final Result      US-EXTREMITY VENOUS UPPER BILAT   Final Result      DX-CHEST-PORTABLE (1 VIEW)   Final Result         1.  No acute cardiopulmonary disease.   2.  Trace right pleural effusion   3.  Atherosclerosis      CT-CTA NECK WITH & W/O-POST PROCESSING   Final Result         1.  Severely hypoplastic right vertebral artery, central segment of the right vertebral artery is not visualized and may be occluded.         CT-CTA HEAD WITH & W/O-POST PROCESS   Final Result         1.  No large vessel occlusion or aneurysm identified   2.  Interval placement of right frontal approach ventriculostomy with postprocedural minimal hemorrhage and new pneumocephalus.   3.  Bilateral ventricular dilatation appears somewhat increased since prior study compatible with somewhat worsened hydrocephalus.    4.  Right frontal and left cerebellar encephalomalacia      DX-CHEST-PORTABLE (1 VIEW)   Final Result         1.  No acute cardiopulmonary disease.   2.  Trace bilateral pleural effusion      DX-ABDOMEN FOR TUBE PLACEMENT   Final Result         1.  Nonspecific bowel gas pattern.   2.  Dobbhoff tube tip terminates overlying the expected location of the gastric antrum.   3.  Trace bilateral pleural effusions      DX-CHEST-PORTABLE (1 VIEW)   Final Result         1.  No acute cardiopulmonary disease.   2.  Trace bilateral pleural effusions   3.  Atherosclerosis      MR-BRAIN-WITH & W/O   Final Result   Addendum 1 of 1   ADDENDUM:      The case was discussed by telephone (call report) with DR. DAVONTE VIEIRA    on call for [facility], at 1854 hours.      Final      1.  Interval development of moderate obstructive hydrocephalus due to mass effect on the fourth ventricle. There is mild transependymal edema.   2.  Large area of acute infarction involving the left cerebellar hemisphere, inferior cerebellar vermis, and left cerebellar tonsil. PICA territory. No hemorrhagic transformation. This is the cause of the fourth ventricle effacement with obstructive    hydrocephalus. There is also mild left-sided cerebellar tonsillar herniation.   3.  Moderate supratentorial white matter disease most consistent with microvascular ischemic change.   4.  Old infarction right anterior frontal convexity.   5.  A Voalte message was sent to RICKEY HAYES at 1823 hours 6/22/2022. Prompt reply as of 6:41 PM not yet received. Efforts are ongoing to contact housestaff managing the patient at this time.   6.  Case was discussed (call report) with nurse SHASTA MAGILL at 6:47 PM. Request to initiate stat neurosurgery consult. Attempts to contact [facility] on-call housestaff are ongoing.      CT-HEAD W/O   Final Result      1.  No evidence of acute hemorrhage, mass or large territorial infarction   2.  LEFT cerebellar and RIGHT frontal  encephalomalacia   3.  Mild atrophy   4.  Mild white matter changes         CT-CSPINE WITHOUT PLUS RECONS   Final Result      1.  No acute fracture or traumatic listhesis in the cervical spine.   2.  Emphysema in the lung apices.      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  Patient had mass-effect with hydrocephalus.  Infarct timing appears as unclear could be 4 to 5 days ago.  Found to have multiple DVTs now.  Patient clinically appears slightly improved.  Planning on trial extubation today.  From a neurology standpoint continue with supportive therapy.  He is already has max therapy on board with a craniectomy and EVD.  We will leave when to start anticoagulation for DVTs based on neurosurgical preference given the craniectomy.  Agree with palliative care consultation.  Sodium is 147.  Blood pressure stable while on nicardipine drip in the 140's systolic.  He is normothermic.            This chart was partially generated using voice recognition technology and sound alike word replacement may be present, best efforts were made to make the chart accurate.    Beni Jackson MD  Board Certified Neurology, ABPN  (t) [phone]

## 2022-06-25 NOTE — PROGRESS NOTES
Critical Care Progress Note    Date of admission  6/21/2022    Chief Complaint  66 y.o. male admitted 6/21/2022 with headaches and frequent falls    Hospital Course  66 yom  with a pmhx  of meth use, tobacco use, prior right frontal stroke (2019), HTN, syphilis who initially presented to the hospital complaining of multiple falls and severe headaches.  He was seen at [place] ED sometime around 06/20/22 and discharged to home.  He then came to [facility]  ED on 6/21 for the same complaint.  CT of head was read as encephalomalacia in the left cerebellar and right frontal areas.  He was admitted to the hospital and yesterday  (6/22) began having a decrease in LOC becoming sleepy then obtunded.  Follow-up MRI noted significant edema with compression of the brain stem and obstructive hydrocephalus.  He was emergently brought to ICU where where he underwent emergent intubated and bedside EVD placement . He was treated with mannitol and 23% saline and started on 3% saline infusion.      Interval Problem Update  Reviewed last 24 hour events:    -S/P EVD placement  6/23/22- not draining   -S/P crani and C1 laminectomy (Matthias)  6/23/22  -Evd at 10 cm H20 -has not been draining since then- follow-up -CT showed ventricles stable in size  -CP-3-10  -CPP- 80-90  -Nicardipine infusing to maintain SBP goal of less   than 160- metoprolol increased  lisinopril added for BP control in attempt to wean off of nicardipine    - Multiple Upper extremity DVTs identified   -TF started this am   -Febrile  -BM.-PTA  -ABX-PCN G  -CSF- sent and pending ( panel + ro neurosyphilis)   -ID consult   -Lower extremities screen for DVTs- negative       Extubated this afternoon (6/25)- placed on 2 l nc - tolerating  Will have case management try to contact pt wife  Since message on phone states phone has been disconnected      Review of Systems  Review of Systems   Unable to perform ROS: Intubated        Vital Signs for last 24 hours    Temp:  [36.5 °C (97.7 °F)-37.3 °C (99.1 °F)] 37.3 °C (99.1 °F)  Pulse:  [] 101  Resp:  [19-35] 23  BP: (131-168)/(62-79) 143/67  SpO2:  [95 %-100 %] 97 %    Hemodynamic parameters for last 24 hours       Respiratory Information for the last 24 hours  Vent Mode: APVCMV  Rate (breaths/min): 20  Vt Target (mL): 460  PEEP/CPAP: 8  P Support: 5  MAP: 12  Control VTE (exp VT): 564    Physical Exam   Physical Exam  Vitals and nursing note reviewed.   Constitutional:       Interventions: He is intubated.   HENT:      Head: Normocephalic.      Comments: Right EVD in place  Right IJ cath in place     Mouth/Throat:      Comments: ET tube in place and secured with nguyen  Neck:      Comments: RIJ 3 lumen WNL  Cardiovascular:      Rate and Rhythm: Normal rate and regular rhythm.      Pulses:           Radial pulses are 2+ on the right side and 2+ on the left side.        Dorsalis pedis pulses are 1+ on the right side and 1+ on the left side.   Pulmonary:      Effort: He is intubated.      Breath sounds: Examination of the right-lower field reveals decreased breath sounds. Decreased breath sounds present.   Abdominal:      General: Abdomen is flat. Bowel sounds are normal.      Palpations: Abdomen is soft.      Comments: cor trak in place    Genitourinary:     Comments: Muse cath to down drain clear yellow urine  Skin:     General: Skin is warm.      Capillary Refill: Capillary refill takes 2 to 3 seconds.   Neurological:      Comments: Intubated   LORENA 2mm slugish  Moving all four exrtem to painful stimuli.  Moving all four extrem spontaneously   He is not following commands at this time   Psychiatric:      Comments: Intubated          Medications  Current Facility-Administered Medications   Medication Dose Route Frequency Provider Last Rate Last Admin   • lisinopril (PRINIVIL) tablet 20 mg  20 mg Enteral Tube Q DAY Dickson Jimenes M.D.   20 mg at 06/25/22 1109   • alteplase (intraventricular drain) 1 mg/mL in  sterile water syringe  1 mg Intraventricular Once Denny Mello M.D.       • sodium chloride (SALT) tablet 2 g  2 g Enteral Tube TID WITH MEALS Dickson Jimenes M.D.       • fentaNYL (SUBLIMAZE) injection 50 mcg  50 mcg Intravenous Q HOUR PRN Dickson Jimenes M.D.       • metoprolol tartrate (LOPRESSOR) tablet 50 mg  50 mg Enteral Tube TWICE DAILY Dickson Jimenes M.D.       • LORazepam (ATIVAN) injection 2-4 mg  2-4 mg Intravenous Q4HRS PRN Dickson Jimenes M.D.       • niCARdipine (CARDENE) 25 mg in  mL Infusion  0-15 mg/hr Intravenous Continuous YAHAIRA Sexton.P.R.N. 75 mL/hr at 06/25/22 1358 7.5 mg/hr at 06/25/22 1358   • enalaprilat (Vasotec) injection 1.25 mg 1 mL  1.25 mg Intravenous Q6HRS PRN Dickson Jimenes M.D.   1.25 mg at 06/25/22 0127   • Respiratory Therapy Consult   Nebulization Continuous RT Dominic Damico M.D.       • MD Alert...ICU Electrolyte Replacement per Pharmacy   Other PHARMACY TO DOSE Dominic Damico M.D.       • sodium chloride 200 mEq in empty bag 50 mL ivpb  200 mEq Intravenous Q6HRS PRN Dominic Damico M.D.       • atorvastatin (LIPITOR) tablet 80 mg  80 mg Enteral Tube Q EVENING Dominic Damico M.D.   80 mg at 06/24/22 1707   • Pharmacy Consult: Enteral tube insertion - review meds/change route/product selection  1 Each Other PHARMACY TO DOSE Dominic Damico M.D.       • acetaminophen (Tylenol) tablet 650 mg  650 mg Enteral Tube Q6HRS PRN Dominic Damico M.D.       • ondansetron (ZOFRAN ODT) dispertab 4 mg  4 mg Enteral Tube Q4HRS PRN Dominic Damico M.D.       • senna-docusate (PERICOLACE or SENOKOT S) 8.6-50 MG per tablet 2 Tablet  2 Tablet Enteral Tube BID Dominic Damico M.D.   2 Tablet at 06/25/22 0544    And   • polyethylene glycol/lytes (MIRALAX) PACKET 1 Packet  1 Packet Enteral Tube QDAY PRN Dominic Damico M.D.        And   • magnesium hydroxide (MILK OF MAGNESIA) suspension 30 mL  30 mL Enteral Tube QDAY PRN Dominic Damico M.D.        And   • bisacodyl (DULCOLAX)  suppository 10 mg  10 mg Rectal QDAY PRN Dominic Damico M.D.       • penicillin G potassium 4 Million Units in  mL IVPB  4 Million Units Intravenous Q4HRS Dominic Damico M.D.   Stopped at 06/25/22 1448   • ondansetron (ZOFRAN) syringe/vial injection 4 mg  4 mg Intravenous Q4HRS PRN Leatha Timmons M.D.       • Pharmacy Consult Request ...Pain Management Review 1 Each  1 Each Other PHARMACY TO DOSE Roro Still M.D.           Fluids    Intake/Output Summary (Last 24 hours) at 6/25/2022 1455  Last data filed at 6/25/2022 1300  Gross per 24 hour   Intake 3885.84 ml   Output 2203 ml   Net 1682.84 ml       Laboratory  Recent Labs     06/23/22  1413 06/24/22  0441 06/25/22  0505   ISTATAPH 7.264* 7.456 7.445   ISTATAPCO2 48.4* 30.6 38.3*   ISTATAPO2 106* 101* 109*   ISTATATCO2 23 23 27   RYOEGVF4KJL 97 98 98   ISTATARTHCO3 21.9 21.6 26.3*   ISTATARTBE -5* -1 2   ISTATTEMP 95.9 F 97.8 F 98.3 F   ISTATFIO2 30 30 30   ISTATSPEC Arterial Arterial Arterial   ISTATAPHTC 7.285* 7.463 7.448   [id number] 97* 98* 108*         Recent Labs     06/23/22  0840 06/23/22  1405 06/23/22  1923 06/25/22  0206 06/25/22  0900 06/25/22  1412   SODIUM 140 149*   < > 147* 146* 146*   POTASSIUM 4.1 4.6   < > 3.7 3.9 3.8   CHLORIDE 109 121*   < > 116* 114* 114*   CO2 18* 20   < > 24 24 24   BUN 19 17   < > 19 18 18   CREATININE 1.02 1.05   < > 0.90 0.86 0.93   MAGNESIUM 2.2 2.0  --   --   --  2.3   PHOSPHORUS 2.6  --   --   --   --  1.3*   CALCIUM 9.0 8.1*   < > 8.7 8.4* 8.4*    < > = values in this interval not displayed.     Recent Labs     06/23/22  0310 06/23/22  0840 06/23/22  1405 06/23/22  1923 06/25/22  0206 06/25/22  0900 06/25/22  1412   ALTSGPT  --   --  10  --   --   --   --    ASTSGOT  --   --  12  --   --   --   --    ALKPHOSPHAT  --   --  57  --   --   --   --    TBILIRUBIN  --   --  0.2  --   --   --   --    PREALBUMIN 18.6  --   --   --   --   --   --    GLUCOSE 116*   < > 125*   < > 154* 160* 134*    < > =  values in this interval not displayed.     Recent Labs     06/23/22  1405 06/24/22  0250 06/25/22  0342   WBC 9.9 12.0* 14.7*   NEUTSPOLYS 68.20 76.80* 75.60*   LYMPHOCYTES 16.50* 9.50* 11.00*   MONOCYTES 14.70* 13.10 12.60   EOSINOPHILS 0.00 0.00 0.00   BASOPHILS 0.30 0.20 0.20   ASTSGOT 12  --   --    ALTSGPT 10  --   --    ALKPHOSPHAT 57  --   --    TBILIRUBIN 0.2  --   --      Recent Labs     06/22/22  1937 06/23/22  0840 06/23/22  0925 06/23/22  1405 06/24/22  0250 06/25/22  0342   RBC  --    < >  --  5.17 5.03 4.67*   HEMOGLOBIN  --    < >  --  14.3 13.8* 13.0*   HEMATOCRIT  --    < >  --  44.7 42.6 40.3*   PLATELETCT  --    < >  --  246 223 224   PROTHROMBTM 13.5  --  14.7*  --   --   --    APTT 30.4  --  27.6  --   --   --    INR 1.06  --  1.18*  --   --   --     < > = values in this interval not displayed.       Imaging  CT:    Reviewed  Ultrasound:  Reviewed    Assessment/Plan  * Acute cerebrovascular accident (CVA) due to occlusion of left cerebellar artery (HCC)- (present on admission)  Assessment & Plan   Large left cerebellar ischemic infarct with cerebral edema and mass-effect on fourth ventricle, cryptogenic  -EVD in placed a.m. 6/23/2022 for suboccipital decompression-status post mannitol and 23% bolus as well as 3% saline infusion-keep ICPs<20  -Post crani and C1 laminectomy  6/23/22   -MAP goal greater than 65; SBP goal to keep   -Nicardipine to keep SBP less than 160- Metoprolol; Added Lisinopril for HTN; prn antihypertensives   -Propofol, fentanyl  -Elevate head of bed, keep head centered-maximize blood flow  -Goal sodium 150-155- with Na tabs -increase salt tabs from 1 g to 2 g 3 times daily to keep sodium 150-155  -CTA pending (though long outside of any interventional window)  -ECHO- pending  -Wean sedation   -Atorvastatin 80 mg - LDL goal less than 70  -Hold ASA and anticoagulants  -post crani/evd   - CSF evaluation for neurosyphilis - pending   -PT/OT/SLP when appropriate  -palliative  consult  -6/25- EVD not draining- repeat CT showed ventricles stable in size- NS aware and may flush drain or remove      New onset a-fib (HCC)  Assessment & Plan  -New onset A. fib with rapid RVR during evening of 6/23  -Treated and Converted with metoprolol 5 mg x 3 followed by metoprolol XL  -continue to monitor     Acute bilateral deep vein thrombosis (DVT) of upper extremities (HCC)  Assessment & Plan  Acute, partially occlusive DVT to the R IJ with extension into the let innominate vein.    Acute, occlusive DVT in on of the right paired brachial veins within the distal bicept  Acute,  occlusive DVT in Right paired ulnar veins within proximal forearm  Occlusive superficial thrombophlebitis in a small segment of th right cephalic vein.   Acute partially occlusive DVT in left paired brachial veins within the elbow region. Possible small extension into radial an ulnar veins   - Follow up lower extrem US- negative for DVT  Unable to start anticoagulation at this time due to new crani and eVD placement - Once ok with NSG and neurology we will start anticoagulation- CT head prior to starting any  anticoagulation       Goals of care, counseling/discussion  Assessment & Plan  -Unable to contact wife.  Message on phone service that phone has been disconnected  -Will contact  case management  -Wife is aware that patient is in the hospital however I am unsure if she is aware of his current status    On mechanically assisted ventilation (HCC)  Assessment & Plan  Intubated date: 6/22  Extubated 6/25- 2L NC   Frequent NT SX  Assess for the need to reintubate for respiratory failure  Goal saturation > 90%  FU CXR  Head of bed > 30 degree  Assess / Treat delirium  Respiratory treatments: prn       Syphilis  Assessment & Plan  -Treponemal test is positive- confirmatory testing ending  -Treat empirically with PCN G 4,000,000 units every 4 hours  -Prior Syphilis infections  (2020) appears to have not been treated as ABX  prescribed were never picked up   -CSF to eval for neurosyphilis- pending    -Health department notified  -ID consult for latent syphilis and treatment     Hypokalemia- (present on admission)  Assessment & Plan  Continue to monitor daily labs and replace as needed  Electrolyte protocol    Tobacco use- (present on admission)  Assessment & Plan  -Discussion regarding smoking cessation  When appropriate  -Provide information   -NRT if requests     Methamphetamine use (HCC)- (present on admission)  Assessment & Plan  Counseling cessation when clinical trajectory is more clear and appropriate       VTE:  Contraindicated  Ulcer: H2 Antagonist  Lines: Central Line  Ongoing indication addressed, Arterial Line  Ongoing indication addressed, Muse Catheter  Ongoing indication addressed and right EVD    I have performed a physical exam and reviewed and updated ROS and Plan today (6/25/2022). In review of yesterday's note (6/24/2022), there are no changes except as documented above.     Discussed patient condition and risk of morbidity and/or mortality with RN, RT, Pharmacy, Dietary, Charge nurse / hot rounds and neurology and neurosurgery  The patient remains critically ill.  Critical care time = 55  minutes in directly providing and coordinating critical care and extensive data review.  No time overlap and excludes procedures.

## 2022-06-25 NOTE — CARE PLAN
Problem: Ventilation  Goal: Ability to achieve and maintain unassisted ventilation or tolerate decreased levels of ventilator support  Description: Target End Date:  4 days     Document on Vent flowsheet    1.  Support and monitor invasive and noninvasive mechanical ventilation  2.  Monitor ventilator weaning response  3.  Perform ventilator associated pneumonia prevention interventions  4.  Manage ventilation therapy by monitoring diagnostic test results  Outcome: Progressing      Ventilator Daily Summary  20/460/+8/30%  Vent Day #3    Ventilator settings changed this shift: No    Weaning trials: SBT x 2    Respiratory Procedures: No    Plan: Continue current ventilator settings and wean mechanical ventilation as tolerated per physician orders.

## 2022-06-25 NOTE — CONSULTS
Consults   INFECTIOUS DISEASES INPATIENT CONSULT NOTE     Date of Service: 6/25/2022    Consult Requested By: Dickson Jimenes M.D.    Reason for Consultation: Syphilis    History of Present Illness:   Alverto Duran is a 66 y.o.  admitted 6/21/2022. Pt has a past medical history of methamphetamine use, tobacco abuse and right frontal stroke in 2019.  Known history of syphilis with positive antibody which per notes was never treated. He initially presented to [place] ED complaining of headaches and falls.  He was discharged to home.  He then presented to [facility] also with headaches and falls.  He was admitted but then had worsening encephalopathy and became obtunded.  MRI brain on 6/22 noted interval development of moderate obstructive hydrocephalus due to mass-effect on the fourth ventricle.  Large acute cerebral infarct.  Neurosurgery was consulted and he underwent EVD placement 6/23 with craniectomy and C1 laminectomy.   Patient has been transferred to the ICU.    Review Of Systems:  Review of Systems   Unable to perform ROS: Medical condition       PMH:   Past Medical History:   Diagnosis Date   • Glaucoma        PSH:  Past Surgical History:   Procedure Laterality Date   • KS SUBOCCIPT DECOMP MEDULLA/SP CRD N/A 6/23/2022    Procedure: CRANIECTOMY, SUBOCCIPITAL, RESECTION CEREBELLUM;  Surgeon: Donovan Rizzo M.D.;  Location: SURGERY [facility];  Service: Neurosurgery   • CERVICAL LAMINECTOMY POSTERIOR  6/23/2022    Procedure: LAMINECTOMY, SPINE, CERVICAL, POSTERIOR APPROACH C1;  Surgeon: Donovan Rizzo M.D.;  Location: SURGERY [facility];  Service: Neurosurgery       FAMILY HX:  History reviewed. No pertinent family history.  Reviewed family history. No pertinent family history.     SOCIAL HX:  Social History     Socioeconomic History   • Marital status: Single     Spouse name: Not on file   • Number of children: Not on file   • Years of education: Not on file   • Highest  education level: Not on file   Occupational History   • Not on file   Tobacco Use   • Smoking status: Current Every Day Smoker     Packs/day: 0.50     Types: Cigarettes   • Smokeless tobacco: Never Used   Vaping Use   • Vaping Use: Never used   Substance and Sexual Activity   • Alcohol use: Never   • Drug use: Yes     Types: Methamphetamines, Inhaled     Comment: crystal meth   • Sexual activity: Yes     Partners: Female   Other Topics Concern   • Not on file   Social History Narrative   • Not on file     Social Determinants of Health     Financial Resource Strain: Not on file   Food Insecurity: Not on file   Transportation Needs: Not on file   Physical Activity: Not on file   Stress: Not on file   Social Connections: Not on file   Intimate Partner Violence: Not on file   Housing Stability: Not on file     Social History     Tobacco Use   Smoking Status Current Every Day Smoker   • Packs/day: 0.50   • Types: Cigarettes   Smokeless Tobacco Never Used     Social History     Substance and Sexual Activity   Alcohol Use Never       Allergies/Intolerances:  No Known Allergies    History reviewed per chart     Other Current Medications:    Current Facility-Administered Medications:   •  sodium chloride (SALT) tablet 1 g, 1 g, Enteral Tube, TID WITH MEALS, Hannah Gee, A.P.R.N., 1 g at 06/25/22 0822  •  LORazepam (ATIVAN) injection 2-4 mg, 2-4 mg, Intravenous, Q4HRS PRN, Dickson Jimenes M.D.  •  niCARdipine (CARDENE) 25 mg in  mL Infusion, 0-15 mg/hr, Intravenous, Continuous, Hannah Gee, A.P.R.N., Last Rate: 75 mL/hr at 06/25/22 0558, 7.5 mg/hr at 06/25/22 0558  •  metoprolol tartrate (LOPRESSOR) tablet 25 mg, 25 mg, Enteral Tube, TWICE DAILY, Sultan EDIE Crury M.D., 25 mg at 06/25/22 0544  •  enalaprilat (Vasotec) injection 1.25 mg 1 mL, 1.25 mg, Intravenous, Q6HRS PRN, Dickson Jimenes M.D., 1.25 mg at 06/25/22 0127  •  Respiratory Therapy Consult, , Nebulization, Continuous RT, Dominic Damico M.D.  •  famotidine  (PEPCID) tablet 20 mg, 20 mg, Enteral Tube, Q12HRS, 20 mg at 06/24/22 1707 **OR** famotidine (PEPCID) injection 20 mg, 20 mg, Intravenous, Q12HRS, Dominic Damico M.D., 20 mg at 06/25/22 0544  •  MD Alert...ICU Electrolyte Replacement per Pharmacy, , Other, PHARMACY TO DOSE, Dominic Damico M.D.  •  lidocaine (XYLOCAINE) 1 % injection 2 mL, 2 mL, Tracheal Tube, Q30 MIN PRN, Dominic Damico M.D.  •  propofol (DIPRIVAN) injection, 0-80 mcg/kg/min, Intravenous, Continuous, Stopped at 06/24/22 0841 **AND** Triglycerides Starting now and then Every 3 Days, , , Every 3 Days (0300), Dominic Damico M.D.  •  fentaNYL (SUBLIMAZE) injection 25 mcg, 25 mcg, Intravenous, Q HOUR PRN **OR** fentaNYL (SUBLIMAZE) injection 50 mcg, 50 mcg, Intravenous, Q HOUR PRN, 50 mcg at 06/24/22 1201 **OR** fentaNYL (SUBLIMAZE) injection 100 mcg, 100 mcg, Intravenous, Q HOUR PRN, Dominic Damico M.D., 100 mcg at 06/25/22 0419  •  fentaNYL (SUBLIMAZE) 50 mcg/mL in 50mL, , Intravenous, Continuous, Dominic Damico M.D., Stopped at 06/25/22 0000  •  sodium chloride 200 mEq in empty bag 50 mL ivpb, 200 mEq, Intravenous, Q6HRS PRN, Dominic Damico M.D.  •  atorvastatin (LIPITOR) tablet 80 mg, 80 mg, Enteral Tube, Q EVENING, Dominic Damico M.D., 80 mg at 06/24/22 1707  •  Pharmacy Consult: Enteral tube insertion - review meds/change route/product selection, 1 Each, Other, PHARMACY TO DOSE, Dominic Damico M.D.  •  acetaminophen (Tylenol) tablet 650 mg, 650 mg, Enteral Tube, Q6HRS PRN, Dominic Damico M.D.  •  ondansetron (ZOFRAN ODT) dispertab 4 mg, 4 mg, Enteral Tube, Q4HRS PRN, Dominic Welte, M.D.  •  senna-docusate (PERICOLACE or SENOKOT S) 8.6-50 MG per tablet 2 Tablet, 2 Tablet, Enteral Tube, BID, 2 Tablet at 06/25/22 0544 **AND** polyethylene glycol/lytes (MIRALAX) PACKET 1 Packet, 1 Packet, Enteral Tube, QDAY PRN **AND** magnesium hydroxide (MILK OF MAGNESIA) suspension 30 mL, 30 mL, Enteral Tube, QDAY PRN **AND** bisacodyl (DULCOLAX) suppository 10 mg,  "10 mg, Rectal, QDAY PRN, Dominic Damico M.D.  •  penicillin G potassium 4 Million Units in  mL IVPB, 4 Million Units, Intravenous, Q4HRS, Dominic Damico M.D., Stopped at 22 06  •  ondansetron (ZOFRAN) syringe/vial injection 4 mg, 4 mg, Intravenous, Q4HRS PRN, Leatha Timmons M.D.  •  Notify provider if pain remains uncontrolled, , , CONTINUOUS **AND** Use the Numeric Rating Scale (NRS), Mc-Baker Faces (WBF), or FLACC on regular floors and Critical-Care Pain Observation Tool (CPOT) on ICUs/Trauma to assess pain, , , CONTINUOUS **AND** Pulse Ox, , , CONTINUOUS **AND** Pharmacy Consult Request ...Pain Management Review 1 Each, 1 Each, Other, PHARMACY TO DOSE **AND** If patient difficult to arouse and/or has respiratory depression (respiratory rate of 10 or less), stop any opiates that are currently infusing and call a Rapid Response., , , CONTINUOUS, Roro Still M.D.  [unfilled]    Most Recent Vital Signs:  /64   Pulse 82   Temp 37.2 °C (98.9 °F) (Temporal)   Resp (!) 22   Ht 1.778 m (5' 10\")   Wt 65.3 kg (143 lb 15.4 oz)   SpO2 99%   BMI 20.66 kg/m²   Temp  Av.7 °C (98.1 °F)  Min: 35.8 °C (96.4 °F)  Max: 37.4 °C (99.3 °F)    Physical Exam:  Physical Exam  Constitutional:       Appearance: Normal appearance.   HENT:      Head:      Comments: Surgical site with dressings in place, external drain     Right Ear: External ear normal.      Left Ear: External ear normal.      Nose: Nose normal.      Mouth/Throat:      Mouth: Mucous membranes are dry.   Eyes:      Extraocular Movements: Extraocular movements intact.      Conjunctiva/sclera: Conjunctivae normal.      Pupils: Pupils are equal, round, and reactive to light.   Cardiovascular:      Rate and Rhythm: Normal rate and regular rhythm.      Heart sounds: Normal heart sounds.   Pulmonary:      Effort: Pulmonary effort is normal.      Comments: Faint crackles bilaterally  Abdominal:      General: Abdomen is flat. " Bowel sounds are normal.      Palpations: Abdomen is soft.   Musculoskeletal:      Cervical back: Neck supple.      Right lower leg: No edema.      Left lower leg: No edema.   Skin:     General: Skin is warm and dry.   Neurological:      Mental Status: He is alert.      Comments: Sedated and not responsive   Psychiatric:         Behavior: Behavior normal.             Pertinent Lab Results:  Recent Labs     06/23/22  1405 06/24/22  0250 06/25/22  0342   WBC 9.9 12.0* 14.7*      Recent Labs     06/23/22  1405 06/24/22  0250 06/25/22  0342   HEMOGLOBIN 14.3 13.8* 13.0*   HEMATOCRIT 44.7 42.6 40.3*   MCV 86.5 84.7 86.3   MCH 27.7 27.4 27.8   PLATELETCT 246 223 224         Recent Labs     06/24/22  1531 06/24/22  2108 06/25/22  0206   SODIUM 144 147* 147*   POTASSIUM 3.7 3.8 3.7   CHLORIDE 112 115* 116*   CO2 22 24 24   CREATININE 0.96 0.93 0.90        Recent Labs     06/23/22  1405   ALBUMIN 3.1*        Pertinent Micro:  Results     Procedure Component Value Units Date/Time    CSF Culture [[id number]] Collected: 06/24/22 0834    Order Status: Sent Specimen: CSF from [facility] Updated: 06/24/22 1031     Significant Indicator NEG     Source CSF     Site SHUNT     Culture Result -     Gram Stain Result Rare WBCs.  No organisms seen.      Narrative:      Collected By: BELEN SYED  Collected By: BELEN SYED    GRAM STAIN [[id number]] Collected: 06/24/22 0834    Order Status: Completed Specimen: CSF Updated: 06/24/22 1031     Significant Indicator .     Source CSF     Site SHUNT     Gram Stain Result Rare WBCs.  No organisms seen.      Narrative:      Collected By: BELEN SYED  Collected By: BELEN SYED    CSF CULTURE [[id number]] Collected: 06/24/22 0837    Order Status: Canceled Specimen: Other from [facility]     COV-2, FLU A/B, AND RSV BY PCR (2-4 HOURS CEPHEID): Collect NP swab in VTM [[id number]] Collected: 06/21/22 1358    Order Status: Completed Specimen: Respirate from Nasopharyngeal  "Updated: 06/21/22 1640     Influenza virus A RNA Negative     Influenza virus B, PCR Negative     RSV, PCR Negative     SARS-CoV-2 by PCR NotDetected     Comment: PATIENTS: Important information regarding your results and instructions can  be found at [email or web address]   \"After your  Covid-19 Test\"    [facility] providers: PLEASE REFER TO DE-ESCALATION AND RETESTING PROTOCOL  on [email or web address]    **The [facility] GeneXpert Xpress SARS-CoV-2 RT-PCR Test has been made  available for use under the Emergency Use Authorization (EUA) only.          SARS-CoV-2 Source NP Swab        No results found for: BLOODCULTU, BLDCULT, BCHOLD     Studies:  CT-CSPINE WITHOUT PLUS RECONS    Result Date: 6/21/2022 6/21/2022 8:59 AM HISTORY/REASON FOR EXAM: Pain following trauma TECHNIQUE/EXAM DESCRIPTION: CT cervical spine without contrast, with reconstructions. The study was performed on a helical multidetector CT scanner. Thin-section helical scanning was performed from the skull base through T1. Sagittal and coronal multiplanar reconstructions were generated from the axial images. Low dose optimization technique was utilized for this CT exam including automated exposure control and adjustment of the mA and/or kV according to patient size. COMPARISON:  None. FINDINGS: Slight cervical kyphosis. There is no acute fracture or traumatic listhesis. The craniovertebral junction appears intact. The prevertebral and paraspinous soft tissues are unremarkable. Moderate to severe cervical spondylosis, with osseous fusion across C4-C5 levels. Moderate to severe multilevel disc height loss and endplate spurring, most pronounced at C3-C7 levels. Grade 1 retrolisthesis at C5-C6 and C6-C7, degenerative. Mild to moderate multilevel facet hypertrophy. Emphysema in the lung apices.     1.  No acute fracture or traumatic listhesis in the cervical spine. 2.  Emphysema in the lung apices.    CT-CTA HEAD WITH & W/O-POST " PROCESS    Result Date: 6/23/2022 6/23/2022 5:11 AM HISTORY/REASON FOR EXAM:  Stroke, follow up TECHNIQUE/EXAM DESCRIPTION: CT angiogram of the [place] without and with contrast.  Initial precontrast images were obtained of the head from the skull base through the vertex.  Postcontrast images were obtained of the [place] following the power injection of nonionic contrast at 5.0 mL/sec. Thin-section helical images were obtained with overlapping reconstruction interval. Coronal and sagittal multiplanar volume reformats were generated.  3D angiographic images were reviewed on PACS.  Maximum intensity projection (MIP) images were generated and reviewed. 70 mL of Omnipaque 350 nonionic contrast was injected intravenously. Low dose optimization technique was utilized for this CT exam including automated exposure control and adjustment of the mA and/or kV according to patient size. COMPARISON:  June 21, 2022 FINDINGS: The right vertebral artery is hypoplastic. Dominant left vertebral artery is seen. The vertebrobasilar confluence is intact. The basilar artery is patent. No aneurysm or occlusive lesion is evident. The anterior circulation shows no stenotic or occlusive lesion. No aneurysm is evident about the [place]. Interval placement of right frontal approach ventriculostomy tube is seen. There is new pneumocephalus visualized. Streaky hyperdensity along the ventriculostomy tube is seen compatible with subtle hemorrhage. Low-density changes in the right frontal lobe and left cerebellar hemisphere seen. Bilateral ventricular dilatation is observed, somewhat more pronounced compared to prior study. 3D angiographic/MIP images of the vasculature confirm the vascular findings as described above.     1.  No large vessel occlusion or aneurysm identified 2.  Interval placement of right frontal approach ventriculostomy with postprocedural minimal hemorrhage and new pneumocephalus. 3.  Bilateral  ventricular dilatation appears somewhat increased since prior study compatible with somewhat worsened hydrocephalus. 4.  Right frontal and left cerebellar encephalomalacia    CT-CTA NECK WITH & W/O-POST PROCESSING    Result Date: 6/23/2022 6/23/2022 5:11 AM HISTORY/REASON FOR EXAM:  Stroke, follow up TECHNIQUE/EXAM DESCRIPTION:  CT angiogram of the neck with contrast. Postcontrast images were obtained of the neck from the great vessels through the skull base following the power injection of nonionic contrast at 5.0 mL/sec. Thin-section helical images were obtained with overlapping reconstruction interval. Coronal and oblique multiplanar volume reformats were generated. Cervical internal carotid artery percent stenosis is calculated using the standard method according to the NASCET criteria wherein a segment of uniform caliber mid or distal cervical internal carotid is used as the reference denominator. 3D angiographic images were reviewed on PACS.  Maximum intensity projection (MIP) images were generated and reviewed 70 mL of Omnipaque 350 nonionic contrast was injected intravenously. Low dose optimization technique was utilized for this CT exam including automated exposure control and adjustment of the mA and/or kV according to patient size. COMPARISON: June 21, 2022 FINDINGS: Aortic arch: conventional branching pattern. There is atherosclerotic plaque of the aorta. Right common carotid artery: Patent Right internal carotid artery: Normal in appearance Left common carotid artery is patent. Left internal carotid artery: Atherosclerotic plaque without significant stenosis (less than 50%). The right vertebral artery is severely hypoplastic. Segment of central right vertebral artery is nonvisualized. The left vertebral artery is patent without dissection or stenosis. Vertebrobasilar confluence: The vertebrobasilar confluence appears normal. Lung apices are clear The soft tissues of the neck are within normal limits.  3D angiographic/MIP images of the vasculature confirm the vascular findings as described above.     1.  Severely hypoplastic right vertebral artery, central segment of the right vertebral artery is not visualized and may be occluded.     CT-HEAD W/O    Result Date: 6/21/2022 6/21/2022 8:59 AM HISTORY/REASON FOR EXAM:  Head trauma, minor (Age >= 65y). Multiple ground-level falls, dizziness TECHNIQUE/EXAM DESCRIPTION AND NUMBER OF VIEWS: CT of the head without contrast. The study was performed on a GE CT scanner. Contiguous 5 mm axial sections were obtained from the skull base through the vertex. Up to date radiation dose reduction adjustments have been utilized to meet ALARA standards for radiation dose reduction. COMPARISON:  None available FINDINGS: There is a moderate area of encephalomalacia in the RIGHT frontal lobe. There is a large area of encephalomalacia in the LEFT cerebellum. The calvariae and skull base are unremarkable. There are no extraaxial fluid collections. There is a pattern of cerebral atrophy manifest as enlargement of sulcal markings and ventricular prominence.  The ventricular system and basal cisterns are otherwise unremarkable. There are areas of hypodensity in the white matter most consistent with small vessel ischemic change versus demyelination or gliosis. There are no hemorrhagic lesions. There are no mass effects or shift of midline structures. The paranasal sinuses and mastoids in the field of view are unremarkable.     1.  No evidence of acute hemorrhage, mass or large territorial infarction 2.  LEFT cerebellar and RIGHT frontal encephalomalacia 3.  Mild atrophy 4.  Mild white matter changes     DX-CHEST-PORTABLE (1 VIEW)    Result Date: 6/24/2022 6/24/2022 1:49 AM HISTORY/REASON FOR EXAM: For indication of respiratory failure; For indication of respiratory failure TECHNIQUE/EXAM DESCRIPTION:  Single AP view of the chest. COMPARISON: FINDINGS: Position of medical devices appears  stable. The cardiac silhouette appears within normal limits. Atherosclerotic calcification of the aorta is noted.  The central pulmonary vasculature appears normal. The lungs appear well expanded bilaterally.  Bilateral lungs are clear. Mild blunting of the right costophrenic angle is seen suggesting small right effusion. The bony structures appear age-appropriate.     1.  No acute cardiopulmonary disease. 2.  Trace right pleural effusion 3.  Atherosclerosis    DX-CHEST-PORTABLE (1 VIEW)    Result Date: 6/23/2022 6/23/2022 2:34 AM HISTORY/REASON FOR EXAM: For indication of respiratory failure; For indication of respiratory failure TECHNIQUE/EXAM DESCRIPTION:  Single AP view of the chest. COMPARISON: Yesterday FINDINGS: Dobbhoff tube has been placed in the interim, terminating below the lower margin of the film within the abdomen.  Otherwise medical device position is stable. The cardiac silhouette appears within normal limits. The mediastinal contour appears within normal limits.  The central pulmonary vasculature appears normal. The lungs appear well expanded bilaterally.  Bilateral lungs are clear. Blunting of bilateral costophrenic angles is seen, compatible with trace bilateral pleural effusions. The bony structures appear age-appropriate.     1.  No acute cardiopulmonary disease. 2.  Trace bilateral pleural effusion    DX-CHEST-PORTABLE (1 VIEW)    Result Date: 6/22/2022 6/22/2022 8:44 PM HISTORY/REASON FOR EXAM: POST INTUBATION TECHNIQUE/EXAM DESCRIPTION:  Single AP view of the chest. COMPARISON: None FINDINGS: Right internal jugular central line is seen terminating in the superior vena cava.  Endotracheal tube terminates at the level of the clavicles.   The cardiac silhouette appears within normal limits. Atherosclerotic calcification of the aorta is noted.  The central pulmonary vasculature appears normal. The lungs appear well expanded bilaterally.  Bilateral lungs are clear. Blunting of bilateral  costophrenic angles is seen, compatible with trace bilateral pleural effusions. The bony structures appear age-appropriate.     1.  No acute cardiopulmonary disease. 2.  Trace bilateral pleural effusions 3.  Atherosclerosis    MR-BRAIN-WITH & W/O    Addendum Date: 6/22/2022    ADDENDUM: The case was discussed by telephone (call report) with DR. DAVONTE VIEIRA on call for [facility], at 1854 hours.    Result Date: 6/22/2022 6/22/2022 5:10 PM HISTORY/REASON FOR EXAM:  Transient ischemic attack (TIA); Headache, chronic, new features or increased frequency. TECHNIQUE/EXAM DESCRIPTION:   MRI of the brain without and with contrast. T1 sagittal, T2 fast spin-echo axial, T1 coronal, FLAIR coronal, diffusion-weighted and apparent diffusion coefficient (ADC map) axial images were obtained of the whole brain. T1 postcontrast axial and T1 postcontrast coronal images were obtained. Additional FLAIR axial images were obtained. The study was performed on a [employer] Signa 1.5 Gali MRI scanner. 15 mL ProHance gadolinium contrast was administered intravenously. COMPARISON:  Head CT 6/21/2022 FINDINGS: The calvaria are unremarkable. There are no extra-axial fluid collections. Since the previous CT study, there is no development of moderate hydrocephalus with dilatation of the lateral ventricles and third ventricle. The temporal horns have become quite dilated. There is mild transependymal edema. This is obstructive hydrocephalus due to mass effect on the fourth ventricle. At the right anterior frontal convexity, again noted is a well-demarcated small area of chronic encephalomalacia consistent with old cerebral infarction (T2 axial image 27, series 11). Elsewhere in the cerebral hemispheres, there is a pattern of moderate supratentorial white matter disease with patchy and focal areas of bright T2 and FLAIR signal in the subcortical and deep white matter of both hemispheres consistent with small vessel ischemic change versus  demyelination or gliosis. The diffusion weighted axial images show no evidence of acute infarction in the cerebral hemispheres/supratentorial compartment. There are no hemorrhagic lesions. In the posterior fossa, there is a large confluent area of T2 and FLAIR hyperintensity involving the left cerebellar hemisphere, inferior cerebellar vermis, and left cerebellar tonsil. There is corresponding bright diffusion signal and restricted diffusion on the ADC map. This is consistent with a large acute infarction in the left PICA territory. No hemorrhagic transformation. There is effacement of the shelbi-medullary cistern posterior laterally on the left. There is mass effect on the fourth ventricle which is nearly effaced and displaced toward the right. There is mild cerebellar tonsillar herniation on the left (FLAIR coronal image 16, series 14, T1 sagittal image 11, series 8). The distal left vertebral artery appears dominant. The distal right vertebral artery is not well visualized and is presumably hypoplastic. The basilar flow void is intact. The carotid flow voids are intact. The dural venous sinuses are intact. The paranasal sinuses in the field-of-view are clear. The mastoids are clear.     1.  Interval development of moderate obstructive hydrocephalus due to mass effect on the fourth ventricle. There is mild transependymal edema. 2.  Large area of acute infarction involving the left cerebellar hemisphere, inferior cerebellar vermis, and left cerebellar tonsil. PICA territory. No hemorrhagic transformation. This is the cause of the fourth ventricle effacement with obstructive hydrocephalus. There is also mild left-sided cerebellar tonsillar herniation. 3.  Moderate supratentorial white matter disease most consistent with microvascular ischemic change. 4.  Old infarction right anterior frontal convexity. 5.  A Voalte message was sent to RICKEY HAYES at 1823 hours 6/22/2022. Prompt reply as of 6:41 PM not yet  received. Efforts are ongoing to contact housestaff managing the patient at this time. 6.  Case was discussed (call report) with nurse SHASTA MAGILL at 6:47 PM. Request to initiate stat neurosurgery consult. Attempts to contact [employer] on-call housestaff are ongoing.    US-CAROTID DOPPLER BILAT    Result Date: 2022   Carotid Duplex  Report  Vascular Laboratory  CONCLUSIONS  Limited evaluation of the right carotid arteries. The right internal and  external carotid arteries are not visualized due to limitations.  Elevated velocities in the LEFT proximal internal carotid artery are not  found to be associated with vessel narrowing and are likely due to vessel  tortuosity.  JOSE POSADA  Exam Date:     2022 07:21  Room #:     Inpatient  Priority:     Routine  Ht (in):             Wt (lb):  Ordering Physician:        RICKEY HAYES  Referring Physician:       [id number]  Sonographer:               Anna Marie Lopez RVT  Study Type:                Limited Bilateral  Technical Quality:         Adequate  Age:    66    Gender:     M  MRN:    [id number]  :    1955      BSA:  Indications:     TIA  CPT Codes:       22402  ICD Codes:       435.9  History:         History of recent transient ischemic attack. No priors.  Limitations:     Limited evaluation of the right side due to central line and                    bandaging. Doctor and Nurse aware of limited study on the                   right.  Right Brachial BP:              /  Left Brachial BP:             /  RIGHT  Plaque          Plaque         Velocity (cm/s)  Characteristics Composition    Syst/Diast                                      /         Distal ICA                                      /         Mid ICA                                      /         Prox ICA                                 133  / 27      Distal CCA                                      /         Mid CCA                                 136  / 21       Prox CCA                                      /         ECA  Waveform:   Triphasic                         SCA  LEFT  Plaque         Plaque          Velocity (cm/s)  CharacteristicsComposition     Syst/Diast                                 78   / 23      Distal ICA                                 72   / 25      Mid ICA                                 193  / 29      Prox ICA                                 132  / 24      Distal CCA                                      /         Mid CCA                                 180  / 34      Prox CCA                                 84   / 14      ECA  Waveform:   Triphasic                         SCA                       ICA/CCA       1.1        Antegrade      Vertebral   Antegrade         69   / 13     cm/s        69    / 19  FINDINGS  Right-  Limited evaluation of the carotid arteries. The internal and external  carotid arteries are not visualized due to limitations.  All other visualized arteries appear with no plaque and normal velocities.  The subclavian and vertebral artery waveforms are antegrade and normal in  character and velocity.  Left-  Elevated velocities in the proximal internal carotid artery are not found  to be associated with vessel narrowing and are likely due to vessel  tortuosity.  All other flow velocities and Doppler waveforms are normal throughout the  carotid arteries without evidence of plaque.  The subclavian and vertebral artery waveforms are antegrade and normal in  character and velocity.  Dean ORTIZ To  (Electronically Signed)  Final Date:      24 June 2022                   13:46    EC-ECHOCARDIOGRAM COMPLETE W/O CONT    Result Date: 6/24/2022  Transthoracic Echo Report Echocardiography Laboratory CONCLUSIONS No prior study is available for comparison. Normal left ventricular systolic function. The left ventricular ejection fraction is visually estimated to be 70%. Mild aortic insufficiency. A definite cardiac source for a systemic  thromboembolic event is not identified, failure to do so does not exclude its presence. If clinically indicated, a transesophageal study would be useful. JOSE POSADA Exam Date:         2022                    10:04 Exam Location:     Out Patient Priority:          Routine Ordering Physician:        RICKEY HAYES Referring Physician:       [id number] Sonographer:               Timo Vasques RDCS Age:    66     Gender:    M MRN:    [id number] :    1955 BSA:    1.89   Ht (in):    71     Wt (lb):    155 Exam Type:     Complete Indications:     TIA ICD Codes:       435.9 CPT Codes:       84706 BP:   182    /   81     HR:   96 Technical Quality:       Technically difficult study -                          adequate information is obtained MEASUREMENTS  (Male / Female) Normal Values 2D ECHO LV Diastolic Diameter PLAX        4.7 cm                4.2 - 5.9 / 3.9 - 5.3 cm LV Systolic Diameter PLAX         3 cm                  2.1 - 4.0 cm IVS Diastolic Thickness           0.69 cm               LVPW Diastolic Thickness          0.79 cm               LVOT Diameter                     2.3 cm                Estimated LV Ejection Fraction    70 %                  LV Ejection Fraction MOD 4C       64.4 %                DOPPLER AV Peak Velocity                  1.4 m/s               AV Peak Gradient                  7.4 mmHg              AV Mean Gradient                  4.7 mmHg              AI Pressure Half Time             326 ms                LVOT Peak Velocity                1.1 m/s               AV Area Cont Eq vti               3.2 cm2               Mitral E Point Velocity           0.45 m/s              Mitral E to A Ratio               0.86                  Mitral A Duration                 71.5 ms               MV Pressure Half Time             57.3 ms               MV Area PHT                       3.8 cm2               MV Deceleration Time              198 ms                 PV Peak Velocity                  1.3 m/s               PV Peak Gradient                  6.3 mmHg              PV Mean Gradient                  4.3 mmHg              * Indicates values subject to auto-interpretation LV EF:  70    % FINDINGS Left Ventricle Normal left ventricular chamber size. Normal left ventricular wall thickness. Normal left ventricular systolic function. The left ventricular ejection fraction is visually estimated to be 70%. Abnormal septal motion. A reliable estimation of diastolic function cannot be made due to dysrhythmia. Right Ventricle Normal right ventricular size and systolic function. Right Atrium Normal right atrial size. Normal inferior vena cava size without inspiratory collapse. Left Atrium Normal left atrial size. Mitral Valve Structurally normal mitral valve without significant stenosis or regurgitation. Aortic Valve Structurally normal aortic valve without significant stenosis. Mild aortic insufficiency. Tricuspid Valve Structurally normal tricuspid valve without significant stenosis or regurgitation. Pulmonic Valve Structurally normal pulmonic valve without significant stenosis or regurgitation. Pericardium Normal pericardium without effusion. Aorta Normal aortic root for body surface area. The ascending aorta diameter is 3.1 cm. Valentín Krueger M.D. (Electronically Signed) Final Date:     24 June 2022                 12:13    US-EXTREMITY VENOUS UPPER BILAT    Result Date: 6/24/2022   Upper Extremity  Venous Duplex Report  Vascular Laboratory  CONCLUSIONS  Bilateral upper extremity venous duplex imaging.  Acute, partially occlusive deep venous thrombosis in the right internal  jugular vein with a visuale extension into the innominate vein.  Acute, occlusive deep venous thrombosis in one of the right paired brachial  veins within the distal bicep.  Acute, occlusive deep venous thrombosis in the right paired ulnar veins  within the proximal forearm.  Occlusive,  superficial thrombophlebitis in a small segment of the right  cephalic vein.  Acute, partially occlusive deep venous thrombosis in one of the left paired  brachial veins within the elbow region. Possible small extension into the  radial and ulnar veins.  JOSE POSADA  Exam Date:     2022 07:47  Room #:     Inpatient  Priority:     Stat  Ht (in):             Wt (lb):  Ordering Physician:        MAILE MOODY  Referring Physician:       [id number], MAILE MOODY,  Sonographer:               Anna Marie Lopez RVCHALO  Study Type:                Complete Bilateral  Technical Quality:         Adequate  Age:    66    Gender:     M  MRN:    [id number]  :    1955      BSA:  Indications:     Encounter for screening for cardiovascular disorders  CPT Codes:       03844  ICD Codes:       Z13.6  History:         Central line placement on the right internal jugular vein and                   recent stroke. No priors.  Limitations:  PROCEDURES:  Bilateral upper extremity venous duplex imaging.  The following venous structures were evaluated: internal jugular,  subclavian, axillary, brachial, cephalic, and basilic veins.  Serial compression, color, and spectral Doppler flow evaluations were  performed.  FINDINGS:  Right upper extremity-  Acute, partially occlusive deep venous thrombosis in the internal jugular  vein with a visuale extension into the innominate vein.  Acute, occlusive deep venous thrombosis in one of the paired brachial veins  within the distal bicep.  Acute, occlusive deep venous thrombosis in the paired ulnar veins within  the proximal forearm.  Occlusive, superficial thrombophlebitis in a small segment of the cephalic  vein.  All other veins demonstrate complete color filling and compressibility with  normal venous flow dynamics including spontaneous flow and respiratory  phasicity.  Left upper extremity-  Acute, partially occlusive deep venous thrombosis in one of the paired  brachial veins within the elbow region.  Possible small extension into the  radial and ulnar veins.  All other veins demonstrate complete color filling and compressibility with  normal venous flow dynamics including spontaneous flow and respiratory  phasicity.  JaceyDevantemerycharis ORTIZ To  (Electronically Signed)  Final Date:      24 June 2022                   09:02    DX-ABDOMEN FOR TUBE PLACEMENT    Result Date: 6/23/2022 6/23/2022 2:33 AM HISTORY/REASON FOR EXAM: Dobbhoff tube placement TECHNIQUE/EXAM DESCRIPTION:  2 AP view the abdomen. COMPARISON:  None FINDINGS: Limited views of the lung bases are clear. Trace bilateral pleural effusions are seen. Atherosclerotic ulcerations are noted. Dobbhoff tube is seen, the tip overlies the lumbar spine.  The bowel gas pattern appears nonspecific. The bony structures appear age-appropriate.     1.  Nonspecific bowel gas pattern. 2.  Dobbhoff tube tip terminates overlying the expected location of the gastric antrum. 3.  Trace bilateral pleural effusions      ASSESSMENT/PLAN:     66 y.o.  admitted 6/21/2022. Pt has a past medical history of methamphetamine use, tobacco abuse and right frontal stroke in 2019.  Known history of syphilis with positive antibody which per notes was never treated. He initially presented to [facility] ED complaining of headaches and falls. Then presented to [facility] and admitted.  Then with worsening encephalopathy and became obtunded.  MRI brain on 6/22 noted interval development of moderate obstructive hydrocephalus due to mass-effect on the fourth ventricle.  Large acute cerebral infarct.  Neurosurgery was consulted and he underwent EVD placement 6/23 with Craney and C1 laminectomy.   Patient has been transferred to the ICU.    Problem List    Large cerebral infarct and obstructive hydrocephalus with mass-effect  Positive syphilis screen with antibody, also seen last year and unknown if treated  -RPR 1:1 very low and not typically consistent with neurosyphilis.     -HIV on 6/21 negative  Right frontal CVA in 2019  History of methamphetamine use  History of tobacco abuse    Plan     --- Due to the history of potentially untreated syphilis in the setting of acute infarct the patient was started on daily penicillin -unlikely the CVA was related to syphilis but no way to definitively rule-out and is already correction through the CNS syphilis course.  Recommend completing a 10-day neurosyphilis course with IV penicillin 4,000,000 units every 4 hours  (end 7/3/22)   --- Monitor labs  --- Monitor for any new infections related to current condition    Dispo: TBD  PICC: No       Plan of care discussed with ARIA Jimenes M.D..ID will sign off.     Radha Marques M.D.

## 2022-06-25 NOTE — CARE PLAN
The patient is Watcher - Medium risk of patient condition declining or worsening    Shift Goals  Clinical Goals: Maintain blood pressure within set goal, Q1 neuro assessments, EVD output and ICP monitoring  Patient Goals: GLENDY  Family Goals: GLENDY      Problem: Pain - Standard  Goal: Alleviation of pain or a reduction in pain to the patient’s comfort goal  Outcome: Progressing     Problem: Fall Risk  Goal: Patient will remain free from falls  Outcome: Progressing       Patient is not progressing towards the following goals:      Problem: Knowledge Deficit - Standard  Goal: Patient and family/care givers will demonstrate understanding of plan of care, disease process/condition, diagnostic tests and medications  Outcome: Not Progressing     Problem: Safety - Medical Restraint  Goal: Free from restraint(s) (Restraint for Interference with Medical Device)  Outcome: Not Progressing

## 2022-06-25 NOTE — PROGRESS NOTES
Critical Care Medicine Faculty Progress Note    Brief HPI/problem list:  66y M hx of meth abuse, tobacco abuse, prior right frontal stroke 2019, htn, RPR +, [facility] and sent home. He presents 6/21 for re-evaluation and unable to ambulate thus was admitted after CT head with what was reported as left cerebellar and right frontal encephalomalacia and admitted to floor with MRI pending. He had worsening mental status and agitation given ativan and haldol. MRI shows 5pm 6/22 with acute PICA infarct with cerebellar edema compressing 4th ventricle and causing obstructive hydrocephalus and early herniation syndrome. Patient with cushing response and emergently transferred to ICU for intubation, 23% bolus, mannitol and emergent NSG, neurology consultation and EVD placement.     6/23 Occipital crani and C1 laminectomy by Dr Rizzo.   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  Echo 6/24 EF 70% with mild AI    Daily exam: ill appearing on ventilated EVD on right, right central line, pupils small difficult to access reactivity, blinks, good cough, trace withdrawals in lowers, no withdrawal in uppers, lungs cta, heart irregular, abdomen soft, no rash    Daily Multi discipline Rounding Report:  Neuro: ICP 3-10 CPP 80-90's, moving lower ext and upper spont, opens eyes to voice, spont RR on vent, + C/G/C  HR:   SBP: 130-160's Nicardipine 7.5  Tmax: afebrile  GI: TF at goal, BM pta  UOP: good 1275ml  Lines: central, line, artline, hernandez, EVD   Resp: vent 4 SBT fine 40-50's no apnea RSBI 40-50, no secretions, cough   Vte: scd's  PPI/H2:pepcid  Antibx: PCN G ID consulted    Plan of care:  Ct head this am reviewed   Increase oral BP regime start Lisinopril 20mg PO   Received a dose of fentanyl for CT will re-evaluate for safety of extubation later  today    Extubated this afternoon looks fairly well. Continue to watch closely need for NT suction or intubation. Currently with stable respiratory effort on 2l n/c.   Discussed case with Dr Mello tried to flush EVD will place 1mg TPA in EVD and monitor.   Increase salt tabs to 2g TID  Increase Metoprolol to 50mg BID      Patient remains in critical condition from ventilator titration and extubation trail, nicardipine gtt. Critical care time provided was 76 minutes. This excludes all separate billable procedures.     Please see [facility]/NP notes for additional documentation    Dickson Jimenes MD  Critical Care Medicine

## 2022-06-26 PROBLEM — Z99.11 ON MECHANICALLY ASSISTED VENTILATION (HCC): Status: RESOLVED | Noted: 2022-01-01 | Resolved: 2022-01-01

## 2022-06-26 NOTE — PROGRESS NOTES
Neurosurgery Progress Note    Subjective:  No acute events overnight. Ventriculostomy stopped draining in the early morning yesterday but now functioning after alteplase.  Echo completed this am.   Patient is more awake and doing well on Tpiece  Tolerated extubation    Exam:  Eye opening spontaneously  PERRL. Midline.  +cough, gag  Maintaining airway with clear breath sounds.   Responds to verbal with yes and no.   Did move both upper extremities to command, although nonspecifically.       Ventriculostomy in place and draining 4-6 cc/ hour    BP  Min: 122/62  Max: 166/80  Pulse  Av.9  Min: 67  Max: 147  Resp  Av  Min: 14  Max: 55  Temp  Av.1 °C (98.8 °F)  Min: 36.3 °C (97.3 °F)  Max: 37.7 °C (99.8 °F)  SpO2  Av.9 %  Min: 94 %  Max: 99 %    ICP  Av MM HG  Min: 0 MM HG  Max: 9 MM HG    Recent Labs     22  0250 22  0342 22  0255   WBC 12.0* 14.7* 14.8*   RBC 5.03 4.67* 4.57*   HEMOGLOBIN 13.8* 13.0* 12.4*   HEMATOCRIT 42.6 40.3* 39.3*   MCV 84.7 86.3 86.0   MCH 27.4 27.8 27.1   MCHC 32.4* 32.3* 31.6*   RDW 46.5 47.7 47.3   PLATELETCT 223 224 234   MPV 10.5 11.0 10.8     Recent Labs     22  1412 22  0255   SODIUM 146* 148* 146*   POTASSIUM 3.8 3.9 3.8   CHLORIDE 114* 113* 113*   CO2 24 24 23   GLUCOSE 134* 125* 140*   BUN 18 17 19   CREATININE 0.93 0.87 0.84   CALCIUM 8.4* 8.7 8.8               Intake/Output                       22 07 - 22 0659 22 07 - 2259     0248-2059 2088-9890 Total 6532-6734 6875-3979 Total                 Intake    I.V.  900  692.9 1592.9  --  -- --    Cardene Volume 900 692.9 1592.9 -- -- --    NG/GT  480  600 1080  --  -- --    Intake (mL) (Enteral Tube 22 Cortrak - Small Bowel/Transpyloric Left nare)  -- -- --    IV Piggyback  300  822.9 1122.9  --  -- --    Volume (mL) (penicillin G potassium 4 Million Units in  mL IVPB) 300 300 600 -- -- --    Volume (mL) (potassium  phosphate 30 mmol in  mL ivpb) -- 522.9 522.9 -- -- --    Enteral  30  120 150  --  -- --    Free Water / Tube Flush 30 120 150 -- -- --    Total Intake 1710 2235.8 3945.8 -- -- --       Output    Urine  1340  1875 3215  --  -- --    Output (mL) (Urethral Catheter) 1340 1875 3215 -- -- --    Drains  3  106 109  --  -- --    Output (mL) (ICP/Ventriculostomy) 3 106 109 -- -- --    Stool  --  -- --  --  -- --    Number of Times Stooled -- 0 x 0 x -- -- --    Total Output 1343 1981 3324 -- -- --       Net I/O     367 254.8 621.8 -- -- --            Intake/Output Summary (Last 24 hours) at 6/26/2022 1333  Last data filed at 6/26/2022 0600  Gross per 24 hour   Intake 2975.84 ml   Output 2719 ml   Net 256.84 ml            • metoprolol tartrate  50 mg Q8HRS   • furosemide  20 mg BID DIURETIC   • lisinopril  20 mg Q DAY   • sodium chloride  2 g TID WITH MEALS   • fentaNYL  50 mcg Q HOUR PRN   • LORazepam  2-4 mg Q4HRS PRN   • niCARdipine infusion  0-15 mg/hr Continuous   • enalaprilat  1.25 mg Q6HRS PRN   • Respiratory Therapy Consult   Continuous RT   • MD Alert...Adult ICU Electrolyte Replacement per Pharmacy   PHARMACY TO DOSE   • sodium chloride 23.4% ivpb  200 mEq Q6HRS PRN   • atorvastatin  80 mg Q EVENING   • Pharmacy  1 Each PHARMACY TO DOSE   • acetaminophen  650 mg Q6HRS PRN   • ondansetron  4 mg Q4HRS PRN   • senna-docusate  2 Tablet BID    And   • polyethylene glycol/lytes  1 Packet QDAY PRN    And   • magnesium hydroxide  30 mL QDAY PRN    And   • bisacodyl  10 mg QDAY PRN   • penicillin g  4 Million Units Q4HRS   • ondansetron  4 mg Q4HRS PRN   • Pharmacy Consult Request  1 Each PHARMACY TO DOSE       Assessment and Plan:  Hospital day #6 left cerebellar stroke  POD #3 right frontal EVD  POD #2 craniectomy, C1 lami  Prophylactic anticoagulation: no         Start date/time: tbd    Patient has maintained off vent overnight. Showing some positive movements forward. Did respond to verbal and followed in  uppers. Ventriculostomy responded to alteplase.   Continue current course

## 2022-06-26 NOTE — PROGRESS NOTES
Patient went into what appeared to be Afib and sustained rates >150. Notified Dr. Ruiz. STAT EKG ordered. Contacted STAT EKG technician at 0249. EKG completed at 0255. Updated MD. MD assessed patient at bedside.

## 2022-06-26 NOTE — CARE PLAN
The patient is Watcher - Medium risk of patient condition declining or worsening    Shift Goals  Clinical Goals: Improved neuro, monitor and trend ICP and EVD output values  Patient Goals: GLENDY  Family Goals: GLENDY    Problem: Skin Integrity  Goal: Skin integrity is maintained or improved  Outcome: Progressing     Patient is not progressing towards the following goals:      Problem: Knowledge Deficit - Standard  Goal: Patient and family/care givers will demonstrate understanding of plan of care, disease process/condition, diagnostic tests and medications  Outcome: Not Progressing     Problem: Safety - Medical Restraint  Goal: Free from restraint(s) (Restraint for Interference with Medical Device)  Outcome: Not Progressing

## 2022-06-26 NOTE — PROGRESS NOTES
Neurology Progress Note  Neurohospitalist Service, [facility]    Referring Physician: Dickson Jimenes M.D.    Chief Complaint   Patient presents with   • T-5000 GLF     Pt developed sudden onset of dizziness yesterday and has had multiple falls. Seen at [facility] for same s/s yesterday discharged with DX of headache/htn    • Dizziness   • Neck Pain   • Headache       HPI: Refer to initial documented Neurology H&P, as detailed in the patient's chart.    Interval History 6/26: Patient was able to tolerate extubation yesterday.  Also tPA was used on the EVD after it stopped draining.  Review of systems: In addition to what is detailed in the HPI and/or updated in the interval history, all other systems reviewed and are negative.    Past Medical History:    has a past medical history of Glaucoma.    FHx:  family history is not on file.    SHx:   reports that he has been smoking cigarettes. He has been smoking about 0.50 packs per day. He has never used smokeless tobacco. He reports current drug use. Drugs: Methamphetamines and Inhaled. He reports that he does not drink alcohol.    Medications:    Current Facility-Administered Medications:   •  potassium bicarbonate (KLYTE) effervescent tablet 25 mEq, 25 mEq, Enteral Tube, Once, Dickson Jimenes M.D.  •  lisinopril (PRINIVIL) tablet 20 mg, 20 mg, Enteral Tube, Q DAY, Dickson Jimenes M.D., 20 mg at 06/26/22 0515  •  sodium chloride (SALT) tablet 2 g, 2 g, Enteral Tube, TID WITH MEALS, Dickson Jimenes M.D., 2 g at 06/26/22 0634  •  [DISCONTINUED] fentaNYL (SUBLIMAZE) injection 25 mcg, 25 mcg, Intravenous, Q HOUR PRN **OR** [DISCONTINUED] fentaNYL (SUBLIMAZE) injection 50 mcg, 50 mcg, Intravenous, Q HOUR PRN, 50 mcg at 06/24/22 1201 **OR** fentaNYL (SUBLIMAZE) injection 50 mcg, 50 mcg, Intravenous, Q HOUR PRN, Dickson Jimenes M.D., 50 mcg at 06/26/22 0750  •  metoprolol tartrate (LOPRESSOR) tablet 50 mg, 50 mg, Enteral Tube, TWICE DAILY, Hannah  MICHELLE Gee, A.P.R.N., 50 mg at 06/26/22 0516  •  LORazepam (ATIVAN) injection 2-4 mg, 2-4 mg, Intravenous, Q4HRS PRN, Dickson Jimenes M.D.  •  niCARdipine (CARDENE) 25 mg in  mL Infusion, 0-15 mg/hr, Intravenous, Continuous, YAHAIRA Sexton.P.R.N., Paused at 06/26/22 0714  •  enalaprilat (Vasotec) injection 1.25 mg 1 mL, 1.25 mg, Intravenous, Q6HRS PRN, Dickson Jimenes M.D., 1.25 mg at 06/26/22 0831  •  Respiratory Therapy Consult, , Nebulization, Continuous RT, Dominic Damico M.D.  •  MD Alert...ICU Electrolyte Replacement per Pharmacy, , Other, PHARMACY TO DOSE, Dominic Damico M.D.  •  sodium chloride 200 mEq in empty bag 50 mL ivpb, 200 mEq, Intravenous, Q6HRS PRN, Dominic Damico M.D.  •  atorvastatin (LIPITOR) tablet 80 mg, 80 mg, Enteral Tube, Q EVENING, Dominic Damico M.D., 80 mg at 06/25/22 1741  •  Pharmacy Consult: Enteral tube insertion - review meds/change route/product selection, 1 Each, Other, PHARMACY TO DOSE, Dominic Damico M.D.  •  acetaminophen (Tylenol) tablet 650 mg, 650 mg, Enteral Tube, Q6HRS PRN, Dominic Damico M.D., 650 mg at 06/26/22 0831  •  ondansetron (ZOFRAN ODT) dispertab 4 mg, 4 mg, Enteral Tube, Q4HRS PRN, Dominic Damico M.D.  •  senna-docusate (PERICOLACE or SENOKOT S) 8.6-50 MG per tablet 2 Tablet, 2 Tablet, Enteral Tube, BID, 2 Tablet at 06/26/22 0515 **AND** polyethylene glycol/lytes (MIRALAX) PACKET 1 Packet, 1 Packet, Enteral Tube, QDAY PRN **AND** magnesium hydroxide (MILK OF MAGNESIA) suspension 30 mL, 30 mL, Enteral Tube, QDAY PRN **AND** bisacodyl (DULCOLAX) suppository 10 mg, 10 mg, Rectal, QDAY PRN, Dominic aDmico M.D.  •  penicillin G potassium 4 Million Units in  mL IVPB, 4 Million Units, Intravenous, Q4HRS, Dominic Damico M.D., Stopped at 06/26/22 0546  •  ondansetron (ZOFRAN) syringe/vial injection 4 mg, 4 mg, Intravenous, Q4HRS PRN, Leatha Timmons M.D.  •  Notify provider if pain remains uncontrolled, , , CONTINUOUS **AND** Use the Numeric Rating Scale  (NRS), Mc-Brunson Faces (WBF), or FLACC on regular floors and Critical-Care Pain Observation Tool (CPOT) on ICUs/Trauma to assess pain, , , CONTINUOUS **AND** Pulse Ox, , , CONTINUOUS **AND** Pharmacy Consult Request ...Pain Management Review 1 Each, 1 Each, Other, PHARMACY TO DOSE **AND** If patient difficult to arouse and/or has respiratory depression (respiratory rate of 10 or less), stop any opiates that are currently infusing and call a Rapid Response., , , CONTINUOUS, Roro Still M.D.    Physical Examination:     Vitals:    06/26/22 0500 06/26/22 0600 06/26/22 0700 06/26/22 0800   BP:       Pulse: (!) 138 (!) 117 88 67   Resp: (!) 24 14 (!) 36 (!) 26   Temp:  37.2 °C (98.9 °F)     TempSrc:  Temporal     SpO2: 97% 95% 96% 94%   Weight:       Height:               NEUROLOGICAL EXAM:     Extubated.  Awake his eyes open spontaneously.  He does look to the left and right to noxious stimuli.  Does not follow any commands.  However is moving all 4 extremities antigravity.  Withdrawing to pain.  Pupils are equal reactive.  There is no gaze preference.  Nystagmus appreciated.  Positive cough/gag.    Objective Data:    Labs:  Lab Results   Component Value Date/Time    PROTHROMBTM 14.7 (H) 06/23/2022 09:25 AM    INR 1.18 (H) 06/23/2022 09:25 AM      Lab Results   Component Value Date/Time    WBC 14.8 (H) 06/26/2022 02:55 AM    RBC 4.57 (L) 06/26/2022 02:55 AM    HEMOGLOBIN 12.4 (L) 06/26/2022 02:55 AM    HEMATOCRIT 39.3 (L) 06/26/2022 02:55 AM    MCV 86.0 06/26/2022 02:55 AM    MCH 27.1 06/26/2022 02:55 AM    MCHC 31.6 (L) 06/26/2022 02:55 AM    MPV 10.8 06/26/2022 02:55 AM    NEUTSPOLYS 74.30 (H) 06/26/2022 02:55 AM    LYMPHOCYTES 14.70 (L) 06/26/2022 02:55 AM    MONOCYTES 10.40 06/26/2022 02:55 AM    EOSINOPHILS 0.00 06/26/2022 02:55 AM    BASOPHILS 0.30 06/26/2022 02:55 AM      Lab Results   Component Value Date/Time    SODIUM 146 (H) 06/26/2022 02:55 AM    POTASSIUM 3.8 06/26/2022 02:55 AM    CHLORIDE 113  (H) 06/26/2022 02:55 AM    CO2 23 06/26/2022 02:55 AM    GLUCOSE 140 (H) 06/26/2022 02:55 AM    BUN 19 06/26/2022 02:55 AM    CREATININE 0.84 06/26/2022 02:55 AM      Lab Results   Component Value Date/Time    CHOLSTRLTOT 125 06/21/2022 07:53 AM    LDL 74 06/21/2022 07:53 AM    HDL 45 06/21/2022 07:53 AM    TRIGLYCERIDE 45 06/24/2022 02:50 AM       Lab Results   Component Value Date/Time    ALKPHOSPHAT 57 06/23/2022 02:05 PM    ASTSGOT 12 06/23/2022 02:05 PM    ALTSGPT 10 06/23/2022 02:05 PM    TBILIRUBIN 0.2 06/23/2022 02:05 PM        Imaging/Testing:  US-EXTREMITY VENOUS LOWER BILAT   Final Result      CT-HEAD W/O   Final Result      1.  There is a small amount of intraventricular blood that is new from 6/23/2022. The ventricles are stable in size.   2.  There is a right frontal approach ventriculostomy catheter with the tip terminating at the foramen of Monro. There is a punctate amount of parenchymal hemorrhage surrounding the catheter is unchanged.   3.  Right frontal and left cerebellar encephalomalacia.      EC-ECHOCARDIOGRAM COMPLETE W/O CONT   Final Result      US-CAROTID DOPPLER BILAT   Final Result      US-EXTREMITY VENOUS UPPER BILAT   Final Result      DX-CHEST-PORTABLE (1 VIEW)   Final Result         1.  No acute cardiopulmonary disease.   2.  Trace right pleural effusion   3.  Atherosclerosis      CT-CTA NECK WITH & W/O-POST PROCESSING   Final Result         1.  Severely hypoplastic right vertebral artery, central segment of the right vertebral artery is not visualized and may be occluded.         CT-CTA HEAD WITH & W/O-POST PROCESS   Final Result         1.  No large vessel occlusion or aneurysm identified   2.  Interval placement of right frontal approach ventriculostomy with postprocedural minimal hemorrhage and new pneumocephalus.   3.  Bilateral ventricular dilatation appears somewhat increased since prior study compatible with somewhat worsened hydrocephalus.   4.  Right frontal and left  cerebellar encephalomalacia      DX-CHEST-PORTABLE (1 VIEW)   Final Result         1.  No acute cardiopulmonary disease.   2.  Trace bilateral pleural effusion      DX-ABDOMEN FOR TUBE PLACEMENT   Final Result         1.  Nonspecific bowel gas pattern.   2.  Dobbhoff tube tip terminates overlying the expected location of the gastric antrum.   3.  Trace bilateral pleural effusions      DX-CHEST-PORTABLE (1 VIEW)   Final Result         1.  No acute cardiopulmonary disease.   2.  Trace bilateral pleural effusions   3.  Atherosclerosis      MR-BRAIN-WITH & W/O   Final Result   Addendum 1 of 1   ADDENDUM:      The case was discussed by telephone (call report) with DR. DAVONTE VIEIRA    on call for [facility], at 1854 hours.      Final      1.  Interval development of moderate obstructive hydrocephalus due to mass effect on the fourth ventricle. There is mild transependymal edema.   2.  Large area of acute infarction involving the left cerebellar hemisphere, inferior cerebellar vermis, and left cerebellar tonsil. PICA territory. No hemorrhagic transformation. This is the cause of the fourth ventricle effacement with obstructive    hydrocephalus. There is also mild left-sided cerebellar tonsillar herniation.   3.  Moderate supratentorial white matter disease most consistent with microvascular ischemic change.   4.  Old infarction right anterior frontal convexity.   5.  A Voalte message was sent to RICKEY HAYES at 1823 hours 6/22/2022. Prompt reply as of 6:41 PM not yet received. Efforts are ongoing to contact housestaff managing the patient at this time.   6.  Case was discussed (call report) with nurse SHASTA MAGILL at 6:47 PM. Request to initiate stat neurosurgery consult. Attempts to contact [facility] on-call housestaff are ongoing.      CT-HEAD W/O   Final Result      1.  No evidence of acute hemorrhage, mass or large territorial infarction   2.  LEFT cerebellar and RIGHT frontal encephalomalacia   3.  Mild  atrophy   4.  Mild white matter changes         CT-CSPINE WITHOUT PLUS RECONS   Final Result      1.  No acute fracture or traumatic listhesis in the cervical spine.   2.  Emphysema in the lung apices.            Assessment and Plan:    66-year-old male found to have a left lower cerebellar infarct status post EVD and suboccipital craniectomy on the 23rd.  Patient had mass-effect with hydrocephalus.  Infarct timing appears as unclear could be 5-6 days ago.  Found to have multiple DVTs now. He is already has max therapy on board with a craniectomy and EVD.  Etiology infarct most likely cardioembolic given episodes of A. fib while admitted.  EVD is draining again after tPA yesterday approximately 8 to 9 cc an hour.  Clinically he is slowly improving.  Was extubated yesterday.  Moving all fours to antigravity.  However is not following or speaking yet.  For now continue supportive therapy.  Will need to be anticoagulated which the timing will be dictated by neurosurgical preference given the craniectomy.          This chart was partially generated using voice recognition technology and sound alike word replacement may be present, best efforts were made to make the chart accurate.    Beni Jackson MD  Board Certified Neurology, ABPN  (t) [phone]

## 2022-06-26 NOTE — CARE PLAN
Extubation:  Patient extubated to 2 lpm nc per Dr. Jimenes's orders w/MD at bedside, no complications at this time. Stable upon RT departure.

## 2022-06-26 NOTE — PROGRESS NOTES
Critical Care Medicine Faculty Progress Note    Brief HPI/problem list:  66y M hx of meth abuse, tobacco abuse, prior right frontal stroke 2019, htn, RPR +, that presented [facility] for acute dizziness 6/20 and sent home. He presents 6/21 for re-evaluation and unable to ambulate thus was admitted after CT head with what was reported as left cerebellar and right frontal encephalomalacia and admitted to floor with MRI pending. He had worsening mental status and agitation given ativan and haldol. MRI shows 5pm 6/22 with acute PICA infarct with cerebellar edema compressing 4th ventricle and causing obstructive hydrocephalus and early herniation syndrome. Patient with cushing response and emergently transferred to ICU for intubation, 23% bolus, mannitol and emergent NSG, neurology consultation and EVD placement.     6/23 Occipital crani and C1 laminectomy by Dr Rizzo.   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  Echo 6/24 EF 70% with mild AI  Etiology of stroke is likely afib    Daily exam: EVD on right, right central line, pupils small difficult to access reactivity, opens eyes and follows commands in all extremities, some grunting verbally, lungs cta, heart irregular, abdomen soft, no rash    Daily Multi discipline Rounding Report:  Neuro: ICP 0-6, CPP 80-98 10ml/hr, moans and mummble,dose follow commands   HR: occasional afib  SBP: nicardipine gtt 2.5mg/hr  Tmax: afebrile  GI: cortrac at goal, BM pta  UOP: 1800ml  Lines: hernandez, central line, gurpreet  Resp: 2l n/c   Vte: contra  PPI/H2:n/a  Antibx: PCN G 5/10     Plan of care:  + 1.45L/net + 3.8L  Start diuretics lasix 20mg BID  Stop q6 Na check  D/c gurpreet  Metoprolol increase TID from BID  Will discuss timing of weaning EVD but with improved function and bloody output probably allow to clear before clamping trial       Patient  remains in critical condition from nicardipine gtt. Critical care time provided was 45 minutes. This excludes all separate billable procedures.     Please see NP notes for additional documentation    Dickson Jimenes MD  Critical Care Medicine

## 2022-06-26 NOTE — PROGRESS NOTES
Critical Care Progress Note    Date of admission  6/21/2022    Chief Complaint  66 y.o. male admitted 6/21/2022 with headaches and frequent falls    Hospital Course  66 yom  with a pmhx  of meth use, tobacco use, prior right frontal stroke (2019), HTN, syphilis who initially presented to the hospital complaining of multiple falls and severe headaches.  He was seen at [facility] sometime around 06/20/22 and discharged to home.  He then came to [facility]  ED on 6/21 for the same complaint.  CT of head was read as encephalomalacia in the left cerebellar and right frontal areas.  He was admitted to the hospital and yesterday  (6/22) began having a decrease in LOC becoming sleepy then obtunded.  Follow-up MRI noted significant edema with compression of the brain stem and obstructive hydrocephalus.  He was emergently brought to ICU where where he underwent emergent intubated and bedside EVD placement . He was treated with mannitol and 23% saline and started on 3% saline infusion.  6/25- Evd at 10 cm H20 -has not been draining follow-up -CT showed ventricles stable in size- draining well post alteplase administration.  Patient extubated.         Interval Problem Update  Reviewed last 24 hour events:  - Afib with RVR last night - treated wit metoprolol IVP X 3- rate controlled- increased metoprolol from 5omg BID to  50 mg Q 8 hrs   -S/P EVD placement  6/23/22-  -S/P crani and C1 laminectomy (Matthias)  6/23/22  -6/25- Evd at 10 cm H20 - draining well post alteplase administration last pm (6/25)  by sina    -CP-0-9  -CPP- 80-98  Tmax 99.8   IO- 24 +1500/since admit 3800  Lasix today  -Nicardipine infusing to maintain SBP goal of less   than 160- metoprolol increased to TID from BID  lisinopril added for BP control in attempt to wean off of nicardipine    - Multiple Upper extremity DVTs identified   -BM.-PTA  -ABX-PCN G     Review of Systems  Review of Systems   Unable to perform ROS: Medical condition         Vital Signs for last 24 hours   Temp:  [36.3 °C (97.3 °F)-37.7 °C (99.8 °F)] 37.3 °C (99.2 °F)  Pulse:  [] 71  Resp:  [14-55] 19  BP: (122-167)/(61-80) 167/74  SpO2:  [94 %-99 %] 98 %    Hemodynamic parameters for last 24 hours       Respiratory Information for the last 24 hours       Physical Exam   Physical Exam  Vitals and nursing note reviewed.   Constitutional:       General: He is awake.      Comments: Awake and agitated however becomes lethargic   HENT:      Head: Normocephalic.      Comments: Right EVD in place  Right IJ cath in place     Mouth/Throat:      Comments: ET tube in place and secured with nguyen  Neck:      Comments: RIJ 3 lumen WNL  Cardiovascular:      Rate and Rhythm: Normal rate and regular rhythm.      Pulses:           Radial pulses are 2+ on the right side and 2+ on the left side.        Dorsalis pedis pulses are 1+ on the right side and 1+ on the left side.   Pulmonary:      Breath sounds: Examination of the right-lower field reveals decreased breath sounds. Decreased breath sounds present.   Chest:   Breasts: Breasts are symmetrical.       Abdominal:      General: Abdomen is flat. Bowel sounds are normal.      Palpations: Abdomen is soft.      Comments: cor trak in place    Genitourinary:     Comments: Muse cath to down drain clear yellow urine  Skin:     General: Skin is warm.      Capillary Refill: Capillary refill takes 2 to 3 seconds.   Neurological:      Mental Status: He is lethargic.      Comments: Awake, agitated  Non verbal  Follows simple commands  Purposeful meovment   Psychiatric:         Attention and Perception: Auditory hallucinations: non verbal.         Behavior: Behavior is uncooperative.      Comments: Non verbal         Medications  Current Facility-Administered Medications   Medication Dose Route Frequency Provider Last Rate Last Admin   • metoprolol tartrate (LOPRESSOR) tablet 50 mg  50 mg Enteral Tube Q8HRS Dickson Jimenes M.D.   50 mg at 06/26/22 1320    • furosemide (LASIX) injection 20 mg  20 mg Intravenous BID DIURETIC Dickson Jimenes M.D.   20 mg at 06/26/22 1040   • lisinopril (PRINIVIL) tablet 20 mg  20 mg Enteral Tube Q DAY Dickson Jimenes M.D.   20 mg at 06/26/22 0515   • sodium chloride (SALT) tablet 2 g  2 g Enteral Tube TID WITH MEALS Dickson Jimenes M.D.   2 g at 06/26/22 1041   • fentaNYL (SUBLIMAZE) injection 50 mcg  50 mcg Intravenous Q HOUR PRN Dickson Jimenes M.D.   50 mcg at 06/26/22 1040   • LORazepam (ATIVAN) injection 2-4 mg  2-4 mg Intravenous Q4HRS PRN Dickson Jimenes M.D.       • niCARdipine (CARDENE) 25 mg in  mL Infusion  0-15 mg/hr Intravenous Continuous JESSICA Sexton.[email or web address]   Paused at 06/26/22 0714   • enalaprilat (Vasotec) injection 1.25 mg 1 mL  1.25 mg Intravenous Q6HRS PRN Dickson Jimenes M.D.   1.25 mg at 06/26/22 0831   • Respiratory Therapy Consult   Nebulization Continuous RT Dominic Damico M.D.       • MD Alert...ICU Electrolyte Replacement per Pharmacy   Other PHARMACY TO DOSE Dominic Damico M.D.       • sodium chloride 200 mEq in empty bag 50 mL ivpb  200 mEq Intravenous Q6HRS PRN Dominic Damico M.D.       • atorvastatin (LIPITOR) tablet 80 mg  80 mg Enteral Tube Q EVENING Dominic Damico M.D.   80 mg at 06/25/22 1741   • Pharmacy Consult: Enteral tube insertion - review meds/change route/product selection  1 Each Other PHARMACY TO DOSE Dominic Damico M.D.       • acetaminophen (Tylenol) tablet 650 mg  650 mg Enteral Tube Q6HRS PRN Dominic Damico M.D.   650 mg at 06/26/22 0831   • ondansetron (ZOFRAN ODT) dispertab 4 mg  4 mg Enteral Tube Q4HRS PRN Dominic Damico M.D.       • senna-docusate (PERICOLACE or SENOKOT S) 8.6-50 MG per tablet 2 Tablet  2 Tablet Enteral Tube BID Dominic Damico M.D.   2 Tablet at 06/26/22 0515    And   • polyethylene glycol/lytes (MIRALAX) PACKET 1 Packet  1 Packet Enteral Tube QDAY PRN Dominic Damico M.D.        And   • magnesium hydroxide (MILK OF MAGNESIA) suspension 30 mL  30 mL  Enteral Tube QDAY PRN Dominic Damico M.D.        And   • bisacodyl (DULCOLAX) suppository 10 mg  10 mg Rectal QDAY PRN Dominic Damico M.D.       • penicillin G potassium 4 Million Units in  mL IVPB  4 Million Units Intravenous Q4HRS Dickson Jimenes M.D. 200 mL/hr at 06/26/22 1321 4 Million Units at 06/26/22 1321   • ondansetron (ZOFRAN) syringe/vial injection 4 mg  4 mg Intravenous Q4HRS PRN Leatha Timmons M.D.       • Pharmacy Consult Request ...Pain Management Review 1 Each  1 Each Other PHARMACY TO DOSE Roro Still M.D.           Fluids    Intake/Output Summary (Last 24 hours) at 6/26/2022 1523  Last data filed at 6/26/2022 0600  Gross per 24 hour   Intake 2725.84 ml   Output 2394 ml   Net 331.84 ml       Laboratory  Recent Labs     06/24/22  0441 06/25/22  0505   ISTATAPH 7.456 7.445   ISTATAPCO2 30.6 38.3*   ISTATAPO2 101* 109*   ISTATATCO2 23 27   VYLWSJQ3QOD 98 98   ISTATARTHCO3 21.6 26.3*   ISTATARTBE -1 2   ISTATTEMP 97.8 F 98.3 F   ISTATFIO2 30 30   ISTATSPEC Arterial Arterial   ISTATAPHTC 7.463 7.448   DDYHUCHA2VA 98* 108*         Recent Labs     06/25/22  1412 06/25/22 2020 06/26/22  0255   SODIUM 146* 148* 146*   POTASSIUM 3.8 3.9 3.8   CHLORIDE 114* 113* 113*   CO2 24 24 23   BUN 18 17 19   CREATININE 0.93 0.87 0.84   MAGNESIUM 2.3  --  2.2   PHOSPHORUS 1.3*  --  2.8   CALCIUM 8.4* 8.7 8.8     Recent Labs     06/25/22  1412 06/25/22 2020 06/26/22  0255   GLUCOSE 134* 125* 140*     Recent Labs     06/24/22  0250 06/25/22  0342 06/26/22  0255   WBC 12.0* 14.7* 14.8*   NEUTSPOLYS 76.80* 75.60* 74.30*   LYMPHOCYTES 9.50* 11.00* 14.70*   MONOCYTES 13.10 12.60 10.40   EOSINOPHILS 0.00 0.00 0.00   BASOPHILS 0.20 0.20 0.30     Recent Labs     06/24/22  0250 06/25/22  0342 06/26/22  0255   RBC 5.03 4.67* 4.57*   HEMOGLOBIN 13.8* 13.0* 12.4*   HEMATOCRIT 42.6 40.3* 39.3*   PLATELETCT 223 224 234       Imaging  CT:    Reviewed  Ultrasound:  Reviewed    Assessment/Plan  * Acute  cerebrovascular accident (CVA) due to occlusion of left cerebellar artery (HCC)- (present on admission)  Assessment & Plan   Large left cerebellar ischemic infarct with cerebral edema and mass-effect on fourth ventricle, cryptogenic  -EVD in placed a.m. 6/23/2022 for suboccipital decompression-status post mannitol and 23% bolus as well as 3% saline infusion-keep ICPs<20  -Post crani and C1 laminectomy  6/23/22   -MAP goal greater than 65; SBP goal to keep   -Nicardipine to keep SBP less than 160- Metoprolol increased to TID from BIDl; Added Lisinopril for HTN; prn antihypertensives   -Propofol, fentanyl  -Elevate head of bed, keep head centered-maximize blood flow  -Goal sodium 150-155- with Na tabs - 2 g 3 times daily to keep sodium 150-155- normalize tomorrow   -ECHO- EF 70%  -Weaning  sedation   -Atorvastatin 80 mg - LDL goal less than 70  -Hold ASA and anticoagulants  -post crani/evd until ok with NSG  - CSF evaluation for neurosyphilis - pending   -PT/OT/SLP when appropriate  -palliative consult  -6/25- EVD not draining- alteplase administration by nasima- draining well      New onset a-fib (HCC)  Assessment & Plan  -6/23 New onset A. fib with rapid RVR during evening of 6/23  -Treated and Converted with metoprolol 5 mg x 3 followed by metoprolol XL  -6/26 Afib with RVR again last night early this am (6/25/6/26) treated again with metoprolol IVP- rate controlled. Increase metoprolol 50 mg Q 8 from metoprolol 50 mg BID   -continue to monitor     Acute bilateral deep vein thrombosis (DVT) of upper extremities (HCC)  Assessment & Plan  Acute, partially occlusive DVT to the R IJ with extension into the let innominate vein.    Acute, occlusive DVT in on of the right paired brachial veins within the distal bicept  Acute,  occlusive DVT in Right paired ulnar veins within proximal forearm  Occlusive superficial thrombophlebitis in a small segment of th right cephalic vein.   Acute partially occlusive DVT in left  paired brachial veins within the elbow region. Possible small extension into radial an ulnar veins   - Follow up lower extrem US- negative for DVT  Unable to start anticoagulation at this time due to new crani and eVD placement - Once ok with NSG and neurology we will start anticoagulation- CT head prior to starting any  anticoagulation       Goals of care, counseling/discussion  Assessment & Plan  -Unable to contact wife.  Message on phone service that phone has been disconnected  -Will contact  case management  -Wife is aware that patient is in the hospital however I am unsure if she is aware of his current status    Syphilis  Assessment & Plan  -Treponemal test is positive- confirmatory testing ending  -Treat empirically with PCN G 4,000,000 units every 4 hours  -Prior Syphilis infections  (2020) appears to have not been treated as ABX prescribed were never picked up   -CSF to eval for neurosyphilis- pending    -Health department notified  -ID consult for latent syphilis and treatment - Recommend completing  10-day for neurosyphilis with IV penicillin - 4,000,000 units every 4 hours with an end date of 7/3/22    Hypokalemia- (present on admission)  Assessment & Plan  Continue to monitor daily labs and replace as needed  Electrolyte protocol    Tobacco use- (present on admission)  Assessment & Plan  -Discussion regarding smoking cessation  When appropriate  -Provide information   -NRT if requests     Methamphetamine use (HCC)- (present on admission)  Assessment & Plan  Counseling cessation when clinical trajectory is more clear and appropriate       VTE:  Contraindicated  Ulcer: Not Indicated  Lines: Central Line  Ongoing indication addressed, Arterial Line  Ongoing indication addressed, Muse Catheter  Ongoing indication addressed and right EVD    I have performed a physical exam and reviewed and updated ROS and Plan today (6/26/2022). In review of yesterday's note (6/25/2022), there are no changes except as  documented above.     Discussed patient condition and risk of morbidity and/or mortality with RN, RT, Pharmacy, Dietary, Charge nurse / hot rounds and infectious disease, neurology and neurosurgery  The patient remains critically ill.  Critical care time = 50  minutes in directly providing and coordinating critical care and extensive data review.  No time overlap and excludes procedures.

## 2022-06-27 NOTE — CARE PLAN
Problem: Knowledge Deficit - Standard  Goal: Patient and family/care givers will demonstrate understanding of plan of care, disease process/condition, diagnostic tests and medications  Outcome: Not Progressing    Problem: Pain - Standard  Goal: Alleviation of pain or a reduction in pain to the patient’s comfort goal  Outcome: Progressing as expected Outcome: Not Progressing

## 2022-06-27 NOTE — THERAPY
Physical Therapy Contact Note    Patient Name: Alverto Duran  Age:  66 y.o., Sex:  male  Medical Record #: [id number]  Today's Date: 6/27/2022 06/27/22 0898   Interdisciplinary Plan of Care Collaboration   Collaboration Comments Per chart review pt with UE DVTs, not yet on anticoagulation.  Per dept algorithm, HOLD due to INR not within therapeutic range.  RN notified.  Pt now on HOLD from PT x 4 attempts.  Will attempt 1 more time, if pt not appropriate for PT will DC order.      Roor Jain, PT, DPT

## 2022-06-27 NOTE — DIETARY
Nutrition Services Brief Update:    Problem: Nutritional:  Goal: Nutrition support tolerated and meeting greater than 85% of estimated needs  Outcome: MET    Pt is receiving TF Fibersource HN at goal rate 60 ml/hr. Pt extubated 6/25, glucose appears stable. Pt is on lasix and Klyte, no drips.

## 2022-06-27 NOTE — PROGRESS NOTES
Neurosurgery paged regarding EVD. EVD had no drainage around 0900. Neurosurgery paged around 0941. Spoke to Erlinda at 0945 regarding EVD not draining and no flow present when lowered. Per Erlinda, leave EVD at 5cm H2O and she will have another APRN come assess drain.  Also mentioned to Erlinda when she was on the unit that patients left pupil is slightly larger than right- no new orders received.

## 2022-06-27 NOTE — PALLIATIVE CARE
"Palliative Care follow-up  PC RN visited BS and spoke with RN and APRN. Call placed to sister Maile at [phone] but no answer. Message left briefly explaining the role of this RN and requesting a call back to discuss Alverto's situation. She number for \"spouse\" Gudelia remains out of service.      Plan: f/u with sister calls back    Thank you for allowing Palliative Care to support this patient and family. Contact [phone] for additional assistance, change in patient status, or with any questions/concerns.     "

## 2022-06-27 NOTE — CARE PLAN
The patient is Watcher - Medium risk of patient condition declining or worsening    Shift Goals  Clinical Goals: improved neuro and stable neuro exam  Patient Goals: GLENDY  Family Goals: GLENDY    Progress made toward(s) clinical / shift goals:  Pt improved neuro wise is talking now. No pain. BP controlled with PRNS.

## 2022-06-27 NOTE — PROGRESS NOTES
Updated neurosurgery NORBERT Coffey that EVD is no longer draining probably due EVD clottng off in line. Per Erlinda, Dr Rizzo does not want to do alteplase at this time but would like an MRI to be completed.

## 2022-06-27 NOTE — PROGRESS NOTES
Critical Care Progress Note    Date of admission  6/21/2022    Chief Complaint  66 y.o. male admitted 6/21/2022 with headaches and frequent falls    Hospital Course  66 yom  with a pmhx  of meth use, tobacco use, prior right frontal stroke (2019), HTN, syphilis who initially presented to the hospital complaining of multiple falls and severe headaches.  He was seen at [place] ED sometime around 06/20/22 and discharged to home.  He then came to [facility]  ED on 6/21 for the same complaint.  CT of head was read as encephalomalacia in the left cerebellar and right frontal areas.  He was admitted to the hospital and yesterday  (6/22) began having a decrease in LOC becoming sleepy then obtunded.  Follow-up MRI noted significant edema with compression of the brain stem and obstructive hydrocephalus.  He was emergently brought to ICU where where he underwent emergent intubated and bedside EVD placement . He was treated with mannitol and 23% saline and started on 3% saline infusion.  6/25- Evd at 10 cm H20 -has not been draining follow-up -CT showed ventricles stable in size- draining well post alteplase administration.  Patient extubated.         Interval Problem Update  Reviewed last 24 hour events:  -EVD not draining neurosurgery contacted who would like MRI (not stat)  -S/P crani and C1 laminectomy (Matthias)  6/23/22  -Early this am EVD once again not draining- EVD at 10 cm H20 - NSG aware  -FU MRI ordered   -CP- 3-10  -CPP-92-100s  Tmax 98.9  IO- 24hr I/O -2500 I/O output since admit  +100  -Nicardipine infusing to maintain SBP goal of less   than 160-  -BM.-  Last night  -ABX-PCN G     Review of Systems  Review of Systems   Unable to perform ROS: Medical condition   Patient following commands however I am  unable to understand speech due to low volume and uncooperative    Vital Signs for last 24 hours   Temp:  [35.8 °C (96.5 °F)-37.2 °C (98.9 °F)] 36.7 °C (98.1 °F)  Pulse:  [] 96  Resp:  [16-57]  24  BP: (127-178)/(63-96) 133/69  SpO2:  [95 %-100 %] 100 %    Hemodynamic parameters for last 24 hours       Respiratory Information for the last 24 hours       Physical Exam   Physical Exam  Vitals and nursing note reviewed.   Constitutional:       General: He is awake.      Comments: Awake and agitated however becomes lethargic   HENT:      Head: Normocephalic.      Comments: Right EVD in place  Right IJ cath in place     Mouth/Throat:      Comments: ET tube in place and secured with nguyen  Neck:      Comments: RIJ 3 lumen WNL  Cardiovascular:      Rate and Rhythm: Normal rate. Rhythm irregular.      Pulses:           Radial pulses are 2+ on the right side and 2+ on the left side.        Dorsalis pedis pulses are 1+ on the right side and 1+ on the left side.   Pulmonary:      Breath sounds: Examination of the right-lower field reveals decreased breath sounds. Examination of the left-lower field reveals decreased breath sounds. Decreased breath sounds present.      Comments: Without any pulmonary respiratory distress  Chest:   Breasts: Breasts are symmetrical.        Comments: Equal chest rise and fall with respirations  Abdominal:      General: Abdomen is flat. Bowel sounds are normal.      Palpations: Abdomen is soft.      Comments: cor trak in place    Genitourinary:     Comments: Muse cath to down drain clear yellow urine  Skin:     General: Skin is warm.      Capillary Refill: Capillary refill takes 2 to 3 seconds.   Neurological:      Mental Status: He is lethargic.      Comments: Awake and alert  Agitated at times  Follows commands and moves all extremities spontaneously and strong  LORENA 3mm  Low volume vocalization difficult to understand   Psychiatric:         Attention and Perception: Auditory hallucinations: non verbal.         Behavior: Behavior is uncooperative.      Comments: Localizes however low volume  Able to understand and uncooperative         Medications  Current Facility-Administered  Medications   Medication Dose Route Frequency Provider Last Rate Last Admin   • Metoprolol Tartrate (LOPRESSOR) injection 5 mg  5 mg Intravenous Q5 MIN PRN Timo Lang M.D.       • fentaNYL (SUBLIMAZE) injection  mcg   mcg Intravenous Q HOUR PRN Hannah Gee A.P.R.N.       • metoprolol tartrate (LOPRESSOR) tablet 50 mg  50 mg Enteral Tube Q8HRS Dickson Jimenes M.D.   50 mg at 06/27/22 1406   • furosemide (LASIX) injection 20 mg  20 mg Intravenous BID DIURETIC Dickson Jimenes M.D.   20 mg at 06/27/22 1745   • lisinopril (PRINIVIL) tablet 20 mg  20 mg Enteral Tube Q DAY Dickson Jimenes M.D.   20 mg at 06/27/22 0633   • sodium chloride (SALT) tablet 2 g  2 g Enteral Tube TID WITH MEALS Dickson Jimenes M.D.   2 g at 06/27/22 1745   • fentaNYL (SUBLIMAZE) injection 50 mcg  50 mcg Intravenous Q HOUR PRN Dickson Jimenes M.D.   50 mcg at 06/27/22 0004   • LORazepam (ATIVAN) injection 2-4 mg  2-4 mg Intravenous Q4HRS PRN Dickson Jimenes M.D.       • niCARdipine (CARDENE) 25 mg in  mL Infusion  0-15 mg/hr Intravenous Continuous Hannah Gee, A.P.R.N. 25 mL/hr at 06/27/22 0905 2.5 mg/hr at 06/27/22 0905   • enalaprilat (Vasotec) injection 1.25 mg 1 mL  1.25 mg Intravenous Q6HRS PRN Dickson Jimenes M.D.   1.25 mg at 06/27/22 1603   • Respiratory Therapy Consult   Nebulization Continuous RT Dominic Damico M.D.       • MD Alert...ICU Electrolyte Replacement per Pharmacy   Other PHARMACY TO DOSE Dominic Damico M.D.       • sodium chloride 200 mEq in empty bag 50 mL ivpb  200 mEq Intravenous Q6HRS PRN Dominic Damico M.D.       • atorvastatin (LIPITOR) tablet 80 mg  80 mg Enteral Tube Q EVENING Dominic Damico M.D.   80 mg at 06/27/22 6065   • Pharmacy Consult: Enteral tube insertion - review meds/change route/product selection  1 Each Other PHARMACY TO DOSE Dominic Damico M.D.       • acetaminophen (Tylenol) tablet 650 mg  650 mg Enteral Tube Q6HRS PRN Dominic Damico M.D.   650 mg at 06/27/22 0910   •  ondansetron (ZOFRAN ODT) dispertab 4 mg  4 mg Enteral Tube Q4HRS PRN Dominic Damico M.D.       • senna-docusate (PERICOLACE or SENOKOT S) 8.6-50 MG per tablet 2 Tablet  2 Tablet Enteral Tube BID Dominic Damico M.D.   2 Tablet at 06/27/22 0634    And   • polyethylene glycol/lytes (MIRALAX) PACKET 1 Packet  1 Packet Enteral Tube QDAY PRN Dominic Damico M.D.        And   • magnesium hydroxide (MILK OF MAGNESIA) suspension 30 mL  30 mL Enteral Tube QDAY PRN Dominic Damico M.D.        And   • bisacodyl (DULCOLAX) suppository 10 mg  10 mg Rectal QDAY PRN Dominic Damico M.D.       • penicillin G potassium 4 Million Units in  mL IVPB  4 Million Units Intravenous Q4HRS Dickson Jimenes M.D. 200 mL/hr at 06/27/22 1745 4 Million Units at 06/27/22 1745   • ondansetron (ZOFRAN) syringe/vial injection 4 mg  4 mg Intravenous Q4HRS PRN Leatha Timmons M.D.       • Pharmacy Consult Request ...Pain Management Review 1 Each  1 Each Other PHARMACY TO DOSE Roro Still M.D.           Fluids    Intake/Output Summary (Last 24 hours) at 6/27/2022 1836  Last data filed at 6/27/2022 1600  Gross per 24 hour   Intake 2499.58 ml   Output 4413 ml   Net -1913.42 ml       Laboratory  Recent Labs     06/25/22  0505   ISTATAPH 7.445   ISTATAPCO2 38.3*   ISTATAPO2 109*   ISTATATCO2 27   PECUVIV8HZF 98   ISTATARTHCO3 26.3*   ISTATARTBE 2   ISTATTEMP 98.3 F   ISTATFIO2 30   ISTATSPEC Arterial   ISTATAPHTC 7.448   XDCWIQPB9JK 108*         Recent Labs     06/25/22  1412 06/25/22  2020 06/26/22  0255 06/27/22  0213   SODIUM 146* 148* 146* 144   POTASSIUM 3.8 3.9 3.8 3.6   CHLORIDE 114* 113* 113* 107   CO2 24 24 23 26   BUN 18 17 19 22   CREATININE 0.93 0.87 0.84 0.90   MAGNESIUM 2.3  --  2.2 2.4   PHOSPHORUS 1.3*  --  2.8 2.5   CALCIUM 8.4* 8.7 8.8 9.3     Recent Labs     06/25/22 2020 06/26/22  0255 06/27/22  0213   GLUCOSE 125* 140* 136*     Recent Labs     06/25/22  0342 06/26/22  0255   WBC 14.7* 14.8*   NEUTSPOLYS 75.60* 74.30*    LYMPHOCYTES 11.00* 14.70*   MONOCYTES 12.60 10.40   EOSINOPHILS 0.00 0.00   BASOPHILS 0.20 0.30     Recent Labs     06/25/22  0342 06/26/22  0255   RBC 4.67* 4.57*   HEMOGLOBIN 13.0* 12.4*   HEMATOCRIT 40.3* 39.3*   PLATELETCT 224 234       Imaging  X-Ray:  I have personally reviewed the images and compared with prior images.    Assessment/Plan  * Acute cerebrovascular accident (CVA) due to occlusion of left cerebellar artery (HCC)- (present on admission)  Assessment & Plan   Large left cerebellar ischemic infarct with cerebral edema and mass-effect on fourth ventricle, cryptogenic  -EVD in placed a.m. 6/23/2022 for suboccipital decompression-status post mannitol and 23% bolus as well as 3% saline infusion-keep ICPs<20  -Post crani and C1 laminectomy  6/23/22   -MAP goal greater than 65; SBP goal to keep   -Nicardipine to keep SBP less than 160- Metoprolol increased to TID from BIDl; Added Lisinopril for HTN; prn antihypertensives   -Propofol, fentanyl  -Elevate head of bed, keep head centered-maximize blood flow  -Goal sodium 150-155- with Na tabs - 2 g 3 times daily to keep sodium 150-155- normalize tomorrow   -ECHO- EF 70%  -Weaning  sedation   -Atorvastatin 80 mg - LDL goal less than 70  -Hold ASA and anticoagulants  -post crani/evd until ok with NSG  - CSF evaluation for neurosyphilis - pending   -PT/OT/SLP when appropriate  -palliative consult  -6/25- EVD not draining NSG aware - MRI pending-      New onset a-fib (HCC)  Assessment & Plan  -6/23 New onset A. fib with rapid RVR during evening of 6/23  -Treated and Converted with metoprolol 5 mg x 3 followed by metoprolol XL  -6/26 Afib with RVR again last night early this am (6/25/6/26) treated again with metoprolol IVP- rate controlled. Increase metoprolol 50 mg Q 8 from metoprolol 50 mg BID   -continue to monitor     Acute bilateral deep vein thrombosis (DVT) of upper extremities (HCC)  Assessment & Plan  Acute, partially occlusive DVT to the R IJ with  extension into the let innominate vein.    Acute, occlusive DVT in on of the right paired brachial veins within the distal bicept  Acute,  occlusive DVT in Right paired ulnar veins within proximal forearm  Occlusive superficial thrombophlebitis in a small segment of th right cephalic vein.   Acute partially occlusive DVT in left paired brachial veins within the elbow region. Possible small extension into radial an ulnar veins   - Follow up lower extrem US- negative for DVT  Unable to start anticoagulation at this time due to new crani and eVD placement - Once ok with NSG and neurology we will start anticoagulation- CT head prior to starting any  anticoagulation       Goals of care, counseling/discussion  Assessment & Plan  -Unable to contact wife.  Message on phone service that phone has been disconnected  -Case management and Palliative attempting to reach family/pt wife      Syphilis  Assessment & Plan  -Treponemal test is positive- confirmatory testing ending  -Treat empirically with PCN G 4,000,000 units every 4 hours  -Prior Syphilis infections  (2020) appears to have not been treated as ABX prescribed were never picked up   -CSF to eval for neurosyphilis- pending    -Health department notified  -ID consult for latent syphilis and treatment - Recommend completing  10-day for neurosyphilis with IV penicillin - 4,000,000 units every 4 hours with an end date of 7/3/22    Hypokalemia- (present on admission)  Assessment & Plan  Continue to monitor daily labs and replace as needed  Electrolyte protocol    Tobacco use- (present on admission)  Assessment & Plan  -Discussion regarding smoking cessation  When appropriate  -Provide information   -NRT if requests     Methamphetamine use (HCC)- (present on admission)  Assessment & Plan  Counseling cessation when clinical trajectory is more clear and appropriate       VTE:  Contraindicated  Ulcer: Not Indicated  Lines: Central Line  Ongoing indication addressed, Arterial  Line  Ongoing indication addressed, Muse Catheter  Ongoing indication addressed and right EVD    I have performed a physical exam and reviewed and updated ROS and Plan today (6/27/2022). In review of yesterday's note (6/26/2022), there are no changes except as documented above.     Discussed patient condition and risk of morbidity and/or mortality with RN, RT, Pharmacy, Dietary, Charge nurse / hot rounds and infectious disease, neurology and neurosurgery  The patient remains critically ill.  Critical care time = 45  minutes in directly providing and coordinating critical care and extensive data review.  No time overlap and excludes procedures.

## 2022-06-27 NOTE — PROGRESS NOTES
Neurology Progress Note  Neurohospitalist Service, [facility] Neurosciences    Referring Physician: Dickson Jimenes M.D.      Interval History: No acute events overnight.  Stable exam- remains mostly non-verbal    Review of systems: In addition to what is detailed in the HPI and/or updated in the interval history, all other systems reviewed and are negative.    Past Medical History, Past Surgical History and Social History reviewed and unchanged from prior    Medications:    Current Facility-Administered Medications:   •  Metoprolol Tartrate (LOPRESSOR) injection 5 mg, 5 mg, Intravenous, Q5 MIN PRN, Timo Lang M.D.  •  metoprolol tartrate (LOPRESSOR) tablet 50 mg, 50 mg, Enteral Tube, Q8HRS, Dickson Jimenes M.D., 50 mg at 06/27/22 0634  •  furosemide (LASIX) injection 20 mg, 20 mg, Intravenous, BID DIURETIC, Dickson Jimenes M.D., 20 mg at 06/27/22 0634  •  lisinopril (PRINIVIL) tablet 20 mg, 20 mg, Enteral Tube, Q DAY, Dickson Jimenes M.D., 20 mg at 06/27/22 0633  •  sodium chloride (SALT) tablet 2 g, 2 g, Enteral Tube, TID WITH MEALS, Dickson Jimenes M.D., 2 g at 06/27/22 0635  •  [DISCONTINUED] fentaNYL (SUBLIMAZE) injection 25 mcg, 25 mcg, Intravenous, Q HOUR PRN **OR** [DISCONTINUED] fentaNYL (SUBLIMAZE) injection 50 mcg, 50 mcg, Intravenous, Q HOUR PRN, 50 mcg at 06/24/22 1201 **OR** fentaNYL (SUBLIMAZE) injection 50 mcg, 50 mcg, Intravenous, Q HOUR PRN, Dickson Jimenes M.D., 50 mcg at 06/27/22 0004  •  LORazepam (ATIVAN) injection 2-4 mg, 2-4 mg, Intravenous, Q4HRS PRN, Dickson Jimenes M.D.  •  niCARdipine (CARDENE) 25 mg in  mL Infusion, 0-15 mg/hr, Intravenous, Continuous, YAHAIRA Sexton.P.R.N., Last Rate: 25 mL/hr at 06/27/22 0905, 2.5 mg/hr at 06/27/22 0905  •  enalaprilat (Vasotec) injection 1.25 mg 1 mL, 1.25 mg, Intravenous, Q6HRS PRN, Dickson Jimenes M.D., 1.25 mg at 06/27/22 0400  •  Respiratory Therapy Consult, , Nebulization, Continuous RT, SHARDA Lazcano MD  Alert...ICU Electrolyte Replacement per Pharmacy, , Other, PHARMACY TO DOSE, Dominic Damico M.D.  •  sodium chloride 200 mEq in empty bag 50 mL ivpb, 200 mEq, Intravenous, Q6HRS PRN, Dominic Damico M.D.  •  atorvastatin (LIPITOR) tablet 80 mg, 80 mg, Enteral Tube, Q EVENING, Dominic Damico M.D., 80 mg at 06/26/22 1749  •  Pharmacy Consult: Enteral tube insertion - review meds/change route/product selection, 1 Each, Other, PHARMACY TO DOSE, Dominic Damico M.D.  •  acetaminophen (Tylenol) tablet 650 mg, 650 mg, Enteral Tube, Q6HRS PRN, Dominic Damico M.D., 650 mg at 06/27/22 0910  •  ondansetron (ZOFRAN ODT) dispertab 4 mg, 4 mg, Enteral Tube, Q4HRS PRN, Dominic Daimco M.D.  •  senna-docusate (PERICOLACE or SENOKOT S) 8.6-50 MG per tablet 2 Tablet, 2 Tablet, Enteral Tube, BID, 2 Tablet at 06/27/22 0634 **AND** polyethylene glycol/lytes (MIRALAX) PACKET 1 Packet, 1 Packet, Enteral Tube, QDAY PRN **AND** magnesium hydroxide (MILK OF MAGNESIA) suspension 30 mL, 30 mL, Enteral Tube, QDAY PRN **AND** bisacodyl (DULCOLAX) suppository 10 mg, 10 mg, Rectal, QDAY PRN, Dominic Damico M.D.  •  penicillin G potassium 4 Million Units in  mL IVPB, 4 Million Units, Intravenous, Q4HRS, Dickson Jimenes M.D., Last Rate: 200 mL/hr at 06/27/22 1112, 4 Million Units at 06/27/22 1112  •  ondansetron (ZOFRAN) syringe/vial injection 4 mg, 4 mg, Intravenous, Q4HRS PRN, Leatha Timmons M.D.  •  Notify provider if pain remains uncontrolled, , , CONTINUOUS **AND** Use the Numeric Rating Scale (NRS), Mc-Baker Faces (WBF), or FLACC on regular floors and Critical-Care Pain Observation Tool (CPOT) on ICUs/Trauma to assess pain, , , CONTINUOUS **AND** Pulse Ox, , , CONTINUOUS **AND** Pharmacy Consult Request ...Pain Management Review 1 Each, 1 Each, Other, PHARMACY TO DOSE **AND** If patient difficult to arouse and/or has respiratory depression (respiratory rate of 10 or less), stop any opiates that are currently infusing and call a  "Rapid Response., , , CONTINUOUS, Roro Still M.D.    Physical Examination:   /73   Pulse 85   Temp 37.2 °C (98.9 °F) (Temporal)   Resp (!) 41   Ht 1.778 m (5' 10\")   Wt 65.3 kg (143 lb 15.4 oz)   SpO2 99%   BMI 20.66 kg/m²     EVD at 4  24 hour output: 189 out  ICPs 3-9    General: Eyes closed, awakes to voice  Neck: There is normal range of motion  CV: Regular rate   Extremities:  Warm, dry, and intact, without peripheral lower extremity edema    NEUROLOGICAL EXAM:     Mental status: Eyes closed, awakes to voice  Speech and language: Minimal speech output, does not follow midline or distal commands for me.  Cranial nerve exam: Pupils are equal, round and reactive to light bilaterally.  Blinks to threat bilaterally.  Does regard both side of room. Face is symmetric.  Motor exam: Does not cooperate for confrontational testing, but does move all 4 extremities antigravity  Sensory exam:  Reacts to tactile in all 4 extremities, there is no obvious neglect to double stim  Coordination: No vignesh ataxia on elicited movements    Objective Data:    Labs:  Lab Results   Component Value Date/Time    PROTHROMBTM 14.7 (H) 06/23/2022 09:25 AM    INR 1.18 (H) 06/23/2022 09:25 AM      Lab Results   Component Value Date/Time    WBC 14.8 (H) 06/26/2022 02:55 AM    RBC 4.57 (L) 06/26/2022 02:55 AM    HEMOGLOBIN 12.4 (L) 06/26/2022 02:55 AM    HEMATOCRIT 39.3 (L) 06/26/2022 02:55 AM    MCV 86.0 06/26/2022 02:55 AM    MCH 27.1 06/26/2022 02:55 AM    MCHC 31.6 (L) 06/26/2022 02:55 AM    MPV 10.8 06/26/2022 02:55 AM    NEUTSPOLYS 74.30 (H) 06/26/2022 02:55 AM    LYMPHOCYTES 14.70 (L) 06/26/2022 02:55 AM    MONOCYTES 10.40 06/26/2022 02:55 AM    EOSINOPHILS 0.00 06/26/2022 02:55 AM    BASOPHILS 0.30 06/26/2022 02:55 AM      Lab Results   Component Value Date/Time    SODIUM 144 06/27/2022 02:13 AM    POTASSIUM 3.6 06/27/2022 02:13 AM    CHLORIDE 107 06/27/2022 02:13 AM    CO2 26 06/27/2022 02:13 AM    GLUCOSE 136 (H) " 06/27/2022 02:13 AM    BUN 22 06/27/2022 02:13 AM    CREATININE 0.90 06/27/2022 02:13 AM      Lab Results   Component Value Date/Time    CHOLSTRLTOT 125 06/21/2022 07:53 AM    LDL 74 06/21/2022 07:53 AM    HDL 45 06/21/2022 07:53 AM    TRIGLYCERIDE 45 06/24/2022 02:50 AM       Lab Results   Component Value Date/Time    ALKPHOSPHAT 57 06/23/2022 02:05 PM    ASTSGOT 12 06/23/2022 02:05 PM    ALTSGPT 10 06/23/2022 02:05 PM    TBILIRUBIN 0.2 06/23/2022 02:05 PM        Imaging/Testing:    I interpreted and/or reviewed the patient's neuroimaging    US-EXTREMITY VENOUS LOWER BILAT   Final Result      CT-HEAD W/O   Final Result      1.  There is a small amount of intraventricular blood that is new from 6/23/2022. The ventricles are stable in size.   2.  There is a right frontal approach ventriculostomy catheter with the tip terminating at the foramen of Monro. There is a punctate amount of parenchymal hemorrhage surrounding the catheter is unchanged.   3.  Right frontal and left cerebellar encephalomalacia.      EC-ECHOCARDIOGRAM COMPLETE W/O CONT   Final Result      US-CAROTID DOPPLER BILAT   Final Result      US-EXTREMITY VENOUS UPPER BILAT   Final Result      DX-CHEST-PORTABLE (1 VIEW)   Final Result         1.  No acute cardiopulmonary disease.   2.  Trace right pleural effusion   3.  Atherosclerosis      CT-CTA NECK WITH & W/O-POST PROCESSING   Final Result         1.  Severely hypoplastic right vertebral artery, central segment of the right vertebral artery is not visualized and may be occluded.         CT-CTA HEAD WITH & W/O-POST PROCESS   Final Result         1.  No large vessel occlusion or aneurysm identified   2.  Interval placement of right frontal approach ventriculostomy with postprocedural minimal hemorrhage and new pneumocephalus.   3.  Bilateral ventricular dilatation appears somewhat increased since prior study compatible with somewhat worsened hydrocephalus.   4.  Right frontal and left cerebellar  encephalomalacia      DX-CHEST-PORTABLE (1 VIEW)   Final Result         1.  No acute cardiopulmonary disease.   2.  Trace bilateral pleural effusion      DX-ABDOMEN FOR TUBE PLACEMENT   Final Result         1.  Nonspecific bowel gas pattern.   2.  Dobbhoff tube tip terminates overlying the expected location of the gastric antrum.   3.  Trace bilateral pleural effusions      DX-CHEST-PORTABLE (1 VIEW)   Final Result         1.  No acute cardiopulmonary disease.   2.  Trace bilateral pleural effusions   3.  Atherosclerosis      MR-BRAIN-WITH & W/O   Final Result   Addendum 1 of 1   ADDENDUM:      The case was discussed by telephone (call report) with DR. DAVONTE VIEIRA    on call for [facility], at 1854 hours.      Final      1.  Interval development of moderate obstructive hydrocephalus due to mass effect on the fourth ventricle. There is mild transependymal edema.   2.  Large area of acute infarction involving the left cerebellar hemisphere, inferior cerebellar vermis, and left cerebellar tonsil. PICA territory. No hemorrhagic transformation. This is the cause of the fourth ventricle effacement with obstructive    hydrocephalus. There is also mild left-sided cerebellar tonsillar herniation.   3.  Moderate supratentorial white matter disease most consistent with microvascular ischemic change.   4.  Old infarction right anterior frontal convexity.   5.  A Voalte message was sent to RICKEY HAYES at 1823 hours 6/22/2022. Prompt reply as of 6:41 PM not yet received. Efforts are ongoing to contact housestaff managing the patient at this time.   6.  Case was discussed (call report) with nurse SHASTA MAGILL at 6:47 PM. Request to initiate stat neurosurgery consult. Attempts to contact [facility] on-call housestaff are ongoing.      CT-HEAD W/O   Final Result      1.  No evidence of acute hemorrhage, mass or large territorial infarction   2.  LEFT cerebellar and RIGHT frontal encephalomalacia   3.  Mild atrophy    4.  Mild white matter changes         CT-CSPINE WITHOUT PLUS RECONS   Final Result      1.  No acute fracture or traumatic listhesis in the cervical spine.   2.  Emphysema in the lung apices.      MR-BRAIN-WITH & W/O    (Results Pending)       Assessment and Plan:  Alverto Duran is a 66 year old man with atrial fibrillation, initially admitted as a trauma code for ground level fall, eventually recognized to have a large L PICA stroke, ultimately resulting in obstructive hydrocephalus, requiring surgical decompression.  He is now POD#3 from suboccipital decompression.  Neurologic exam is slowly improving.  EVD with slowed output over course of this AM.  At this time, would continue with 3% therapy to mitigate cytotoxic edema and minimize local mass effect on the 4th ventricle to relieve his CSF blockage.  Eventually will need to be placed on apixaban for long-term secondary stroke prevention.    Problem list:  1.  Acute L PICA stroke  2.  Cerebral edema with symptomatic mass effect  3.  S/p suboccipital decompression, EVD placement  4.  Obstructive hydrocephalus  5.  Atrial fibrillation    Plan:   - q4h neurochecks/NIHSS ok   - long-term BP goal is 110-130/60-80; acutely may aim for SBP goal 100-160, on cardene PRN, transition to enteral meds as needed   - Na goal 145-155- restart 3%, add salt tabs- this is to mitigate localized mass effect and re-establish CSF flow   - continue EVD @ 5, measure output and ICP- if no output- consider repeat head CT in 12-24 hours (low output may mean CSF outflow is re-established).  Drain no more than 15cc/hr to prevent upward herniation   - will start eliquis 5mg BID once EVD out for long-term secondary stroke prevention   - LDL is 74, decrease atorvastatin to 40mg daily for goal LDL < 70   - not a current candidate for PT/OT/SLP   - SCDs only for DVT ppx    Upon my evaluation, this patient had a high probability of imminent or life-threatening deterioration due to increased  ICP, hydrocephalus, recurrent stroke which required my direct attention, intervention, and personal management.  I personally provided 55 minutes of total critical care time outside of time spent on separately billable/documented procedures. Time includes: review of laboratory data, review of radiology studies, discussion with consultants, discussion with family/patient, monitoring for potential decompensation.  Interventions were performed as documented in the chart.    The evaluation of the patient, and recommended management, was discussed with the ICU staff. I have performed a physical exam and reviewed and updated ROS and Plan today (6/27/2022).     Beni Gutierrez MD  Neurohospitalist, Acute Care Services

## 2022-06-27 NOTE — PROGRESS NOTES
[facility] Critical Care Medicine Progress Note    Brief HPI/problem list:  66y M hx of meth abuse, tobacco abuse, prior right frontal stroke 2019, htn, RPR +, that presented [facility] ER for acute dizziness 6/20 and sent home. He presents 6/21 for re-evaluation and unable to ambulate thus was admitted after CT head with what was reported as left cerebellar and right frontal encephalomalacia and admitted to floor with MRI pending. He had worsening mental status and agitation given ativan and haldol. MRI shows 5pm 6/22 with acute PICA infarct with cerebellar edema compressing 4th ventricle and causing obstructive hydrocephalus and early herniation syndrome. Patient with cushing response and emergently transferred to ICU for intubation, 23% bolus, mannitol and emergent NSG, neurology consultation and EVD placement.     6/23 Occipital crani and C1 laminectomy by Dr Rizzo.   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  Echo 6/24 EF 70% with mild AI  Etiology of stroke is likely afib  6/26 recurrent AF/RVR controlled with PRN IV BB  6/27 EVD     Daily exam: EVD on right, right central line, A&O x3-4, brainstem reflexes improved, follows commands in all extremities, very verbal today, lungs cta, heart irregular, abdomen soft, no rash    Team rounds:  Neuro: ICP 0-6, CPP 80-98 10ml/hr, following well, A&O x3-4  HR: occasional afib-SR  SBP: nicardipine gtt 2.5mg/hr, actively titrating  Tmax: 98.9  GI: cortrak at goal, BM pta  UOP: 5.3 L  Lines: hernandez, central line, gurpreet  Resp: 2l n/c   Vte: contraindicated  PPI/H2:n/a  Antibx: PCN G 6/10     ASSESSMENT:  CVA w/herniation  S/p EVD  S/p craniotomy-C1/occiput decompression  HTN  PAF  RF improved - extubated  Latent syphilis  DVT UEs, no anticoagulation  Normochromic anemia  Hypophosphatemia resolved  Mild hyperglycemia  H/o Meth use  H/o TOB  use    Plan of care:  EVD draining  Hypertonic Na Rx  BB for AF  Holding anticoag - will need longterm when ok with NS  SCDs  Adjust Bp Rx enterally  Nicardipine infusion  PRN labetalol  High dose statin  Forced diuresis as needed  Optimize electrolytes    Will continue to discuss timing of weaning EVD but with improved function and bloody output probably allow to clear before clamping trial     Case reviewed with NS/neurology, patient, RT, RN, Charge RN, Clinical Pharmacist and Residents    Patient remains in critical condition from nicardipine gtt. Critical care time provided was 35 minutes. This excludes all separate billable procedures.     Please see NP notes for additional documentation    Yung Chatterjee MD  [facility]

## 2022-06-27 NOTE — CARE PLAN
The patient is Stable - Low risk of patient condition declining or worsening    Shift Goals  Clinical Goals: Stable Q1H neuro checks, SBP <160, wean cardene,  Patient Goals: Go home  Family Goals: GLENDY    Progress made toward(s) clinical / shift goals:  in progress      Problem: Pain - Standard  Goal: Alleviation of pain or a reduction in pain to the patient’s comfort goal  Outcome: Progressing     Problem: Fall Risk  Goal: Patient will remain free from falls  Outcome: Progressing     Problem: Safety - Medical Restraint  Goal: Remains free of injury from restraints (Restraint for Interference with Medical Device)  Outcome: Progressing     Problem: Skin Integrity  Goal: Skin integrity is maintained or improved  Outcome: Progressing       Patient is not progressing towards the following goals:      Problem: Knowledge Deficit - Standard  Goal: Patient and family/care givers will demonstrate understanding of plan of care, disease process/condition, diagnostic tests and medications  Outcome: Not Progressing  Note: Patient A&Ox1- reorientation provided frequently

## 2022-06-27 NOTE — PROGRESS NOTES
Neurosurgery Progress Note    Subjective:  No acute events overnight. Ventriculostomy stopped draining Saturday, began functioning after alteplase.  Pt awake but drowsy, extubated.  Went into Afib with RVR this morning.    Exam:  Awake, drowsy   Faint, hoarse voice. Mumbles incomprehensive words with AO questions. Once I started PE he began to speak more clearly, asking why when told to stick out his tongue.   Pupils: right 3mm, left 4mm. Round, sluggish   Tongue dry, midline.   Hearing intact.   Follows in all extremities, weak, equal.  Bilat wrists in soft restraints  Sensation: Intact throughout.   Incision: Top scalp w/staples. Tagaderm covering. No drainage seen  EVD site: drain in place, biopatch and tagaderm over site. No drainage  Muse  EVD: 56cc/8hr, serosang      BP  Min: 127/75  Max: 178/87  Pulse  Av.4  Min: 64  Max: 170  Resp  Av.2  Min: 12  Max: 57  Temp  Av.1 °C (98.7 °F)  Min: 36.7 °C (98 °F)  Max: 37.3 °C (99.2 °F)  SpO2  Av.3 %  Min: 95 %  Max: 99 %    ICP  Av.1 MM HG  Min: 2 MM HG  Max: 14 MM HG    Recent Labs     22  0342 22  0255   WBC 14.7* 14.8*   RBC 4.67* 4.57*   HEMOGLOBIN 13.0* 12.4*   HEMATOCRIT 40.3* 39.3*   MCV 86.3 86.0   MCH 27.8 27.1   MCHC 32.3* 31.6*   RDW 47.7 47.3   PLATELETCT 224 234   MPV 11.0 10.8     Recent Labs     22  2020 22  0255 22  0213   SODIUM 148* 146* 144   POTASSIUM 3.9 3.8 3.6   CHLORIDE 113* 113* 107   CO2 24 23 26   GLUCOSE 125* 140* 136*   BUN 17  22   CREATININE 0.87 0.84 0.90   CALCIUM 8.7 8.8 9.3               Intake/Output                       22 0700 - 22 0659 22 07 - 22 659 Total  Total                 Intake    I.V.  37.1  637.5 674.6  --  -- --    Cardene Volume 37.1 637.5 674.6 -- -- --    NG/GT  720  720 1440  --  -- --    Intake (mL) (Enteral Tube 22 Cortrak - Small Bowel/Transpyloric Left nare)  -- -- --     IV Piggyback  236.7  -- 236.7  --  -- --    Volume (mL) (penicillin G potassium 4 Million Units in  mL IVPB) 236.7 -- 236.7 -- -- --    Enteral  --  90 90  --  -- --    Free Water / Tube Flush -- 90 90 -- -- --    Total Intake 993.8 1447.5 2441.3 -- -- --       Output    Urine  3200  2300 5500  --  -- --    Urine -- 1100 1100 -- -- --    Output (mL) (Urethral Catheter) 3200 1200 4400 -- -- --    Drains  102  87 189  0  -- 0    Output (mL) (ICP/Ventriculostomy) 102 87 189 0 -- 0    Stool  --  -- --  --  -- --    Number of Times Stooled -- -- -- 1 x -- 1 x    Total Output 3302 2387 5689 0 -- 0       Net I/O     -2308.3 -939.5 -3247.8 0 -- 0            Intake/Output Summary (Last 24 hours) at 6/27/2022 1043  Last data filed at 6/27/2022 0800  Gross per 24 hour   Intake 2410.42 ml   Output 5357 ml   Net -2946.58 ml            • Metoprolol Tartrate  5 mg Q5 MIN PRN   • metoprolol tartrate  50 mg Q8HRS   • furosemide  20 mg BID DIURETIC   • lisinopril  20 mg Q DAY   • sodium chloride  2 g TID WITH MEALS   • fentaNYL  50 mcg Q HOUR PRN   • LORazepam  2-4 mg Q4HRS PRN   • niCARdipine infusion  0-15 mg/hr Continuous   • enalaprilat  1.25 mg Q6HRS PRN   • Respiratory Therapy Consult   Continuous RT   • MD Alert...Adult ICU Electrolyte Replacement per Pharmacy   PHARMACY TO DOSE   • sodium chloride 23.4% ivpb  200 mEq Q6HRS PRN   • atorvastatin  80 mg Q EVENING   • Pharmacy  1 Each PHARMACY TO DOSE   • acetaminophen  650 mg Q6HRS PRN   • ondansetron  4 mg Q4HRS PRN   • senna-docusate  2 Tablet BID    And   • polyethylene glycol/lytes  1 Packet QDAY PRN    And   • magnesium hydroxide  30 mL QDAY PRN    And   • bisacodyl  10 mg QDAY PRN   • penicillin g  4 Million Units Q4HRS   • ondansetron  4 mg Q4HRS PRN   • Pharmacy Consult Request  1 Each PHARMACY TO DOSE       Assessment and Plan:  Hospital day #7 left cerebellar stroke  POD #4 right frontal EVD  POD #3 craniectomy, C1 lami  Prophylactic anticoagulation: no          Start date/time: tbd    Plan:   Neuro exam sig improved.   Con't EVD drain. Raise to 10cm H2O  Na 144, stable, continue to monitor      Addendum:  Paged for no output of EVD.since 0800. Discussed with Dr Rizzo. Can try stripping drain.   MRI w/o to evaluate for hydrocephalus

## 2022-06-27 NOTE — PROGRESS NOTES
Notified by nursing that patient has increased heart rate up to 173.  At bedside on monitor patient was in A. fib with RVR.  This was confirmed on EKG.  As needed metoprolol IV push ordered, patient converted with HR now in 90s.

## 2022-06-28 PROBLEM — F17.200 TOBACCO DEPENDENCE: Status: ACTIVE | Noted: 2022-01-01

## 2022-06-28 PROBLEM — F15.10 METHAMPHETAMINE USE (HCC): Chronic | Status: ACTIVE | Noted: 2022-01-01

## 2022-06-28 NOTE — PROGRESS NOTES
4 Eyes Skin Assessment Completed by SHAREE Mai and SHAREE Carreno.    Head Incision mid back of head and middle front of head with EVD drain site   Ears WDL  Nose WDL  Mouth WDL  Neck WDL  Breast/Chest WDL  Shoulder Blades WDL  Spine WDL  (R) Arm/Elbow/Hand WDL  (L) Arm/Elbow/Hand WDL  Abdomen WDL  Groin WDL  Scrotum/Coccyx/Buttocks WDL  (R) Leg discoloration from old scars   (L) Leg discoloration from old scars  (R) Heel/Foot/Toe dry, calloused, boggy  (L) Heel/Foot/Toe dry, calloused, boggy          Devices In Places ECG, Blood Pressure Cuff, Pulse Ox, SCD's and Ortho Boot/Shoe      Interventions In Place Pillows    Possible Skin Injury No    Pictures Uploaded Into Epic N/A  Wound Consult Placed N/A  RN Wound Prevention Protocol Ordered No

## 2022-06-28 NOTE — PROGRESS NOTES
"Mr. Alverto Duran is a 66-year-old male with PMH significant for chronic methamphetamine use, tobacco dependence, HTN, right frontal CVA 2019, and syphilis (2020) who initially presented to [place] ED around 6/20/22 with headaches and multiple falls and was discharged home.   He presented to [facility] ER on 6/21 with c/o dizziness, neck pain, headache, and falls.   CT head without contrast showed encephalomalacia in the left cerebellar and right frontal areas. He was admitted to the floor under the [facility] service.     Positive syphilis titer. Started on PCN for possible neurosyphilis.     On 6/22 he developed decreased LOC with obtundation. MRI showed significant edema with compression of the brain stem and obstructive hydrocephalus. He was transferred to ICU for intubation, EVD placement and hypertonic saline therapy.   6/23 - suboccipital craniectomy. New onset AFIB RVR.  6/24 - multiple DVT's BUE noted on US. Start AC when cleared by Neurosurgery.  6/25 - ID consult, recommend 10 day neurosyphilis course with IV PCN q4h (end date 7/3/22). Extubated  6/26 - EVD no output overnight. Alteplase by Dr. Mello.   6/27 - IV Metoprolol for AFIB 170's    Neuro: drowsy, arouses to stimulation, mumbles incomprehensible words. Follows commands intermittently. Moving all extremities spontaneously, BLE 5/5, LUE 4/5, RUE 3/5. Pupils 2mm equal, reactive.  Tmax 98.9  RR 20-30's  O2 sats 95-99% on 2L NC  -160's  ICP 3-8. Output 4mL/24 hours  Nicardipine 5/hr  I/O: 2400/4500 (-2,100/24 hours)  Mobility 1     Discussed AC with neurosurgery, per Dr. Rizzo/Erlinda TOUSSAINT via [employer]: \"large posterior fossa stroke. Nothing for 6 weeks. Can work with PT/OT, get up into chair, mobilize\".      Keep TLC today in case he needs 3% started per Neurology. Evaluate daily removal.     10:09 AM - No longer requires ICU care.  Transfer to [facility] floor.  Critical care service is available for any questions or concerns.    Mckenna Mathews, " YUE

## 2022-06-28 NOTE — THERAPY
Physical Therapy   Initial Evaluation     Patient Name: Alverto Duran  Age:  66 y.o., Sex:  male  Medical Record #: [id number]  Today's Date: 6/28/2022     Precautions  Precautions: Fall Risk;Swallow Precautions ( See Comments);Nasogastric Tube    Assessment  Patient is 66 y.o. male admitted with multiple falls at home and severe headache.  Initial CT noted L cerebellar and R frontal encephalomalacia, follow up MRI noted severe L cerebellar stroke with effacement of fourth ventricle and early signs of brainstem compression.  Patient underwent R frontal EVD on 6/22, craniectomy and C1 lami on 6/23.  Hospital course complicated by development of UE DVTs, not on anticoagulation.  Patient cleared for mobility by neurosurgery team despite anticoagulation.   Today patient confused, able to follow simple 1 step commands.  Patient required min A for supine <> sit.  Patient initially required assistance for R weight shift to maintain seated balance, able to initiate anterior scoot and maintain seated balance without assistance for ~5 sec.  Patient sitting with cervical flexion, one attempt to extend and look upright.  He became agitated with further questions and cues, returned to bed.  Patient would benefit from continued acute PT and post acute placement to address impairments and maximize safety & independence with functional mobility.   Recommend OT evaluation.    Plan    Recommend Physical Therapy 3 times per week until therapy goals are met for the following treatments:  Bed Mobility, Equipment, Gait Training, Neuro Re-Education / Balance, Sensory Integration Techniques, Therapeutic Activities and Therapeutic Exercises    DC Equipment Recommendations: Unable to determine at this time  Discharge Recommendations: Recommend post-acute placement for additional physical therapy services prior to discharge home     Objective     06/28/22 1535   Precautions   Precautions Fall Risk;Swallow Precautions ( See  "Comments);Nasogastric Tube   Prior Living Situation   Prior Services Unable To Determine At This Time   Comments Pt confused, stated he lives in a house in [place], [place] with \"some girl\".   Prior Level of Functional Mobility   Comments Pt unable to state PLOF   Cognition    Cognition / Consciousness X   Speech/ Communication Dysarthric   Safety Awareness Impaired;Impulsive   New Learning Impaired   Attention Impaired   Initiation Impaired   Comments Cooperative with encouragement.  Trying to get LEs out of bed upon arrival.  Able to follow simple, one step commands.  Disoriented to time, agitated with further questions   Passive ROM Lower Body   Comments WFL   Active ROM Lower Body    Active ROM Lower Body  X   Comments Appear WFL, agitated with too many cues so not formally tested   Strength Lower Body   Lower Body Strength  X   Comments Generalized weakness, not formally tested   Sensation Lower Body   Comments Not tested   Balance Assessment   Sitting Balance (Static) Poor   Sitting Balance (Dynamic) Poor -   Weight Shift Sitting Poor   Comments standing deferred due to impulsivity and poor seated balance   Gait Analysis   Gait Level Of Assist Unable to Participate   Bed Mobility    Supine to Sit Minimal Assist   Sit to Supine Minimal Assist   Scooting Minimal Assist  (seated EOB)   Comments HOB elevated   Functional Mobility   Sit to Stand Unable to Participate   Bed, Chair, Wheelchair Transfer Unable to Participate   Mobility supine <> EOB   Comments Pt frequently scooted anteriorly, demonstrated half stand with min A for safety   Activity Tolerance   Sitting Edge of Bed 6 min   Comments limited by cognition, weakness   Short Term Goals    Short Term Goal # 1 Pt will perform supine <> sit without bed features with SPV within 6 visits in order to progress toward PLOF   Short Term Goal # 2 Pt will perform STS with LRAD and min A within 6 visits in order to progress OOB mobility   Short Term Goal # 3 Pt will " perform functional transfers with LRAD and SPV within 6 visits in order to progress OOB mobility   Anticipated Discharge Equipment and Recommendations   DC Equipment Recommendations Unable to determine at this time   Discharge Recommendations Recommend post-acute placement for additional physical therapy services prior to discharge home

## 2022-06-28 NOTE — PROGRESS NOTES
[facility] [place], [place] FAMILY MEDICINE PROGRESS NOTE     Attending:   Kathe Mccray MD    Resident:   Roro Still MD    PATIENT:   Alverto Duran; [id number]; 1955    ID/Interval Events:   66 y.o. male PMHx of chronic methamphetamine use, tobacco dependence, HTN, right frontal CVA 2019, and syphilis (2020) who initially presented to [facility] ED around 6/20/22 with headaches and multiple falls and was discharged home.   He presented to [facility] ER on 6/21 with c/o dizziness, neck pain, headache, and falls. CT head without contrast showed encephalomalacia in the left cerebellar and right frontal areas. Patient was initially admitted to the floor under the [facility] service.   On 6/22 he developed decreased LOC with obtundation. MRI with and without contrast showed significant edema with compression of the brain stem and obstructive hydrocephalus. He was transferred to ICU for intubation, EVD placement and hypertonic saline therapy.    6/23 - suboccipital craniectomy. New onset AFIB RVR.  6/24 - multiple DVT's BUE noted on US.    6/25 - ID consult, recommend 10 day neurosyphilis course with IV PCN q4h (end date 7/3/22). Extubated  6/26 - EVD no output overnight. Alteplase by Dr. Mello.   6/27 - IV Metoprolol for AFIB with rate to 170's    SUBJECTIVE:   Patient not able to communicate meaningfully.    OBJECTIVE:  Vitals:    06/28/22 0630 06/28/22 0645 06/28/22 0700 06/28/22 0715   BP: 119/82 125/88 132/72 (!) 142/78   Pulse: 82 80 75 87   Resp: (!) 29 (!) 30 (!) 22 (!) 22   Temp:       TempSrc:       SpO2:  96% 96% 95%   Weight:       Height:           Intake/Output Summary (Last 24 hours) at 6/28/2022 1035  Last data filed at 6/28/2022 0601  Gross per 24 hour   Intake 1999.58 ml   Output 3185 ml   Net -1185.42 ml       PHYSICAL EXAM:  General: Lying in bed, sleeping  HEENT: NC/AT. EOMI. NG tube in place, EVD drain in place, biopatch and tegaderm over site, no drainage  Cardiovascular: irregularly irregular,  without murmurs  Respiratory: CTAB, no tachypnea or retractions   Abdomen: normal bowel sounds, soft, nontender, nondistended, no masses, no organomegaly   EXT:  No pitting edema noted, bilateral soft wrist restraints  Skin: No erythema/lesions   Neuro: drowsy, arouses to stimulation, mumbles incomprehensible words. Follows commands intermittently. Moving all extremities spontaneously, BLE 5/5, LUE 4/5, RUE 3/5. Pupils 2mm equal, reactive  : Muse in place, rectal tube in place      LABS:  Recent Labs     06/26/22  0255   WBC 14.8*   RBC 4.57*   HEMOGLOBIN 12.4*   HEMATOCRIT 39.3*   MCV 86.0   MCH 27.1   RDW 47.3   PLATELETCT 234   MPV 10.8   NEUTSPOLYS 74.30*   LYMPHOCYTES 14.70*   MONOCYTES 10.40   EOSINOPHILS 0.00   BASOPHILS 0.30     Recent Labs     06/26/22  0255 06/27/22  0213 06/28/22  0430   SODIUM 146* 144 144   POTASSIUM 3.8 3.6 4.0   CHLORIDE 113* 107 106   CO2 23 26 27   BUN 19 22 22   CREATININE 0.84 0.90 0.92   CALCIUM 8.8 9.3 9.5   MAGNESIUM 2.2 2.4 2.4   PHOSPHORUS 2.8 2.5 2.7     Estimated GFR/CRCL = Estimated Creatinine Clearance: 72.9 mL/min (by C-G formula based on SCr of 0.92 mg/dL).  Recent Labs     06/26/22  0255 06/27/22  0213 06/28/22  0430   GLUCOSE 140* 136* 137*                 No results for input(s): INR, APTT, FIBRINOGEN in the last 72 hours.    Invalid input(s): DIMER    MICROBIOLOGY:   No results found for: BLOODCULTU, BLDCULT, BCHOLD     IMAGING:   MR-BRAIN-WITH & W/O   Final Result         Subacute left inferior cerebellar infarct with minimal blood products within representing petechial microhemorrhages. Mass effect upon the fourth ventricle remains stable.      Posterior fossa is well decompressed by midline occipital craniotomy. Lateral ventricles are well decompressed by a right frontal approach EVD with its tip in the frontal horn of the right lateral ventricle.      Small right occipital tentorial subdural hematoma without mass effect.      Remote bilateral frontal infarct  with encephalomalacia.      Age-related volume loss and chronic microvascular ischemic changes.         US-EXTREMITY VENOUS LOWER BILAT   Final Result      CT-HEAD W/O   Final Result      1.  There is a small amount of intraventricular blood that is new from 6/23/2022. The ventricles are stable in size.   2.  There is a right frontal approach ventriculostomy catheter with the tip terminating at the foramen of Monro. There is a punctate amount of parenchymal hemorrhage surrounding the catheter is unchanged.   3.  Right frontal and left cerebellar encephalomalacia.      EC-ECHOCARDIOGRAM COMPLETE W/O CONT   Final Result      US-CAROTID DOPPLER BILAT   Final Result      US-EXTREMITY VENOUS UPPER BILAT   Final Result      DX-CHEST-PORTABLE (1 VIEW)   Final Result         1.  No acute cardiopulmonary disease.   2.  Trace right pleural effusion   3.  Atherosclerosis      CT-CTA NECK WITH & W/O-POST PROCESSING   Final Result         1.  Severely hypoplastic right vertebral artery, central segment of the right vertebral artery is not visualized and may be occluded.         CT-CTA HEAD WITH & W/O-POST PROCESS   Final Result         1.  No large vessel occlusion or aneurysm identified   2.  Interval placement of right frontal approach ventriculostomy with postprocedural minimal hemorrhage and new pneumocephalus.   3.  Bilateral ventricular dilatation appears somewhat increased since prior study compatible with somewhat worsened hydrocephalus.   4.  Right frontal and left cerebellar encephalomalacia      DX-CHEST-PORTABLE (1 VIEW)   Final Result         1.  No acute cardiopulmonary disease.   2.  Trace bilateral pleural effusion      DX-ABDOMEN FOR TUBE PLACEMENT   Final Result         1.  Nonspecific bowel gas pattern.   2.  Dobbhoff tube tip terminates overlying the expected location of the gastric antrum.   3.  Trace bilateral pleural effusions      DX-CHEST-PORTABLE (1 VIEW)   Final Result         1.  No acute  cardiopulmonary disease.   2.  Trace bilateral pleural effusions   3.  Atherosclerosis      MR-BRAIN-WITH & W/O   Final Result   Addendum 1 of 1   ADDENDUM:      The case was discussed by telephone (call report) with DR. DAVONTE VIEIRA    on call for [facility], at 1854 hours.      Final      1.  Interval development of moderate obstructive hydrocephalus due to mass effect on the fourth ventricle. There is mild transependymal edema.   2.  Large area of acute infarction involving the left cerebellar hemisphere, inferior cerebellar vermis, and left cerebellar tonsil. PICA territory. No hemorrhagic transformation. This is the cause of the fourth ventricle effacement with obstructive    hydrocephalus. There is also mild left-sided cerebellar tonsillar herniation.   3.  Moderate supratentorial white matter disease most consistent with microvascular ischemic change.   4.  Old infarction right anterior frontal convexity.   5.  A Voalte message was sent to RICKEY HAYES at 1823 hours 6/22/2022. Prompt reply as of 6:41 PM not yet received. Efforts are ongoing to contact housestaff managing the patient at this time.   6.  Case was discussed (call report) with nurse SHASTA MAGILL at 6:47 PM. Request to initiate stat neurosurgery consult. Attempts to contact [facility] on-call housestaff are ongoing.      CT-HEAD W/O   Final Result      1.  No evidence of acute hemorrhage, mass or large territorial infarction   2.  LEFT cerebellar and RIGHT frontal encephalomalacia   3.  Mild atrophy   4.  Mild white matter changes         CT-CSPINE WITHOUT PLUS RECONS   Final Result      1.  No acute fracture or traumatic listhesis in the cervical spine.   2.  Emphysema in the lung apices.          CULTURES:   Results     Procedure Component Value Units Date/Time    CSF Culture [[id number]] Collected: 06/24/22 0834    Order Status: Completed Specimen: CSF from [facility]unt Updated: 06/28/22 0808     Significant Indicator NEG     Source CSF     " Site SHUNT     Culture Result No growth at 4 days.     Gram Stain Result Rare WBCs.  No organisms seen.      Narrative:      Collected By: BELEN SYED  Collected By: BELEN SYED    GRAM STAIN [[id number]] Collected: 06/24/22 0834    Order Status: Completed Specimen: CSF Updated: 06/24/22 1031     Significant Indicator .     Source CSF     Site SHUNT     Gram Stain Result Rare WBCs.  No organisms seen.      Narrative:      Collected By: BELEN SYED  Collected By: BELEN SYED    CSF CULTURE [[id number]] Collected: 06/24/22 0837    Order Status: Canceled Specimen: Other from Shunt     COV-2, FLU A/B, AND RSV BY PCR (2-4 HOURS CEPRecombineID): Collect NP swab in VTM [[id number]] Collected: 06/21/22 1358    Order Status: Completed Specimen: Respirate from Nasopharyngeal Updated: 06/21/22 1640     Influenza virus A RNA Negative     Influenza virus B, PCR Negative     RSV, PCR Negative     SARS-CoV-2 by PCR NotDetected     Comment: PATIENTS: Important information regarding your results and instructions can  be found at [email or web address]   \"After your  Covid-19 Test\"    [facility] providers: PLEASE REFER TO DE-ESCALATION AND RETESTING PROTOCOL  on [email or web address]    **The Med ePad GeneXpert Xpress SARS-CoV-2 RT-PCR Test has been made  available for use under the Emergency Use Authorization (EUA) only.          SARS-CoV-2 Source NP Swab          MEDS:  Current Facility-Administered Medications   Medication Last Admin   • [START ON 6/29/2022] lisinopril (PRINIVIL) tablet 40 mg     • Metoprolol Tartrate (LOPRESSOR) injection 5 mg     • metoprolol tartrate (LOPRESSOR) tablet 50 mg 50 mg at 06/28/22 0601   • furosemide (LASIX) injection 20 mg 20 mg at 06/28/22 0601   • sodium chloride (SALT) tablet 2 g 2 g at 06/28/22 1012   • LORazepam (ATIVAN) injection 2-4 mg     • enalaprilat (Vasotec) injection 1.25 mg 1 mL 1.25 mg at 06/27/22 1603   • Respiratory Therapy Consult "     • MD Alert...ICU Electrolyte Replacement per Pharmacy     • atorvastatin (LIPITOR) tablet 80 mg 80 mg at 06/27/22 1745   • Pharmacy Consult: Enteral tube insertion - review meds/change route/product selection     • acetaminophen (Tylenol) tablet 650 mg 650 mg at 06/27/22 0910   • ondansetron (ZOFRAN ODT) dispertab 4 mg     • senna-docusate (PERICOLACE or SENOKOT S) 8.6-50 MG per tablet 2 Tablet 2 Tablet at 06/27/22 0634    And   • polyethylene glycol/lytes (MIRALAX) PACKET 1 Packet      And   • magnesium hydroxide (MILK OF MAGNESIA) suspension 30 mL      And   • bisacodyl (DULCOLAX) suppository 10 mg     • penicillin G potassium 4 Million Units in  mL IVPB 4 Million Units at 06/28/22 1012   • ondansetron (ZOFRAN) syringe/vial injection 4 mg     • Pharmacy Consult Request ...Pain Management Review 1 Each         PROBLEM LIST:  Problem Noted   Hypertensive Urgency 6/21/2022   Methamphetamine Use (Hcc) 6/21/2022   Gait Instability 6/21/2022   Hypokalemia 6/21/2022   Tobacco Use 6/21/2022       ASSESSMENT/PLAN: 66 y.o. male admitted for:    * Acute cerebrovascular accident (CVA) due to occlusion of left cerebellar artery (HCC)- (present on admission)  Obstructive Hydrocephalus  Assessment & Plan   Large left cerebellar ischemic infarct with cerebral edema and mass-effect on fourth ventricle, cryptogenic. Detected on MRI 6/22.   POD #4 from suboccipital decompression, EVD placement  -EVD placed 6/23/2022 for suboccipital decompression- s/p mannitol and 23% bolus as well as 3% saline infusion   -S/p craniectomy and C1 laminectomy  6/23/22   -MAP goal greater than 65; SBP goal to keep  (acutely). Longer term, BP goal of 110-130/60-80.   -Nicardipine to keep SBP less than 160   - Metoprolol increased to TID from BID   - Added Lisinopril 40mg qd for HTN; prn antihypertensives   -Elevate head of bed, keep head centered-maximize blood flow  -Goal sodium 145-155- with Na tabs - 2 g 3 times daily to keep sodium  150-155- normalize tomorrow   -ECHO- EF 70%  -Atorvastatin 80 mg - LDL goal less than 70  -PT/OT/SLP when appropriate  -6/27- EVD removed   - Keep Triple Lumen catheter in place in case 3% saline needs to be  restarted, per neurology, continue with salt therapy to mitigate cytotoxic  edema and minimize local mass effect on the 4th ventricle to relieve  his CSF blockage  - Anticoagulation - waiting on recs from NSG (initial recommendation of holding AC for 6 weeks, but given A Fib and bilateral UE DVTs, neurology pushing for reintroduction sooner). Will start eliquis 5mg BID as soon as cleared by NSG.  - Continue q4h neurochecks/NIHSS     New onset a-fib (HCC)  Assessment & Plan  -Thought to be etiology of above CVA.  -6/23 New onset A. fib with rapid RVR during evening of 6/23  -Treated and Converted with metoprolol 5 mg x 3 followed by metoprolol XL  -6/26 Afib with RVR again last night early this am (6/25-6/26) treated again with metoprolol IVP- rate controlled. Increase metoprolol 50 mgTID from metoprolol 50 mg BID   - Eventual plan for metoprolol succinate for longer acting     Acute bilateral deep vein thrombosis (DVT) of upper extremities (HCC)  Assessment & Plan  Acute, partially occlusive DVT to the R IJ with extension into the let innominate vein.    Acute, occlusive DVT in on of the right paired brachial veins within the distal bicept  Acute,  occlusive DVT in Right paired ulnar veins within proximal forearm  Occlusive superficial thrombophlebitis in a small segment of the right cephalic vein.   Acute partially occlusive DVT in left paired brachial veins within the elbow region. Possible small extension into radial an ulnar veins   - Lower extremity US- negative for DVT  - Once ok with NSG we will start anticoagulation- CT head prior to starting any  anticoagulation   - PT unable to work with patient until AC is therapeutic given multiple DVTs.     Goals of care, counseling/discussion  Assessment &  Plan  -Unable to contact wife.  Message on phone service that phone has been disconnected  -Will contact case management  -Per sister, patient is not   -Palliative following     Syphilis  Assessment & Plan  -Treponemal test is positive- confirmatory testing ending  -Prior Syphilis infection (2020) appears to have not been treated as ABX prescribed were never picked up   -CSF to eval for neurosyphilis negative  -Health department notified  -ID consult for latent syphilis and treatment - Recommend completing  10-day for neurosyphilis with IV penicillin - 4,000,000 units every 4 hours with an end date of 7/3/22. Given above CSF findings, may be able to DC early. Clear with ID beforehand.     Hypokalemia- (present on admission)  Assessment & Plan  Continue to monitor daily labs and replace as needed  Electrolyte protocol     Tobacco use- (present on admission)  Assessment & Plan  -Discussion regarding smoking cessation  When appropriate  -Provide information   -NRT if requests      Methamphetamine use (HCC)- (present on admission)  Assessment & Plan  Counseling cessation when clinical trajectory is more clear and appropriate    VTE:  Contraindicated, pending NSG clearance   Ulcer: Not Indicated  Lines: A line reportedly DC'd  Tubes: Muse, rectal, NG tube for feeding  Code Status: Full    Disposition: Inpatient for acute CVA recovery. Speech and PT to work with patient in coming days. Downgraded to IMICU status on 6/28. Continue q4h neuro checks.    Roro Still MD   PGY-2 Family Medicine Resident   [facility]

## 2022-06-28 NOTE — PROGRESS NOTES
"Critical Care Progress Note    Date of admission  6/21/2022    Chief Complaint  Difficulties walking    Hospital Course  Mr. Alverto Duran is a 66-year-old male with PMH significant for chronic methamphetamine use, tobacco dependence, HTN, right frontal CVA 2019, and syphilis (2020) who initially presented to [facility] ED around 6/20/22 with headaches and multiple falls and was discharged home.   He presented to [facility] ER on 6/21 with c/o dizziness, neck pain, headache, and falls.   CT head without contrast showed encephalomalacia in the left cerebellar and right frontal areas. He was admitted to the floor under the [facility] service.     Positive syphilis titer. Started on PCN for possible neurosyphilis.     On 6/22 he developed decreased LOC with obtundation. MRI showed significant edema with compression of the brain stem and obstructive hydrocephalus. He was transferred to ICU for intubation, EVD placement and hypertonic saline therapy.   6/23 - suboccipital craniectomy. New onset AFIB RVR.  6/24 - multiple DVT's BUE noted on US. Start AC when cleared by Neurosurgery.  6/25 - ID consult, recommend 10 day neurosyphilis course with IV PCN q4h (end date 7/3/22). Extubated  6/26 - EVD no output overnight. Alteplase by Dr. Mello.   6/27 - IV Metoprolol for AFIB 170's      Interval Problem Update  Neuro: drowsy, arouses to stimulation, mumbles incomprehensible words. Follows commands intermittently. Moving all extremities spontaneously, BLE 5/5, LUE 4/5, RUE 3/5. Pupils 2mm equal, reactive.  Tmax 98.9  RR 20-30's  O2 sats 95-99% on 2L NC  -160's  ICP 3-8. Output 4mL/24 hours  Nicardipine 5/hr  I/O: 2400/4500 (-2,100/24 hours)    Discussed AC with neurosurgery, per Dr. Rizzo/Erlinda TOUSSAINT via [facility]: \"large posterior fossa stroke. Nothing for 6 weeks. Can work with PT/OT, get up into chair, mobilize\".     Keep TLC today in case he needs 3% started per Neurology. Evaluate daily removal.    11:10 No longer " requires ICU care.  Transfer to IMCU floor. Intensivist team will continue to follow due to staffing issues with Hospitalist team.    2:20 PM - update - discussed AC plan again with neurosurgery. Concern for patient's high-risk of hemorrhagic conversion. Hold off on AC for 2 weeks (July 7th). Re-image patient at that time. If no hemorrhagic conversion, re-evaluate/discuss AC plan.    Review of Systems  Review of Systems   Unable to perform ROS: Medical condition        Vital Signs for last 24 hours   Temp:  [35.9 °C (96.6 °F)-36.7 °C (98.1 °F)] 35.9 °C (96.7 °F)  Pulse:  [46-98] 84  Resp:  [15-60] 20  BP: (110-168)/(61-98) 146/82  SpO2:  [95 %-100 %] 97 %    Hemodynamic parameters for last 24 hours       Respiratory Information for the last 24 hours       Physical Exam   Physical Exam  Vitals and nursing note reviewed.   Constitutional:       General: He is not in acute distress.     Appearance: Normal appearance. He is ill-appearing. He is not toxic-appearing.      Interventions: He is restrained.   HENT:      Head: Normocephalic.      Nose: Nose normal.      Mouth/Throat:      Lips: Pink.      Mouth: Mucous membranes are moist.   Eyes:      Comments: Unable to cooperate with exam   Cardiovascular:      Rate and Rhythm: Normal rate and regular rhythm.      Pulses: Normal pulses.      Heart sounds: Normal heart sounds.   Pulmonary:      Effort: Pulmonary effort is normal.      Breath sounds: Normal breath sounds. No wheezing.   Abdominal:      General: Bowel sounds are normal.      Palpations: Abdomen is soft.      Tenderness: There is no abdominal tenderness. There is no guarding or rebound.   Musculoskeletal:      Right lower leg: No edema.      Left lower leg: No edema.      Comments: Moving all extremities spont  BLE 5/5  RUE 3/5  LUE 4/5   Skin:     General: Skin is warm and dry.      Capillary Refill: Capillary refill takes less than 2 seconds.      Nails: There is no clubbing.   Neurological:      GCS: GCS  eye subscore is 2. GCS verbal subscore is 2. GCS motor subscore is 6.      Motor: Weakness present.      Comments: Arouses to voice, intermittently follows commands  Mumbles  Unable to fully assess orientation and sensation   Psychiatric:         Speech: Speech is slurred.         Cognition and Memory: Cognition is impaired.         Judgment: Judgment is impulsive.      Comments: Restless           Medications  Current Facility-Administered Medications   Medication Dose Route Frequency Provider Last Rate Last Admin   • [START ON 6/29/2022] lisinopril (PRINIVIL) tablet 40 mg  40 mg Enteral Tube Q DAY YUE Pillai       • Metoprolol Tartrate (LOPRESSOR) injection 5 mg  5 mg Intravenous Q5 MIN PRN Timo Lang M.D.       • metoprolol tartrate (LOPRESSOR) tablet 50 mg  50 mg Enteral Tube Q8HRS Dickson Jimenes M.D.   50 mg at 06/28/22 1409   • furosemide (LASIX) injection 20 mg  20 mg Intravenous BID DIURETIC Dickson Jimenes M.D.   20 mg at 06/28/22 0601   • sodium chloride (SALT) tablet 2 g  2 g Enteral Tube TID WITH MEALS Dickson Jimenes M.D.   2 g at 06/28/22 1140   • LORazepam (ATIVAN) injection 2-4 mg  2-4 mg Intravenous Q4HRS PRN Dickson Jimenes M.D.       • enalaprilat (Vasotec) injection 1.25 mg 1 mL  1.25 mg Intravenous Q6HRS PRN Dickson Jimenes M.D.   1.25 mg at 06/28/22 1133   • Respiratory Therapy Consult   Nebulization Continuous RT Dominic Damico M.D.       • MD Alert...ICU Electrolyte Replacement per Pharmacy   Other PHARMACY TO DOSE Dominic Damico M.D.       • atorvastatin (LIPITOR) tablet 80 mg  80 mg Enteral Tube Q EVENING Dominic Damico M.D.   80 mg at 06/27/22 1745   • Pharmacy Consult: Enteral tube insertion - review meds/change route/product selection  1 Each Other PHARMACY TO DOSE Dominic Damico M.D.       • acetaminophen (Tylenol) tablet 650 mg  650 mg Enteral Tube Q6HRS PRN Dominic Damico M.D.   650 mg at 06/27/22 0910   • ondansetron (ZOFRAN ODT) dispertab 4 mg  4 mg  Enteral Tube Q4HRS PRN Dominic Damico M.D.       • senna-docusate (PERICOLACE or SENOKOT S) 8.6-50 MG per tablet 2 Tablet  2 Tablet Enteral Tube BID Dominic Damico M.D.   2 Tablet at 06/27/22 0634    And   • polyethylene glycol/lytes (MIRALAX) PACKET 1 Packet  1 Packet Enteral Tube QDAY PRN Dominic Damico M.D.        And   • magnesium hydroxide (MILK OF MAGNESIA) suspension 30 mL  30 mL Enteral Tube QDAY PRN Dominic Damico M.D.        And   • bisacodyl (DULCOLAX) suppository 10 mg  10 mg Rectal QDAY PRN Dominic Damico M.D.       • penicillin G potassium 4 Million Units in  mL IVPB  4 Million Units Intravenous Q4HRS Dickson Jimenes M.D. 200 mL/hr at 06/28/22 1409 4 Million Units at 06/28/22 1409   • ondansetron (ZOFRAN) syringe/vial injection 4 mg  4 mg Intravenous Q4HRS PRN Leatha Timmons M.D.       • Pharmacy Consult Request ...Pain Management Review 1 Each  1 Each Other PHARMACY TO DOSE Roro Still M.D.           Fluids    Intake/Output Summary (Last 24 hours) at 6/28/2022 1412  Last data filed at 6/28/2022 1200  Gross per 24 hour   Intake 2372.49 ml   Output 3885 ml   Net -1512.51 ml       Laboratory          Recent Labs     06/26/22  0255 06/27/22 0213 06/28/22  0430   SODIUM 146* 144 144   POTASSIUM 3.8 3.6 4.0   CHLORIDE 113* 107 106   CO2 23 26 27   BUN 19 22 22   CREATININE 0.84 0.90 0.92   MAGNESIUM 2.2 2.4 2.4   PHOSPHORUS 2.8 2.5 2.7   CALCIUM 8.8 9.3 9.5     Recent Labs     06/26/22  0255 06/27/22  0213 06/27/22  2245 06/28/22  0430   PREALBUMIN  --   --  11.1*  --    GLUCOSE 140* 136*  --  137*     Recent Labs     06/26/22 0255   WBC 14.8*   NEUTSPOLYS 74.30*   LYMPHOCYTES 14.70*   MONOCYTES 10.40   EOSINOPHILS 0.00   BASOPHILS 0.30     Recent Labs     06/26/22  0255   RBC 4.57*   HEMOGLOBIN 12.4*   HEMATOCRIT 39.3*   PLATELETCT 234       Imaging  CT:    Reviewed  Echo:   Reviewed    Assessment/Plan  * Acute cerebrovascular accident (CVA) due to occlusion of left cerebellar  artery (HCC)- (present on admission)  Assessment & Plan   Large left cerebellar ischemic infarct with cerebral edema and mass-effect on fourth ventricle, cryptogenic  -EVD in placed a.m. 6/23/2022 for suboccipital decompression-status post mannitol and 23% bolus as well as 3% saline infusion-keep ICPs<20  -Post crani and C1 laminectomy  6/23/22   -MAP goal greater than 65; SBP goal to keep   -Nicardipine to keep SBP less than 160- Metoprolol increased to TID from BIDl; Added Lisinopril for HTN; prn antihypertensives   -Propofol, fentanyl  -Elevate head of bed, keep head centered-maximize blood flow  -Goal sodium 150-155- with Na tabs - 2 g 3 times daily to keep sodium 150-155- normalize tomorrow   -ECHO- EF 70%, unremarkable  -Atorvastatin 80 mg - LDL goal less than 70  -PCN for neurosyphillis   -PT/OT/SLP  -palliative consult  -EVD removed 6/28    Discussed AC plan with neurosurgery. Concern for patient's high-risk of hemorrhagic conversion. Hold off on AC for 2 weeks (July 7th). Re-image patient at that time. If no hemorrhagic conversion, re-evaluate/discuss AC plan.    New onset a-fib (HCC)  Assessment & Plan  -new onset  -rate controlled with Metoprolol  -optimize electrolytes    Acute bilateral deep vein thrombosis (DVT) of upper extremities (HCC)  Assessment & Plan  Acute, partially occlusive DVT to the R IJ with extension into the let innominate vein.    Acute, occlusive DVT in on of the right paired brachial veins within the distal bicept  Acute,  occlusive DVT in Right paired ulnar veins within proximal forearm  Occlusive superficial thrombophlebitis in a small segment of th right cephalic vein.   Acute partially occlusive DVT in left paired brachial veins within the elbow region. Possible small extension into radial an ulnar veins   -BLE US unremarkable  -no AC for 6 weeks per Neurosurgery    Syphilis  Assessment & Plan  -Treponemal test is positive- confirmatory testing ending  -Treat empirically  with PCN G 4,000,000 units every 4 hours  -Prior Syphilis infections  (2020) appears to have not been treated as ABX prescribed were never picked up   -[facility] to eval for neurosyphilis- pending    -Health department notified  -ID consult for latent syphilis and treatment - Recommend completing  10-day for neurosyphilis with IV penicillin - 4,000,000 units every 4 hours with an end date of 7/3/22    Tobacco dependence- (present on admission)  Assessment & Plan  -cessation education and counseling as able    Hypokalemia- (present on admission)  Assessment & Plan  -replace as indicated    Methamphetamine use (HCC)- (present on admission)  Assessment & Plan  -cessation education and counseling when clinically able       VTE:  Contraindicated per Neurosurgery  Ulcer: Not Indicated  Lines: Central Line  Ongoing indication addressed and Muse Catheter  Ongoing indication addressed    I have performed a physical exam and reviewed and updated ROS and Plan today (6/28/2022). In review of yesterday's note (6/27/2022), there are no changes except as documented above.     Discussed patient condition and risk of morbidity and/or mortality with RN, RT, , Charge nurse / hot rounds, Patient, neurosurgery and Neurology and my attending Dr. Chatterjee  The patient remains critically ill.  Critical care time = 62 minutes in directly providing and coordinating critical care and extensive data review.  No time overlap and excludes procedures.    Please note that this dictation was created using voice recognition software. I have made every reasonable attempt to correct obvious errors, but there may be errors of grammar and possibly content that I did not discover before finalizing the note.    YUMIKO Pillai.

## 2022-06-28 NOTE — PROGRESS NOTES
[facility] Critical Care Medicine Progress Note    Brief HPI/problem list:  66y M hx of meth abuse, tobacco abuse, prior right frontal stroke 2019, htn, RPR +, that presented [facility] ER for acute dizziness 6/20 and sent home. He presents 6/21 for re-evaluation and unable to ambulate thus was admitted after CT head with what was reported as left cerebellar and right frontal encephalomalacia and admitted to floor with MRI pending. He had worsening mental status and agitation given ativan and haldol. MRI shows 5pm 6/22 with acute PICA infarct with cerebellar edema compressing 4th ventricle and causing obstructive hydrocephalus and early herniation syndrome. Patient with cushing response and emergently transferred to ICU for intubation, 23% bolus, mannitol and emergent NSG, neurology consultation and EVD placement.     6/23 Occipital crani and C1 laminectomy by Dr Rizzo.   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  Echo 6/24 EF 70% with mild AI  Etiology of stroke is likely afib  6/26 recurrent AF/RVR controlled with PRN IV BB  6/27 EVD draining, LOC better  6/28 Nicardipine 5, LOC wax and wane still-better overall    Daily exam: LOC wax/wane, A/L & O x3, no change in neuro exam, abdomen benign, lungs clear, heart regular at this time, SR on monitor still having PAF    Team rounds:  Neuro: ICP 0-6, CPP 80-98 10ml/hr, following well, A&O x3-4  HR: occasional afib-SR  SBP: nicardipine gtt 2.5mg/hr, actively titrating  Tmax: 98.9  GI: cortrak at goal, BM pta  UOP: 5.3 L  Lines: hernandez, central line, gurpreet  Resp: 2l n/c   Vte: contraindicated  PPI/H2:n/a  Antibx: PCN G 6/10     ASSESSMENT:  CVA w/herniation, wax/wane LOC ongoing  S/p EVD removed 6/28  S/p craniotomy-C1/occiput decompression  HTN  PAF  RF improved - extubated  Latent syphilis  DVT UEs, no anticoagulation  Normochromic  anemia  Hypophosphatemia resolved  Mild hyperglycemia  H/o Meth use  H/o TOB use    Plan of care:  Okay to transfer to IM for a few days of ongoing higher level of care  Continue neurochecks every 2 hours  Continue hypertonic Na Rx, on salt tabs  BB for AF-controlled  Holding anticoag - will need longterm when ok with NS  (Start Lovenox for prophylaxis when okay with neurosurgery 7-10 days?)  (Start apixaban for PAF and stroke prevention when okay with neurosurgery -2 weeks?)  SCDs  Adjust Bp Rx enterally, daily as needed  Nicardipine infusion ongoing this a.m., adjust enteral regimen and as needed meds and hopefully wean off  PRN labetalol & enalprilat  High dose statin  Forced diuresis as needed  Optimize electrolytes    Add  EVD pulled by NS, LOC ok - wax/wane still,     Case reviewed with NS/neurology, patient, RT, RN, Charge RN, Clinical Pharmacist and Residents    Patient remains in critical condition from nicardipine gtt this AM. Critical care time provided was 35 minutes. This excludes all separate billable procedures.     Please see NP notes for additional documentation    Addendum:  EVD pulled this MA by NS, LOC wax/wane, SBp better after Bp adjustments, off nicardipine now    Yung Chatterjee MD  [facility]

## 2022-06-28 NOTE — PROGRESS NOTES
Neurology Progress Note  Neurohospitalist Service, [facility]    Referring Physician: Dickson Jimenes M.D.      Interval History: No acute events overnight.  Stable to improving exam, now verbal.  Minimal output with EVD at 10- removed this AM by NSG.    Review of systems: In addition to what is detailed in the HPI and/or updated in the interval history, all other systems reviewed and are negative.    Past Medical History, Past Surgical History and Social History reviewed and unchanged from prior    Medications:    Current Facility-Administered Medications:   •  Metoprolol Tartrate (LOPRESSOR) injection 5 mg, 5 mg, Intravenous, Q5 MIN PRN, Timo Lang M.D.  •  fentaNYL (SUBLIMAZE) injection  mcg,  mcg, Intravenous, Q HOUR PRN, YUE Sexton  •  metoprolol tartrate (LOPRESSOR) tablet 50 mg, 50 mg, Enteral Tube, Q8HRS, Dickson Jimenes M.D., 50 mg at 06/28/22 0601  •  furosemide (LASIX) injection 20 mg, 20 mg, Intravenous, BID DIURETIC, Dickson Jimenes M.D., 20 mg at 06/28/22 0601  •  lisinopril (PRINIVIL) tablet 20 mg, 20 mg, Enteral Tube, Q DAY, Dickson Jimenes M.D., 20 mg at 06/28/22 0601  •  sodium chloride (SALT) tablet 2 g, 2 g, Enteral Tube, TID WITH MEALS, Dickson Jimenes M.D., 2 g at 06/27/22 1745  •  [DISCONTINUED] fentaNYL (SUBLIMAZE) injection 25 mcg, 25 mcg, Intravenous, Q HOUR PRN **OR** [DISCONTINUED] fentaNYL (SUBLIMAZE) injection 50 mcg, 50 mcg, Intravenous, Q HOUR PRN, 50 mcg at 06/24/22 1201 **OR** fentaNYL (SUBLIMAZE) injection 50 mcg, 50 mcg, Intravenous, Q HOUR PRN, Dickson Jimenes M.D., 50 mcg at 06/27/22 0004  •  LORazepam (ATIVAN) injection 2-4 mg, 2-4 mg, Intravenous, Q4HRS PRN, Dickson Jimenes M.D.  •  niCARdipine (CARDENE) 25 mg in  mL Infusion, 0-15 mg/hr, Intravenous, Continuous, Hannah Gee A.P.R.N., Last Rate: 50 mL/hr at 06/28/22 0642, 5 mg/hr at 06/28/22 0642  •  enalaprilat (Vasotec) injection 1.25 mg 1 mL, 1.25 mg,  Intravenous, Q6HRS PRN, Dickson Jimenes M.D., 1.25 mg at 06/27/22 1603  •  Respiratory Therapy Consult, , Nebulization, Continuous RT, Dominic Damico M.D.  •  MD Alert...ICU Electrolyte Replacement per Pharmacy, , Other, PHARMACY TO DOSE, Dominic Damico M.D.  •  sodium chloride 200 mEq in empty bag 50 mL ivpb, 200 mEq, Intravenous, Q6HRS PRN, Dominic Damico M.D.  •  atorvastatin (LIPITOR) tablet 80 mg, 80 mg, Enteral Tube, Q EVENING, Dominic Damico M.D., 80 mg at 06/27/22 1745  •  Pharmacy Consult: Enteral tube insertion - review meds/change route/product selection, 1 Each, Other, PHARMACY TO DOSE, Dominic Damico M.D.  •  acetaminophen (Tylenol) tablet 650 mg, 650 mg, Enteral Tube, Q6HRS PRN, Dominic Damico M.D., 650 mg at 06/27/22 0910  •  ondansetron (ZOFRAN ODT) dispertab 4 mg, 4 mg, Enteral Tube, Q4HRS PRN, Dominic Damico M.D.  •  senna-docusate (PERICOLACE or SENOKOT S) 8.6-50 MG per tablet 2 Tablet, 2 Tablet, Enteral Tube, BID, 2 Tablet at 06/27/22 0634 **AND** polyethylene glycol/lytes (MIRALAX) PACKET 1 Packet, 1 Packet, Enteral Tube, QDAY PRN **AND** magnesium hydroxide (MILK OF MAGNESIA) suspension 30 mL, 30 mL, Enteral Tube, QDAY PRN **AND** bisacodyl (DULCOLAX) suppository 10 mg, 10 mg, Rectal, QDAY PRN, Dominic Damico M.D.  •  penicillin G potassium 4 Million Units in  mL IVPB, 4 Million Units, Intravenous, Q4HRS, Dickson Jimenes M.D., Stopped at 06/28/22 0631  •  ondansetron (ZOFRAN) syringe/vial injection 4 mg, 4 mg, Intravenous, Q4HRS PRN, Leatha Timmons M.D.  •  Notify provider if pain remains uncontrolled, , , CONTINUOUS **AND** Use the Numeric Rating Scale (NRS), Mc-Baker Faces (WBF), or FLACC on regular floors and Critical-Care Pain Observation Tool (CPOT) on ICUs/Trauma to assess pain, , , CONTINUOUS **AND** Pulse Ox, , , CONTINUOUS **AND** Pharmacy Consult Request ...Pain Management Review 1 Each, 1 Each, Other, PHARMACY TO DOSE **AND** If patient difficult to arouse and/or has  "respiratory depression (respiratory rate of 10 or less), stop any opiates that are currently infusing and call a Rapid Response., , , CONTINUOUS, Roro Still M.D.    Physical Examination:   /88   Pulse 80   Temp 35.9 °C (96.6 °F) (Temporal)   Resp (!) 30   Ht 1.778 m (5' 10\")   Wt 65.3 kg (143 lb 15.4 oz)   SpO2 96%   BMI 20.66 kg/m²     EVD at 10  24 hour output:  4 out  ICPs 3-9    General: Eyes closed, awakes to voice  Neck: There is normal range of motion  CV: Regular rate   Extremities:  Warm, dry, and intact, without peripheral lower extremity edema    NEUROLOGICAL EXAM:     Mental status: Eyes open, interactive  Speech and language: Minimal speech output, dysarthric, mostly  Gibberish.  Follows midline commands to close eyes, distal commands to wiggle toes, intermittently follows to squeeze hands.  Cranial nerve exam: Pupils are equal, round and reactive to light bilaterally.  Blinks to threat bilaterally.  Does regard both side of room. Face is symmetric.  Motor exam: Does not cooperate for confrontational testing, but does move all 4 extremities antigravity  Sensory exam:  Reacts to tactile in all 4 extremities, there is no obvious neglect to double stim  Coordination: No vignesh ataxia on elicited movements    Objective Data:    Labs:  Lab Results   Component Value Date/Time    PROTHROMBTM 14.7 (H) 06/23/2022 09:25 AM    INR 1.18 (H) 06/23/2022 09:25 AM      Lab Results   Component Value Date/Time    WBC 14.8 (H) 06/26/2022 02:55 AM    RBC 4.57 (L) 06/26/2022 02:55 AM    HEMOGLOBIN 12.4 (L) 06/26/2022 02:55 AM    HEMATOCRIT 39.3 (L) 06/26/2022 02:55 AM    MCV 86.0 06/26/2022 02:55 AM    MCH 27.1 06/26/2022 02:55 AM    MCHC 31.6 (L) 06/26/2022 02:55 AM    MPV 10.8 06/26/2022 02:55 AM    NEUTSPOLYS 74.30 (H) 06/26/2022 02:55 AM    LYMPHOCYTES 14.70 (L) 06/26/2022 02:55 AM    MONOCYTES 10.40 06/26/2022 02:55 AM    EOSINOPHILS 0.00 06/26/2022 02:55 AM    BASOPHILS 0.30 06/26/2022 02:55 AM "      Lab Results   Component Value Date/Time    SODIUM 144 06/28/2022 04:30 AM    POTASSIUM 4.0 06/28/2022 04:30 AM    CHLORIDE 106 06/28/2022 04:30 AM    CO2 27 06/28/2022 04:30 AM    GLUCOSE 137 (H) 06/28/2022 04:30 AM    BUN 22 06/28/2022 04:30 AM    CREATININE 0.92 06/28/2022 04:30 AM      Lab Results   Component Value Date/Time    CHOLSTRLTOT 125 06/21/2022 07:53 AM    LDL 74 06/21/2022 07:53 AM    HDL 45 06/21/2022 07:53 AM    TRIGLYCERIDE 45 06/24/2022 02:50 AM       Lab Results   Component Value Date/Time    ALKPHOSPHAT 57 06/23/2022 02:05 PM    ASTSGOT 12 06/23/2022 02:05 PM    ALTSGPT 10 06/23/2022 02:05 PM    TBILIRUBIN 0.2 06/23/2022 02:05 PM        Imaging/Testing:    I interpreted and/or reviewed the patient's neuroimaging    US-EXTREMITY VENOUS LOWER BILAT   Final Result      CT-HEAD W/O   Final Result      1.  There is a small amount of intraventricular blood that is new from 6/23/2022. The ventricles are stable in size.   2.  There is a right frontal approach ventriculostomy catheter with the tip terminating at the foramen of Monro. There is a punctate amount of parenchymal hemorrhage surrounding the catheter is unchanged.   3.  Right frontal and left cerebellar encephalomalacia.      EC-ECHOCARDIOGRAM COMPLETE W/O CONT   Final Result      US-CAROTID DOPPLER BILAT   Final Result      US-EXTREMITY VENOUS UPPER BILAT   Final Result      DX-CHEST-PORTABLE (1 VIEW)   Final Result         1.  No acute cardiopulmonary disease.   2.  Trace right pleural effusion   3.  Atherosclerosis      CT-CTA NECK WITH & W/O-POST PROCESSING   Final Result         1.  Severely hypoplastic right vertebral artery, central segment of the right vertebral artery is not visualized and may be occluded.         CT-CTA HEAD WITH & W/O-POST PROCESS   Final Result         1.  No large vessel occlusion or aneurysm identified   2.  Interval placement of right frontal approach ventriculostomy with postprocedural minimal hemorrhage and  new pneumocephalus.   3.  Bilateral ventricular dilatation appears somewhat increased since prior study compatible with somewhat worsened hydrocephalus.   4.  Right frontal and left cerebellar encephalomalacia      DX-CHEST-PORTABLE (1 VIEW)   Final Result         1.  No acute cardiopulmonary disease.   2.  Trace bilateral pleural effusion      DX-ABDOMEN FOR TUBE PLACEMENT   Final Result         1.  Nonspecific bowel gas pattern.   2.  Dobbhoff tube tip terminates overlying the expected location of the gastric antrum.   3.  Trace bilateral pleural effusions      DX-CHEST-PORTABLE (1 VIEW)   Final Result         1.  No acute cardiopulmonary disease.   2.  Trace bilateral pleural effusions   3.  Atherosclerosis      MR-BRAIN-WITH & W/O   Final Result   Addendum 1 of 1   ADDENDUM:      The case was discussed by telephone (call report) with DR. DAVONTE VIEIRA    on call for [facility], at 1854 hours.      Final      1.  Interval development of moderate obstructive hydrocephalus due to mass effect on the fourth ventricle. There is mild transependymal edema.   2.  Large area of acute infarction involving the left cerebellar hemisphere, inferior cerebellar vermis, and left cerebellar tonsil. PICA territory. No hemorrhagic transformation. This is the cause of the fourth ventricle effacement with obstructive    hydrocephalus. There is also mild left-sided cerebellar tonsillar herniation.   3.  Moderate supratentorial white matter disease most consistent with microvascular ischemic change.   4.  Old infarction right anterior frontal convexity.   5.  A Voalte message was sent to RICKEY HAYES at 1823 hours 6/22/2022. Prompt reply as of 6:41 PM not yet received. Efforts are ongoing to contact housestaff managing the patient at this time.   6.  Case was discussed (call report) with nurse SHASTA MAGILL at 6:47 PM. Request to initiate stat neurosurgery consult. Attempts to contact [facility] on-call housestaff are  ongoing.      CT-HEAD W/O   Final Result      1.  No evidence of acute hemorrhage, mass or large territorial infarction   2.  LEFT cerebellar and RIGHT frontal encephalomalacia   3.  Mild atrophy   4.  Mild white matter changes         CT-CSPINE WITHOUT PLUS RECONS   Final Result      1.  No acute fracture or traumatic listhesis in the cervical spine.   2.  Emphysema in the lung apices.      MR-BRAIN-WITH & W/O    (Results Pending)       Assessment and Plan:  Alverto Duran is a 66 year old man with atrial fibrillation, initially admitted as a trauma code for ground level fall, eventually recognized to have a large L PICA stroke, ultimately resulting in obstructive hydrocephalus, requiring surgical decompression.  He is now POD#4 from suboccipital decompression.  Neurologic exam is slowly improving.  At this time, would continue with salt therapy to mitigate cytotoxic edema and minimize local mass effect on the 4th ventricle to relieve his CSF blockage.  EVD has been removed given minimal output last 24 hours and overall stable to improving clinical exam. Eventually will need to be placed on apixaban for long-term secondary stroke prevention.    Problem list:  1.  Acute L PICA stroke  2.  Cerebral edema with symptomatic mass effect  3.  S/p suboccipital decompression, EVD placement  4.  Obstructive hydrocephalus  5.  Atrial fibrillation    Plan:   - q4h neurochecks/NIHSS ok, ok for [facility]   - long-term BP goal is 110-130/60-80; acutely may aim for SBP goal 100-160, on cardene PRN, transition to enteral meds as needed   - Na goal 145-155- continue salt tabs, add 3% if needed- this is to mitigate localized mass effect and re-establish CSF flow- anticipate 2-3 days of additional therapy   - recommend eliquis 5mg BID now EVD out for long-term secondary stroke prevention, attain NSG clearance to start today   - LDL is 74, decrease atorvastatin to 40mg daily for goal LDL < 70   - PT/OT/SLP   - SCDs only for DVT  ppx    The evaluation of the patient, and recommended management, was discussed with the ICU staff. I have performed a physical exam and reviewed and updated ROS and Plan today (6/28/2022).     Beni Gutierrez MD  Neurohospitalist, Acute Care Services

## 2022-06-28 NOTE — PROGRESS NOTES
Neurosurgery Progress Note    Subjective:  No acute events overnight.     Exam:  Awake, drowsy   Faint, hoarse voice. Mumbles incomprehensive words with AO questions. Follows commands   Pupils: right 3mm, left 4mm. Round, sluggish   Tongue dry, midline.   Hearing intact.   Follows in all extremities, weak, equal.  Bilat wrists in soft restraints  Sensation: Intact throughout.   Incision: Top scalp w/staples. Tagaderm covering. No drainage seen  EVD site: drain in place, biopatch and tagaderm over site. No drainage  Muse  EVD: 0cc/8hr       BP  Min: 110/61  Max: 168/81  Pulse  Av.4  Min: 46  Max: 98  Resp  Av.7  Min: 15  Max: 60  Temp  Av.2 °C (97.1 °F)  Min: 35.8 °C (96.5 °F)  Max: 36.7 °C (98.1 °F)  SpO2  Av.7 %  Min: 95 %  Max: 100 %    ICP  Av.4 MM HG  Min: 3 MM HG  Max: 12 MM HG  CPP   Av.8  Min: 87  Max: 102    Recent Labs     22  0255   WBC 14.8*   RBC 4.57*   HEMOGLOBIN 12.4*   HEMATOCRIT 39.3*   MCV 86.0   MCH 27.1   MCHC 31.6*   RDW 47.3   PLATELETCT 234   MPV 10.8     Recent Labs     22  0255 22  0213 22  0430   SODIUM 146* 144 144   POTASSIUM 3.8 3.6 4.0   CHLORIDE 113* 107 106   CO2    GLUCOSE 140* 136* 137*   BUN    CREATININE 0.84 0.90 0.92   CALCIUM 8.8 9.3 9.5               Intake/Output                       22 0700 - 22 0659 22 07 - 22 0659     [id number] Total [id number] Total                 Intake    I.V.  362.1  199.6 561.7  --  -- --    Cardene Volume 362.1 199.6 561.7 -- -- --    NG/GT  600  840 1440  --  -- --    Intake (mL) (Enteral Tube 22 Cortrak - Small Bowel/Transpyloric Left nare)  -- -- --    IV Piggyback  250  -- 250  --  -- --    Volume (mL) (penicillin G potassium 4 Million Units in  mL IVPB) 250 -- 250 -- -- --    Enteral  90  90 180  --  -- --    Free Water / Tube Flush 90 90 180 -- -- --    Total Intake 1302.1 1129.6 2431.7 -- -- --        Output    Urine  2275 1935 4210  --  -- --    Output (mL) (Urethral Catheter) 2275 1935 4210 -- -- --    Drains  4  0 4  --  -- --    Output (mL) (ICP/Ventriculostomy) 4 0 4 -- -- --    Stool  325  -- 325  --  -- --    Number of Times Stooled 3 x -- 3 x -- -- --    Rectal Tube Output (Rectal Tube) 325 -- 325 -- -- --    Total Output 2604 1935 4539 -- -- --       Net I/O     -1301.9 -805.4 -2107.3 -- -- --            Intake/Output Summary (Last 24 hours) at 6/28/2022 0911  Last data filed at 6/28/2022 0601  Gross per 24 hour   Intake 2281.66 ml   Output 3485 ml   Net -1203.34 ml            • [START ON 6/29/2022] lisinopril  40 mg Q DAY   • lisinopril  10 mg Once   • Metoprolol Tartrate  5 mg Q5 MIN PRN   • fentaNYL   mcg Q HOUR PRN   • metoprolol tartrate  50 mg Q8HRS   • furosemide  20 mg BID DIURETIC   • sodium chloride  2 g TID WITH MEALS   • LORazepam  2-4 mg Q4HRS PRN   • niCARdipine infusion  0-15 mg/hr Continuous   • enalaprilat  1.25 mg Q6HRS PRN   • Respiratory Therapy Consult   Continuous RT   • MD Alert...Adult ICU Electrolyte Replacement per Pharmacy   PHARMACY TO DOSE   • sodium chloride 23.4% ivpb  200 mEq Q6HRS PRN   • atorvastatin  80 mg Q EVENING   • Pharmacy  1 Each PHARMACY TO DOSE   • acetaminophen  650 mg Q6HRS PRN   • ondansetron  4 mg Q4HRS PRN   • senna-docusate  2 Tablet BID    And   • polyethylene glycol/lytes  1 Packet QDAY PRN    And   • magnesium hydroxide  30 mL QDAY PRN    And   • bisacodyl  10 mg QDAY PRN   • penicillin g  4 Million Units Q4HRS   • ondansetron  4 mg Q4HRS PRN   • Pharmacy Consult Request  1 Each PHARMACY TO DOSE       Assessment and Plan:  Hospital day #8 left cerebellar stroke  POD #5 right frontal EVD  POD #4 craniectomy, C1 lami  Prophylactic anticoagulation: no         Start date/time: tbd    Plan:   Neuro exam sig improved.  MRI stable    EVD drain dc'd   No anticoags for 6 weeks   Okay for PT/OT, chair/mobilize  Keep eunatremic

## 2022-06-29 NOTE — CARE PLAN
The patient is Stable - Low risk of patient condition declining or worsening    Shift Goals  Clinical Goals: stable/improved neuro exams, stable HR  Patient Goals: go to the store  Family Goals: no family present at this time    Progress made toward(s) clinical / shift goals:    Problem: Safety - Medical Restraint  Goal: Remains free of injury from restraints (Restraint for Interference with Medical Device)  Outcome: Progressing  Flowsheets (Taken 6/29/2022 1838)  Addressed this shift: Remains free of injury from restraints (restraint for interference with medical device):   Determine that other, less restrictive measures have been tried or would not be effective before applying the restraint   Evaluate the patient's condition at the time of restraint application   Inform patient/family regarding the reason for restraint   Every 2 hours: Monitor safety, psychosocial status, comfort, nutrition and hydration     Problem: Skin Integrity  Goal: Skin integrity is maintained or improved  Outcome: Progressing  Note: Q2H turns

## 2022-06-29 NOTE — PROGRESS NOTES
Neurosurgery Progress Note    Subjective:  No acute events overnight.     Exam:  Awake, drowsy   Faint, hoarse voice. Mumbles incomprehensive words with AO questions. Follows commands   Pupils: right 3mm, left 4mm. Round, sluggish   Tongue dry, midline.   Hearing intact.   Follows in all extremities, weak, equal.  Bilat wrists in soft restraints  Sensation: Intact throughout.   Incision: Top scalp w/staples. Tagaderm covering. No drainage seen  Muse  coretrak       BP  Min: 139/94  Max: 179/116  Pulse  Av.8  Min: 61  Max: 106  Resp  Av.6  Min: 9  Max: 75  Temp  Av.5 °C (97.7 °F)  Min: 35.9 °C (96.7 °F)  Max: 36.7 °C (98.1 °F)  SpO2  Av.2 %  Min: 95 %  Max: 99 %    No data recorded    Recent Labs     22  0255   WBC 13.0*   RBC 5.41   HEMOGLOBIN 14.8   HEMATOCRIT 45.5   MCV 84.1   MCH 27.4   MCHC 32.5*   RDW 43.9   PLATELETCT 376   MPV 11.4     Recent Labs     22  0213 22  0430 22  0255   SODIUM 144 144 143   POTASSIUM 3.6 4.0 3.9   CHLORIDE 107 106 106   CO2  24   GLUCOSE 136* 137* 132*   BUN *   CREATININE 0.90 0.92 0.91   CALCIUM 9.3 9.5 9.7               Intake/Output                       22 0700 - 22 0659 22 0700 - 22 0659      Total [id number] Total                 Intake    I.V.  322.9  -- 322.9  --  -- --    Cardene Volume 322.9 -- 322.9 -- -- --    NG/GT  360  720 1080  --  -- --    Intake (mL) (Enteral Tube 22 Cortrak - Small Bowel/Transpyloric Left nare)  -- -- --    IV Piggyback  100  400 500  --  -- --    Volume (mL) (penicillin G potassium 4 Million Units in  mL IVPB) 100 400 500 -- -- --    Enteral  60  90 150  --  -- --    Free Water / Tube Flush 60 90 150 -- -- --    Total Intake 842.9 1210 2052.9 -- -- --       Output    Urine  1050  1700 2750  --  -- --    Output (mL) (Urethral Catheter) 1050 1700 2750 -- -- --    Stool  --  -- --  --  -- --    Number of Times  Stooled 1 x -- 1 x -- -- --    Total Output 1050 1700 2750 -- -- --       Net I/O     -207.1 -490 -697.1 -- -- --            Intake/Output Summary (Last 24 hours) at 6/29/2022 1024  Last data filed at 6/29/2022 0600  Gross per 24 hour   Intake 1400.83 ml   Output 1900 ml   Net -499.17 ml            • hydrALAZINE  20 mg Q6HRS PRN   • LORazepam  1 mg Q4HRS PRN   • phosphorus  500 mg TID   • [START ON 6/30/2022] furosemide  20 mg Q DAY   • lisinopril  40 mg Q DAY   • Metoprolol Tartrate  5 mg Q5 MIN PRN   • metoprolol tartrate  50 mg Q8HRS   • sodium chloride  2 g TID WITH MEALS   • LORazepam  2-4 mg Q4HRS PRN   • enalaprilat  1.25 mg Q6HRS PRN   • Respiratory Therapy Consult   Continuous RT   • MD Alert...Adult ICU Electrolyte Replacement per Pharmacy   PHARMACY TO DOSE   • atorvastatin  80 mg Q EVENING   • Pharmacy  1 Each PHARMACY TO DOSE   • acetaminophen  650 mg Q6HRS PRN   • ondansetron  4 mg Q4HRS PRN   • senna-docusate  2 Tablet BID    And   • polyethylene glycol/lytes  1 Packet QDAY PRN    And   • magnesium hydroxide  30 mL QDAY PRN    And   • bisacodyl  10 mg QDAY PRN   • ondansetron  4 mg Q4HRS PRN   • Pharmacy Consult Request  1 Each PHARMACY TO DOSE       Assessment and Plan:  Hospital day #9 left cerebellar stroke  POD #6 right frontal EVD  POD #5 craniectomy, C1 lami  Prophylactic anticoagulation: no         Start date/time: tbd    Plan:   Neuro exam sig improved.  MRI stable    CT head in 2 weeks   No anticoags for 2 weeks   Consider IVC filter if has DVT  PT/OT/SLP   Keep eunatremic, Na 143

## 2022-06-29 NOTE — PROGRESS NOTES
Mr. Alverto Duran is a 66-year-old male with PMH significant for chronic methamphetamine use, tobacco dependence, HTN, right frontal CVA 2019, and syphilis (2020) who initially presented to [facility] ED around 6/20/22 with headaches and multiple falls and was discharged home.   He presented to [facility] ER on 6/21 with c/o dizziness, neck pain, headache, and falls.   CT head without contrast showed encephalomalacia in the left cerebellar and right frontal areas. He was admitted to the floor under the [facility] service.      Positive syphilis titer. Started on PCN for possible neurosyphilis.      On 6/22 he developed decreased LOC with obtundation. MRI showed significant edema with compression of the brain stem and obstructive hydrocephalus. He was transferred to ICU for intubation, EVD placement and hypertonic saline therapy.   6/23 - suboccipital craniectomy. New onset AFIB RVR.  6/24 - multiple DVT's BUE noted on US. Start AC when cleared by Neurosurgery.  6/25 - ID consult, recommend 10 day neurosyphilis course with IV PCN q4h (end date 7/3/22). Extubated  6/26 - EVD no output overnight. Alteplase by Dr. Mello.   6/27 - IV Metoprolol for AFIB 170's  6/28 - EVD out. Transfer to [facility]    6/29 - no acute events overnight. Seen and examined this morning. He is more alert and verbal today. Able to tell me his name and date of birth. Restless, trying to remove surgical dressing requiring restraints. Replacing phosphorus. Cortrack with tube feedings pending SLP evaluation.     No longer requires ICU care.  Transfer to Neurology floor with telemetry.  Discussed with Hospitalist, [facility] Dr. Still, who will assume care.  Critical care service is available for any questions or concerns.    YUMIKO Pillai.        AC plan per neurosurgery: Concern for patient's high-risk of hemorrhagic conversion. Hold off on AC for 2 weeks (July 7th). Re-image patient at that time. If no hemorrhagic conversion,  re-evaluate/discuss AC plan.

## 2022-06-29 NOTE — CARE PLAN
The patient is Watcher - Medium risk of patient condition declining or worsening    Shift Goals  Clinical Goals: improved neuro and stable neuro exam  Patient Goals: GLENDY  Family Goals: GLENDY    Progress made toward(s) clinical / shift goals:    Problem: Safety - Medical Restraint  Goal: Remains free of injury from restraints (Restraint for Interference with Medical Device)  Flowsheets (Taken 6/28/2022 5458)  Addressed this shift: Remains free of injury from restraints (restraint for interference with medical device):   Determine that other, less restrictive measures have been tried or would not be effective before applying the restraint   Evaluate the patient's condition at the time of restraint application   Inform patient/family regarding the reason for restraint   Every 2 hours: Monitor safety, psychosocial status, comfort, nutrition and hydration

## 2022-06-29 NOTE — PROGRESS NOTES
Neurology Progress Note  Neurohospitalist Service, [facility] Neurosciences    Referring Physician: Dickson Jimenes M.D.      Interval History: No acute events overnight.  Transferred to [facility] [place].  Na not at goal, 143 this AM.  Stable exam.    Review of systems: In addition to what is detailed in the HPI and/or updated in the interval history, all other systems reviewed and are negative.    Past Medical History, Past Surgical History and Social History reviewed and unchanged from prior    Medications:    Current Facility-Administered Medications:   •  hydrALAZINE (APRESOLINE) injection 20 mg, 20 mg, Intravenous, Q6HRS PRN, Timo Carter M.D., 20 mg at 06/29/22 0049  •  LORazepam (ATIVAN) injection 1 mg, 1 mg, Intravenous, Q4HRS PRN, Timo Carter M.D., 1 mg at 06/29/22 0049  •  lisinopril (PRINIVIL) tablet 40 mg, 40 mg, Enteral Tube, Q DAY, YUE Pillai, 40 mg at 06/29/22 0512  •  Metoprolol Tartrate (LOPRESSOR) injection 5 mg, 5 mg, Intravenous, Q5 MIN PRN, Timo Lang M.D.  •  metoprolol tartrate (LOPRESSOR) tablet 50 mg, 50 mg, Enteral Tube, Q8HRS, Dickson Jimenes M.D., 50 mg at 06/29/22 0512  •  furosemide (LASIX) injection 20 mg, 20 mg, Intravenous, BID DIURETIC, Dickson Jimenes M.D., 20 mg at 06/29/22 0512  •  sodium chloride (SALT) tablet 2 g, 2 g, Enteral Tube, TID WITH MEALS, Dickson Jimenes M.D., 2 g at 06/29/22 0512  •  LORazepam (ATIVAN) injection 2-4 mg, 2-4 mg, Intravenous, Q4HRS PRN, Dickson Jimenes M.D.  •  enalaprilat (Vasotec) injection 1.25 mg 1 mL, 1.25 mg, Intravenous, Q6HRS PRN, Dickson Jimenes M.D., 1.25 mg at 06/28/22 2032  •  Respiratory Therapy Consult, , Nebulization, Continuous RT, Dominic Damico M.D.  •  MD Alert...ICU Electrolyte Replacement per Pharmacy, , Other, PHARMACY TO DOSE, Dominic Damico M.D.  •  atorvastatin (LIPITOR) tablet 80 mg, 80 mg, Enteral Tube, Q EVENING, Dominic Damico M.D., 80 mg at 06/28/22 1800  •  Pharmacy Consult: Enteral tube  "insertion - review meds/change route/product selection, 1 Each, Other, PHARMACY TO DOSE, Dominic Damico M.D.  •  acetaminophen (Tylenol) tablet 650 mg, 650 mg, Enteral Tube, Q6HRS PRN, Dominic Damico M.D., 650 mg at 06/27/22 0910  •  ondansetron (ZOFRAN ODT) dispertab 4 mg, 4 mg, Enteral Tube, Q4HRS PRN, Dominic Damico M.D.  •  senna-docusate (PERICOLACE or SENOKOT S) 8.6-50 MG per tablet 2 Tablet, 2 Tablet, Enteral Tube, BID, 2 Tablet at 06/29/22 0513 **AND** polyethylene glycol/lytes (MIRALAX) PACKET 1 Packet, 1 Packet, Enteral Tube, QDAY PRN **AND** magnesium hydroxide (MILK OF MAGNESIA) suspension 30 mL, 30 mL, Enteral Tube, QDAY PRN **AND** bisacodyl (DULCOLAX) suppository 10 mg, 10 mg, Rectal, QDAY PRN, Dominic Damico M.D.  •  penicillin G potassium 4 Million Units in  mL IVPB, 4 Million Units, Intravenous, Q4HRS, Dickson Jimenes M.D., Stopped at 06/29/22 0543  •  ondansetron (ZOFRAN) syringe/vial injection 4 mg, 4 mg, Intravenous, Q4HRS PRN, Leatha Timmons M.D.  •  Notify provider if pain remains uncontrolled, , , CONTINUOUS **AND** Use the Numeric Rating Scale (NRS), Mc-Baker Faces (WBF), or FLACC on regular floors and Critical-Care Pain Observation Tool (CPOT) on ICUs/Trauma to assess pain, , , CONTINUOUS **AND** Pulse Ox, , , CONTINUOUS **AND** Pharmacy Consult Request ...Pain Management Review 1 Each, 1 Each, Other, PHARMACY TO DOSE **AND** If patient difficult to arouse and/or has respiratory depression (respiratory rate of 10 or less), stop any opiates that are currently infusing and call a Rapid Response., , , CONTINUOUS, Roro Still M.D.    Physical Examination:   BP (!) 158/87   Pulse 76   Temp 36.7 °C (98.1 °F) (Temporal)   Resp 20   Ht 1.778 m (5' 10\")   Wt 59 kg (130 lb 1.1 oz)   SpO2 97%   BMI 18.66 kg/m²     General: Eyes open, alert  Neck: There is normal range of motion  CV: Regular rate   Extremities:  Warm, dry, and intact, without peripheral lower extremity " edema    NEUROLOGICAL EXAM:     Mental status: Eyes open, interactive  Speech and language: Dysarthric but fluent.  Follows midline commands and distal commands readily  Cranial nerve exam: Pupils are equal, round and reactive to light bilaterally.  Blinks to threat bilaterally.  Tracks past midline. Face is symmetric.  Motor exam: All limbs are antigravity in strength.  Does breakaway due to inattention  Sensory exam:  Reacts to tactile in all 4 extremities, there is no obvious neglect to double stim  Coordination: No vignesh ataxia on elicited movements    Objective Data:    Labs:  Lab Results   Component Value Date/Time    PROTHROMBTM 14.7 (H) 06/23/2022 09:25 AM    INR 1.18 (H) 06/23/2022 09:25 AM      Lab Results   Component Value Date/Time    WBC 13.0 (H) 06/29/2022 02:55 AM    RBC 5.41 06/29/2022 02:55 AM    HEMOGLOBIN 14.8 06/29/2022 02:55 AM    HEMATOCRIT 45.5 06/29/2022 02:55 AM    MCV 84.1 06/29/2022 02:55 AM    MCH 27.4 06/29/2022 02:55 AM    MCHC 32.5 (L) 06/29/2022 02:55 AM    MPV 11.4 06/29/2022 02:55 AM    NEUTSPOLYS 74.30 (H) 06/26/2022 02:55 AM    LYMPHOCYTES 14.70 (L) 06/26/2022 02:55 AM    MONOCYTES 10.40 06/26/2022 02:55 AM    EOSINOPHILS 0.00 06/26/2022 02:55 AM    BASOPHILS 0.30 06/26/2022 02:55 AM      Lab Results   Component Value Date/Time    SODIUM 143 06/29/2022 02:55 AM    POTASSIUM 3.9 06/29/2022 02:55 AM    CHLORIDE 106 06/29/2022 02:55 AM    CO2 24 06/29/2022 02:55 AM    GLUCOSE 132 (H) 06/29/2022 02:55 AM    BUN 28 (H) 06/29/2022 02:55 AM    CREATININE 0.91 06/29/2022 02:55 AM      Lab Results   Component Value Date/Time    CHOLSTRLTOT 125 06/21/2022 07:53 AM    LDL 74 06/21/2022 07:53 AM    HDL 45 06/21/2022 07:53 AM    TRIGLYCERIDE 45 06/24/2022 02:50 AM       Lab Results   Component Value Date/Time    ALKPHOSPHAT 57 06/23/2022 02:05 PM    ASTSGOT 12 06/23/2022 02:05 PM    ALTSGPT 10 06/23/2022 02:05 PM    TBILIRUBIN 0.2 06/23/2022 02:05 PM        Imaging/Testing:    I interpreted  and/or reviewed the patient's neuroimaging    MR-BRAIN-WITH & W/O   Final Result         Subacute left inferior cerebellar infarct with minimal blood products within representing petechial microhemorrhages. Mass effect upon the fourth ventricle remains stable.      Posterior fossa is well decompressed by midline occipital craniotomy. Lateral ventricles are well decompressed by a right frontal approach EVD with its tip in the frontal horn of the right lateral ventricle.      Small right occipital tentorial subdural hematoma without mass effect.      Remote bilateral frontal infarct with encephalomalacia.      Age-related volume loss and chronic microvascular ischemic changes.         US-EXTREMITY VENOUS LOWER BILAT   Final Result      CT-HEAD W/O   Final Result      1.  There is a small amount of intraventricular blood that is new from 6/23/2022. The ventricles are stable in size.   2.  There is a right frontal approach ventriculostomy catheter with the tip terminating at the foramen of Monro. There is a punctate amount of parenchymal hemorrhage surrounding the catheter is unchanged.   3.  Right frontal and left cerebellar encephalomalacia.      EC-ECHOCARDIOGRAM COMPLETE W/O CONT   Final Result      US-CAROTID DOPPLER BILAT   Final Result      US-EXTREMITY VENOUS UPPER BILAT   Final Result      DX-CHEST-PORTABLE (1 VIEW)   Final Result         1.  No acute cardiopulmonary disease.   2.  Trace right pleural effusion   3.  Atherosclerosis      CT-CTA NECK WITH & W/O-POST PROCESSING   Final Result         1.  Severely hypoplastic right vertebral artery, central segment of the right vertebral artery is not visualized and may be occluded.         CT-CTA HEAD WITH & W/O-POST PROCESS   Final Result         1.  No large vessel occlusion or aneurysm identified   2.  Interval placement of right frontal approach ventriculostomy with postprocedural minimal hemorrhage and new pneumocephalus.   3.  Bilateral ventricular  dilatation appears somewhat increased since prior study compatible with somewhat worsened hydrocephalus.   4.  Right frontal and left cerebellar encephalomalacia      DX-CHEST-PORTABLE (1 VIEW)   Final Result         1.  No acute cardiopulmonary disease.   2.  Trace bilateral pleural effusion      DX-ABDOMEN FOR TUBE PLACEMENT   Final Result         1.  Nonspecific bowel gas pattern.   2.  Dobbhoff tube tip terminates overlying the expected location of the gastric antrum.   3.  Trace bilateral pleural effusions      DX-CHEST-PORTABLE (1 VIEW)   Final Result         1.  No acute cardiopulmonary disease.   2.  Trace bilateral pleural effusions   3.  Atherosclerosis      MR-BRAIN-WITH & W/O   Final Result   Addendum 1 of 1   ADDENDUM:      The case was discussed by telephone (call report) with DR. DAVONTE VIEIRA    on call for [facility], at 1854 hours.      Final      1.  Interval development of moderate obstructive hydrocephalus due to mass effect on the fourth ventricle. There is mild transependymal edema.   2.  Large area of acute infarction involving the left cerebellar hemisphere, inferior cerebellar vermis, and left cerebellar tonsil. PICA territory. No hemorrhagic transformation. This is the cause of the fourth ventricle effacement with obstructive    hydrocephalus. There is also mild left-sided cerebellar tonsillar herniation.   3.  Moderate supratentorial white matter disease most consistent with microvascular ischemic change.   4.  Old infarction right anterior frontal convexity.   5.  A Voalte message was sent to RICKEY HAYES at 1823 hours 6/22/2022. Prompt reply as of 6:41 PM not yet received. Efforts are ongoing to contact housestaff managing the patient at this time.   6.  Case was discussed (call report) with nurse SHASTA MAGILL at 6:47 PM. Request to initiate stat neurosurgery consult. Attempts to contact [facility] on-call housestaff are ongoing.      CT-HEAD W/O   Final Result      1.  No  evidence of acute hemorrhage, mass or large territorial infarction   2.  LEFT cerebellar and RIGHT frontal encephalomalacia   3.  Mild atrophy   4.  Mild white matter changes         CT-CSPINE WITHOUT PLUS RECONS   Final Result      1.  No acute fracture or traumatic listhesis in the cervical spine.   2.  Emphysema in the lung apices.          Assessment and Plan:  Alverto Duran is a 66 year old man with atrial fibrillation, initially admitted as a trauma code for ground level fall, eventually recognized to have a large L PICA stroke, ultimately resulting in obstructive hydrocephalus, requiring surgical decompression.  He is now POD#5 from suboccipital decompression.  Neurologic exam is slowly improving.  EVD has been removed.  Na not at goal, but ok to liberalize goal given clinical stability.   Eventually will need to be placed on apixaban for long-term secondary stroke prevention.    Problem list:  1.  Acute L PICA stroke  2.  Cerebral edema with symptomatic mass effect  3.  S/p suboccipital decompression, EVD placement  4.  Obstructive hydrocephalus  5.  Atrial fibrillation    Plan:   - q4h neurochecks/NIHSS   - long-term BP goal is 110-130/60-80; acutely may aim for SBP goal 100-160, titrate enteral meds to goal   - Na goal 135-145- continue salt tabs for 2 more days, then wean   - recommend eliquis 5mg BID when cleared by NSG (anticipate in 2 weeks)   - LDL is 74, decrease atorvastatin to 40mg daily for goal LDL < 70   - PT/OT/SLP   - SCDs only for DVT ppx   - please reconsult Neurology with any additional questions or concerns    The evaluation of the patient, and recommended management, was discussed with the attending hospitalist. I have performed a physical exam and reviewed and updated ROS and Plan today (6/29/2022).     Beni Gutierrez MD  Neurohospitalist, Acute Care Services

## 2022-06-29 NOTE — PROGRESS NOTES
[facility] PROGRESS NOTE     Attending:   Kathe Mccray MD    Resident:   Roro Still MD    PATIENT:   Alverto Duran; [id number]; 1955    ID/Interval Events:   66 y.o. male PMHx of chronic methamphetamine use, tobacco dependence, HTN, right frontal CVA 2019, and syphilis (2020) who initially presented to [place] ED around 6/20/22 with headaches and multiple falls and was discharged home.   He presented to [facility] ER on 6/21 with c/o dizziness, neck pain, headache, and falls. CT head without contrast showed encephalomalacia in the left cerebellar and right frontal areas. Patient was initially admitted to the floor under the [facility] service.   On 6/22 he developed decreased LOC with obtundation. MRI with and without contrast showed significant edema with compression of the brain stem and obstructive hydrocephalus. He was transferred to ICU for intubation, EVD placement and hypertonic saline therapy.    6/23 - suboccipital craniectomy. New onset AFIB RVR.  6/24 - multiple DVT's BUE noted on US.    6/25 - ID consult, recommend 10 day neurosyphilis course with IV PCN q4h (end date 7/3/22). Extubated  6/26 - EVD no output overnight. Alteplase by Dr. Mello.   6/27 - IV Metoprolol for AFIB with rate to 170's    SUBJECTIVE:   Patient slightly more coherent today, able to state his name and a few words.    OBJECTIVE:  Vitals:    06/29/22 0600 06/29/22 0602 06/29/22 0800 06/29/22 1000   BP: (!) 158/87  (!) 153/89 (!) 143/79   Pulse: 76  83 73   Resp: 20  15 (!) 26   Temp:  36.7 °C (98.1 °F) 36.6 °C (97.9 °F)    TempSrc:  Temporal Temporal    SpO2: 97%  97% 97%   Weight:       Height:           Intake/Output Summary (Last 24 hours) at 6/29/2022 1143  Last data filed at 6/29/2022 0600  Gross per 24 hour   Intake 1400.83 ml   Output 1900 ml   Net -499.17 ml       PHYSICAL EXAM:  General: Lying in bed, awake, responsive at times  HEENT: NC/AT. EOMI. NG tube in place, EVD drain in place,  biopatch and tegaderm over site, no drainage, staples  Cardiovascular: irregularly irregular, without murmurs  Respiratory: CTAB, no tachypnea or retractions   Abdomen: normal bowel sounds, soft, nontender, nondistended, no masses, no organomegaly   EXT:  No pitting edema noted, bilateral soft wrist restraints, moving all extremities  Skin: No erythema/lesions   Neuro: drowsy, arouses to stimulation, mumbles incomprehensible words. Follows commands intermittently. Moving all extremities spontaneously, BLE 5/5, LUE 4/5, RUE 3/5. Pupils 2mm equal, reactive  : Muse in place, rectal tube in place      LABS:  Recent Labs     06/29/22  0255   WBC 13.0*   RBC 5.41   HEMOGLOBIN 14.8   HEMATOCRIT 45.5   MCV 84.1   MCH 27.4   RDW 43.9   PLATELETCT 376   MPV 11.4     Recent Labs     06/27/22  0213 06/28/22  0430 06/29/22  0255   SODIUM 144 144 143   POTASSIUM 3.6 4.0 3.9   CHLORIDE 107 106 106   CO2 26 27 24   BUN 22 22 28*   CREATININE 0.90 0.92 0.91   CALCIUM 9.3 9.5 9.7   MAGNESIUM 2.4 2.4 2.3   PHOSPHORUS 2.5 2.7 2.1*     Estimated GFR/CRCL = Estimated Creatinine Clearance: 66.6 mL/min (by C-G formula based on SCr of 0.91 mg/dL).  Recent Labs     06/27/22  0213 06/28/22  0430 06/29/22  0255   GLUCOSE 136* 137* 132*                 No results for input(s): INR, APTT, FIBRINOGEN in the last 72 hours.    Invalid input(s): DIMER    MICROBIOLOGY:   No results found for: BLOODCULTU, BLDCULT, BCHOLD     IMAGING:   MR-BRAIN-WITH & W/O   Final Result         Subacute left inferior cerebellar infarct with minimal blood products within representing petechial microhemorrhages. Mass effect upon the fourth ventricle remains stable.      Posterior fossa is well decompressed by midline occipital craniotomy. Lateral ventricles are well decompressed by a right frontal approach EVD with its tip in the frontal horn of the right lateral ventricle.      Small right occipital tentorial subdural hematoma without mass effect.      Remote  bilateral frontal infarct with encephalomalacia.      Age-related volume loss and chronic microvascular ischemic changes.         US-EXTREMITY VENOUS LOWER BILAT   Final Result      CT-HEAD W/O   Final Result      1.  There is a small amount of intraventricular blood that is new from 6/23/2022. The ventricles are stable in size.   2.  There is a right frontal approach ventriculostomy catheter with the tip terminating at the foramen of Monro. There is a punctate amount of parenchymal hemorrhage surrounding the catheter is unchanged.   3.  Right frontal and left cerebellar encephalomalacia.      EC-ECHOCARDIOGRAM COMPLETE W/O CONT   Final Result      US-CAROTID DOPPLER BILAT   Final Result      US-EXTREMITY VENOUS UPPER BILAT   Final Result      DX-CHEST-PORTABLE (1 VIEW)   Final Result         1.  No acute cardiopulmonary disease.   2.  Trace right pleural effusion   3.  Atherosclerosis      CT-CTA NECK WITH & W/O-POST PROCESSING   Final Result         1.  Severely hypoplastic right vertebral artery, central segment of the right vertebral artery is not visualized and may be occluded.         CT-CTA HEAD WITH & W/O-POST PROCESS   Final Result         1.  No large vessel occlusion or aneurysm identified   2.  Interval placement of right frontal approach ventriculostomy with postprocedural minimal hemorrhage and new pneumocephalus.   3.  Bilateral ventricular dilatation appears somewhat increased since prior study compatible with somewhat worsened hydrocephalus.   4.  Right frontal and left cerebellar encephalomalacia      DX-CHEST-PORTABLE (1 VIEW)   Final Result         1.  No acute cardiopulmonary disease.   2.  Trace bilateral pleural effusion      DX-ABDOMEN FOR TUBE PLACEMENT   Final Result         1.  Nonspecific bowel gas pattern.   2.  Dobbhoff tube tip terminates overlying the expected location of the gastric antrum.   3.  Trace bilateral pleural effusions      DX-CHEST-PORTABLE (1 VIEW)   Final Result          1.  No acute cardiopulmonary disease.   2.  Trace bilateral pleural effusions   3.  Atherosclerosis      MR-BRAIN-WITH & W/O   Final Result   Addendum 1 of 1   ADDENDUM:      The case was discussed by telephone (call report) with DR. DAVONTE VIEIRA    on call for [facility], at 1854 hours.      Final      1.  Interval development of moderate obstructive hydrocephalus due to mass effect on the fourth ventricle. There is mild transependymal edema.   2.  Large area of acute infarction involving the left cerebellar hemisphere, inferior cerebellar vermis, and left cerebellar tonsil. PICA territory. No hemorrhagic transformation. This is the cause of the fourth ventricle effacement with obstructive    hydrocephalus. There is also mild left-sided cerebellar tonsillar herniation.   3.  Moderate supratentorial white matter disease most consistent with microvascular ischemic change.   4.  Old infarction right anterior frontal convexity.   5.  A Voalte message was sent to RICKEY HAYES at 1823 hours 6/22/2022. Prompt reply as of 6:41 PM not yet received. Efforts are ongoing to contact housestaff managing the patient at this time.   6.  Case was discussed (call report) with nurse SHASTA MAGILL at 6:47 PM. Request to initiate stat neurosurgery consult. Attempts to contact [facility] on-call housestaff are ongoing.      CT-HEAD W/O   Final Result      1.  No evidence of acute hemorrhage, mass or large territorial infarction   2.  LEFT cerebellar and RIGHT frontal encephalomalacia   3.  Mild atrophy   4.  Mild white matter changes         CT-CSPINE WITHOUT PLUS RECONS   Final Result      1.  No acute fracture or traumatic listhesis in the cervical spine.   2.  Emphysema in the lung apices.      DX-ESOPHAGUS - [id number]-BF    (Results Pending)       CULTURES:   Results     Procedure Component Value Units Date/Time    CSF Culture [[id number]] Collected: 06/24/22 0834    Order Status: Completed Specimen: CSF from [facility]  Updated: 06/29/22 0728     Significant Indicator NEG     Source CSF     Site SHUNT     Culture Result No growth at 5 days.     Gram Stain Result Rare WBCs.  No organisms seen.      Narrative:      Collected By: BELEN SYED  Collected By: BELEN SYED    GRAM STAIN [[id number]] Collected: 06/24/22 0834    Order Status: Completed Specimen: CSF Updated: 06/24/22 1031     Significant Indicator .     Source CSF     Site SHUNT     Gram Stain Result Rare WBCs.  No organisms seen.      Narrative:      Collected By: BELEN SYED  Collected By: BELEN SYED    CSF CULTURE [[id number]] Collected: 06/24/22 0837    Order Status: Canceled Specimen: Other from [facility]           MEDS:  Current Facility-Administered Medications   Medication Last Admin   • hydrALAZINE (APRESOLINE) injection 20 mg 20 mg at 06/29/22 0049   • LORazepam (ATIVAN) injection 1 mg 1 mg at 06/29/22 0049   • phosphorus (K-Phos-Neutral) per tablet 500 mg 500 mg at 06/29/22 1009   • [START ON 6/30/2022] furosemide (LASIX) injection 20 mg     • lisinopril (PRINIVIL) tablet 40 mg 40 mg at 06/29/22 0512   • Metoprolol Tartrate (LOPRESSOR) injection 5 mg     • metoprolol tartrate (LOPRESSOR) tablet 50 mg 50 mg at 06/29/22 0512   • sodium chloride (SALT) tablet 2 g 2 g at 06/29/22 0512   • LORazepam (ATIVAN) injection 2-4 mg     • enalaprilat (Vasotec) injection 1.25 mg 1 mL 1.25 mg at 06/28/22 2032   • Respiratory Therapy Consult     • atorvastatin (LIPITOR) tablet 80 mg 80 mg at 06/28/22 1800   • Pharmacy Consult: Enteral tube insertion - review meds/change route/product selection     • acetaminophen (Tylenol) tablet 650 mg 650 mg at 06/27/22 0910   • ondansetron (ZOFRAN ODT) dispertab 4 mg     • senna-docusate (PERICOLACE or SENOKOT S) 8.6-50 MG per tablet 2 Tablet 2 Tablet at 06/29/22 0513    And   • polyethylene glycol/lytes (MIRALAX) PACKET 1 Packet      And   • magnesium hydroxide (MILK OF MAGNESIA) suspension 30 mL       And   • bisacodyl (DULCOLAX) suppository 10 mg     • ondansetron (ZOFRAN) syringe/vial injection 4 mg     • Pharmacy Consult Request ...Pain Management Review 1 Each         PROBLEM LIST:  Problem Noted   Hypertensive Urgency 6/21/2022   Methamphetamine Use (Hcc) 6/21/2022   Gait Instability 6/21/2022   Hypokalemia 6/21/2022   Tobacco Dependence 6/21/2022       ASSESSMENT/PLAN: 66 y.o. male admitted for:    * Acute cerebrovascular accident (CVA) due to occlusion of left cerebellar artery (HCC)- (present on admission)  Obstructive Hydrocephalus  Assessment & Plan   Large left cerebellar ischemic infarct with cerebral edema and mass-effect on fourth ventricle, cryptogenic. Detected on MRI 6/22.   POD #5 from suboccipital decompression with craniectomy and C1 laminectomy  POD #6 right frontal EVD, removed 6/27  - MAP goal greater than 65; SBP goal to keep  (acutely). Longer term, BP goal of 110-130/60-80.          - PRN Nicardipine     - Scheduled lasix          - Metoprolol increased to TID from BID          - Added Lisinopril 40mg qd for HTN   - Elevate head of bed, keep head centered-maximize blood flow  - Goal sodium 145-155- with Na tabs - 2 g 3 times daily    - ECHO- EF 70%  - Atorvastatin 80 mg - LDL goal less than 70  - PT/OT/SLP when appropriate   - 6/29: Speech consulted - rec'd continuing NG tube feeds as pt is not  appropriate for PO intake d/t poor participation   - Barium swallow ordered  - Anticoagulation:   - CT head 7/7. If no hemorrhagic conversion, will start Eliquis 5mg BID.  - Continue q4h neurochecks      New onset a-fib (HCC)  Assessment & Plan  - Thought to be etiology of above CVA.  - 6/23 New onset A. fib with rapid RVR during evening of 6/23  - Treated and Converted with metoprolol 5 mg x 3 followed by metoprolol XL  -6/26 Afib with RVR again. Increased metoprolol 50 mg to TID   - Eventual plan for metoprolol succinate for longer acting     Acute bilateral deep vein thrombosis (DVT) of  upper extremities (HCC)  Assessment & Plan  Acute, partially occlusive DVT to the R IJ with extension into the let innominate vein.    Acute, occlusive DVT in on of the right paired brachial veins within the distal bicept  Acute,  occlusive DVT in Right paired ulnar veins within proximal forearm  Occlusive superficial thrombophlebitis in a small segment of the right cephalic vein.   Acute partially occlusive DVT in left paired brachial veins within the elbow region. Possible small extension into radial an ulnar veins   - Lower extremity US- negative for DVT  - Anticoagulation plan as above  - PT unable to work with patient until AC is therapeutic given multiple DVTs.     Goals of care, counseling/discussion  Assessment & Plan  -Unable to contact significant other who was originally mistaken to be wife. Sister clarified that patient is not    -Will contact case management  -Palliative following     Syphilis  Assessment & Plan  -Treponemal test is positive- confirmatory testing ending  -Prior Syphilis infection (2020) appears to have not been treated as ABX prescribed were never picked up   -CSF VRDL non-reactive  -Health department notified  -ID consult for latent syphilis and treatment - Recommend completing  10-day for neurosyphilis with IV penicillin - 4,000,000 units every 4 hours with an end date of 7/3/22.      Hypokalemia- (present on admission)  Assessment & Plan  Continue to monitor daily labs and replace as needed  Electrolyte protocol     Tobacco use- (present on admission)  Assessment & Plan  -Discussion regarding smoking cessation  When appropriate  -Provide information   -NRT if requests      Methamphetamine use (HCC)- (present on admission)  Assessment & Plan  Counseling cessation when clinical trajectory is more clear and appropriate     VTE:  Contraindicated, pending NSG clearance   Ulcer: Not Indicated  Lines: A line reportedly DC'd  Tubes: Muse, rectal, NG tube for feeding  Code Status:  Full     Disposition: Inpatient for acute CVA recovery. Speech and PT to work with patient in coming days. Downgraded to IMICU status on 6/28, transferred to neurology floor 6/29. Continue q4h neuro checks.    Roro Still MD   PGY-2 Family Medicine Resident   [facility]

## 2022-06-29 NOTE — PROGRESS NOTES
Handoff report received from ICU nurse. Pt care assumed. Pt is currently resting in bed. POC discussed with Pt and Pt verbalizes no questions at this time. Pt is AAOx2, on Ra, on Tele monitoring, and VSS. Call light and belongings within reach, bed in lowest and locked position, and Pt educated on use of call light.

## 2022-06-29 NOTE — PROGRESS NOTES
Neurosurgery Progress Note    Subjective:  No acute events overnight.     Exam:  Awake, drowsy   Faint, hoarse voice. Mumbles incomprehensive words with AO questions. Follows commands   Pupils: right 3mm, left 4mm. Round, sluggish   Tongue dry, midline.   Hearing intact.   Follows in all extremities, weak, equal.  Bilat wrists in soft restraints  Sensation: Intact throughout.   Incision: Top scalp w/staples. Tagaderm covering. No drainage seen  Muse  coretrak       BP  Min: 139/94  Max: 179/116  Pulse  Av.8  Min: 61  Max: 106  Resp  Av.7  Min: 9  Max: 75  Temp  Av.4 °C (97.5 °F)  Min: 35.9 °C (96.7 °F)  Max: 36.7 °C (98.1 °F)  SpO2  Av.2 %  Min: 95 %  Max: 99 %    No data recorded    Recent Labs     22  0255   WBC 13.0*   RBC 5.41   HEMOGLOBIN 14.8   HEMATOCRIT 45.5   MCV 84.1   MCH 27.4   MCHC 32.5*   RDW 43.9   PLATELETCT 376   MPV 11.4     Recent Labs     22  0213 22  0430 22  0255   SODIUM 144 144 143   POTASSIUM 3.6 4.0 3.9   CHLORIDE 107 106 106   CO2  24   GLUCOSE 136* 137* 132*   BUN *   CREATININE 0.90 0.92 0.91   CALCIUM 9.3 9.5 9.7               Intake/Output                       22 0700 - 22 0659 22 0700 - 22 0659      Total [id number] Total                 Intake    I.V.  322.9  -- 322.9  --  -- --    Cardene Volume 322.9 -- 322.9 -- -- --    NG/GT  360  720 1080  --  -- --    Intake (mL) (Enteral Tube 22 Cortrak - Small Bowel/Transpyloric Left nare)  -- -- --    IV Piggyback  100  400 500  --  -- --    Volume (mL) (penicillin G potassium 4 Million Units in  mL IVPB) 100 400 500 -- -- --    Enteral  60  90 150  --  -- --    Free Water / Tube Flush 60 90 150 -- -- --    Total Intake 842.9 1210 2052.9 -- -- --       Output    Urine  1050  1700 2750  --  -- --    Output (mL) (Urethral Catheter) 1050 1700 2750 -- -- --    Stool  --  -- --  --  -- --    Number of Times  Stooled 1 x -- 1 x -- -- --    Total Output 1050 1700 2750 -- -- --       Net I/O     -207.1 -490 697.1 -- -- --            Intake/Output Summary (Last 24 hours) at 6/29/2022 0957  Last data filed at 6/29/2022 0600  Gross per 24 hour   Intake 1720.83 ml   Output 2300 ml   Net -579.17 ml            • hydrALAZINE  20 mg Q6HRS PRN   • LORazepam  1 mg Q4HRS PRN   • phosphorus  500 mg TID   • [START ON 6/30/2022] furosemide  20 mg Q DAY   • lisinopril  40 mg Q DAY   • Metoprolol Tartrate  5 mg Q5 MIN PRN   • metoprolol tartrate  50 mg Q8HRS   • sodium chloride  2 g TID WITH MEALS   • LORazepam  2-4 mg Q4HRS PRN   • enalaprilat  1.25 mg Q6HRS PRN   • Respiratory Therapy Consult   Continuous RT   • MD Alert...Adult ICU Electrolyte Replacement per Pharmacy   PHARMACY TO DOSE   • atorvastatin  80 mg Q EVENING   • Pharmacy  1 Each PHARMACY TO DOSE   • acetaminophen  650 mg Q6HRS PRN   • ondansetron  4 mg Q4HRS PRN   • senna-docusate  2 Tablet BID    And   • polyethylene glycol/lytes  1 Packet QDAY PRN    And   • magnesium hydroxide  30 mL QDAY PRN    And   • bisacodyl  10 mg QDAY PRN   • ondansetron  4 mg Q4HRS PRN   • Pharmacy Consult Request  1 Each PHARMACY TO DOSE       Assessment and Plan:  Hospital day #9 left cerebellar stroke  POD #6 right frontal EVD  POD #5 craniectomy, C1 lami  Prophylactic anticoagulation: no         Start date/time: tbd    Plan:   Neuro exam sig improved.  MRI stable    PT/OT/SLP   Keep eunatremic, Na 143   No anticoags for 6 weeks   Okay for PT/OT, chair/mobilize

## 2022-06-29 NOTE — THERAPY
Speech Language Pathology   Clinical Swallow Evaluation     Patient Name: Alverto Duran  AGE:  66 y.o., SEX:  male  Medical Record #: [id number]  Today's Date: 6/29/2022     Precautions  Precautions: Fall Risk, Swallow Precautions ( See Comments), Nasogastric Tube    Assessment  H&P: 66 y.o. male presented to [place] ED around 6/20/22 with headaches and multiple falls and was discharged home.   He presented to [facility] ER on 6/21 with c/o dizziness, neck pain, headache, and falls.       CT head without contrast showed encephalomalacia in the left cerebellar and right frontal areas. MRI with and without contrast showed significant edema with compression of the brain stem and obstructive hydrocephalus.     PMHx: Chronic methamphetamine use, tobacco dependence, HTN, right frontal CVA 2019, and syphilis (2020)    Level of Consciousness: Awake  Affect/Behavior: Cooperative. restless  Follows Directives: Yes - simple commands only  Orientation: Self  Hearing: Functional hearing  Vision: Functional vision    Prior Living Situation & Level of Function:  Pt is poor historian, unable to obtain prior living situation. Hx of CVA, dysphagia Hx unknown.    Oral Mechanism Evaluation  Facial Symmetry: Equal  Facial Sensation: Pt did not follow commands to assess  Labial Observations: WFL  Lingual Observations: Midline  Dentition: Poor, Missing posterior dentition  Comments:    Voice  Quality: WFL  Resonance: WFL  Intensity: Appropriate  Cough: Pt did not follow commands to assess  Comments:    Current Method of Nutrition   NPO until cleared by speech pathology, NGT/Cortrak     Subjective  Oral care provided at beginning of session. Thick white coating on lingual surface, concerning for oral thrush.    Assessment  Positioning: Haynes's (60-90 degrees)  Bolus Administration: SLP  Oxygen Requirements: Room Air  Factor(s) Affecting Performance: Impaired mental status, Impaired command following    Swallowing Trials  Ice: Impaired  "x3  Thin Liquid (TN0): Impaired x2    Comments:  Bolus acceptance and containment appeared adequate. Prolonged mastication with PO trial of ice chips, needing max verbal cues to initiate swallow. Pharyngeal swallow appeared timely. Immediate cough response with intake of thin liquids x1, concerning for airway invasion. PO trials discontinued d/t poor pt participation and concern for airway invasion.        Clinical Impressions  Patient presents with clinical signs of oropharyngeal dysphagia likely acute (CVA, AMS)  on chronic (Hx of substance abuse and prior CVA) Pt is at higher risk of silent aspiration d/t potentially sensory deficits related to CVA. Dx swallow evaluation is recommended prior to PO alimentation; however, pt is currently not appropriate d/t to restlessness, difficulty following commands, and poor participation at bedside. Pt is not appropriate for PO intake recommend damp sponges to alleviate xerostomia.    Recommendations  1.  NPO/TF  2.  Instrumentation: Instrumental swallow study pending clinical progress  3.  Swallowing Instructions & Precautions:   Medication: Non Oral   Oral Care: Q4h    Plan    Recommend Speech Therapy 4 times per week until therapy goals are met for the following treatments:  Dysphagia Training and Patient / Family / Caregiver Education.       Objective       06/29/22 0840   Patient / Family Goals   Patient / Family Goal #1 \"That's good\"- when given water   Short Term Goals   Short Term Goal # 1 Patient will consume PO trials with SLP only with no s/sx of aspiration     "

## 2022-06-30 NOTE — PALLIATIVE CARE
"Palliative Care follow-up  PC RN placed phone call to patient's sister, Maile. No answer and no ability to leave a voice mail. Mailbox states \"memory is full, enter the remote accesses code.\" Also attempted to place call to emergency contact, Gudelia. Number not in service. NOK search requested.    Plan: NOK search. Consider Ethics consult.    Thank you for allowing Palliative Care to support this patient and family. Contact [phone] for additional assistance, change in patient status, or with any questions/concerns.   "

## 2022-06-30 NOTE — DISCHARGE PLANNING
NOK search requested by Palliative RN Tricia. Unable to get a hold of pt's emergency contacts due to numbers not working/in service. LSW met with pt at bedside to attempt to confirm family/contact information. Pt unable to remember any contact info for family members. LSW sent message to [facility]  Claire for NOK search.    Addendum @[phone]  LSW received NOK results from Claire. Phone numbers for Lizz, Vicente, and Alverto Kat are disconnected. LSW left VM's for Alverto Duran [phone] and Gina Duran [phone] requesting a call back.

## 2022-06-30 NOTE — CARE PLAN
Problem: Knowledge Deficit - Standard  Goal: Patient and family/care givers will demonstrate understanding of plan of care, disease process/condition, diagnostic tests and medications  Outcome: Progressing     Problem: Pain - Standard  Goal: Alleviation of pain or a reduction in pain to the patient’s comfort goal  Outcome: Progressing     Problem: Fall Risk  Goal: Patient will remain free from falls  Outcome: Progressing     Problem: Skin Integrity  Goal: Skin integrity is maintained or improved  Outcome: Progressing   The patient is Watcher - Medium risk of patient condition declining or worsening    Shift Goals  Clinical Goals: Safety  Patient Goals: rest  Family Goals: No family present

## 2022-06-30 NOTE — PROGRESS NOTES
[facility] MEDICINE PROGRESS NOTE     Attending:   Luna Monique MD    Resident:   Roro Still MD    PATIENT:   Alverto Duran; [id number]; 1955    ID/Interval Events:   66 y.o. male PMHx of chronic methamphetamine use, tobacco dependence, HTN, right frontal CVA 2019, and syphilis (2020) who initially presented to [place] ED around 6/20/22 with headaches and multiple falls and was discharged home.   He presented to [facility] ER on 6/21 with c/o dizziness, neck pain, headache, and falls. CT head without contrast showed encephalomalacia in the left cerebellar and right frontal areas. Patient was initially admitted to the floor under the [facility] Family Medicine service.   On 6/22 he developed decreased LOC with obtundation. MRI with and without contrast showed significant edema with compression of the brain stem and obstructive hydrocephalus. He was transferred to ICU for intubation, EVD placement and hypertonic saline therapy.    6/23 - suboccipital craniectomy. New onset AFIB RVR.  6/24 - multiple DVT's BUE noted on US.    6/25 - ID consult, recommend 10 day neurosyphilis course with IV PCN q4h (end date 7/3/22). Extubated  6/26 - EVD no output overnight. Alteplase by Dr. Mello.   6/27 - IV Metoprolol for AFIB with rate to 170's  6/29 - [facility] re-assumed care, patient transferred to telemetry    SUBJECTIVE:   Patient delirious, unable to communicate meaningfully     OBJECTIVE:  Vitals:    06/29/22 1341 06/30/22 0000 06/30/22 0400 06/30/22 0720   BP:  132/87 (!) 151/89 (!) 152/86   Pulse:  68 82 75   Resp:  18 18 18   Temp:  36.1 °C (97 °F) 36.2 °C (97.2 °F) 36.4 °C (97.5 °F)   TempSrc:  Temporal Temporal Temporal   SpO2:  95% 94% 94%   Weight: 64.9 kg (143 lb 1.3 oz)   61.4 kg (135 lb 5.8 oz)   Height:           Intake/Output Summary (Last 24 hours) at 6/30/2022 1118  Last data filed at 6/30/2022 0619  Gross per 24 hour   Intake 150 ml   Output 800 ml   Net -650 ml       PHYSICAL EXAM:  General: Lying in  bed, awake, responsive at times  HEENT: NC/AT. EOMI. NG tube in place, EVD drain in place, biopatch and tegaderm over site, no drainage, staples  Cardiovascular: irregularly irregular, without murmurs  Respiratory: CTAB, no tachypnea or retractions   Abdomen: normal bowel sounds, soft, nontender, nondistended, no masses, no organomegaly   EXT:  No pitting edema noted, bilateral soft wrist restraints, moving all extremities  Skin: No erythema/lesions   Neuro: drowsy, arouses to stimulation, mumbles incomprehensible words. Follows commands intermittently. Moving all extremities spontaneously, BLE 5/5, LUE 4/5, RUE 3/5. Pupils 2mm equal, reactive  : Muse in place       LABS:  Recent Labs     06/29/22  0255 06/30/22  0820   WBC 13.0* 14.3*   RBC 5.41 5.38   HEMOGLOBIN 14.8 14.6   HEMATOCRIT 45.5 45.8   MCV 84.1 85.1   MCH 27.4 27.1   RDW 43.9 45.5   PLATELETCT 376 415   MPV 11.4 11.6     Recent Labs     06/28/22  0430 06/29/22  0255 06/30/22  0215   SODIUM 144 143 146*   POTASSIUM 4.0 3.9 3.9   CHLORIDE 106 106 109   CO2 27 24 26   BUN 22 28* 29*   CREATININE 0.92 0.91 0.94   CALCIUM 9.5 9.7 9.4   MAGNESIUM 2.4 2.3  --    PHOSPHORUS 2.7 2.1* 2.9     Estimated GFR/CRCL = Estimated Creatinine Clearance: 67.1 mL/min (by C-G formula based on SCr of 0.94 mg/dL).  Recent Labs     06/28/22  0430 06/29/22  0255 06/30/22  0215   GLUCOSE 137* 132* 126*                 No results for input(s): INR, APTT, FIBRINOGEN in the last 72 hours.    Invalid input(s): DIMER    MICROBIOLOGY:   No results found for: BLOODCULTU, BLDCULT, BCHOLD     IMAGING:   MR-BRAIN-WITH & W/O   Final Result         Subacute left inferior cerebellar infarct with minimal blood products within representing petechial microhemorrhages. Mass effect upon the fourth ventricle remains stable.      Posterior fossa is well decompressed by midline occipital craniotomy. Lateral ventricles are well decompressed by a right frontal approach EVD with its tip in the  frontal horn of the right lateral ventricle.      Small right occipital tentorial subdural hematoma without mass effect.      Remote bilateral frontal infarct with encephalomalacia.      Age-related volume loss and chronic microvascular ischemic changes.         US-EXTREMITY VENOUS LOWER BILAT   Final Result      CT-HEAD W/O   Final Result      1.  There is a small amount of intraventricular blood that is new from 6/23/2022. The ventricles are stable in size.   2.  There is a right frontal approach ventriculostomy catheter with the tip terminating at the foramen of Monro. There is a punctate amount of parenchymal hemorrhage surrounding the catheter is unchanged.   3.  Right frontal and left cerebellar encephalomalacia.      EC-ECHOCARDIOGRAM COMPLETE W/O CONT   Final Result      US-CAROTID DOPPLER BILAT   Final Result      US-EXTREMITY VENOUS UPPER BILAT   Final Result      DX-CHEST-PORTABLE (1 VIEW)   Final Result         1.  No acute cardiopulmonary disease.   2.  Trace right pleural effusion   3.  Atherosclerosis      CT-CTA NECK WITH & W/O-POST PROCESSING   Final Result         1.  Severely hypoplastic right vertebral artery, central segment of the right vertebral artery is not visualized and may be occluded.         CT-CTA HEAD WITH & W/O-POST PROCESS   Final Result         1.  No large vessel occlusion or aneurysm identified   2.  Interval placement of right frontal approach ventriculostomy with postprocedural minimal hemorrhage and new pneumocephalus.   3.  Bilateral ventricular dilatation appears somewhat increased since prior study compatible with somewhat worsened hydrocephalus.   4.  Right frontal and left cerebellar encephalomalacia      DX-CHEST-PORTABLE (1 VIEW)   Final Result         1.  No acute cardiopulmonary disease.   2.  Trace bilateral pleural effusion      DX-ABDOMEN FOR TUBE PLACEMENT   Final Result         1.  Nonspecific bowel gas pattern.   2.  Dobbhoff tube tip terminates overlying the  expected location of the gastric antrum.   3.  Trace bilateral pleural effusions      DX-CHEST-PORTABLE (1 VIEW)   Final Result         1.  No acute cardiopulmonary disease.   2.  Trace bilateral pleural effusions   3.  Atherosclerosis      MR-BRAIN-WITH & W/O   Final Result   Addendum 1 of 1   ADDENDUM:      The case was discussed by telephone (call report) with DR. DAVONTE VIEIRA    on call for [facility], at 1854 hours.      Final      1.  Interval development of moderate obstructive hydrocephalus due to mass effect on the fourth ventricle. There is mild transependymal edema.   2.  Large area of acute infarction involving the left cerebellar hemisphere, inferior cerebellar vermis, and left cerebellar tonsil. PICA territory. No hemorrhagic transformation. This is the cause of the fourth ventricle effacement with obstructive    hydrocephalus. There is also mild left-sided cerebellar tonsillar herniation.   3.  Moderate supratentorial white matter disease most consistent with microvascular ischemic change.   4.  Old infarction right anterior frontal convexity.   5.  A Voalte message was sent to RICKEY HAYES at 1823 hours 6/22/2022. Prompt reply as of 6:41 PM not yet received. Efforts are ongoing to contact housestaff managing the patient at this time.   6.  Case was discussed (call report) with nurse SHASTA MAGILL at 6:47 PM. Request to initiate stat neurosurgery consult. Attempts to contact [facility] on-call housestaff are ongoing.      CT-HEAD W/O   Final Result      1.  No evidence of acute hemorrhage, mass or large territorial infarction   2.  LEFT cerebellar and RIGHT frontal encephalomalacia   3.  Mild atrophy   4.  Mild white matter changes         CT-CSPINE WITHOUT PLUS RECONS   Final Result      1.  No acute fracture or traumatic listhesis in the cervical spine.   2.  Emphysema in the lung apices.      DX-CHEST-PORTABLE (1 VIEW)    (Results Pending)       CULTURES:   Results     Procedure  Component Value Units Date/Time    BLOOD CULTURE [[id number]]     Order Status: Sent Specimen: Blood from Peripheral     BLOOD CULTURE [[id number]]     Order Status: Sent Specimen: Blood from Peripheral     URINALYSIS [[id number]]     Order Status: No result Specimen: Urine, Muse Cath     URINE CULTURE(NEW) [[id number]]     Order Status: No result Specimen: Urine, Muse Cath     CSF Culture [[id number]] Collected: 06/24/22 0834    Order Status: Completed Specimen: CSF from Shunt Updated: 06/30/22 0748     Significant Indicator NEG     Source CSF     Site SHUNT     Culture Result No growth at 6 days.     Gram Stain Result Rare WBCs.  No organisms seen.      Narrative:      Collected By: BELEN SYED  Collected By: BELEN SYED    GRAM STAIN [[id number]] Collected: 06/24/22 0834    Order Status: Completed Specimen: CSF Updated: 06/24/22 1031     Significant Indicator .     Source CSF     Site SHUNT     Gram Stain Result Rare WBCs.  No organisms seen.      Narrative:      Collected By: BELEN SYED  Collected By: BELEN SYED    CSF CULTURE [[id number]] Collected: 06/24/22 0837    Order Status: Canceled Specimen: Other from Shunt           MEDS:  Current Facility-Administered Medications   Medication Last Admin   • sodium chloride (SALT) tablet 2 g     • amLODIPine (NORVASC) tablet 5 mg     • atorvastatin (LIPITOR) tablet 40 mg     • hydrALAZINE (APRESOLINE) injection 20 mg 20 mg at 06/29/22 0049   • LORazepam (ATIVAN) injection 1 mg 1 mg at 06/29/22 2135   • furosemide (LASIX) injection 20 mg 20 mg at 06/30/22 0610   • penicillin G potassium 4 Million Units in  mL IVPB Stopped at 06/30/22 1109   • lisinopril (PRINIVIL) tablet 40 mg 40 mg at 06/30/22 0610   • Metoprolol Tartrate (LOPRESSOR) injection 5 mg     • metoprolol tartrate (LOPRESSOR) tablet 50 mg 50 mg at 06/30/22 0610   • LORazepam (ATIVAN) injection 2-4 mg     • enalaprilat (Vasotec) injection 1.25 mg 1 mL 1.25 mg at  06/28/22 2032   • Respiratory Therapy Consult     • Pharmacy Consult: Enteral tube insertion - review meds/change route/product selection     • acetaminophen (Tylenol) tablet 650 mg 650 mg at 06/27/22 0910   • ondansetron (ZOFRAN ODT) dispertab 4 mg     • senna-docusate (PERICOLACE or SENOKOT S) 8.6-50 MG per tablet 2 Tablet 2 Tablet at 06/29/22 0513    And   • polyethylene glycol/lytes (MIRALAX) PACKET 1 Packet      And   • magnesium hydroxide (MILK OF MAGNESIA) suspension 30 mL      And   • bisacodyl (DULCOLAX) suppository 10 mg     • ondansetron (ZOFRAN) syringe/vial injection 4 mg     • Pharmacy Consult Request ...Pain Management Review 1 Each         PROBLEM LIST:  Problem Noted   Hypertensive Urgency 6/21/2022   Methamphetamine Use (Hcc) 6/21/2022   Gait Instability 6/21/2022   Hypokalemia 6/21/2022   Tobacco Dependence 6/21/2022       ASSESSMENT/PLAN: 66 y.o. male admitted for:     * Acute cerebrovascular accident (CVA) due to occlusion of left cerebellar artery (HCC)- (present on admission)  Obstructive Hydrocephalus  Assessment & Plan   Large left cerebellar ischemic infarct with cerebral edema and mass-effect on fourth ventricle, cryptogenic. Detected on MRI 6/22.   POD #6 from suboccipital decompression with craniectomy and C1 laminectomy  POD #7 right frontal EVD, removed 6/27  - MAP goal greater than 65; SBP goal to keep  (acutely). Longer term, BP goal of 110-130/60-80.          - PRN Nicardipine            - Scheduled lasix          - Metoprolol increased to TID from BID          - Scheduled Lisinopril 40mg qd for HTN    - 6/30: SBP still in 150s. Will add scheduled amlodipine 5mg  - Elevate head of bed, keep head centered-maximize blood flow  - Goal sodium 135-145, wean Na tabs to BID, then daily (with DC on 7/2)  - ECHO- EF 70%  - Atorvastatin 40 mg - LDL goal less than 70  - PT/OT/SLP when appropriate          - 6/29: Speech consulted - rec'd continuing NG tube feeds as pt is not         appropriate for PO intake d/t poor participation  - Anticoagulation:          - CT head 7/7. If no hemorrhagic conversion, will start Eliquis 5mg BID.  - Continue q4h neurochecks      New onset a-fib (HCC)  Assessment & Plan  - Thought to be etiology of above CVA.  - 6/23 New onset A. fib with rapid RVR during evening of 6/23  - Treated and Converted with metoprolol 5 mg x 3 followed by metoprolol XL  -6/26 Afib with RVR again. Increased metoprolol 50 mg to TID   - Eventual plan for metoprolol succinate for longer acting, but for now must remain metoprolol tartrate TID since cortrak cannot tolerate succinate     Acute bilateral deep vein thrombosis (DVT) of upper extremities (HCC)  Assessment & Plan  Acute, partially occlusive DVT to the R IJ with extension into the let innominate vein.    Acute, occlusive DVT in on of the right paired brachial veins within the distal bicept  Acute,  occlusive DVT in Right paired ulnar veins within proximal forearm  Occlusive superficial thrombophlebitis in a small segment of the right cephalic vein.   Acute partially occlusive DVT in left paired brachial veins within the elbow region. Possible small extension into radial an ulnar veins   - Lower extremity US- negative for DVT  - Anticoagulation plan as above  - PT unable to work with patient until AC is therapeutic given multiple DVTs. Confirming that this is the case with RN.     Leukocytosis  Assessment & Plan  - Could be secondary to generalized state of inflammation. Not on steroids. Will check UA, CXR, and blood cultures. Patient remains afebrile.    Goals of care, counseling/discussion  Assessment & Plan  -Unable to contact significant other who was originally mistaken to be wife. Sister clarified that patient is not    -Will contact case management  -Palliative following  - 6/30: Unable to reach sister despite multiple attempts. Palliative team also unable to reach family. Consider ethics consult in  long-term.     Syphilis  Assessment & Plan  -Treponemal test is positive- confirmatory testing ending  -Prior Syphilis infection (2020) appears to have not been treated as ABX prescribed were never picked up   -CSF VRDL non-reactive  -Health department notified  -ID consult for latent syphilis and treatment - Recommend completing  10-day for neurosyphilis with IV penicillin - 4,000,000 units every 4 hours with an end date of 7/3/22.      Hypokalemia- (present on admission)  Assessment & Plan  Continue to monitor daily labs and replace as needed  Electrolyte protocol     Tobacco use- (present on admission)  Assessment & Plan  -Discussion regarding smoking cessation  When appropriate  -Provide information   -NRT if requests      Methamphetamine use (HCC)- (present on admission)  Assessment & Plan  Counseling cessation when clinical trajectory is more clear and appropriate     VTE:  Contraindicated, pending NSG clearance   Ulcer: Not Indicated  Lines: A line reportedly DC'd  Tubes: Muse (attempt DCing 6/30), NG tube for feeding      Disposition: Inpatient for acute CVA recovery. Speech and PT to work with patient in coming days. Downgraded to IMICU status on 6/28, transferred to telemetry 6/29. Continue q4h neuro checks.    Roro Still MD   PGY-2 Family Medicine Resident   [facility]

## 2022-06-30 NOTE — PROGRESS NOTES
Neurosurgery Progress Note    Subjective:  No acute events overnight. Sleeping, rouses easily to voice.     Exam:  Drowsy.   Faint voice.   AO self only.   Follows commands   Pupils: right 3mm, left 4mm. Round, sluggish   Tongue dry, midline.   Hearing intact.   Follows in all extremities, weak, equal.  Bilat wrists in soft restraints  Sensation: Intact throughout.   Incision: Top scalp w/staples. Tagaderm covering. No drainage seen  Posterior cervical, full, soft with palpation. No drainage  Muse  coretrak       BP  Min: 132/87  Max: 164/99  Pulse  Av.3  Min: 66  Max: 93  Resp  Av.4  Min: 16  Max: 18  Temp  Av.5 °C (97.7 °F)  Min: 36.1 °C (97 °F)  Max: 37.3 °C (99.1 °F)  SpO2  Av.4 %  Min: 94 %  Max: 98 %    No data recorded    Recent Labs     22  0255 22  0820   WBC 13.0* 14.3*   RBC 5.41 5.38   HEMOGLOBIN 14.8 14.6   HEMATOCRIT 45.5 45.8   MCV 84.1 85.1   MCH 27.4 27.1   MCHC 32.5* 31.9*   RDW 43.9 45.5   PLATELETCT 376 415   MPV 11.4 11.6     Recent Labs     22  0430 22  0255 22  0215   SODIUM 144 143 146*   POTASSIUM 4.0 3.9 3.9   CHLORIDE 106 106 109   CO2 27 24 26   GLUCOSE 137* 132* 126*   BUN 22 28* 29*   CREATININE 0.92 0.91 0.94   CALCIUM 9.5 9.7 9.4               Intake/Output                       22 0700 - 22 0659 22 07 - 22 0659      Total  Total                 Intake    NG/GT  --  60 60  --  -- --    Intake (mL) (Enteral Tube 22 Cortrak - Small Bowel/Transpyloric Left nare) -- 60 60 -- -- --    IV Piggyback  --  -- --  200  -- 200    Volume (mL) (penicillin G potassium 4 Million Units in  mL IVPB) -- -- -- 200 -- 200    Enteral  60  30 90  --  -- --    Free Water / Tube Flush 60 30 90 -- -- --    Total Intake 60 90 150 200 -- 200       Output    Urine  --  800 800  --  -- --    Output (mL) ([REMOVED] Urethral Catheter) -- 800 800 -- -- --    Total Output -- 800 800 -- -- --        Net I/O     60 -710 -912 200 -- 200            Intake/Output Summary (Last 24 hours) at 6/30/2022 1609  Last data filed at 6/30/2022 1453  Gross per 24 hour   Intake 320 ml   Output 800 ml   Net -480 ml            • sodium chloride  2 g BID WITH MEALS   • amLODIPine  5 mg Q DAY   • atorvastatin  40 mg Q EVENING   • metoprolol tartrate  25 mg TWICE DAILY   • hydrALAZINE  20 mg Q6HRS PRN   • LORazepam  1 mg Q4HRS PRN   • furosemide  20 mg Q DAY   • penicillin g  4 Million Units Q4HRS   • lisinopril  40 mg Q DAY   • Metoprolol Tartrate  5 mg Q5 MIN PRN   • LORazepam  2-4 mg Q4HRS PRN   • enalaprilat  1.25 mg Q6HRS PRN   • Respiratory Therapy Consult   Continuous RT   • Pharmacy  1 Each PHARMACY TO DOSE   • acetaminophen  650 mg Q6HRS PRN   • ondansetron  4 mg Q4HRS PRN   • senna-docusate  2 Tablet BID    And   • polyethylene glycol/lytes  1 Packet QDAY PRN    And   • magnesium hydroxide  30 mL QDAY PRN    And   • bisacodyl  10 mg QDAY PRN   • ondansetron  4 mg Q4HRS PRN   • Pharmacy Consult Request  1 Each PHARMACY TO DOSE       Assessment and Plan:  Hospital day #10 left cerebellar stroke  POD #7 right frontal EVD  POD #6 craniectomy, C1 lami  Prophylactic anticoagulation: no         Start date/time: tbd    Plan:   Neuro exam stable  Will recheck CT head in 2 weeks   No anticoags for 2 weeks post op  Consider IVC filter if has DVT  PT/OT/SLP   Na 146, continue to monitor and keep eunatremic

## 2022-06-30 NOTE — PROGRESS NOTES
4 Eyes Skin Assessment Completed by SHAREE Gauthier and SHAREE Martel.    Head Incision 2 head incisions with staples  Ears WDL  Nose WDL  Mouth WDL  Neck WDL  Breast/Chest WDL  Shoulder Blades WDL  Spine WDL  (R) Arm/Elbow/Hand WDL  (L) Arm/Elbow/Hand WDL  Abdomen WDL  Groin WDL  Scrotum/Coccyx/Buttocks WDL  (R) Leg WDL  (L) Leg WDL  (R) Heel/Foot/Toe WDL  (L) Heel/Foot/Toe WDL          Devices In Places Tele Box, Blood Pressure Cuff, Pulse Ox and Muse      Interventions In Place Waffle Overlay, Pillows, Q2 Turns, Barrier Cream, Heels Loaded W/Pillows and Pressure Redistribution Mattress    Possible Skin Injury No    Pictures Uploaded Into Epic N/A  Wound Consult Placed N/A  RN Wound Prevention Protocol Ordered No

## 2022-06-30 NOTE — DOCUMENTATION QUERY
[facility]                                                                       Query Response Note      PATIENT:               JOSE POSADA  ACCT #:                  [id number]  MRN:                     [id number]  :                      1955  ADMIT DATE:       2022 7:44 AM  DISCH DATE:          RESPONDING  PROVIDER #:        [id number]           QUERY TEXT:    Encephalopathy is documented in the Medical Record. Please specify type.    NOTE:  If an appropriate response is not listed below, please respond with a new note.    The patient's Clinical Indicators include:  Patient admitted with CVA  Encephalopathy is noted in the medical record  Clinical Indicators: Patient with confusion, which worsened with patient eventually becoming obtunded with                                     GCS 3.                                    Required EVD and Craniotomy due to hydrocephalus.                                    MRI Brain also noted cerebral edema and brain compression                                    Status post drain patient mental status slowly improved with waxing/waning LOC,                                     improved speech and following of some commands.   TX: 3% Saline, neuro checks, craniotomy and EVD placement    Thank you,  Iris Jimenez RN, CCDS  Clinical   Connect via [employer] Messenger  Options provided:   -- Metabolic encephalopathy due CVA and Brain Compression with Cerebral Edema and Hydrocephalus   -- Other type of encephalopathy, please specify_______________________________   -- Other, please specify ________________________________________      Query created by: Iris Jimenez on 2022 12:13 PM    RESPONSE TEXT:    Metabolic encephalopathy due CVA and Brain Compression with Cerebral Edema and Hydrocephalus          Electronically signed by:  MARCELA ARCHER MD 2022 3:04 PM

## 2022-06-30 NOTE — CARE PLAN
Problem: Fall Risk  Goal: Patient will remain free from falls  Outcome: Progressing     Problem: Knowledge Deficit - Standard  Goal: Patient and family/care givers will demonstrate understanding of plan of care, disease process/condition, diagnostic tests and medications  Outcome: Progressing   The patient is Watcher - Medium risk of patient condition declining or worsening    Shift Goals  Clinical Goals: monitor VS, safety  Patient Goals: rest  Family Goals: no family present at this time    Progress made toward(s) clinical / shift goals: stable VS    Patient is not progressing towards the following goals:

## 2022-07-01 NOTE — THERAPY
"Occupational Therapy   Initial Evaluation     Patient Name: Alverto Duran  Age:  66 y.o., Sex:  male  Medical Record #: [id number]  Today's Date: 7/1/2022     Precautions  Precautions: Fall Risk, Nasogastric Tube    Assessment  Patient is 66 y.o. male who presents to acute for multiple falls at home and severe H/A. MRI revealed severe L cerebellar stroke, pt s/p EVD placement and removal, craniectomy, and C1 lami. Pt demo'd impaired coordination w/ B UE's, balance, functional mobility, activity tolerance, sequencing and safety skills. Will continue to follow while in house.     Plan    Recommend Occupational Therapy 4 times per week until therapy goals are met for the following treatments:  Adaptive Equipment, Neuro Re-Education / Balance, Self Care/Activities of Daily Living, Therapeutic Activities, and Therapeutic Exercises.    DC Equipment Recommendations: (P) Unable to determine at this time  Discharge Recommendations: (P) Recommend post-acute placement for additional occupational therapy services prior to discharge home     Subjective    \"What do you want?\"     Objective       07/01/22 1035   Charge Group   OT Evaluation OT Evaluation Mod   Total Time Spent   OT Time Spent Yes   OT Evaluation (Minutes) 30   OT Total Time Spent (Calculated) 30   Initial Contact Note    Initial Contact Note Order Received and Verified, Occupational Therapy Evaluation in Progress with Full Report to Follow.   Prior Living Situation   Housing / Facility 1 Story Apartment / Condo   Lives with - Patient's Self Care Capacity Friends   Comments Pt very lethargic, information pulled from chart, unable to provide PLOF   Prior Level of ADL Function   Comments Pt likely independent w/ BADLs at baseline, will clarify   Precautions   Precautions Fall Risk;Nasogastric Tube   Vitals   O2 (LPM) 0   O2 Delivery Device None - Room Air   Pain 0 - 10 Group   Therapist Pain Assessment Post Activity Pain Same as Prior to Activity;Nurse " Notified  (no c/o pain during session)   Cognition    Cognition / Consciousness X   Speech/ Communication Dysarthric   Level of Consciousness Alert   Safety Awareness Impaired;Impulsive   New Learning Impaired   Attention Impaired   Initiation Impaired   Comments increased processing time, encouragement required, easily aggitated, redirectable, frequently pulling at coretrac lines   Active ROM Upper Body   Active ROM Upper Body  WDL   Strength Upper Body   Upper Body Strength  WDL   Coordination Upper Body   Coordination X   Fine Motor Coordination impaired bilaterally, unable to open/don socks   Gross Motor Coordination impaired bilaterally   Balance Assessment   Sitting Balance (Static) Poor   Sitting Balance (Dynamic) Poor -   Standing Balance (Static) Trace +   Standing Balance (Dynamic) Trace   Weight Shift Sitting Poor   Weight Shift Standing Poor   Comments w/ B UE support   Bed Mobility    Supine to Sit Standby Assist   Sit to Supine Standby Assist   Scooting Standby Assist   ADL Assessment   Grooming Minimal Assist;Seated   Lower Body Dressing Maximal Assist   How much help from another person does the patient currently need...   Putting on and taking off regular lower body clothing? 2   Bathing (including washing, rinsing, and drying)? 2   Toileting, which includes using a toilet, bedpan, or urinal? 2   Putting on and taking off regular upper body clothing? 2   Taking care of personal grooming such as brushing teeth? 3   Eating meals? 1   6 Clicks Daily Activity Score 12   Functional Mobility   Sit to Stand Minimal Assist   Mobility lateral steps at EOB   Activity Tolerance   Sitting in Chair NT   Sitting Edge of Bed 8 min   Standing 1-2 min total   Comments limited by cog   Patient / Family Goals   Patient / Family Goal #1 None stated   Short Term Goals   Short Term Goal # 1 Pt will demo LB dressing w/ SPV   Short Term Goal # 2 Pt will demo toilet txf w/ SPV   Short Term Goal # 3 Pt will demo standing  grooming w/ SPV   Education Group   Role of Occupational Therapist Patient Response Patient;Acceptance;Explanation;Demonstration;Verbal Demonstration   Problem List   Problem List Decreased Active Daily Living Skills;Decreased Homemaking Skills;Decreased Functional Mobility;Decreased Activity Tolerance;Safety Awareness Deficits / Cognition;Impaired Coordination Left Upper Extremity;Impaired Coordination Right Upper Extremity;Impaired Postural Control / Balance;Impaired Cognitive Function   Anticipated Discharge Equipment and Recommendations   DC Equipment Recommendations Unable to determine at this time   Discharge Recommendations Recommend post-acute placement for additional occupational therapy services prior to discharge home   Interdisciplinary Plan of Care Collaboration   IDT Collaboration with  Nursing   Patient Position at End of Therapy In Bed;Call Light within Reach;Tray Table within Reach;Phone within Reach;Bed Alarm On;Wrist Restraints Applied   Collaboration Comments report given   Session Information   Date / Session Number  7/1, 1 (1/4, 7/7)   Priority 3

## 2022-07-01 NOTE — PROGRESS NOTES
Monitor summary:   Sinus bradycardia-sinus rhythm 66-82  Down to 47 nonsustained   PVC's and couplets  0.15/0.07/0.39

## 2022-07-01 NOTE — DIETARY
Nutrition support weekly update:  Day 10 of admit.  Alverto Duran is a 66 y.o. male with admitting DX of Hypertensive Urgency, CVA due to occulusion of left cerebellar artery.    Tube feeding started on 6/23. Current TF via small bowel Cortrak feeding tube (just replaced today) running with Fibersource HN @ 45 ml/hr per observation; TF orders for Fibersource HN @ 60 ml/hr. Based on current wt, TF at goal will provide 1728 kcals/day (27 kcal/kg), 78 g pro/day (1.2 g pro/kg), and 1166 ml/hr. RN states he just replaced cortrak feeding tube so advancing to goal, previously tolerating Fibersource HN @ 60 ml/hr.     Assessment:  Weight 63.1 kg per bedscale on 7/1  Difficult to assess accurrate wt as it varies greatly per bedscale; will take a middle wt over hospital stay of 65 kg to calculate estimated needs:   MSJ X 1.2= 1725  Estmated kcal needs 2996-0064 kcal/day (26-29 kcal/kg)   Estimated Protein needs:  65-78 g pro/day (1.0-1.2 g pro/day).   Estimated free water needs: 25-30 ml/kg (4800-1805 ml/day)    Evaluation:   1. Pertinent Labs: Na+ 146, BUN 29.   2. Pertinent Meds: Lasix, Zofran PRN, Bowel protocol, Sodium Chloride.   3. Palliative following, Unable to reach sister/family, considering ethics consult in long-term.  4. Current TF orders remain appropriate at this time; will continue to monitor trends.   5. Skin: no pressure ulcers noted.   6. No scheduled free water flushes noted in chart; pt is on diuretics.  7. Per MD notes: Speech and PT to work with patient in coming days, patient still NPO per speech      Malnutrition risk: n/a    Recommendations/Plan:  1. Continue same TF orders: Fibersource HN @ 60 ml/hr to provide 1728 kcals/day, 78 g pro/day, and 1166 ml/hr.  2. RD to monitor wts  And adjust TF as needed.   3. In addition to free water provided by tube feeding @ goal, pt would benefit from a total of 500-800 ml free water/day as flushes or IVF to meet estimated fluid needs as medically feasible.  Fluids per MD.   4. Advance diet when determined safe per SLP.       RD following.

## 2022-07-01 NOTE — PROGRESS NOTES
Bedside report received and patient care assumed. Pt is resting in bed, A&Ox1, with no complaints of pain, and is on RA. Tele box on. All fall precautions are in place, belongings at bedside table. Pt was updated on POC, no questions or concerns. Pt educated on use of call light for assistance.

## 2022-07-01 NOTE — DISCHARGE PLANNING
Case Management Discharge Planning    Admission Date: 6/21/2022  GMLOS: 7.1  ALOS: 10    6-Clicks ADL Score: 6  6-Clicks Mobility Score: 12  PT and/or OT Eval ordered: Yes  Post-acute Referrals Ordered: Yes  Post-acute Choice Obtained: No  Has referral(s) been sent to post-acute provider:  No      Anticipated Discharge Dispo: Discharge Disposition: Still a Patient (30)  Discharge Address: unknown    DME Needed: pending hospital course    Action(s) Taken: Pt was discussed in IDR. Chart reviewed.   Pt now is on cortrak, cont neuro checks.   PT cont to recommend post-acute placement   Per SLP >> NPO   Pall care requested NOK search as pt's sister Maile is not answering the phone.     Escalations Completed: None    Medically Clear: No    Next Steps: cont to f/u with med team for DC needs.     Barriers to Discharge: Medical clearance    Is the patient up for discharge tomorrow: No

## 2022-07-01 NOTE — PROGRESS NOTES
Neurosurgery Progress Note    Subjective:  No acute events overnight. Awake  RN at bedside     Exam:  Awake, confused. More fluent speech today   AO self only.   Follows commands   Pupils: right 3mm, left 4mm. Round, sluggish   Tongue dry, midline.   Hearing intact.   Follows in all extremities, weak, equal.  Bilat wrists in soft restraints  Sensation: Intact throughout.   Incision: Top scalp w/staples. Tagaderm covering. No drainage seen  Posterior cervical, full, soft with palpation. No drainage  Muse  coretrak       BP  Min: 139/84  Max: 159/94  Pulse  Av.8  Min: 66  Max: 88  Resp  Av.5  Min: 16  Max: 18  Temp  Av.5 °C (97.7 °F)  Min: 36.1 °C (96.9 °F)  Max: 36.8 °C (98.3 °F)  SpO2  Av.2 %  Min: 91 %  Max: 98 %    No data recorded    Recent Labs     22  0255 22  0820   WBC 13.0* 14.3*   RBC 5.41 5.38   HEMOGLOBIN 14.8 14.6   HEMATOCRIT 45.5 45.8   MCV 84.1 85.1   MCH 27.4 27.1   MCHC 32.5* 31.9*   RDW 43.9 45.5   PLATELETCT 376 415   MPV 11.4 11.6     Recent Labs     22  0255 22  0215   SODIUM 143 146*   POTASSIUM 3.9 3.9   CHLORIDE 106 109   CO2 24 26   GLUCOSE 132* 126*   BUN 28* 29*   CREATININE 0.91 0.94   CALCIUM 9.7 9.4               Intake/Output                       22 - 22 - 22 0659      Total  Total                 Intake    NG/GT  720  -- 720  --  -- --    Intake (mL) ([REMOVED] Enteral Tube 22 Cortrak - Small Bowel/Transpyloric Left nare) 720 -- 720 -- -- --    IV Piggyback  300  -- 300  --  -- --    Volume (mL) (penicillin G potassium 4 Million Units in  mL IVPB) 300 -- 300 -- -- --    Enteral  90  90 180  --  -- --    Free Water / Tube Flush 90 90 180 -- -- --    Total Intake 1110 90 1200 -- -- --       Output    Urine  175  500 675  --  -- --    Straight Catheter 175 500 675 -- -- --    Drains  --  0 0  --  -- --    Residual Amount (ml) (Discarded) -- 0 0 -- --  --    Total Output 175 500 675 -- -- --       Net I/O     935 -410 525 -- -- --            Intake/Output Summary (Last 24 hours) at 7/1/2022 0911  Last data filed at 7/1/2022 0535  Gross per 24 hour   Intake 1200 ml   Output 675 ml   Net 525 ml       $ Bladder Scan Results (mL): 552    • sodium chloride  2 g BID WITH MEALS   • amLODIPine  5 mg Q DAY   • atorvastatin  40 mg Q EVENING   • metoprolol tartrate  25 mg TWICE DAILY   • hydrALAZINE  20 mg Q6HRS PRN   • LORazepam  1 mg Q4HRS PRN   • furosemide  20 mg Q DAY   • penicillin g  4 Million Units Q4HRS   • lisinopril  40 mg Q DAY   • Metoprolol Tartrate  5 mg Q5 MIN PRN   • LORazepam  2-4 mg Q4HRS PRN   • enalaprilat  1.25 mg Q6HRS PRN   • Respiratory Therapy Consult   Continuous RT   • Pharmacy  1 Each PHARMACY TO DOSE   • acetaminophen  650 mg Q6HRS PRN   • ondansetron  4 mg Q4HRS PRN   • senna-docusate  2 Tablet BID    And   • polyethylene glycol/lytes  1 Packet QDAY PRN    And   • magnesium hydroxide  30 mL QDAY PRN    And   • bisacodyl  10 mg QDAY PRN   • ondansetron  4 mg Q4HRS PRN   • Pharmacy Consult Request  1 Each PHARMACY TO DOSE       Assessment and Plan:  Hospital day #11 left cerebellar stroke  POD #8 right frontal EVD  POD #7 craniectomy, C1 lami  Prophylactic anticoagulation: no         Start date/time: tbd    Plan:   Neuro exam stable  Will recheck CT head in 2 weeks   No anticoags for 2 weeks post op  Consider IVC filter if has DVT  PT/OT/SLP   AM labs pending continue to monitor and keep eunatremic  Discharge planning appreciated

## 2022-07-01 NOTE — PROGRESS NOTES
[facility] MEDICINE PROGRESS NOTE     Attending:   Luna Monique MD    Resident:   Roro Still MD    PATIENT:   Alverto Duran; [id number]; 1955    ID/Interval Events:   66 y.o. male PMHx of chronic methamphetamine use, tobacco dependence, HTN, right frontal CVA 2019, and syphilis (2020) who initially presented to [place] ED around 6/20/22 with headaches and multiple falls and was discharged home.   He presented to [facility] ER on 6/21 with c/o dizziness, neck pain, headache, and falls. CT head without contrast showed encephalomalacia in the left cerebellar and right frontal areas. Patient was initially admitted to the floor under the [facility] Family Medicine service.   On 6/22 he developed decreased LOC with obtundation. MRI with and without contrast showed significant edema with compression of the brain stem and obstructive hydrocephalus. He was transferred to ICU for intubation, EVD placement and hypertonic saline therapy.    6/23 - suboccipital craniectomy. New onset AFIB RVR.  6/24 - multiple DVT's BUE noted on US.    6/25 - ID consult, recommend 10 day neurosyphilis course with IV PCN q4h (end date 7/3/22). Extubated  6/26 - EVD no output overnight. Alteplase by Dr. Mello.   6/27 - IV Metoprolol for AFIB with rate to 170's  6/29 - [facility] re-assumed care, patient transferred to telemetry    SUBJECTIVE:   Patient pulled out Cortrak overnight. Replaced by RN, positioning confirmed by CXR. Agitated, given Ativan.     Patient still unable to communicate. Delirious.    OBJECTIVE:  Vitals:    07/01/22 0000 07/01/22 0400 07/01/22 0728 07/01/22 1209   BP: (!) 157/82 (!) 159/94 139/84 111/68   Pulse: 83 88 69 99   Resp: 16 16 16 16   Temp: 36.1 °C (96.9 °F) 36.8 °C (98.3 °F) 36.8 °C (98.2 °F) 36.1 °C (97 °F)   TempSrc: Temporal Temporal Temporal Temporal   SpO2: 91% 95% 95% 95%   Weight:   63.1 kg (139 lb 1.8 oz)    Height:           Intake/Output Summary (Last 24 hours) at 7/1/2022 1406  Last data filed at  7/1/2022 1200  Gross per 24 hour   Intake 1160 ml   Output 675 ml   Net 485 ml       PHYSICAL EXAM:  General: Lying in bed, awake, responsive at times  HEENT: NC/AT. EOMI. NG tube in place, EVD drain in place, biopatch and tegaderm over site, no drainage, staples  Cardiovascular: irregularly irregular, without murmurs  Respiratory: CTAB, no tachypnea or retractions   Abdomen: normal bowel sounds, soft, nontender, nondistended, no masses, no organomegaly   EXT:  No pitting edema noted, bilateral soft wrist restraints, moving all extremities  Skin: No erythema/lesions   Neuro: drowsy, arouses to stimulation, mumbles incomprehensible words. Follows commands intermittently. Moving all extremities spontaneously, BLE 5/5, LUE 4/5, RUE 3/5. Pupils 2mm equal, reactive  : Muse in place       LABS:  Recent Labs     06/29/22  0255 06/30/22  0820   WBC 13.0* 14.3*   RBC 5.41 5.38   HEMOGLOBIN 14.8 14.6   HEMATOCRIT 45.5 45.8   MCV 84.1 85.1   MCH 27.4 27.1   RDW 43.9 45.5   PLATELETCT 376 415   MPV 11.4 11.6     Recent Labs     06/29/22  0255 06/30/22  0215   SODIUM 143 146*   POTASSIUM 3.9 3.9   CHLORIDE 106 109   CO2 24 26   BUN 28* 29*   CREATININE 0.91 0.94   CALCIUM 9.7 9.4   MAGNESIUM 2.3  --    PHOSPHORUS 2.1* 2.9     Estimated GFR/CRCL = Estimated Creatinine Clearance: 69 mL/min (by C-G formula based on SCr of 0.94 mg/dL).  Recent Labs     06/29/22  0255 06/30/22  0215   GLUCOSE 132* 126*                 No results for input(s): INR, APTT, FIBRINOGEN in the last 72 hours.    Invalid input(s): DIMER    MICROBIOLOGY:   No results found for: BLOODCULTU, BLDCULT, BCHOLD     IMAGING:   DX-ABDOMEN FOR TUBE PLACEMENT   Final Result         1.  Nonspecific bowel gas pattern.   2.  Dobbhoff tube tip overlying the expected location of the pylorus or first duodenal segment.      DX-CHEST-PORTABLE (1 VIEW)   Final Result      1.  No acute cardiopulmonary disease.   2.  Supportive tubing as described above.      MR-BRAIN-WITH &  W/O   Final Result         Subacute left inferior cerebellar infarct with minimal blood products within representing petechial microhemorrhages. Mass effect upon the fourth ventricle remains stable.      Posterior fossa is well decompressed by midline occipital craniotomy. Lateral ventricles are well decompressed by a right frontal approach EVD with its tip in the frontal horn of the right lateral ventricle.      Small right occipital tentorial subdural hematoma without mass effect.      Remote bilateral frontal infarct with encephalomalacia.      Age-related volume loss and chronic microvascular ischemic changes.         US-EXTREMITY VENOUS LOWER BILAT   Final Result      CT-HEAD W/O   Final Result      1.  There is a small amount of intraventricular blood that is new from 6/23/2022. The ventricles are stable in size.   2.  There is a right frontal approach ventriculostomy catheter with the tip terminating at the foramen of Monro. There is a punctate amount of parenchymal hemorrhage surrounding the catheter is unchanged.   3.  Right frontal and left cerebellar encephalomalacia.      EC-ECHOCARDIOGRAM COMPLETE W/O CONT   Final Result      US-CAROTID DOPPLER BILAT   Final Result      US-EXTREMITY VENOUS UPPER BILAT   Final Result      DX-CHEST-PORTABLE (1 VIEW)   Final Result         1.  No acute cardiopulmonary disease.   2.  Trace right pleural effusion   3.  Atherosclerosis      CT-CTA NECK WITH & W/O-POST PROCESSING   Final Result         1.  Severely hypoplastic right vertebral artery, central segment of the right vertebral artery is not visualized and may be occluded.         CT-CTA HEAD WITH & W/O-POST PROCESS   Final Result         1.  No large vessel occlusion or aneurysm identified   2.  Interval placement of right frontal approach ventriculostomy with postprocedural minimal hemorrhage and new pneumocephalus.   3.  Bilateral ventricular dilatation appears somewhat increased since prior study compatible with  somewhat worsened hydrocephalus.   4.  Right frontal and left cerebellar encephalomalacia      DX-CHEST-PORTABLE (1 VIEW)   Final Result         1.  No acute cardiopulmonary disease.   2.  Trace bilateral pleural effusion      DX-ABDOMEN FOR TUBE PLACEMENT   Final Result         1.  Nonspecific bowel gas pattern.   2.  Dobbhoff tube tip terminates overlying the expected location of the gastric antrum.   3.  Trace bilateral pleural effusions      DX-CHEST-PORTABLE (1 VIEW)   Final Result         1.  No acute cardiopulmonary disease.   2.  Trace bilateral pleural effusions   3.  Atherosclerosis      MR-BRAIN-WITH & W/O   Final Result   Addendum 1 of 1   ADDENDUM:      The case was discussed by telephone (call report) with DR. DAVONTE VIEIRA    on call for [facility], at 1854 hours.      Final      1.  Interval development of moderate obstructive hydrocephalus due to mass effect on the fourth ventricle. There is mild transependymal edema.   2.  Large area of acute infarction involving the left cerebellar hemisphere, inferior cerebellar vermis, and left cerebellar tonsil. PICA territory. No hemorrhagic transformation. This is the cause of the fourth ventricle effacement with obstructive    hydrocephalus. There is also mild left-sided cerebellar tonsillar herniation.   3.  Moderate supratentorial white matter disease most consistent with microvascular ischemic change.   4.  Old infarction right anterior frontal convexity.   5.  A Voalte message was sent to RICKEY HAYES at 1823 hours 6/22/2022. Prompt reply as of 6:41 PM not yet received. Efforts are ongoing to contact housestaff managing the patient at this time.   6.  Case was discussed (call report) with nurse SHASTA MAGILL at 6:47 PM. Request to initiate stat neurosurgery consult. Attempts to contact [facility] on-call housestaff are ongoing.      CT-HEAD W/O   Final Result      1.  No evidence of acute hemorrhage, mass or large territorial infarction   2.   "LEFT cerebellar and RIGHT frontal encephalomalacia   3.  Mild atrophy   4.  Mild white matter changes         CT-CSPINE WITHOUT PLUS RECONS   Final Result      1.  No acute fracture or traumatic listhesis in the cervical spine.   2.  Emphysema in the lung apices.          CULTURES:   Results     Procedure Component Value Units Date/Time    BLOOD CULTURE [[id number]] Collected: 06/30/22 1443    Order Status: Completed Specimen: Blood from Peripheral Updated: 07/01/22 0755     Significant Indicator NEG     Source BLD     Site PERIPHERAL     Culture Result No Growth  Note: Blood cultures are incubated for 5 days and  are monitored continuously.Positive blood cultures  are called to the RN and reported as soon as  they are identified.      Narrative:      Per Hospital Policy: Only change Specimen Src: to \"Line\" if  specified by physician order.  Right AC    BLOOD CULTURE [[id number]] Collected: 06/30/22 1606    Order Status: Completed Specimen: Blood from Peripheral Updated: 07/01/22 0755     Significant Indicator NEG     Source BLD     Site PERIPHERAL     Culture Result No Growth  Note: Blood cultures are incubated for 5 days and  are monitored continuously.Positive blood cultures  are called to the RN and reported as soon as  they are identified.      Narrative:      Per Hospital Policy: Only change Specimen Src: to \"Line\" if  specified by physician order.  Right AC    CSF Culture [[id number]] Collected: 06/24/22 0834    Order Status: Completed Specimen: CSF from Shunt Updated: 07/01/22 0747     Significant Indicator NEG     Source CSF     Site SHUNT     Culture Result No growth at 7 days.     Gram Stain Result Rare WBCs.  No organisms seen.      Narrative:      Collected By: BELEN SYED  Collected By: BELEN SYED    URINALYSIS [[id number]]  (Abnormal) Collected: 06/30/22 1511    Order Status: Completed Specimen: Urine, Muse Cath Updated: 06/30/22 1616     Color Yellow     Character Clear     " Specific Gravity 1.023     Ph 6.0     Glucose Negative mg/dL      Ketones Negative mg/dL      Protein Negative mg/dL      Bilirubin Negative     Urobilinogen, Urine 1.0     Nitrite Negative     Leukocyte Esterase Trace     Occult Blood Negative     Micro Urine Req Microscopic    Narrative:      Collected By: [id number] LINO RINCON  Indication for culture:->Evaluation for sepsis without a  clear source of infection    URINE CULTURE(NEW) [[id number]] Collected: 06/30/22 1511    Order Status: Sent Specimen: Urine, Umse Cath Updated: 06/30/22 1521    Narrative:      Collected By: [id number] LINO RINCON  Indication for culture:->Evaluation for sepsis without a  clear source of infection          MEDS:  Current Facility-Administered Medications   Medication Last Admin   • [START ON 7/2/2022] sodium chloride (SALT) tablet 2 g     • nystatin (MYCOSTATIN) 458043 UNIT/ML suspension 500,000 Units     • amLODIPine (NORVASC) tablet 5 mg 5 mg at 07/01/22 0522   • atorvastatin (LIPITOR) tablet 40 mg 40 mg at 06/30/22 1746   • metoprolol tartrate (LOPRESSOR) tablet 25 mg 25 mg at 07/01/22 0522   • hydrALAZINE (APRESOLINE) injection 20 mg 20 mg at 06/29/22 0049   • LORazepam (ATIVAN) injection 1 mg 1 mg at 07/01/22 0221   • furosemide (LASIX) injection 20 mg 20 mg at 07/01/22 0522   • penicillin G potassium 4 Million Units in  mL IVPB Stopped at 07/01/22 1103   • lisinopril (PRINIVIL) tablet 40 mg 40 mg at 07/01/22 0522   • Metoprolol Tartrate (LOPRESSOR) injection 5 mg     • LORazepam (ATIVAN) injection 2-4 mg     • enalaprilat (Vasotec) injection 1.25 mg 1 mL 1.25 mg at 06/28/22 2032   • Respiratory Therapy Consult     • Pharmacy Consult: Enteral tube insertion - review meds/change route/product selection     • acetaminophen (Tylenol) tablet 650 mg 650 mg at 06/27/22 0910   • ondansetron (ZOFRAN ODT) dispertab 4 mg     • senna-docusate (PERICOLACE or SENOKOT S) 8.6-50 MG per tablet 2 Tablet 2 Tablet at  06/29/22 0513    And   • polyethylene glycol/lytes (MIRALAX) PACKET 1 Packet      And   • magnesium hydroxide (MILK OF MAGNESIA) suspension 30 mL      And   • bisacodyl (DULCOLAX) suppository 10 mg     • ondansetron (ZOFRAN) syringe/vial injection 4 mg     • Pharmacy Consult Request ...Pain Management Review 1 Each         PROBLEM LIST:  Problem Noted   Hypertensive Urgency 6/21/2022   Methamphetamine Use (Hcc) 6/21/2022   Gait Instability 6/21/2022   Hypokalemia 6/21/2022   Tobacco Dependence 6/21/2022       ASSESSMENT/PLAN: 66 y.o. male admitted for:     * Acute cerebrovascular accident (CVA) due to occlusion of left cerebellar artery (HCC)- (present on admission)  Obstructive Hydrocephalus  Assessment & Plan   Large left cerebellar ischemic infarct with cerebral edema and mass-effect on fourth ventricle, cryptogenic. Detected on MRI 6/22.   POD #6 from suboccipital decompression with craniectomy and C1 laminectomy  POD #7 right frontal EVD, removed 6/27  - MAP goal greater than 65; SBP goal to keep  (acutely). Longer term, BP goal of 110-130/60-80.          - PRN Nicardipine            - Scheduled lasix          - Metoprolol increased to TID from BID          - Scheduled Lisinopril 40mg qd for HTN           - 6/30: SBP still in 150s. Will add scheduled amlodipine 5mg   - 7/1: SBP better controlled  - Elevate head of bed, keep head centered-maximize blood flow  - Goal sodium 135-145, wean Na tabs to BID, then daily (with DC on 7/2)  - ECHO- EF 70%  - Atorvastatin 40 mg - LDL goal less than 70  - PT/OT/SLP consult          - 6/29: Speech consulted - rec'd continuing NG tube feeds as pt is not        appropriate for PO intake d/t poor participation  - Anticoagulation:          - CT head 7/7. If no hemorrhagic conversion, will start Eliquis 5mg BID.  - Continue q4h neurochecks      Oral Thrush  Assessment & Plan  - Noted by speech. Will treat with nystatin swabs. Maintain moisture in mouth to prevent more      New onset a-fib (HCC)  Assessment & Plan  - Thought to be etiology of above CVA.  - 6/23 New onset A. fib with rapid RVR during evening of 6/23  - Treated and Converted with metoprolol 5 mg x 3 followed by metoprolol XL  -6/26 Afib with RVR again. Increased metoprolol 50 mg to TID   - Eventual plan for metoprolol succinate for longer acting, but for now must remain metoprolol tartrate TID since cortrak cannot tolerate succinate     Acute bilateral deep vein thrombosis (DVT) of upper extremities (HCC)  Assessment & Plan  Acute, partially occlusive DVT to the R IJ with extension into the let innominate vein.    Acute, occlusive DVT in on of the right paired brachial veins within the distal bicept  Acute,  occlusive DVT in Right paired ulnar veins within proximal forearm  Occlusive superficial thrombophlebitis in a small segment of the right cephalic vein.   Acute partially occlusive DVT in left paired brachial veins within the elbow region. Possible small extension into radial an ulnar veins   - Lower extremity US- negative for DVT  - Anticoagulation plan as above  - PT/OT working with patient  - Consideration for IVC filter in future     Leukocytosis  Assessment & Plan  - Could be secondary to generalized state of inflammation. Not on steroids.   - UA relatively unremarkable. Urine culture pending  - CXR unremarkable  - Blood cultures NGTD  - CTM  - Patient remains afebrile.     Goals of care, counseling/discussion  Assessment & Plan  -Unable to contact significant other who was originally mistaken to be wife. Sister clarified that patient is not    -Will contact case management  -Palliative following  - Unable to reach sister despite multiple attempts. Palliative team also unable to reach family.   - Consider ethics consult in long-term.     Syphilis  Assessment & Plan  -Treponemal test is positive- confirmatory testing ending  -Prior Syphilis infection (2020) appears to have not been treated as ABX  prescribed were never picked up   -CSF VRDL non-reactive  -Health department notified  -ID consult for latent syphilis and treatment - Recommend completing  10-day for neurosyphilis with IV penicillin - 4,000,000 units every 4 hours with an end date of 7/3/22.      Hypokalemia- (present on admission)  Assessment & Plan  Continue to monitor daily labs and replace as needed  Electrolyte protocol     Tobacco use- (present on admission)  Assessment & Plan  -Discussion regarding smoking cessation  When appropriate  -Provide information   -NRT if requests      Methamphetamine use (HCC)- (present on admission)  Assessment & Plan  Counseling cessation when clinical trajectory is more clear and appropriate     VTE:  Contraindicated, pending NSG clearance   Ulcer: Not Indicated  Lines: A line reportedly DC'd  Tubes: Muse (attempt DCing 6/30), NG tube for feeding      Disposition: Inpatient for acute CVA recovery, now downgraded to telemetry. Speech and PT to work with patient in coming days, patient still NPO per speech. Long-term will be a challenging discharge. Anticipate LTACH. No DPOA, may need ethics consult.    Roro Still MD   PGY-2 Family Medicine Resident   [facility]

## 2022-07-01 NOTE — THERAPY
"Speech Language Pathology  Daily Treatment     Patient Name: Alverto Duran  Age:  66 y.o., Sex:  male  Medical Record #: [id number]  Today's Date: 7/1/2022     Precautions  Precautions: Fall Risk, Nasogastric Tube, Swallow Precautions ( See Comments)    Assessment  Pt seen this date for dysphagia therapy. Pt sleeping at SLP arrival, required max verbal A to rouse. Oral cavity noted to be dry, lips with dried secretions; oral care provided. Tongue with white coating, RN aware. He consumed single ice chips and tsp sips of thins. Oral holding appreciated, with intermittent delayed initiation of swallow trigger, however voice remained clear and no coughing. Attempted laryngeal exercise high pitch 'e', pt produced 3x with \"fair\" accuracy, but was unable to follow directives for additional repetitions.    Recommendations  1. Continue with NPO/TF and good ORAL CARE    Plan  Continue current treatment plan.    Discharge Recommendations: Recommend post-acute placement for additional speech therapy services prior to discharge home    Subjective  \"Let's hit the road.\"      Objective     07/01/22 1031   Precautions   Precautions Fall Risk;Nasogastric Tube;Swallow Precautions ( See Comments)   Vitals   O2 Delivery Device None - Room Air   Pain 0 - 10 Group   Therapist Pain Assessment Post Activity Pain Same as Prior to Activity;Nurse Notified;0   Dysphagia    Oral / Pharyngeal / Laryngeal Exercises Laryngeal Exercises   Diet / Liquid Recommendation NPO;Pre-Feeding Trials with SLP Only   Skilled Intervention Compensatory Strategies;Verbal Cueing   Recommended Route of Medication Administration   Medication Administration  Via Gastric Tube   Short Term Goals   Short Term Goal # 1 Patient will consume PO trials with SLP only with no s/sx of aspiration   Goal Outcome # 1 Progressing slower than expected     "

## 2022-07-01 NOTE — PROGRESS NOTES
Cortrak pulled out by patient. Pt scooted down in bed and pulled out.     Contacted RRT to vacilitate reinstallation of Cortrak.

## 2022-07-01 NOTE — CARE PLAN
The patient is Stable - Low risk of patient condition declining or worsening    Shift Goals  Clinical Goals: vs, I/O, safety,  Patient Goals: rest/ comfort  Family Goals: n/a    Progress made toward(s) clinical / shift goals:       Problem: Pain - Standard  Goal: Alleviation of pain or a reduction in pain to the patient’s comfort goal  Outcome: Progressing     Problem: Fall Risk  Goal: Patient will remain free from falls  Outcome: Progressing     Problem: Skin Integrity  Goal: Skin integrity is maintained or improved  Outcome: Progressing       Patient is not progressing towards the following goals:      Problem: Knowledge Deficit - Standard  Goal: Patient and family/care givers will demonstrate understanding of plan of care, disease process/condition, diagnostic tests and medications  Outcome: Not Progressing     Problem: Safety - Medical Restraint  Goal: Remains free of injury from restraints (Restraint for Interference with Medical Device)  Outcome: Not Progressing

## 2022-07-01 NOTE — THERAPY
Physical Therapy   Daily Treatment     Patient Name: Alverto Duran  Age:  66 y.o., Sex:  male  Medical Record #: [id number]  Today's Date: 7/1/2022     Precautions: Fall Risk;Nasogastric Tube;Swallow Precautions     Assessment    Pt conts to be limited by cognition however once stimulated he had some increased level of arousal. Today he was able to negotiate bed mob with SBA. Once EOB intermittent posterior LOB when attempting dynamic sitting balance tasks such as donning socks. He demonstrated adequate strength in his legs to bear weight in standing. Quick fatigue noted with frequent attempts to return to bed and even attempting to lie down with head of bed at the foot. Would benefit from OOB activity and up to a chair although restraints may be a barrier. PT to increase frequency to 4x/wk.    Plan    Treatment plan modified to 4 times per week until therapy goals are met for the following treatments:  Bed Mobility, Gait Training, Neuro Re-Education / Balance, Therapeutic Activities and Therapeutic Exercises.    DC Equipment Recommendations: Unable to determine at this time  Discharge Recommendations: Recommend post-acute placement for additional physical therapy services prior to discharge home     Objective    Cognition    Cognition / Consciousness X   Speech/ Communication Dysarthric   Level of Consciousness   (Lethargic)   Safety Awareness Impaired;Impulsive   New Learning Impaired   Attention Impaired   Initiation Impaired   Comments delayed initiation, required encouragement, attempting to push therapist out of the way once self directed, quickly irritable   Passive ROM Lower Body   Passive ROM Lower Body WDL   Strength Lower Body   Lower Body Strength  X   Comments unable to formally test d/t impaired command following, able to stand and wt shift without evidence of knee buckling   Neurological Concerns   Neurological Concerns Yes   Comments intermittent posterior lean in sitting and standing   Balance    Sitting Balance (Static) Poor   Sitting Balance (Dynamic) Poor -   Standing Balance (Static) Trace +   Standing Balance (Dynamic) Trace   Weight Shift Sitting Poor   Weight Shift Standing Poor   Skilled Intervention Verbal Cuing;Sequencing;Postural Facilitation   Comments w/ support from therapist for balance   Gait Analysis   Gait Level Of Assist Unable to Participate  (due to cognition, appears to have adequate strength to attempt amb)   Weight Bearing Status No restrictions   Bed Mobility    Supine to Sit Standby Assist   Sit to Supine Standby Assist   Skilled Intervention Verbal Cuing;Sequencing   Comments Pt attempting to lie down with head at the foot of the bed, requires sequencing cues   Functional Mobility   Sit to Stand Minimal Assist   Bed, Chair, Wheelchair Transfer Minimal Assist  (to side step toward L, returned BTB at end of session)   Skilled Intervention Verbal Cuing;Sequencing;Postural Facilitation   Short Term Goals    Short Term Goal # 1 Pt will perform supine <> sit without bed features with SPV within 6 visits in order to progress toward PLOF   Goal Outcome # 1 Progressing as expected   Short Term Goal # 2 Pt will perform STS with LRAD and min A within 6 visits in order to progress OOB mobility   Goal Outcome # 2 Goal met   Short Term Goal # 3 Pt will perform functional transfers with LRAD and SPV within 6 visits in order to progress OOB mobility   Goal Outcome # 3 Progressing slower than expected   Short Term Goal # 4 Pt will amb >50ft with LRAD and min A within 6 visits to progress back to PLOF.   Education Group   Role of Physical Therapist Patient Response Patient;Nonacceptance;Explanation;Demonstration;Reinforcement Needed

## 2022-07-01 NOTE — CARE PLAN
Problem: Fall Risk  Goal: Patient will remain free from falls  Outcome: Progressing     Problem: Safety - Medical Restraint  Goal: Remains free of injury from restraints (Restraint for Interference with Medical Device)  Outcome: Progressing     Problem: Skin Integrity  Goal: Skin integrity is maintained or improved  Outcome: Progressing   The patient is Stable - Low risk of patient condition declining or worsening    Shift Goals  Clinical Goals: monitor safety  Patient Goals: rest  Family Goals: n/a    Progress made toward(s) clinical / shift goals:  pt remaining free of falls, skin integrity being maintained, pt free of injury from restraints.    Patient is not progressing towards the following goals:

## 2022-07-02 NOTE — PROGRESS NOTES
Bedside report received from nurse. Assumed care of pt. Pt resting comfortably in bed. A/Ox1 to self only, VSS,  All needs met. POC reviewed and white board updated. Tele box on. Call light in reach. Bed locked in lowest position with 2 upper bed rails up. Pt answered no to pain and does not appear to show any signs

## 2022-07-02 NOTE — CARE PLAN
The patient is Watcher - Medium risk of patient condition declining or worsening    Shift Goals  Clinical Goals: VS, I/O, safety  Patient Goals: rest/ activity  Family Goals: na    Progress made toward(s) clinical / shift goals:    Problem: Knowledge Deficit - Standard  Goal: Patient and family/care givers will demonstrate understanding of plan of care, disease process/condition, diagnostic tests and medications  Outcome: Progressing     Problem: Pain - Standard  Goal: Alleviation of pain or a reduction in pain to the patient’s comfort goal  Outcome: Progressing, tolerating pain to back of head with PRN tylenol      Problem: Fall Risk  Goal: Patient will remain free from falls  Outcome: Progressing     Problem: Safety - Medical Restraint  Goal: Remains free of injury from restraints (Restraint for Interference with Medical Device)  Outcome: Progressing     Problem: Safety - Medical Restraint  Goal: Free from restraint(s) (Restraint for Interference with Medical Device)  Outcome: Progressing    Restraints removed at noon, patient able to communicate and recall where he is (hospital) and who he is. Bedside nurse informed patient not to remove anything attached to body. Fall risk remains in place.        Patient is not progressing towards the following goals:

## 2022-07-02 NOTE — PROGRESS NOTES
[facility] PROGRESS NOTE     Attending:   Luna Monique MD    Resident:   Rocky Michelle DO    PATIENT: Alverto Duran; [id number]; 1955    SUBJECTIVE: No acute events overnight, this morning no new concerns from patient    6/23 - suboccipital craniectomy. New onset AFIB RVR.  6/24 - multiple DVT's BUE noted on US.    6/25 - ID consult, recommend 10 day neurosyphilis course with IV PCN q4h (end date 7/3/22). Extubated  6/26 - EVD no output overnight. Alteplase by Dr. Mello.   6/27 - IV Metoprolol for AFIB with rate to 170's  6/29 - [facility] re-assumed care, patient transferred to telemetry    OBJECTIVE:     Vitals:    07/01/22 1209 07/01/22 1530 07/01/22 2039 07/02/22 0403   BP: 111/68 115/70 133/83 139/82   Pulse: 99 95 75 66   Resp: 16 16 18 18   Temp: 36.1 °C (97 °F) 36.6 °C (97.8 °F) 36.4 °C (97.5 °F) 36.2 °C (97.2 °F)   TempSrc: Temporal Temporal Temporal Temporal   SpO2: 95% 95%  95%   Weight:   62.6 kg (138 lb 0.1 oz)    Height:           Intake/Output Summary (Last 24 hours) at 7/2/2022 0555  Last data filed at 7/2/2022 0534  Gross per 24 hour   Intake 360 ml   Output --   Net 360 ml       PE:   General: resting in bed, no acute distress, awakens to speech but only mumbles responses  HEENT: surgical scar on scalp. NG tube in place  Cardiovascular: no murmur  Respiratory: CTAB, normal respiratory effort  Abdomen:  soft, nontender, nondistended  EXT:  No pitting edema noted, bilateral soft wrist restraints, moving all extremities  Neuro: drowsy, arouses to stimulation, mumbles in response, Moving all extremities spontaneously  : Muse in place     LABS: reviewed    MICROBIOLOGY: blood cx from 6/30 NGTD    IMAGING: reviewed    MEDS:  Current Facility-Administered Medications   Medication Last Admin   • [Held by provider] sodium chloride (SALT) tablet 2 g     • nystatin (MYCOSTATIN) 729086 UNIT/ML suspension 500,000 Units 500,000 Units at 07/01/22 2159   • amLODIPine (NORVASC) tablet 5 mg 5 mg at  07/02/22 0508   • atorvastatin (LIPITOR) tablet 40 mg 40 mg at 07/01/22 1653   • metoprolol tartrate (LOPRESSOR) tablet 25 mg 25 mg at 07/02/22 0508   • hydrALAZINE (APRESOLINE) injection 20 mg 20 mg at 06/29/22 0049   • LORazepam (ATIVAN) injection 1 mg 1 mg at 07/01/22 0221   • furosemide (LASIX) injection 20 mg 20 mg at 07/02/22 0509   • penicillin G potassium 4 Million Units in  mL IVPB Stopped at 07/02/22 0539   • lisinopril (PRINIVIL) tablet 40 mg 40 mg at 07/02/22 0508   • Metoprolol Tartrate (LOPRESSOR) injection 5 mg     • LORazepam (ATIVAN) injection 2-4 mg     • enalaprilat (Vasotec) injection 1.25 mg 1 mL 1.25 mg at 06/28/22 2032   • Respiratory Therapy Consult     • Pharmacy Consult: Enteral tube insertion - review meds/change route/product selection     • acetaminophen (Tylenol) tablet 650 mg 650 mg at 06/27/22 0910   • ondansetron (ZOFRAN ODT) dispertab 4 mg     • senna-docusate (PERICOLACE or SENOKOT S) 8.6-50 MG per tablet 2 Tablet 2 Tablet at 07/02/22 0508    And   • polyethylene glycol/lytes (MIRALAX) PACKET 1 Packet      And   • magnesium hydroxide (MILK OF MAGNESIA) suspension 30 mL      And   • bisacodyl (DULCOLAX) suppository 10 mg     • ondansetron (ZOFRAN) syringe/vial injection 4 mg     • Pharmacy Consult Request ...Pain Management Review 1 Each           ASSESSMENT/PLAN:   66 y.o. male PMHx of chronic methamphetamine use, tobacco dependence, HTN, right frontal CVA 2019, and syphilis (2020) who initially presented to [place] ED around 6/20/22 with headaches and multiple falls and was discharged home. He presented to [facility] on 6/21 with c/o dizziness, neck pain, headache, and falls. CT head without contrast showed encephalomalacia in the left cerebellar and right frontal areas. Patient was initially admitted to the floor under the [facility] Family Medicine service. On 6/22 he developed decreased LOC with obtundation. MRI with and without contrast showed significant edema with  compression of the brain stem and obstructive hydrocephalus. He was transferred to ICU for intubation, EVD placement and hypertonic saline therapy.    # acute CVA due to occlusion of the left cerebellar artery  # obstructive hydrocephalus  Large left cerebellar ischemic infarct with cerebral edema and mass-effect on fourth ventricle, Detected on MRI 6/22.  - s/p suboccipital decompression with craniectomy and C1 laminectomy  - s/p right frontal EVD, removed 6/27  - MAP goal greater than 65; SBP goal to keep  (acutely). Longer term, BP goal of 110-130/60-80  - PRN Nicardipine    - Scheduled lasix  - Metoprolol TID  - Lisinopril 40 mg  - amlodipine 5 mg  - 7/2: good blood pressure control  - Elevate head of bed, keep head centered-maximize blood flow  - Goal sodium 135-145  - Atorvastatin 40 mg - LDL goal less than 70  - PT/OT/SLP consult  - 6/29: Speech consulted - rec'd continuing NG tube feeds as pt is not appropriate for PO intake d/t poor participation  - Anticoagulation: CT head 7/7. If no hemorrhagic conversion, will start Eliquis 5mg BID  - Continue q4h neurochecks     # oral thrush  - Continue nystatin swabs    # Afib  - Thought to be etiology of above CVA.  - 6/23 New onset A. fib with rapid RVR during evening of 6/23. Treated and Converted with metoprolol 5 mg x 3 followed by metoprolol XL  -6/26 Afib with RVR again. Increased metoprolol 50 mg to TID   - Eventual plan for metoprolol succinate for longer acting, but for now must remain metoprolol tartrate TID since cortrak cannot tolerate succinate     # acute bilateral DVT of upper extremities  - Acute, partially occlusive DVT to the R IJ with extension into the let innominate vein.    - Acute, occlusive DVT in on of the right paired brachial veins  - Acute,  occlusive DVT in Right paired ulnar veins within proximal forearm  - Occlusive superficial thrombophlebitis in a small segment of the right cephalic vein.   - Acute partially occlusive DVT in  left paired brachial veins within the elbow region. Possible small extension into radial an ulnar veins   - Lower extremity US- negative for DVT  - Anticoagulation plan as above  - PT/OT working with patient  - Consideration for IVC filter in future    # leukocytosis  Infectious workup negative thus far. Patient remains afebrile. He is not on any steroids. Unclear etiology. Continue to monitor.     # goals of care  Unable to reach sister despite multiple attempts. Palliative also unable to reach family. May need ethics consult. May need guardian.     # neurosyphilis  Treponemal test is positive. Prior syphilis infection from 2020 appears to have not been treated as antibiotics were prescribed but never picked up. CSF VRDL non-reactive. ID has been consulted and gave recommendations. 10 day course of IV penicillin last dose on 7/3/22.     # methamphetamine use  # tobacco use   on cessation when appropriate    Core Measures:  DVT PPX: SCDs  ABX: penicillin  Lines: PIV  Fluids: NGT  PCP: none  CODE STATUS: FULL CODE    Dispo:  Inpatient for CVA recovery, long-term disposition unclear, may need LTAC, no DPOA so may need ethics consults vs. Guardian

## 2022-07-02 NOTE — CARE PLAN
The patient is Watcher - Medium risk of patient condition declining or worsening    Shift Goals  Clinical Goals: safety  Patient Goals: na  Family Goals: na    Progress made toward(s) clinical / shift goals:    Problem: Pain - Standard  Goal: Alleviation of pain or a reduction in pain to the patient’s comfort goal  Outcome: Progressing  Note: Pt reports no pain      Problem: Fall Risk  Goal: Patient will remain free from falls  Outcome: Progressing     Problem: Safety - Medical Restraint  Goal: Remains free of injury from restraints (Restraint for Interference with Medical Device)  Outcome: Progressing       Patient is not progressing towards the following goals:      Problem: Knowledge Deficit - Standard  Goal: Patient and family/care givers will demonstrate understanding of plan of care, disease process/condition, diagnostic tests and medications  Outcome: Not Progressing  Note: Pt is confused and impulsive. Needed to get a telesitter this shift for safety      Problem: Safety - Medical Restraint  Goal: Free from restraint(s) (Restraint for Interference with Medical Device)  Outcome: Not Progressing

## 2022-07-03 NOTE — THERAPY
Speech Language Pathology  Daily Treatment     Patient Name: Alverto Duran  Age:  66 y.o., Sex:  male  Medical Record #: [id number]  Today's Date: 7/3/2022     Precautions: Fall Risk, Nasogastric Tube, Swallow Precautions ( See Comments)    Assessment  Patient seen on this date for dysphagia reassessment per RN request. Patient with improved JUANCARLOS. Pt awake and agreeable to therapeutic objectives. Patient confused and oriented to self and place only, at times making odd and irrelevant statements. Thick white coating on superior aspect of lingual surface persists - per RN pt is receiving Nystatin via swabs.      PO trials were administered and consisted of ice chips, MTL, pudding, and thin liquids. Onset of swallow trigger was mildly delayed. NO overt s/sx of aspiration appreciated across all trials tested. Voice unchanged. Cannot rule out silent aspiration given prolonged NPO status and CVA.    Recommendations  1. Continue NPO/cortrak  2. OK for a few single ice chips with RN only following oral care  3. MBSS to be completed tomorrow, as appropriate       Plan  Continue current treatment plan.    Discharge Recommendations: Recommend post-acute placement for additional speech therapy services prior to discharge home     07/03/22 1255   Vitals   O2 Delivery Device None - Room Air   Short Term Goals   Short Term Goal # 1 Patient will consume PO trials with SLP only with no s/sx of aspiration   Goal Outcome # 1 Progressing as expected

## 2022-07-03 NOTE — PROGRESS NOTES
Handoff report received from day shift nurse. Patient care assumed. Patient is currently resting in bed. Plan of care discussed with the patient and the patient verbalizes no questions at this time. Patient is A&O x2; disoriented to time and situation, on room air, telemetry monitoring in place and rhythm verified, and vital signs are stable. Patient denies any pain at this time. Call light and belongings within reach, patient educated on the use of call light. All fall precautions are in place, bed is in locked and lowest position. Hourly rounding in place. Will continue plan of care.

## 2022-07-03 NOTE — CARE PLAN
The patient is Stable - Low risk of patient condition declining or worsening    Shift Goals  Clinical Goals: Q4H neuro; maintain patient safety; maintain hemodynamic stability; administer abx and meds per MAR  Patient Goals: Rest  Family Goals: GLENDY    Progress made toward(s) clinical / shift goals:    Problem: Knowledge Deficit - Standard  Goal: Patient and family/care givers will demonstrate understanding of plan of care, disease process/condition, diagnostic tests and medications  Outcome: Progressing     Problem: Pain - Standard  Goal: Alleviation of pain or a reduction in pain to the patient’s comfort goal  Outcome: Progressing     Problem: Fall Risk  Goal: Patient will remain free from falls  Outcome: Progressing     Problem: Safety - Medical Restraint  Goal: Remains free of injury from restraints (Restraint for Interference with Medical Device)  Outcome: Progressing     Problem: Safety - Medical Restraint  Goal: Free from restraint(s) (Restraint for Interference with Medical Device)  Outcome: Progressing     Problem: Skin Integrity  Goal: Skin integrity is maintained or improved  Outcome: Progressing     Problem: Dysphagia  Goal: Dysphagia will improve  Outcome: Progressing

## 2022-07-03 NOTE — CARE PLAN
The patient is Watcher - Medium risk of patient condition declining or worsening    Shift Goals  Clinical Goals: Q4H neuro; maintain patient safety; maintain hemodynamic stability; administer abx and meds per MAR  Patient Goals: Rest  Family Goals: GLENDY    Progress made toward(s) clinical / shift goals:      Problem: Safety - Medical Restraint  Goal: Remains free of injury from restraints (Restraint for Interference with Medical Device)  Outcome: Progressing     Problem: Optimal Care of the Stroke Patient  Goal: Optimal emergency care for the stroke patient  Outcome: Progressing     Problem: Neuro Status  Goal: Neuro status will remain stable or improve  Outcome: Progressing     Problem: Hemodynamic Monitoring  Goal: Patient's hemodynamics, fluid balance and neurologic status will be stable or improve  Outcome: Progressing     Problem: Respiratory - Stroke Patient  Goal: Patient will achieve/maintain optimum respiratory rate/effort  Outcome: Progressing       Patient is not progressing towards the following goals:

## 2022-07-03 NOTE — PROGRESS NOTES
Neurosurgery Progress Note    Subjective:  No acute events  Persistent headaches    Exam:  A&O  Pt minimally cooperative for exam  FC with coaching, TORRES  EOM intact, PERRL  Incision CDI    BP  Min: 126/74  Max: 132/84  Pulse  Av.8  Min: 66  Max: 80  Resp  Av.5  Min: 16  Max: 18  Temp  Av.4 °C (97.6 °F)  Min: 36.3 °C (97.3 °F)  Max: 36.7 °C (98 °F)  SpO2  Av.3 %  Min: 95 %  Max: 96 %    No data recorded    Recent Labs     22  0300 22  0210   WBC 14.1* 11.3*   RBC 5.19 4.85   HEMOGLOBIN 14.4 13.3*   HEMATOCRIT 44.7 41.3*   MCV 86.1 85.2   MCH 27.7 27.4   MCHC 32.2* 32.2*   RDW 45.9 44.9   PLATELETCT 480* 474*   MPV 11.8 11.9     Recent Labs     22  0300 22  0210   SODIUM 146* 144   POTASSIUM 4.2 4.3   CHLORIDE 110 110   CO2 26 25   GLUCOSE 119* 134*   BUN 34* 33*   CREATININE 1.12 0.89   CALCIUM 9.2 9.1               Intake/Output                       22 - 22 0659 22 07 - 22 0659     [id number] 0158-2762 Total  6901-8350 Total                 Intake    NG/GT  150  -- 150  60  -- 60    Intake (mL) (Enteral Tube 22 Cortrak - Small Bowel/Transpyloric 10 Fr. Left nare) 150 -- 150 60 -- 60    Enteral  --  60 60  --  -- --    Free Water / Tube Flush -- 60 60 -- -- --    Total Intake 150 60 210 60 -- 60       Output    Urine  --  1250 1250  800  -- 800    Number of Times Voided -- 2 x 2 x 2 x -- 2 x    Urine Void (mL) -- 1250 1250 800 -- 800    Total Output -- 1250 1250 800 -- 800       Net I/O     150 -1190 -1040 -740 -- -740            Intake/Output Summary (Last 24 hours) at 7/3/2022 1016  Last data filed at 7/3/2022 0800  Gross per 24 hour   Intake 210 ml   Output 2050 ml   Net -1840 ml            • nystatin  5 mL 4X/DAY   • amLODIPine  5 mg Q DAY   • atorvastatin  40 mg Q EVENING   • metoprolol tartrate  25 mg TWICE DAILY   • hydrALAZINE  20 mg Q6HRS PRN   • LORazepam  1 mg Q4HRS PRN   • furosemide  20 mg Q DAY   • penicillin g  4  Million Units Q4HRS   • lisinopril  40 mg Q DAY   • Metoprolol Tartrate  5 mg Q5 MIN PRN   • LORazepam  2-4 mg Q4HRS PRN   • enalaprilat  1.25 mg Q6HRS PRN   • Respiratory Therapy Consult   Continuous RT   • Pharmacy  1 Each PHARMACY TO DOSE   • acetaminophen  650 mg Q6HRS PRN   • ondansetron  4 mg Q4HRS PRN   • senna-docusate  2 Tablet BID    And   • polyethylene glycol/lytes  1 Packet QDAY PRN    And   • magnesium hydroxide  30 mL QDAY PRN    And   • bisacodyl  10 mg QDAY PRN   • ondansetron  4 mg Q4HRS PRN   • Pharmacy Consult Request  1 Each PHARMACY TO DOSE       Assessment and Plan:  Hospital day #12 left cerebellar stroke  POD #9 right frontal EVD  POD #8 craniectomy, C1 lami  Prophylactic anticoagulation: no         Start date/time: tbd    Plan:   Neuro exam stable  Will recheck CT head in 2 weeks   No anticoags for 2 weeks post op  Consider IVC filter if has DVT  PT/OT/SLP   Na 144  AM labs pending continue to monitor and keep eunatremic  Discharge planning appreciated

## 2022-07-03 NOTE — PROGRESS NOTES
[facility] MEDICINE PROGRESS NOTE     Attending:   Luna Monique MD    Resident:   Roro Still MD    PATIENT:   Alverto Duran; [id number]; 1955    ID/Interval Events:   66 y.o. male PMHx of chronic methamphetamine use, tobacco dependence, HTN, right frontal CVA 2019, and syphilis (2020) who initially presented to [place] ED around 6/20/22 with headaches and multiple falls and was discharged home.   He presented to [facility] ER on 6/21 with c/o dizziness, neck pain, headache, and falls. CT head without contrast showed encephalomalacia in the left cerebellar and right frontal areas. Patient was initially admitted to the floor under the [facility] Family Medicine service.   On 6/22 he developed decreased LOC with obtundation. MRI with and without contrast showed significant edema with compression of the brain stem and obstructive hydrocephalus. He was transferred to ICU for intubation, EVD placement and hypertonic saline therapy.    6/23 - suboccipital craniectomy. New onset AFIB RVR.  6/24 - multiple DVT's BUE noted on US.    6/25 - ID consult, recommend 10 day neurosyphilis course with IV PCN q4h (end date 7/3/22). Extubated  6/26 - EVD no output overnight. Alteplase by Dr. Mello.   6/27 - IV Metoprolol for AFIB with rate to 170's  6/29 - [facility] re-assumed care, patient transferred to telemetry  7/1 - Telemetry DC'd    SUBJECTIVE:   Patient with improving cognition. Asking to smoke cigarettes.     OBJECTIVE:  Vitals:    07/02/22 1940 07/03/22 0000 07/03/22 0612 07/03/22 0800   BP: 129/78 127/76 130/74 126/74   Pulse: 66 69 78 72   Resp: 16 17 18 16   Temp: 36.3 °C (97.3 °F) 36.4 °C (97.6 °F) 36.3 °C (97.4 °F) 36.6 °C (97.9 °F)   TempSrc: Temporal Temporal Temporal Temporal   SpO2: 95% 95% 96% 96%   Weight:       Height:           Intake/Output Summary (Last 24 hours) at 7/3/2022 1502  Last data filed at 7/3/2022 1200  Gross per 24 hour   Intake 530 ml   Output 2050 ml   Net -1520 ml       PHYSICAL  EXAM:  General: resting in bed, no acute distress, improving conversational capacity  HEENT: surgical scar on scalp. NG tube in place  Cardiovascular: no murmur  Respiratory: CTAB, normal respiratory effort  Abdomen:  soft, nontender, nondistended  EXT:  No pitting edema noted, moving all extremities  Neuro: drowsy, arouses to stimulation, mumbles in response, Moving all extremities spontaneously  : Muse in place      LABS:  Recent Labs     07/02/22  0300 07/03/22  0210   WBC 14.1* 11.3*   RBC 5.19 4.85   HEMOGLOBIN 14.4 13.3*   HEMATOCRIT 44.7 41.3*   MCV 86.1 85.2   MCH 27.7 27.4   RDW 45.9 44.9   PLATELETCT 480* 474*   MPV 11.8 11.9   NEUTSPOLYS  --  69.60   LYMPHOCYTES  --  20.00*   MONOCYTES  --  7.60   EOSINOPHILS  --  1.90   BASOPHILS  --  0.40     Recent Labs     07/02/22 0300 07/03/22  0210   SODIUM 146* 144   POTASSIUM 4.2 4.3   CHLORIDE 110 110   CO2 26 25   BUN 34* 33*   CREATININE 1.12 0.89   CALCIUM 9.2 9.1     Estimated GFR/CRCL = Estimated Creatinine Clearance: 72.3 mL/min (by C-G formula based on SCr of 0.89 mg/dL).  Recent Labs     07/02/22 0300 07/03/22  0210   GLUCOSE 119* 134*                 No results for input(s): INR, APTT, FIBRINOGEN in the last 72 hours.    Invalid input(s): DIMER    MICROBIOLOGY:   No results found for: BLOODCULTU, BLDCULT, BCHOLD     IMAGING:   DX-ABDOMEN FOR TUBE PLACEMENT   Final Result         1.  Nonspecific bowel gas pattern.   2.  Dobbhoff tube tip overlying the expected location of the pylorus or first duodenal segment.      DX-CHEST-PORTABLE (1 VIEW)   Final Result      1.  No acute cardiopulmonary disease.   2.  Supportive tubing as described above.      MR-BRAIN-WITH & W/O   Final Result         Subacute left inferior cerebellar infarct with minimal blood products within representing petechial microhemorrhages. Mass effect upon the fourth ventricle remains stable.      Posterior fossa is well decompressed by midline occipital craniotomy. Lateral ventricles  are well decompressed by a right frontal approach EVD with its tip in the frontal horn of the right lateral ventricle.      Small right occipital tentorial subdural hematoma without mass effect.      Remote bilateral frontal infarct with encephalomalacia.      Age-related volume loss and chronic microvascular ischemic changes.         US-EXTREMITY VENOUS LOWER BILAT   Final Result      CT-HEAD W/O   Final Result      1.  There is a small amount of intraventricular blood that is new from 6/23/2022. The ventricles are stable in size.   2.  There is a right frontal approach ventriculostomy catheter with the tip terminating at the foramen of Monro. There is a punctate amount of parenchymal hemorrhage surrounding the catheter is unchanged.   3.  Right frontal and left cerebellar encephalomalacia.      EC-ECHOCARDIOGRAM COMPLETE W/O CONT   Final Result      US-CAROTID DOPPLER BILAT   Final Result      US-EXTREMITY VENOUS UPPER BILAT   Final Result      DX-CHEST-PORTABLE (1 VIEW)   Final Result         1.  No acute cardiopulmonary disease.   2.  Trace right pleural effusion   3.  Atherosclerosis      CT-CTA NECK WITH & W/O-POST PROCESSING   Final Result         1.  Severely hypoplastic right vertebral artery, central segment of the right vertebral artery is not visualized and may be occluded.         CT-CTA HEAD WITH & W/O-POST PROCESS   Final Result         1.  No large vessel occlusion or aneurysm identified   2.  Interval placement of right frontal approach ventriculostomy with postprocedural minimal hemorrhage and new pneumocephalus.   3.  Bilateral ventricular dilatation appears somewhat increased since prior study compatible with somewhat worsened hydrocephalus.   4.  Right frontal and left cerebellar encephalomalacia      DX-CHEST-PORTABLE (1 VIEW)   Final Result         1.  No acute cardiopulmonary disease.   2.  Trace bilateral pleural effusion      DX-ABDOMEN FOR TUBE PLACEMENT   Final Result         1.   Nonspecific bowel gas pattern.   2.  Dobbhoff tube tip terminates overlying the expected location of the gastric antrum.   3.  Trace bilateral pleural effusions      DX-CHEST-PORTABLE (1 VIEW)   Final Result         1.  No acute cardiopulmonary disease.   2.  Trace bilateral pleural effusions   3.  Atherosclerosis      MR-BRAIN-WITH & W/O   Final Result   Addendum 1 of 1   ADDENDUM:      The case was discussed by telephone (call report) with DR. DAVONTE VIEIRA    on call for [facility], at 1854 hours.      Final      1.  Interval development of moderate obstructive hydrocephalus due to mass effect on the fourth ventricle. There is mild transependymal edema.   2.  Large area of acute infarction involving the left cerebellar hemisphere, inferior cerebellar vermis, and left cerebellar tonsil. PICA territory. No hemorrhagic transformation. This is the cause of the fourth ventricle effacement with obstructive    hydrocephalus. There is also mild left-sided cerebellar tonsillar herniation.   3.  Moderate supratentorial white matter disease most consistent with microvascular ischemic change.   4.  Old infarction right anterior frontal convexity.   5.  A Voalte message was sent to RICKEY HAYES at 1823 hours 6/22/2022. Prompt reply as of 6:41 PM not yet received. Efforts are ongoing to contact housestaff managing the patient at this time.   6.  Case was discussed (call report) with nurse SHASTA MAGILL at 6:47 PM. Request to initiate stat neurosurgery consult. Attempts to contact [facility] on-call housestaff are ongoing.      CT-HEAD W/O   Final Result      1.  No evidence of acute hemorrhage, mass or large territorial infarction   2.  LEFT cerebellar and RIGHT frontal encephalomalacia   3.  Mild atrophy   4.  Mild white matter changes         CT-CSPINE WITHOUT PLUS RECONS   Final Result      1.  No acute fracture or traumatic listhesis in the cervical spine.   2.  Emphysema in the lung apices.          CULTURES:  "  Results     Procedure Component Value Units Date/Time    URINE CULTURE(NEW) [[id number]] Collected: 06/30/22 1511    Order Status: Completed Specimen: Urine, [place] Cath Updated: 07/02/22 0659     Significant Indicator NEG     Source UR     Site URINE, LONGO CATH     Culture Result No growth at 48 hours.    Narrative:      Collected By: [id number] LINO RINCON  Indication for culture:->Evaluation for sepsis without a  clear source of infection  Collected By: [id number] LINO RINCON    BLOOD CULTURE [[id number]] Collected: 06/30/22 1443    Order Status: Completed Specimen: Blood from Peripheral Updated: 07/01/22 0755     Significant Indicator NEG     Source BLD     Site PERIPHERAL     Culture Result No Growth  Note: Blood cultures are incubated for 5 days and  are monitored continuously.Positive blood cultures  are called to the RN and reported as soon as  they are identified.      Narrative:      Per Hospital Policy: Only change Specimen Src: to \"Line\" if  specified by physician order.  Right AC    BLOOD CULTURE [[id number]] Collected: 06/30/22 1606    Order Status: Completed Specimen: Blood from Peripheral Updated: 07/01/22 0755     Significant Indicator NEG     Source BLD     Site PERIPHERAL     Culture Result No Growth  Note: Blood cultures are incubated for 5 days and  are monitored continuously.Positive blood cultures  are called to the RN and reported as soon as  they are identified.      Narrative:      Per Hospital Policy: Only change Specimen Src: to \"Line\" if  specified by physician order.  Right AC    CSF Culture [[id number]] Collected: 06/24/22 0834    Order Status: Completed Specimen: CSF from Shunt Updated: 07/01/22 0747     Significant Indicator NEG     Source CSF     Site SHUNT     Culture Result No growth at 7 days.     Gram Stain Result Rare WBCs.  No organisms seen.      Narrative:      Collected By: BELEN SYED  Collected By: BELEN SYED    URINALYSIS [[id number]]  " (Abnormal) Collected: 06/30/22 1511    Order Status: Completed Specimen: Urine, Muse Cath Updated: 06/30/22 1616     Color Yellow     Character Clear     Specific Gravity 1.023     Ph 6.0     Glucose Negative mg/dL      Ketones Negative mg/dL      Protein Negative mg/dL      Bilirubin Negative     Urobilinogen, Urine 1.0     Nitrite Negative     Leukocyte Esterase Trace     Occult Blood Negative     Micro Urine Req Microscopic    Narrative:      Collected By: [id number] LINO RINCON  Indication for culture:->Evaluation for sepsis without a  clear source of infection          MEDS:  Current Facility-Administered Medications   Medication Last Admin   • nicotine (NICODERM) 14 MG/24HR 14 mg 14 mg at 07/03/22 1227   • nystatin (MYCOSTATIN) 147372 UNIT/ML suspension 500,000 Units 500,000 Units at 07/03/22 1227   • amLODIPine (NORVASC) tablet 5 mg 5 mg at 07/03/22 0614   • atorvastatin (LIPITOR) tablet 40 mg 40 mg at 07/02/22 1740   • metoprolol tartrate (LOPRESSOR) tablet 25 mg 25 mg at 07/03/22 0615   • hydrALAZINE (APRESOLINE) injection 20 mg 20 mg at 06/29/22 0049   • LORazepam (ATIVAN) injection 1 mg 1 mg at 07/01/22 0221   • furosemide (LASIX) injection 20 mg 20 mg at 07/03/22 0613   • lisinopril (PRINIVIL) tablet 40 mg 40 mg at 07/03/22 0614   • Metoprolol Tartrate (LOPRESSOR) injection 5 mg     • LORazepam (ATIVAN) injection 2-4 mg     • enalaprilat (Vasotec) injection 1.25 mg 1 mL 1.25 mg at 06/28/22 2032   • Respiratory Therapy Consult     • Pharmacy Consult: Enteral tube insertion - review meds/change route/product selection     • acetaminophen (Tylenol) tablet 650 mg 650 mg at 07/02/22 1746   • ondansetron (ZOFRAN ODT) dispertab 4 mg     • senna-docusate (PERICOLACE or SENOKOT S) 8.6-50 MG per tablet 2 Tablet 2 Tablet at 07/03/22 0614    And   • polyethylene glycol/lytes (MIRALAX) PACKET 1 Packet      And   • magnesium hydroxide (MILK OF MAGNESIA) suspension 30 mL      And   • bisacodyl (DULCOLAX)  suppository 10 mg     • ondansetron (ZOFRAN) syringe/vial injection 4 mg     • Pharmacy Consult Request ...Pain Management Review 1 Each         PROBLEM LIST:  Problem Noted   Hypertensive Urgency 6/21/2022   Methamphetamine Use (Hcc) 6/21/2022   Gait Instability 6/21/2022   Hypokalemia 6/21/2022   Tobacco Dependence 6/21/2022       ASSESSMENT/PLAN: 66 y.o. male PMHx of chronic methamphetamine use, tobacco dependence, HTN, right frontal CVA 2019, and syphilis (2020) who initially presented to [place] ED around 6/20/22 with headaches and multiple falls and was discharged home. He presented to [facility] ER on 6/21 with c/o dizziness, neck pain, headache, and falls. CT head without contrast showed encephalomalacia in the left cerebellar and right frontal areas. Patient was initially admitted to the floor under the [facility] Family Medicine service. On 6/22 he developed decreased LOC with obtundation. MRI with and without contrast showed significant edema with compression of the brain stem and obstructive hydrocephalus. He was transferred to ICU for intubation, EVD placement and hypertonic saline therapy.     # Acute CVA due to occlusion of the left cerebellar artery  # Obstructive hydrocephalus  Large left cerebellar ischemic infarct with cerebral edema and mass-effect on fourth ventricle, Detected on MRI 6/22.  - s/p suboccipital decompression with craniectomy and C1 laminectomy  - s/p right frontal EVD, removed 6/27  - MAP goal greater than 65; SBP goal to keep  (acutely). Longer term, BP goal of 110-130/60-80.   - Scheduled lasix - can DC if urine output improves  - Continue Lisinopril 40 mg, Amlodipine 5 mg  - Goal sodium 135-145  - Atorvastatin 40 mg - LDL goal less than 70  - PT/OT/SLP consult  - 6/29: Speech consulted - rec'd continuing NG tube feeds as pt is not appropriate for PO intake d/t poor participation  - 7/3: Speech re-consulted as patient's cognition improving, plan for modified barium swallow  tomorrow  - Anticoagulation: CT head 7/7. If no hemorrhagic conversion, will start Eliquis 5mg BID  - Continue q4h neurochecks      # oral thrush  - Continue nystatin swabs     # Afib  - Thought to be etiology of above CVA.  - A Fib RVR resolved  - Continue Metoprolol BID   - Eventual plan for metoprolol succinate for longer acting, but for now must remain metoprolol tartrate BID since cortrak cannot tolerate succinate      # acute bilateral DVT of upper extremities  - Acute, partially occlusive DVT to the R IJ with extension into the let innominate vein.    - Acute, occlusive DVT in on of the right paired brachial veins  - Acute,  occlusive DVT in Right paired ulnar veins within proximal forearm  - Occlusive superficial thrombophlebitis in a small segment of the right cephalic vein.   - Acute partially occlusive DVT in left paired brachial veins within the elbow region. Possible small extension into radial an ulnar veins   - Lower extremity US- negative for DVT  - Anticoagulation plan as above  - PT/OT working with patient  - Consideration for IVC filter in future     # leukocytosis  Infectious workup negative thus far. Patient remains afebrile. He is not on any steroids. Unclear etiology. Continue to monitor.      # goals of care  Unable to reach sister despite multiple attempts. Palliative also unable to reach family. May need ethics consult. May need guardian.      # neurosyphilis  Treponemal test is positive. Prior syphilis infection from 2020 appears to have not been treated as antibiotics were prescribed but never picked up. CSF VRDL non-reactive. ID has been consulted and gave recommendations. 10 day course of IV penicillin (last dose on 7/3/22).      # methamphetamine use  # tobacco use   on cessation when appropriate     Core Measures:  DVT PPX: SCDs  ABX: penicillin  Lines: PIV  Fluids: NGT  PCP: none  CODE STATUS: FULL CODE     Dispo:  Inpatient for CVA recovery, long-term disposition unclear, may  need LTAC, no DPOA so may need ethics consults vs. Guardian    Roro Still MD   PGY-3 Family Medicine Resident   [facility]

## 2022-07-04 NOTE — PROGRESS NOTES
Report received from [facility] shift RN, assumed patient care. Patient is calmly resting in bed, no signs of distress, even and unlabored breathing noted. Pt on room air. Patient has call light within reach, fall precautions in place. Will continue to monitor.

## 2022-07-04 NOTE — THERAPY
Speech Language Pathology   Video Swallow Evaluation     Patient Name: Alverto Duran  AGE:  66 y.o., SEX:  male  Medical Record #: [id number]  Today's Date: 7/4/2022     Precautions  Precautions: Fall Risk, Nasogastric Tube    Assessment    Current Method of Nutrition   NPO until cleared by speech pathology, NGT/Cortrak    Pertinent Information  Affect/Behavior: Restless  Oxygen Requirements: Room Air  Secretion Management: WNL  Cortrak: in situ to L nare  Dentition: Fair  Factor(s) Affecting Performance: Impaired mental status    Discussed with the risks, benefits, and alternatives of the MBSS procedure. Patient/family acknowledged and agreed to proceed.    Assessment  Videofluoroscopic Swallow Study was conducted in the lateral projection(s) to evaluate oropharyngeal swallow function. A radiology tech was present to assist with the procedure.       Positioning: seated upright in fluoroscopy chair  Anatomic View: WNL  Bolus Administration: SLP  PO barium contrast trials: Varibar thin liquid, Varibar nectar (mildly thick) liquid, liquidized mixed with Varibar powder, Varibar pudding, solid coated in Varibar pudding, mixed consistency coated in Varibar powder      Consistency PAS Score Timing Comments   Thin Liquid 1 N/A    Mildly Thick Liquid 1 N/A    Liquidized 1 N/A    Pudding 1 N/A    Soft & Bite Sized 1 N/A    Solid 1 N/A    Mixed 1 N/A      1     No contrast enters airway  2     Contrast enters the airway, remains above the vocal folds, and is ejected from the airway (not seen in the airway at the end of the swallow).  3     Contrast enters the airway, remains above the vocal folds, and is not ejected from the airway (is seen in the airway after the swallow).  4     Contrast enters the airway, contacts the vocal folds, and is ejected from the airway.  5     Contrast enters the airway, contacts the vocal folds, and is not ejected from the airway  6     Contrast enters the airway, crosses the plane of the vocal  folds, and is ejected from the airway.  7     Contrast enters the airway, crosses the plane of the vocal folds, and is not ejected from the airway despite effort.  8     Contrast enters the airway, crosses the plane of the vocal folds, is not ejected from the airway and there is no response to aspiration.    Oral Phase    Adequate oral acceptance and containment of bolus. Mastication of solids and soft solids was prolonged and slow but remained functional. AP transit was slowed with decreased lingual initiation. Trace residue lining oral structures oral residue on floor of mouth after swallow intitation. Pharyngeal swallow initiated when bolus head at valleculae.    Pharyngeal Phase    Patient with slightly reduced base of tongue retraction resulting in trace vallecular residue with mildly thick liquids and pureed textures. No penetration or aspiration appreciated across consistencies.     Esophageal Phase:     Complete PES extension and duration with no obstruction of bolus flow into the esophagus.    Compensatory Strategies   None indicated    Clinical Impressions  The pt presents with a age-appropriate oropharyngeal swallow. Swallow safety is preserved; swallow efficiency is preserved. Risk for aspiration PNA is low. Risk for malnutrition/dehydration is low. A short term modified diet is indicated at this time.. Pt appears to be a good candidate for behavioral swallow rehabilitation.     Recommendations  1. Soft and bite sized diet with thin liquids   2.  Swallowing Instructions & Precautions:   Supervision: Close supervision - patient may be left alone for less than 5 minutes at a time, Encourage self-feeding  Positioning: Fully upright and midline during oral intake  Medication: Whole with liquid  Strategies: Small bites/sips  Oral Care: BID    Plan    Recommend Speech Therapy 3 times per week until therapy goals are met for the following treatments:  Dysphagia Training.    Discharge Recommendations: Recommend  "post-acute placement for additional speech therapy services prior to discharge home    Objective       07/04/22 1026   Precautions   Precautions Fall Risk;Nasogastric Tube   Vitals   O2 Delivery Device None - Room Air   Pain 0 - 10 Group   Therapist Pain Assessment Post Activity Pain Same as Prior to Activity;Nurse Notified;0   Prior Level Of Function   Patient's Primary Language English   Recommendations   Diet / Liquid Recommendation NPO;Pre-Feeding Trials with SLP Only   Medication Administration  Via Gastric Tube   Dysphagia Rating   Nutritional Liquid Intake Rating Scale Nothing by mouth   Nutritional Food Intake Rating Scale Nothing by mouth   Patient / Family Goals   Patient / Family Goal #1 \"That's good\"- when given water   Goal #1 Outcome Progressing as expected   Short Term Goals   Short Term Goal # 1 Patient will consume PO trials with SLP only with no s/sx of aspiration   Goal Outcome # 1 Goal met, new goal added   Short Term Goal # 1 B  Pt will consume SB6/TN0 meal without any overt s/sx of aspiration     "

## 2022-07-04 NOTE — PROGRESS NOTES
[facility] FAMILY MEDICINE PROGRESS NOTE     Attending:   Luna Monique MD     Resident:   Emory Urias D.O.  Family Medicine Resident PGY-3     PATIENT:   Alverto Duran; [id number]; 1955     ID/Interval Events:   66 y.o. male PMHx of chronic methamphetamine use, tobacco dependence, HTN, right frontal CVA 2019, and syphilis (2020) who was admitted for headache and dizziness.   6/21- Patient admitted to [facility]   6/22- Altered level of conciousness. MRI with and without contrast showed significant edema with compression of the brain stem and obstructive hydrocephalus. He was transferred to ICU for intubation, EVD placement and hypertonic saline therapy.    6/23 - suboccipital craniectomy. New onset AFIB RVR.  6/24 - multiple DVT's BUE noted on US.    6/25 - ID consult, recommend 10 day neurosyphilis course with IV PCN q4h (end date 7/3/22). Extubated  6/26 - EVD no output overnight. Alteplase by Dr. Mello.   6/27 - IV Metoprolol for AFIB with rate to 170's  6/29 - [facility] re-assumed care, patient transferred to telemetry  7/1 - Telemetry DC'd  7/2- No acute events   7/3- Urinary retention overnight.     Subjective: Patient had ~500cc on bladder scan. VSS. Afebrile. Patient oriented to self and place (A&OX2) this morning. Denies shortness of breath, focal numbness or weakness. He does not feel urge to urinate.     OBJECTIVE:     Vitals:    07/03/22 2000 07/04/22 0500 07/04/22 0520 07/04/22 0521   BP: (!) 96/51 101/61 100/61 100/61   Pulse: 73 85 91    Resp: 18 17     Temp: 36.4 °C (97.6 °F) 36.7 °C (98 °F)     TempSrc: Temporal Temporal     SpO2: 96% 96%     Weight:       Height:           Intake/Output Summary (Last 24 hours) at 7/4/2022 0624  Last data filed at 7/4/2022 0321  Gross per 24 hour   Intake 410 ml   Output 1925 ml   Net -1515 ml       PE:  General: resting in bed, no acute distress  HEENT: surgical scar on scalp. NG tube in place  Cardiovascular: RRR. No murmer.   Respiratory: CTAB, normal respiratory  effort  Abdomen:  soft, nontender, nondistended  EXT:  No pitting edema noted  Neuro: No focal deficits. Moving all 4 extremities.        LABS:  Recent Labs     07/02/22  0300 07/03/22  0210 07/04/22  0500   WBC 14.1* 11.3* 10.9*   RBC 5.19 4.85 5.03   HEMOGLOBIN 14.4 13.3* 13.7*   HEMATOCRIT 44.7 41.3* 42.9   MCV 86.1 85.2 85.3   MCH 27.7 27.4 27.2   RDW 45.9 44.9 44.7   PLATELETCT 480* 474* 522*   MPV 11.8 11.9 11.9   NEUTSPOLYS  --  69.60 65.50   LYMPHOCYTES  --  20.00* 23.70   MONOCYTES  --  7.60 8.70   EOSINOPHILS  --  1.90 1.10   BASOPHILS  --  0.40 0.30     Recent Labs     07/02/22  0300 07/03/22  0210   SODIUM 146* 144   POTASSIUM 4.2 4.3   CHLORIDE 110 110   CO2 26 25   BUN 34* 33*   CREATININE 1.12 0.89   CALCIUM 9.2 9.1     Estimated GFR/CRCL = Estimated Creatinine Clearance: 72.3 mL/min (by C-G formula based on SCr of 0.89 mg/dL).  Recent Labs     07/02/22  0300 07/03/22  0210   GLUCOSE 119* 134*                 No results for input(s): INR, APTT, FIBRINOGEN in the last 72 hours.    Invalid input(s): DIMER    MICROBIOLOGY:   Results     Procedure Component Value Units Date/Time    URINALYSIS [[id number]]     Order Status: No result Specimen: Urine     URINE CULTURE(NEW) [[id number]] Collected: 06/30/22 1511    Order Status: Completed Specimen: Urine, Longo Cath Updated: 07/02/22 0659     Significant Indicator NEG     Source UR     Site URINE, LONGO CATH     Culture Result No growth at 48 hours.    Narrative:      Collected By: [id number] LINO RINCON  Indication for culture:->Evaluation for sepsis without a  clear source of infection  Collected By: [id number] LINO RINCON    BLOOD CULTURE [[id number]] Collected: 06/30/22 1443    Order Status: Completed Specimen: Blood from Peripheral Updated: 07/01/22 0755     Significant Indicator NEG     Source BLD     Site PERIPHERAL     Culture Result No Growth  Note: Blood cultures are incubated for 5 days and  are monitored continuously.Positive blood  "cultures  are called to the RN and reported as soon as  they are identified.      Narrative:      Per Hospital Policy: Only change Specimen Src: to \"Line\" if  specified by physician order.  Right AC    BLOOD CULTURE [[id number]] Collected: 06/30/22 1606    Order Status: Completed Specimen: Blood from Peripheral Updated: 07/01/22 0755     Significant Indicator NEG     Source BLD     Site PERIPHERAL     Culture Result No Growth  Note: Blood cultures are incubated for 5 days and  are monitored continuously.Positive blood cultures  are called to the RN and reported as soon as  they are identified.      Narrative:      Per Hospital Policy: Only change Specimen Src: to \"Line\" if  specified by physician order.  Right AC    CSF Culture [[id number]] Collected: 06/24/22 0834    Order Status: Completed Specimen: CSF from Shunt Updated: 07/01/22 0747     Significant Indicator NEG     Source CSF     Site SHUNT     Culture Result No growth at 7 days.     Gram Stain Result Rare WBCs.  No organisms seen.      Narrative:      Collected By: BELEN SYED  Collected By: BELEN SYED    URINALYSIS [[id number]]  (Abnormal) Collected: 06/30/22 1511    Order Status: Completed Specimen: Urine, Muse Cath Updated: 06/30/22 1616     Color Yellow     Character Clear     Specific Gravity 1.023     Ph 6.0     Glucose Negative mg/dL      Ketones Negative mg/dL      Protein Negative mg/dL      Bilirubin Negative     Urobilinogen, Urine 1.0     Nitrite Negative     Leukocyte Esterase Trace     Occult Blood Negative     Micro Urine Req Microscopic    Narrative:      Collected By: [id number] LINO RINCON  Indication for culture:->Evaluation for sepsis without a  clear source of infection            IMAGING:   DX-ABDOMEN FOR TUBE PLACEMENT   Final Result         1.  Nonspecific bowel gas pattern.   2.  Dobbhoff tube tip overlying the expected location of the pylorus or first duodenal segment.      DX-CHEST-PORTABLE (1 VIEW) "   Final Result      1.  No acute cardiopulmonary disease.   2.  Supportive tubing as described above.      MR-BRAIN-WITH & W/O   Final Result         Subacute left inferior cerebellar infarct with minimal blood products within representing petechial microhemorrhages. Mass effect upon the fourth ventricle remains stable.      Posterior fossa is well decompressed by midline occipital craniotomy. Lateral ventricles are well decompressed by a right frontal approach EVD with its tip in the frontal horn of the right lateral ventricle.      Small right occipital tentorial subdural hematoma without mass effect.      Remote bilateral frontal infarct with encephalomalacia.      Age-related volume loss and chronic microvascular ischemic changes.         US-EXTREMITY VENOUS LOWER BILAT   Final Result      CT-HEAD W/O   Final Result      1.  There is a small amount of intraventricular blood that is new from 6/23/2022. The ventricles are stable in size.   2.  There is a right frontal approach ventriculostomy catheter with the tip terminating at the foramen of Monro. There is a punctate amount of parenchymal hemorrhage surrounding the catheter is unchanged.   3.  Right frontal and left cerebellar encephalomalacia.      EC-ECHOCARDIOGRAM COMPLETE W/O CONT   Final Result      US-CAROTID DOPPLER BILAT   Final Result      US-EXTREMITY VENOUS UPPER BILAT   Final Result      DX-CHEST-PORTABLE (1 VIEW)   Final Result         1.  No acute cardiopulmonary disease.   2.  Trace right pleural effusion   3.  Atherosclerosis      CT-CTA NECK WITH & W/O-POST PROCESSING   Final Result         1.  Severely hypoplastic right vertebral artery, central segment of the right vertebral artery is not visualized and may be occluded.         CT-CTA HEAD WITH & W/O-POST PROCESS   Final Result         1.  No large vessel occlusion or aneurysm identified   2.  Interval placement of right frontal approach ventriculostomy with postprocedural minimal hemorrhage  and new pneumocephalus.   3.  Bilateral ventricular dilatation appears somewhat increased since prior study compatible with somewhat worsened hydrocephalus.   4.  Right frontal and left cerebellar encephalomalacia      DX-CHEST-PORTABLE (1 VIEW)   Final Result         1.  No acute cardiopulmonary disease.   2.  Trace bilateral pleural effusion      DX-ABDOMEN FOR TUBE PLACEMENT   Final Result         1.  Nonspecific bowel gas pattern.   2.  Dobbhoff tube tip terminates overlying the expected location of the gastric antrum.   3.  Trace bilateral pleural effusions      DX-CHEST-PORTABLE (1 VIEW)   Final Result         1.  No acute cardiopulmonary disease.   2.  Trace bilateral pleural effusions   3.  Atherosclerosis      MR-BRAIN-WITH & W/O   Final Result   Addendum 1 of 1   ADDENDUM:      The case was discussed by telephone (call report) with DR. DAVONTE VIEIRA    on call for [facility], at 1854 hours.      Final      1.  Interval development of moderate obstructive hydrocephalus due to mass effect on the fourth ventricle. There is mild transependymal edema.   2.  Large area of acute infarction involving the left cerebellar hemisphere, inferior cerebellar vermis, and left cerebellar tonsil. PICA territory. No hemorrhagic transformation. This is the cause of the fourth ventricle effacement with obstructive    hydrocephalus. There is also mild left-sided cerebellar tonsillar herniation.   3.  Moderate supratentorial white matter disease most consistent with microvascular ischemic change.   4.  Old infarction right anterior frontal convexity.   5.  A Voalte message was sent to RICKEY HAYES at 1823 hours 6/22/2022. Prompt reply as of 6:41 PM not yet received. Efforts are ongoing to contact housestaff managing the patient at this time.   6.  Case was discussed (call report) with nurse SHASTA MAGILL at 6:47 PM. Request to initiate stat neurosurgery consult. Attempts to contact [facility] on-call housestaff are  ongoing.      CT-HEAD W/O   Final Result      1.  No evidence of acute hemorrhage, mass or large territorial infarction   2.  LEFT cerebellar and RIGHT frontal encephalomalacia   3.  Mild atrophy   4.  Mild white matter changes         CT-CSPINE WITHOUT PLUS RECONS   Final Result      1.  No acute fracture or traumatic listhesis in the cervical spine.   2.  Emphysema in the lung apices.      DX-ESOPHAGUS - [id number]    (Results Pending)         MEDS:  Current Facility-Administered Medications   Medication Last Admin   • nicotine (NICODERM) 14 MG/24HR 14 mg 14 mg at 07/04/22 0521   • nystatin (MYCOSTATIN) 765503 UNIT/ML suspension 500,000 Units 500,000 Units at 07/03/22 2137   • amLODIPine (NORVASC) tablet 5 mg 5 mg at 07/03/22 0614   • atorvastatin (LIPITOR) tablet 40 mg 40 mg at 07/03/22 1658   • metoprolol tartrate (LOPRESSOR) tablet 25 mg 25 mg at 07/04/22 0520   • hydrALAZINE (APRESOLINE) injection 20 mg 20 mg at 06/29/22 0049   • LORazepam (ATIVAN) injection 1 mg 1 mg at 07/01/22 0221   • furosemide (LASIX) injection 20 mg 20 mg at 07/03/22 0613   • lisinopril (PRINIVIL) tablet 40 mg 40 mg at 07/04/22 0520   • Metoprolol Tartrate (LOPRESSOR) injection 5 mg     • LORazepam (ATIVAN) injection 2-4 mg     • enalaprilat (Vasotec) injection 1.25 mg 1 mL 1.25 mg at 06/28/22 2032   • Respiratory Therapy Consult     • Pharmacy Consult: Enteral tube insertion - review meds/change route/product selection     • acetaminophen (Tylenol) tablet 650 mg 650 mg at 07/02/22 1746   • ondansetron (ZOFRAN ODT) dispertab 4 mg     • senna-docusate (PERICOLACE or SENOKOT S) 8.6-50 MG per tablet 2 Tablet 2 Tablet at 07/04/22 0522    And   • polyethylene glycol/lytes (MIRALAX) PACKET 1 Packet      And   • magnesium hydroxide (MILK OF MAGNESIA) suspension 30 mL      And   • bisacodyl (DULCOLAX) suppository 10 mg     • ondansetron (ZOFRAN) syringe/vial injection 4 mg     • Pharmacy Consult Request ...Pain Management Review 1 Each          PROBLEM LIST:  No problems updated.    ASSESSMENT/PLAN: 66 y.o. male PMHx of chronic methamphetamine use, tobacco dependence, HTN, right frontal CVA 2019, and syphilis (2020) who admitted for headache and dizziness. He was found to have encephalomalacia in the left cerebellar and right frontal areas on CT head without contrast. Patient was initially admitted to the floor under the [facility] Family Medicine service, but later transferred to ICU for decreased level of consciousness. 6/22 he developed decreased LOC with obtundation. In ICU he underwent intubation, EVD placement and hypertonic saline therapy. He was eventually transferred out of ICU, [facility] resumed care.      # Acute CVA due to occlusion of the left cerebellar artery  # Obstructive hydrocephalus  Large left cerebellar ischemic infarct with cerebral edema and mass-effect on fourth ventricle, Detected on MRI 6/22.  - s/p suboccipital decompression with craniectomy and C1 laminectomy  - s/p right frontal EVD, removed 6/27  - MAP goal greater than 65; SBP goal to keep  (acutely). Longer term, BP goal of 110-130/60-80.   - Scheduled lasix - can DC if urine output improves  - Continue Lisinopril 40 mg, Amlodipine 5 mg  - Goal sodium 135-145  - Atorvastatin 40 mg - LDL goal less than 70  - PT/OT/SLP consult  - 6/29: Speech consulted - rec'd continuing NG tube feeds as pt is not appropriate for PO intake d/t poor participation  - 7/3: Speech re-consulted as patient's cognition improving, plan for modified barium swallow today   - Anticoagulation: CT head 7/7. If no hemorrhagic conversion, will start Eliquis 5mg BID  - Continue q4h neurochecks      #Urinary retention  -Urinary retention overnight   -Avoid anticholinergic medications   -Check UA   -Likely secondary to altered level of consciousness   -Consider starting Flomax for possible BPH    # oral thrush  - Continue nystatin swabs     # Afib  - Thought to be etiology of above CVA.  - A Fib RVR  resolved  - Continue Metoprolol BID   - Eventual plan for metoprolol succinate for longer acting, but for now must remain metoprolol tartrate BID since cortrak cannot tolerate succinate      # acute bilateral DVT of upper extremities  - Acute, partially occlusive DVT to the R IJ with extension into the let innominate vein.    - Acute, occlusive DVT in on of the right paired brachial veins  - Acute,  occlusive DVT in Right paired ulnar veins within proximal forearm  - Occlusive superficial thrombophlebitis in a small segment of the right cephalic vein.   - Acute partially occlusive DVT in left paired brachial veins within the elbow region. Possible small extension into radial an ulnar veins   - Lower extremity US- negative for DVT  - Anticoagulation plan as outlined above   - Continue PT/OT  - Consideration for IVC filter in future     # leukocytosis  No signs of infection, work up negative   -CTM       # goals of care  Unable to reach sister despite multiple attempts. Palliative also unable to reach family. May need ethics consult. May need guardian.      # neurosyphilis  Treponemal test is positive. Prior syphilis infection from 2020 appears to have not been treated as antibiotics were prescribed but never picked up.   -CSF VRDL non-reactive.   -ID has been consulted  -S/P 10 day course of IV penicillin      # methamphetamine use  # tobacco use  Plan for further discussions on cessation when appropriate     Core Measures:  DVT PPX: SCDs  ABX: None  Lines: PIV  Fluids: NGT  PCP: none  CODE STATUS: FULL CODE     Dispo:  Inpatient for CVA recovery, long-term disposition unclear, may need LTAC, no DPOA so may need ethics consults vs. Guardian

## 2022-07-04 NOTE — CARE PLAN
The patient is Watcher - Medium risk of patient condition declining or worsening    Shift Goals  Clinical Goals: Neuro checks, reorient as needed  Patient Goals: sleep  Family Goals: GLENDY    Progress made toward(s) clinical / shift goals:        Problem: Knowledge Deficit - Standard  Goal: Patient and family/care givers will demonstrate understanding of plan of care, disease process/condition, diagnostic tests and medications  Outcome: Progressing     Problem: Fall Risk  Goal: Patient will remain free from falls  Outcome: Progressing     Problem: Safety - Medical Restraint  Goal: Remains free of injury from restraints (Restraint for Interference with Medical Device)  Outcome: Progressing  Goal: Free from restraint(s) (Restraint for Interference with Medical Device)  Outcome: Progressing     Problem: Skin Integrity  Goal: Skin integrity is maintained or improved  Outcome: Progressing     Problem: Optimal Care of the Stroke Patient  Goal: Optimal emergency care for the stroke patient  Outcome: Progressing  Goal: Optimal acute care for the stroke patient  Outcome: Progressing     Problem: Knowledge Deficit - Stroke Education  Goal: Patient's knowledge of stroke and risk factors will improve  Outcome: Progressing     Problem: Discharge Planning - Stroke  Goal: Ensure Stroke Core Measures are met prior to discharge  Outcome: Progressing  Goal: Patient’s continuum of care needs will be met  Outcome: Progressing     Problem: Neuro Status  Goal: Neuro status will remain stable or improve  Outcome: Progressing

## 2022-07-04 NOTE — PROGRESS NOTES
Report received, pt care assumed, pt is medical. VSS and pt is on room air. Pt aaox4, no signs of distress noted at this time. POC discussed with pt and verbalizes no questions. Pt c/o of no pain at this time. Pt denies any additional needs at this time. Bed in lowest position, bed alarm on, pt educated on fall risk and verbalized understanding, call light within reach, will continue with plan of care.Tele sitter at bedside

## 2022-07-04 NOTE — PROGRESS NOTES
Pt not voiding, bladder scan 311ml. Pt stood up to attempt to urinate, unsuccessful.  MD Urias notified

## 2022-07-04 NOTE — DIETARY
"Consult received for diet per RD \"cortrak\". Spoke with RN via voalte regarding TF. Recommend restarting/continuing Fibersource HN @ goal rate of 60 mL/hr continuous.   "

## 2022-07-04 NOTE — CARE PLAN
Problem: Knowledge Deficit - Standard  Goal: Patient and family/care givers will demonstrate understanding of plan of care, disease process/condition, diagnostic tests and medications  Outcome: Progressing     Problem: Pain - Standard  Goal: Alleviation of pain or a reduction in pain to the patient’s comfort goal  Outcome: Progressing     Problem: Safety - Medical Restraint  Goal: Remains free of injury from restraints (Restraint for Interference with Medical Device)  Outcome: Progressing     Problem: Skin Integrity  Goal: Skin integrity is maintained or improved  Outcome: Progressing   The patient is Watcher - Medium risk of patient condition declining or worsening    Shift Goals  Clinical Goals: q 4 neuros, free water flushes  Patient Goals: rest  Family Goals: GLENDY    Progress made toward(s) clinical / shift goals:  q 4 neuros remain intact, water flushes     Patient is not progressing towards the following goals:

## 2022-07-05 NOTE — DISCHARGE PLANNING
Case Management Discharge Planning    Admission Date: 6/21/2022  GMLOS: 7.1  ALOS: 14    6-Clicks ADL Score: 12  6-Clicks Mobility Score: 12  PT and/or OT Eval ordered: Yes  Post-acute Referrals Ordered: Yes  Post-acute Choice Obtained: No  Has referral(s) been sent to post-acute provider:  No      Anticipated Discharge Dispo: Discharge Disposition: Still a Patient (30)  Discharge Address: unknown    DME Needed: Unable to determine at this time, pending progression of hospital course.    Action(s) Taken: Updated Provider/Nurse on Discharge Plan and OTHER: discussed the patients treatment and discharge plans with the medical and nursing teams during IDT rounds.     Escalations Completed: None    Medically Clear: No    Next Steps: F/u with medical and nursing teams regarding discharge plans, needs, and barriers. Continue to monitor for additional discharge needs/barriers and assist as indicated.    Barriers to Discharge: Medical clearance and Pending Placement    Is the patient up for discharge tomorrow: No

## 2022-07-05 NOTE — PROGRESS NOTES
Pt bladder scanned per protocol. [id number]. Patient encouraged to urinate without success. MD notified. Order for hernandez catheter received from MD. Hernandez catheter inserted.

## 2022-07-05 NOTE — PROGRESS NOTES
[facility] FAM[facility] MEDICINE PROGRESS NOTE   Attending:   Luna Monique MD     Resident:   Emory Urias D.O.  Family Medicine Resident PGY-3     PATIENT:   Alverto Duran; [id number]; 1955     ID/Interval Events:   66 y.o. male PMHx of chronic methamphetamine use, tobacco dependence, HTN, right frontal CVA 2019, and syphilis (2020) who was admitted for headache and dizziness.   6/21- Patient admitted to [facility]   6/22- Altered level of conciousness. MRI with and without contrast showed significant edema with compression of the brain stem and obstructive hydrocephalus. He was transferred to ICU for intubation, EVD placement and hypertonic saline therapy.    6/23 - suboccipital craniectomy. New onset AFIB RVR.  6/24 - multiple DVT's BUE noted on US.    6/25 - ID consult, recommend 10 day neurosyphilis course with IV PCN q4h (end date 7/3/22). Extubated  6/26 - EVD no output overnight. Alteplase by Dr. Mello.   6/27 - IV Metoprolol for AFIB with rate to 170's  6/29 - [facility] re-assumed care, patient transferred to telemetry  7/1 - Telemetry DC'd  7/2- No acute events   7/3- Urinary retention overnight.   7/4- Passed barium swallow study, was started on diet.     Subjective: Patient had urinary retention, approximately 500cc on bladder scan. Muse catheter placed. Vital signs stable, afebrile. Patient tired this morniing, but conversational. Denies pain, numbness or paresthesias. He says he ate food yesterday, and tolerated. He says he has had BM.     OBJECTIVE:     Vitals:    07/04/22 1636 07/04/22 1701 07/04/22 2026 07/05/22 0518   BP: 103/60 113/72 100/53 103/55   Pulse: 74 75 88 82   Resp:  16 17 16   Temp:  36.8 °C (98.3 °F) 36.7 °C (98 °F) 36.6 °C (97.9 °F)   TempSrc:  Temporal Temporal Temporal   SpO2:  95% 95% 94%   Weight:       Height:           Intake/Output Summary (Last 24 hours) at 7/5/2022 0608  Last data filed at 7/5/2022 0606  Gross per 24 hour   Intake 30 ml   Output 900 ml   Net -870 ml        PE:  General: resting in bed, no acute distress  HEENT: surgical scar on scalp. NG tube in place. (L>R) pupillary dilation.   Cardiovascular: RRR. No murmer.   Respiratory: CTAB, normal respiratory effort  Abdomen:  soft, nontender, nondistended  EXT:  No pitting edema noted  Neuro: No focal deficits. Moving all 4 extremities.     LABS:  Recent Labs     07/03/22  0210 07/04/22  0500   WBC 11.3* 10.9*   RBC 4.85 5.03   HEMOGLOBIN 13.3* 13.7*   HEMATOCRIT 41.3* 42.9   MCV 85.2 85.3   MCH 27.4 27.2   RDW 44.9 44.7   PLATELETCT 474* 522*   MPV 11.9 11.9   NEUTSPOLYS 69.60 65.50   LYMPHOCYTES 20.00* 23.70   MONOCYTES 7.60 8.70   EOSINOPHILS 1.90 1.10   BASOPHILS 0.40 0.30     Recent Labs     07/03/22  0210 07/04/22  1158   SODIUM 144 138   POTASSIUM 4.3 4.4   CHLORIDE 110 104   CO2 25 24   BUN 33* 35*   CREATININE 0.89 1.12   CALCIUM 9.1 9.1     Estimated GFR/CRCL = Estimated Creatinine Clearance: 57.4 mL/min (by C-G formula based on SCr of 1.12 mg/dL).  Recent Labs     07/03/22  0210 07/04/22  1158   GLUCOSE 134* 131*                 No results for input(s): INR, APTT, FIBRINOGEN in the last 72 hours.    Invalid input(s): DIMER    MICROBIOLOGY:   Results     Procedure Component Value Units Date/Time    URINALYSIS [[id number]]     Order Status: No result Specimen: Urine     URINE CULTURE(NEW) [[id number]] Collected: 06/30/22 1511    Order Status: Completed Specimen: Urine, Longo Cath Updated: 07/02/22 0659     Significant Indicator NEG     Source UR     Site URINE, LONGO CATH     Culture Result No growth at 48 hours.    Narrative:      Collected By: [id number] LINO RINCON  Indication for culture:->Evaluation for sepsis without a  clear source of infection  Collected By: [id number] LINO RINCON    BLOOD CULTURE [[id number]] Collected: 06/30/22 1443    Order Status: Completed Specimen: Blood from Peripheral Updated: 07/01/22 0755     Significant Indicator NEG     Source BLD     Site PERIPHERAL     Culture  "Result No Growth  Note: Blood cultures are incubated for 5 days and  are monitored continuously.Positive blood cultures  are called to the RN and reported as soon as  they are identified.      Narrative:      Per Hospital Policy: Only change Specimen Src: to \"Line\" if  specified by physician order.  Right AC    BLOOD CULTURE [[id number]] Collected: 06/30/22 1606    Order Status: Completed Specimen: Blood from Peripheral Updated: 07/01/22 0755     Significant Indicator NEG     Source BLD     Site PERIPHERAL     Culture Result No Growth  Note: Blood cultures are incubated for 5 days and  are monitored continuously.Positive blood cultures  are called to the RN and reported as soon as  they are identified.      Narrative:      Per Hospital Policy: Only change Specimen Src: to \"Line\" if  specified by physician order.  Right AC    CSF Culture [[id number]] Collected: 06/24/22 0834    Order Status: Completed Specimen: CSF from Shunt Updated: 07/01/22 0747     Significant Indicator NEG     Source CSF     Site SHUNT     Culture Result No growth at 7 days.     Gram Stain Result Rare WBCs.  No organisms seen.      Narrative:      Collected By: BELEN SYED  Collected By: BELEN SYED    URINALYSIS [[id number]]  (Abnormal) Collected: 06/30/22 1511    Order Status: Completed Specimen: Urine, Muse Cath Updated: 06/30/22 1616     Color Yellow     Character Clear     Specific Gravity 1.023     Ph 6.0     Glucose Negative mg/dL      Ketones Negative mg/dL      Protein Negative mg/dL      Bilirubin Negative     Urobilinogen, Urine 1.0     Nitrite Negative     Leukocyte Esterase Trace     Occult Blood Negative     Micro Urine Req Microscopic    Narrative:      Collected By: [id number] LINO RINCON  Indication for culture:->Evaluation for sepsis without a  clear source of infection            IMAGING:   DX-ESOPHAGUS - [id number]   Final Result      Fluoroscopy provided for cookie swallow evaluation. Please " refer to speech pathology report for details      DX-ABDOMEN FOR TUBE PLACEMENT   Final Result         1.  Nonspecific bowel gas pattern.   2.  Dobbhoff tube tip overlying the expected location of the pylorus or first duodenal segment.      DX-CHEST-PORTABLE (1 VIEW)   Final Result      1.  No acute cardiopulmonary disease.   2.  Supportive tubing as described above.      MR-BRAIN-WITH & W/O   Final Result         Subacute left inferior cerebellar infarct with minimal blood products within representing petechial microhemorrhages. Mass effect upon the fourth ventricle remains stable.      Posterior fossa is well decompressed by midline occipital craniotomy. Lateral ventricles are well decompressed by a right frontal approach EVD with its tip in the frontal horn of the right lateral ventricle.      Small right occipital tentorial subdural hematoma without mass effect.      Remote bilateral frontal infarct with encephalomalacia.      Age-related volume loss and chronic microvascular ischemic changes.         US-EXTREMITY VENOUS LOWER BILAT   Final Result      CT-HEAD W/O   Final Result      1.  There is a small amount of intraventricular blood that is new from 6/23/2022. The ventricles are stable in size.   2.  There is a right frontal approach ventriculostomy catheter with the tip terminating at the foramen of Monro. There is a punctate amount of parenchymal hemorrhage surrounding the catheter is unchanged.   3.  Right frontal and left cerebellar encephalomalacia.      EC-ECHOCARDIOGRAM COMPLETE W/O CONT   Final Result      US-CAROTID DOPPLER BILAT   Final Result      US-EXTREMITY VENOUS UPPER BILAT   Final Result      DX-CHEST-PORTABLE (1 VIEW)   Final Result         1.  No acute cardiopulmonary disease.   2.  Trace right pleural effusion   3.  Atherosclerosis      CT-CTA NECK WITH & W/O-POST PROCESSING   Final Result         1.  Severely hypoplastic right vertebral artery, central segment of the right vertebral  artery is not visualized and may be occluded.         CT-CTA HEAD WITH & W/O-POST PROCESS   Final Result         1.  No large vessel occlusion or aneurysm identified   2.  Interval placement of right frontal approach ventriculostomy with postprocedural minimal hemorrhage and new pneumocephalus.   3.  Bilateral ventricular dilatation appears somewhat increased since prior study compatible with somewhat worsened hydrocephalus.   4.  Right frontal and left cerebellar encephalomalacia      DX-CHEST-PORTABLE (1 VIEW)   Final Result         1.  No acute cardiopulmonary disease.   2.  Trace bilateral pleural effusion      DX-ABDOMEN FOR TUBE PLACEMENT   Final Result         1.  Nonspecific bowel gas pattern.   2.  Dobbhoff tube tip terminates overlying the expected location of the gastric antrum.   3.  Trace bilateral pleural effusions      DX-CHEST-PORTABLE (1 VIEW)   Final Result         1.  No acute cardiopulmonary disease.   2.  Trace bilateral pleural effusions   3.  Atherosclerosis      MR-BRAIN-WITH & W/O   Final Result   Addendum 1 of 1   ADDENDUM:      The case was discussed by telephone (call report) with DR. DAVONTE VIEIRA    on call for [facility], at 1854 hours.      Final      1.  Interval development of moderate obstructive hydrocephalus due to mass effect on the fourth ventricle. There is mild transependymal edema.   2.  Large area of acute infarction involving the left cerebellar hemisphere, inferior cerebellar vermis, and left cerebellar tonsil. PICA territory. No hemorrhagic transformation. This is the cause of the fourth ventricle effacement with obstructive    hydrocephalus. There is also mild left-sided cerebellar tonsillar herniation.   3.  Moderate supratentorial white matter disease most consistent with microvascular ischemic change.   4.  Old infarction right anterior frontal convexity.   5.  A Voalte message was sent to RICKEY HAYES at 1823 hours 6/22/2022. Prompt reply as of  6:41 PM not yet received. Efforts are ongoing to contact housestaff managing the patient at this time.   6.  Case was discussed (call report) with nurse SHASTA MAGILL at 6:47 PM. Request to initiate stat neurosurgery consult. Attempts to contact [facility] on-call housestaff are ongoing.      CT-HEAD W/O   Final Result      1.  No evidence of acute hemorrhage, mass or large territorial infarction   2.  LEFT cerebellar and RIGHT frontal encephalomalacia   3.  Mild atrophy   4.  Mild white matter changes         CT-CSPINE WITHOUT PLUS RECONS   Final Result      1.  No acute fracture or traumatic listhesis in the cervical spine.   2.  Emphysema in the lung apices.            MEDS:  Current Facility-Administered Medications   Medication Last Admin   • nicotine (NICODERM) 14 MG/24HR 14 mg 14 mg at 07/05/22 0540   • nystatin (MYCOSTATIN) 265879 UNIT/ML suspension 500,000 Units 500,000 Units at 07/04/22 2209   • amLODIPine (NORVASC) tablet 5 mg 5 mg at 07/05/22 0540   • atorvastatin (LIPITOR) tablet 40 mg 40 mg at 07/04/22 1637   • metoprolol tartrate (LOPRESSOR) tablet 25 mg 25 mg at 07/05/22 0540   • hydrALAZINE (APRESOLINE) injection 20 mg 20 mg at 06/29/22 0049   • LORazepam (ATIVAN) injection 1 mg 1 mg at 07/01/22 0221   • furosemide (LASIX) injection 20 mg 20 mg at 07/05/22 0544   • lisinopril (PRINIVIL) tablet 40 mg 40 mg at 07/05/22 0540   • Metoprolol Tartrate (LOPRESSOR) injection 5 mg     • LORazepam (ATIVAN) injection 2-4 mg     • enalaprilat (Vasotec) injection 1.25 mg 1 mL 1.25 mg at 06/28/22 2032   • Respiratory Therapy Consult     • Pharmacy Consult: Enteral tube insertion - review meds/change route/product selection     • acetaminophen (Tylenol) tablet 650 mg 650 mg at 07/02/22 1746   • ondansetron (ZOFRAN ODT) dispertab 4 mg     • senna-docusate (PERICOLACE or SENOKOT S) 8.6-50 MG per tablet 2 Tablet 2 Tablet at 07/04/22 1637    And   • polyethylene glycol/lytes (MIRALAX) PACKET 1 Packet      And   • magnesium  hydroxide (MILK OF MAGNESIA) suspension 30 mL      And   • bisacodyl (DULCOLAX) suppository 10 mg     • ondansetron (ZOFRAN) syringe/vial injection 4 mg     • Pharmacy Consult Request ...Pain Management Review 1 Each         PROBLEM LIST:  No problems updated.    ASSESSMENT/PLAN: 66 y.o. male PMHx of chronic methamphetamine use, tobacco dependence, HTN, right frontal CVA 2019, and syphilis (2020) who admitted for headache and dizziness. He was found to have encephalomalacia in the left cerebellar and right frontal areas on CT head without contrast. Patient was initially admitted to the floor under the [facility] Family Medicine service, but later transferred to ICU for decreased level of consciousness. 6/22 he developed decreased LOC with obtundation. In ICU he underwent intubation, EVD placement and hypertonic saline therapy. He was eventually transferred out of ICU, [facility] FM resumed care.      # Acute CVA due to occlusion of the left cerebellar artery  # Obstructive hydrocephalus  Large left cerebellar ischemic infarct with cerebral edema and mass-effect on fourth ventricle, Detected on MRI 6/22.  - s/p suboccipital decompression with craniectomy and C1 laminectomy  - s/p right frontal EVD, removed 6/27  - MAP goal greater than 65. Long term, BP goal of 110-130/60-80.   - Continue lasix   - Continue Lisinopril 40 mg, Amlodipine 5 mg  - Atorvastatin 40 mg - LDL goal less than 70  - PT/OT/SLP consult  -SLP consulted, passed barium swallow. Started on PO feeds.   - Continue to hold anti-coagulation. Repeat CT head 7/7,  If no hemorrhagic conversion, will start Eliquis 5mg BID  - Continue q4h neurochecks     #Asymetric pupillary dilation   -Left > right dilation   - This has been present during hospitalization and is not new per review of records     #Urinary retention  -Urinary retention   -Muse placed   -Avoid anticholinergic medications   -Check UA   -Likely secondary to altered level of consciousness   -Consider starting  Flomax for possible BPH     # oral thrush  - Continue nystatin swabs     # Afib  - Rate controlled   - Continue Metoprolol BID   - Eventual plan for metoprolol succinate for longer acting, but for now must remain metoprolol tartrate BID since cortrak cannot tolerate succinate      #bilateral DVT of upper extremities  - Acute, partially occlusive DVT to the R IJ with extension into the let innominate vein.    - Acute, occlusive DVT in on of the right paired brachial veins  - Acute,  occlusive DVT in Right paired ulnar veins within proximal forearm  - Occlusive superficial thrombophlebitis in a small segment of the right cephalic vein.   - Acute partially occlusive DVT in left paired brachial veins within the elbow region. Possible small extension into radial an ulnar veins   - Lower extremity US- negative for DVT  - Anticoagulation plan as outlined above   - Continue PT/OT  - Consider for IVC filter in future     # leukocytosis  No signs of infection, work up negative   -CTM       # goals of care  Unable to reach sister despite multiple attempts. Palliative also unable to reach family. May need ethics consult and may need guardian.      # neurosyphilis  ID consulted   -resolved  -S/P 10 day treatment      # methamphetamine use  # tobacco use  Plan for further discussions on cessation when appropriate     Core Measures:  DVT PPX: SCDs  ABX: None  Lines: PIV  Fluids: NGT  PCP: none  CODE STATUS: FULL CODE     Dispo:  Inpatient for CVA recovery, long-term disposition unclear, may need LTAC, no DPOA so may need ethics consults vs. Guardian

## 2022-07-05 NOTE — CARE PLAN
The patient is Stable - Low risk of patient condition declining or worsening    Shift Goals  Clinical Goals: Neuro checks, safe from falls  Patient Goals: rest  Family Goals: GLENDY    Progress made toward(s) clinical / shift goals:    Problem: Knowledge Deficit - Stroke Education  Goal: Patient's knowledge of stroke and risk factors will improve  Outcome: Progressing     Problem: Psychosocial - Patient Condition  Goal: Patient's ability to verbalize feelings about condition will improve  Outcome: Progressing     Problem: Neuro Status  Goal: Neuro status will remain stable or improve  Outcome: Progressing       Vital signs and neuro checks [id number].  PT assessed pt.  Pt NPO, speech evaluation ordered.  Pt educated on stroke risk factors, diet, exercise, medications, tests, signs and symptoms of a stroke, activation of EMS, pt verbalizes understanding.

## 2022-07-05 NOTE — THERAPY
"Missed Therapy     Patient Name: Alverto Duran  Age:  66 y.o., Sex:  male  Medical Record #: [id number]  Today's Date: 7/4/2022    Attempted PT treatment session x2. First attempt pt reporting feeling \"too full.\" On second attempt, pt reports recently returned to bed from bathroom and states he needs to rest. Educated on role of PT and benefits of incr mobilization, pt continues to decline. Will re-attempt as able.  "

## 2022-07-05 NOTE — THERAPY
Missed Therapy     Patient Name: Alverto Duran  Age:  66 y.o., Sex:  male  Medical Record #: [id number]  Today's Date: 7/5/2022    Attempted PT treatment session. Pt very reluctant, barely opening eyes and repeating he does not want to get OOB and does not want to ambulate. Extensive education regarding negative effects of immobility, role of PT, dx, and encouragement provided to participate even just to allow therapist to change visibly dirty linens. Pt continues to refuse and is not receptive to education despite saying he is listening. PT will re-attempt as able, if pt continues to refuse PT services, will be discharged.

## 2022-07-05 NOTE — PROGRESS NOTES
Received report and assumed care of patient. Patient is alert and oriented x2. Patient is in no signs of distress. Patient was updated on the plan of care for the day. Call light within reach, bed in low position, 2 side rails up. All fall precautions in place.

## 2022-07-06 NOTE — THERAPY
"Occupational Therapy  Daily Treatment     Patient Name: Alverto Duran  Age:  66 y.o., Sex:  male  Medical Record #: [id number]  Today's Date: 7/6/2022     Precautions  Precautions: Fall Risk, Nasogastric Tube    Assessment    Pt continues to require max encouragement to participate minimally in therapy sessions. Pt had breakfast present, encouraged pt to get to chair so he could be sitting up at 90 degrees. Pt refused to get to chair, agreeable to sit EOB, however when encouraged to put feet on ground pt returned himself to supine stating it was not worth it. Pt primarily limited by behaviors, will decrease frequency to 1x/wk until pt increases participation in therapy sessions.     Plan    Treatment plan modified to 1 time per week until therapy goals are met for the following treatments:  Adaptive Equipment, Neuro Re-Education / Balance, Self Care/Activities of Daily Living, Therapeutic Activities, and Therapeutic Exercises.    DC Equipment Recommendations: (P) Unable to determine at this time  Discharge Recommendations: (P) Recommend post-acute placement for additional occupational therapy services prior to discharge home    Subjective    \"I don't need to put my feet on the floor to drink coffee\"     Objective       07/06/22 0920   Treatment Charges   Charges Yes   OT Self Care / ADL 1   Total Time Spent   OT Self Care / ADL (Minutes) 20   Precautions   Precautions Fall Risk;Nasogastric Tube   Vitals   O2 (LPM) 0   O2 Delivery Device None - Room Air   Pain 0 - 10 Group   Therapist Pain Assessment Post Activity Pain Same as Prior to Activity;Nurse Notified  (no c/o pain during session)   Cognition    Cognition / Consciousness X   Level of Consciousness Alert   Safety Awareness Impaired   New Learning Impaired   Attention Impaired   Initiation Impaired   Comments self limiting, no volition. Reports he is going to lay in bed until he gets better. Did not appear to internalize any ed from previous sessions. Closes " eyes when therapist is speaking to him.   Balance   Sitting Balance (Static) Fair   Sitting Balance (Dynamic) Fair   Standing Balance (Static)   (refused)   Weight Shift Sitting Fair   Skilled Intervention Tactile Cuing;Verbal Cuing;Facilitation   Bed Mobility    Supine to Sit Supervised   Sit to Supine Supervised   Scooting Supervised   Skilled Intervention Verbal Cuing   Activities of Daily Living   Eating   (refused to sit EOB to take sips of coffee (needs to be at 90 degrees))   Grooming   (refused)   Upper Body Dressing Minimal Assist  (gown)   Skilled Intervention Tactile Cuing;Verbal Cuing;Facilitation   How much help from another person does the patient currently need...   Putting on and taking off regular lower body clothing? 2   Bathing (including washing, rinsing, and drying)? 2   Toileting, which includes using a toilet, bedpan, or urinal? 3   Putting on and taking off regular upper body clothing? 3   Taking care of personal grooming such as brushing teeth? 3   Eating meals? 3   6 Clicks Daily Activity Score 16   Functional Mobility   Sit to Stand Refused   Bed, Chair, Wheelchair Transfer Refused   Mobility sat EOB for ~1 min, laid himself back down   Skilled Intervention Tactile Cuing;Verbal Cuing;Facilitation   Activity Tolerance   Sitting in Chair NT   Sitting Edge of Bed 2 min   Standing Refused   Comments limited by behavior   Patient / Family Goals   Patient / Family Goal #1 To drink coffee   Goal #1 Outcome Progressing as expected   Short Term Goals   Short Term Goal # 1 Pt will demo LB dressing w/ SPV   Goal Outcome # 1 Progressing slower than expected   Short Term Goal # 2 Pt will demo toilet txf w/ SPV   Goal Outcome # 2 Progressing slower than expected   Short Term Goal # 3 Pt will demo standing grooming w/ SPV   Goal Outcome # 3 Progressing slower than expected   Education Group   Role of Occupational Therapist Patient Response Patient;Acceptance;Explanation;Demonstration;Verbal Demonstration    Stroke Patient Response Patient;Explanation;Demonstration;Nonacceptance;Reinforcement Needed   Pathology of Bedrest Patient Response Patient;Nonacceptance;Explanation;Demonstration;Reinforcement Needed   Anticipated Discharge Equipment and Recommendations   DC Equipment Recommendations Unable to determine at this time   Discharge Recommendations Recommend post-acute placement for additional occupational therapy services prior to discharge home   Interdisciplinary Plan of Care Collaboration   IDT Collaboration with  Nursing   Patient Position at End of Therapy Call Light within Reach;Tray Table within Reach;Phone within Reach;In Bed;Bed Alarm On   Collaboration Comments report given   Session Information   Date / Session Number  7/6, 3 (1/1, 7/7)   Priority 2

## 2022-07-06 NOTE — PALLIATIVE CARE
Palliative Care follow-up  Spoke with ISABELA Arechiga regarding NOK search. Call placed to Gina [phone] who had returned Geni's call over the weekend to f/u on her relationship to this patient, if any. Message left for Gnia to call this RN back.      Plan: f/u if family located- if no NOK located, may need ethics/guardianship    Thank you for allowing Palliative Care to support this patient and family. Contact [phone] for additional assistance, change in patient status, or with any questions/concerns.

## 2022-07-06 NOTE — PROGRESS NOTES
Patient refused all 0600 meds this am. VSS. Education provided and pt verbalized understanding. MD Odonnell notified. No new orders at this time.

## 2022-07-06 NOTE — PROGRESS NOTES
[facility] [place] [facility] PROGRESS NOTE   Attending:   Dr. Lucas      Resident:   Emory Urias D.O.  Family Medicine Resident PGY-3     PATIENT:   Alverto Duran; [id number]; 1955     ID/Interval Events:   66 y.o. male PMHx of chronic methamphetamine use, tobacco dependence, HTN, right frontal CVA 2019, and syphilis (2020) who was admitted for headache and dizziness.   6/21- Patient admitted to [facility]   6/22- Altered level of conciousness. MRI with and without contrast showed significant edema with compression of the brain stem and obstructive hydrocephalus. He was transferred to ICU for intubation, EVD placement and hypertonic saline therapy.    6/23 - suboccipital craniectomy. New onset AFIB RVR.  6/24 - multiple DVT's BUE noted on US.    6/25 - ID consult, recommend 10 day neurosyphilis course with IV PCN q4h (end date 7/3/22). Extubated  6/26 - EVD no output overnight. Alteplase by Dr. Mello.   6/27 - IV Metoprolol for AFIB with rate to 170's  6/29 - [facility] re-assumed care, patient transferred to telemetry  7/1 - Telemetry DC'd  7/2- No acute events   7/3- Urinary retention overnight.   7/4- Passed barium swallow study, was started on diet.  7/5- No acute events     Subjective: No acute events overnight. VSS, afebrile. Patient says he has pain at site of staples. Denies other pain, numbness or paresthesias.     OBJECTIVE:     Vitals:    07/05/22 0846 07/05/22 1603 07/05/22 2050 07/06/22 0536   BP: 110/58 102/65 124/73 113/61   Pulse: 69 85 75 79   Resp: 16 16 17 19   Temp: 36.2 °C (97.1 °F) 36.6 °C (97.8 °F) 36.6 °C (97.9 °F) 36.4 °C (97.6 °F)   TempSrc: Temporal Temporal Temporal Temporal   SpO2: 91% 93% 96% 96%   Weight:       Height:           Intake/Output Summary (Last 24 hours) at 7/6/2022 0556  Last data filed at 7/6/2022 0453  Gross per 24 hour   Intake 120 ml   Output 2450 ml   Net -2330 ml       PE:  General: resting in bed, no acute distress  HEENT: surgical scar on scalp. NG tube in place.  "  Cardiovascular: RRR. No murmer.   Respiratory: CTAB, normal respiratory effort  Abdomen:  soft, nontender, nondistended  EXT:  No pitting edema noted  Neuro: No focal deficits. Moving all 4 extremities.      LABS:  Recent Labs     07/04/22  0500   WBC 10.9*   RBC 5.03   HEMOGLOBIN 13.7*   HEMATOCRIT 42.9   MCV 85.3   MCH 27.2   RDW 44.7   PLATELETCT 522*   MPV 11.9   NEUTSPOLYS 65.50   LYMPHOCYTES 23.70   MONOCYTES 8.70   EOSINOPHILS 1.10   BASOPHILS 0.30     Recent Labs     07/04/22  1158   SODIUM 138   POTASSIUM 4.4   CHLORIDE 104   CO2 24   BUN 35*   CREATININE 1.12   CALCIUM 9.1     Estimated GFR/CRCL = Estimated Creatinine Clearance: 57.4 mL/min (by C-G formula based on SCr of 1.12 mg/dL).  Recent Labs     07/04/22  1158   GLUCOSE 131*                 No results for input(s): INR, APTT, FIBRINOGEN in the last 72 hours.    Invalid input(s): DIMER    MICROBIOLOGY:   Results     Procedure Component Value Units Date/Time    BLOOD CULTURE [[id number]] Collected: 06/30/22 1606    Order Status: Completed Specimen: Blood from Peripheral Updated: 07/05/22 1700     Significant Indicator NEG     Source BLD     Site PERIPHERAL     Culture Result No growth after 5 days of incubation.    Narrative:      Per Hospital Policy: Only change Specimen Src: to \"Line\" if  specified by physician order.  Right AC    BLOOD CULTURE [[id number]] Collected: 06/30/22 1443    Order Status: Completed Specimen: Blood from Peripheral Updated: 07/05/22 1500     Significant Indicator NEG     Source BLD     Site PERIPHERAL     Culture Result No growth after 5 days of incubation.    Narrative:      Per Hospital Policy: Only change Specimen Src: to \"Line\" if  specified by physician order.  Right AC    URINALYSIS [[id number]]     Order Status: No result Specimen: Urine     URINE CULTURE(NEW) [[id number]] Collected: 06/30/22 1511    Order Status: Completed Specimen: Urine, Muse Cath Updated: 07/02/22 0659     Significant Indicator NEG     Source UR     " Site URINE, MUSE CATH     Culture Result No growth at 48 hours.    Narrative:      Collected By: [id number] LINO MARTINEZ.  Indication for culture:->Evaluation for sepsis without a  clear source of infection  Collected By: [id number] LINO MARTINEZ.    CSF Culture [[id number]] Collected: 06/24/22 0834    Order Status: Completed Specimen: CSF from Shunt Updated: 07/01/22 0747     Significant Indicator NEG     Source CSF     Site SHUNT     Culture Result No growth at 7 days.     Gram Stain Result Rare WBCs.  No organisms seen.      Narrative:      Collected By: BELEN SYED  Collected By: BELEN SYED    URINALYSIS [[id number]]  (Abnormal) Collected: 06/30/22 1511    Order Status: Completed Specimen: Urine, Muse Cath Updated: 06/30/22 1616     Color Yellow     Character Clear     Specific Gravity 1.023     Ph 6.0     Glucose Negative mg/dL      Ketones Negative mg/dL      Protein Negative mg/dL      Bilirubin Negative     Urobilinogen, Urine 1.0     Nitrite Negative     Leukocyte Esterase Trace     Occult Blood Negative     Micro Urine Req Microscopic    Narrative:      Collected By: [id number] LINO MARTINEZ.  Indication for culture:->Evaluation for sepsis without a  clear source of infection            IMAGING:   DX-ABDOMEN FOR TUBE PLACEMENT   Final Result      1. The tip of the enteric tube terminates over the second portion of the duodenum.   2. The remainder of the bowel gas pattern is within normal limits.      DX-ESOPHAGUS - FOPT-DVEKI-XO   Final Result      Fluoroscopy provided for cookie swallow evaluation. Please refer to speech pathology report for details      DX-ABDOMEN FOR TUBE PLACEMENT   Final Result         1.  Nonspecific bowel gas pattern.   2.  Dobbhoff tube tip overlying the expected location of the pylorus or first duodenal segment.      DX-CHEST-PORTABLE (1 VIEW)   Final Result      1.  No acute cardiopulmonary disease.   2.  Supportive tubing as described  above.      MR-BRAIN-WITH & W/O   Final Result         Subacute left inferior cerebellar infarct with minimal blood products within representing petechial microhemorrhages. Mass effect upon the fourth ventricle remains stable.      Posterior fossa is well decompressed by midline occipital craniotomy. Lateral ventricles are well decompressed by a right frontal approach EVD with its tip in the frontal horn of the right lateral ventricle.      Small right occipital tentorial subdural hematoma without mass effect.      Remote bilateral frontal infarct with encephalomalacia.      Age-related volume loss and chronic microvascular ischemic changes.         US-EXTREMITY VENOUS LOWER BILAT   Final Result      CT-HEAD W/O   Final Result      1.  There is a small amount of intraventricular blood that is new from 6/23/2022. The ventricles are stable in size.   2.  There is a right frontal approach ventriculostomy catheter with the tip terminating at the foramen of Monro. There is a punctate amount of parenchymal hemorrhage surrounding the catheter is unchanged.   3.  Right frontal and left cerebellar encephalomalacia.      EC-ECHOCARDIOGRAM COMPLETE W/O CONT   Final Result      US-CAROTID DOPPLER BILAT   Final Result      US-EXTREMITY VENOUS UPPER BILAT   Final Result      DX-CHEST-PORTABLE (1 VIEW)   Final Result         1.  No acute cardiopulmonary disease.   2.  Trace right pleural effusion   3.  Atherosclerosis      CT-CTA NECK WITH & W/O-POST PROCESSING   Final Result         1.  Severely hypoplastic right vertebral artery, central segment of the right vertebral artery is not visualized and may be occluded.         CT-CTA HEAD WITH & W/O-POST PROCESS   Final Result         1.  No large vessel occlusion or aneurysm identified   2.  Interval placement of right frontal approach ventriculostomy with postprocedural minimal hemorrhage and new pneumocephalus.   3.  Bilateral ventricular dilatation appears somewhat increased since  prior study compatible with somewhat worsened hydrocephalus.   4.  Right frontal and left cerebellar encephalomalacia      DX-CHEST-PORTABLE (1 VIEW)   Final Result         1.  No acute cardiopulmonary disease.   2.  Trace bilateral pleural effusion      DX-ABDOMEN FOR TUBE PLACEMENT   Final Result         1.  Nonspecific bowel gas pattern.   2.  Dobbhoff tube tip terminates overlying the expected location of the gastric antrum.   3.  Trace bilateral pleural effusions      DX-CHEST-PORTABLE (1 VIEW)   Final Result         1.  No acute cardiopulmonary disease.   2.  Trace bilateral pleural effusions   3.  Atherosclerosis      MR-BRAIN-WITH & W/O   Final Result   Addendum 1 of 1   ADDENDUM:      The case was discussed by telephone (call report) with DR. DAVONTE VIEIRA    on call for [facility], at 1854 hours.      Final      1.  Interval development of moderate obstructive hydrocephalus due to mass effect on the fourth ventricle. There is mild transependymal edema.   2.  Large area of acute infarction involving the left cerebellar hemisphere, inferior cerebellar vermis, and left cerebellar tonsil. PICA territory. No hemorrhagic transformation. This is the cause of the fourth ventricle effacement with obstructive    hydrocephalus. There is also mild left-sided cerebellar tonsillar herniation.   3.  Moderate supratentorial white matter disease most consistent with microvascular ischemic change.   4.  Old infarction right anterior frontal convexity.   5.  A Voalte message was sent to RICKEY HAYES at 1823 hours 6/22/2022. Prompt reply as of 6:41 PM not yet received. Efforts are ongoing to contact housestaff managing the patient at this time.   6.  Case was discussed (call report) with nurse SHASTA MAGILL at 6:47 PM. Request to initiate stat neurosurgery consult. Attempts to contact [facility] on-call housestaff are ongoing.      CT-HEAD W/O   Final Result      1.  No evidence of acute hemorrhage, mass or large  territorial infarction   2.  LEFT cerebellar and RIGHT frontal encephalomalacia   3.  Mild atrophy   4.  Mild white matter changes         CT-CSPINE WITHOUT PLUS RECONS   Final Result      1.  No acute fracture or traumatic listhesis in the cervical spine.   2.  Emphysema in the lung apices.            MEDS:  Current Facility-Administered Medications   Medication Last Admin   • tamsulosin (FLOMAX) capsule 0.4 mg     • nicotine (NICODERM) 14 MG/24HR 14 mg 14 mg at 07/05/22 0540   • nystatin (MYCOSTATIN) 867953 UNIT/ML suspension 500,000 Units 500,000 Units at 07/05/22 2057   • amLODIPine (NORVASC) tablet 5 mg 5 mg at 07/05/22 0540   • atorvastatin (LIPITOR) tablet 40 mg 40 mg at 07/05/22 1638   • metoprolol tartrate (LOPRESSOR) tablet 25 mg 25 mg at 07/05/22 1638   • hydrALAZINE (APRESOLINE) injection 20 mg 20 mg at 06/29/22 0049   • LORazepam (ATIVAN) injection 1 mg 1 mg at 07/01/22 0221   • furosemide (LASIX) injection 20 mg 20 mg at 07/05/22 0544   • lisinopril (PRINIVIL) tablet 40 mg 40 mg at 07/05/22 0540   • Metoprolol Tartrate (LOPRESSOR) injection 5 mg     • LORazepam (ATIVAN) injection 2-4 mg     • enalaprilat (Vasotec) injection 1.25 mg 1 mL 1.25 mg at 06/28/22 2032   • Respiratory Therapy Consult     • Pharmacy Consult: Enteral tube insertion - review meds/change route/product selection     • acetaminophen (Tylenol) tablet 650 mg 650 mg at 07/02/22 1746   • ondansetron (ZOFRAN ODT) dispertab 4 mg     • senna-docusate (PERICOLACE or SENOKOT S) 8.6-50 MG per tablet 2 Tablet 2 Tablet at 07/04/22 1637    And   • polyethylene glycol/lytes (MIRALAX) PACKET 1 Packet      And   • magnesium hydroxide (MILK OF MAGNESIA) suspension 30 mL      And   • bisacodyl (DULCOLAX) suppository 10 mg     • ondansetron (ZOFRAN) syringe/vial injection 4 mg     • Pharmacy Consult Request ...Pain Management Review 1 Each         PROBLEM LIST:  No problems updated.    ASSESSMENT/PLAN: 66 y.o. male PMHx of chronic methamphetamine use,  tobacco dependence, HTN, right frontal CVA 2019, and syphilis (2020) who admitted for headache and dizziness. He was found to have encephalomalacia in the left cerebellar and right frontal areas on CT head without contrast. Patient was initially admitted to the floor under the [facility] Family Medicine service, but later transferred to ICU for decreased level of consciousness. 6/22 he developed decreased LOC with obtundation. In ICU he underwent intubation, EVD placement and hypertonic saline therapy. He was eventually transferred out of ICU, [facility] resumed care.      # Acute CVA due to occlusion of the left cerebellar artery  # Obstructive hydrocephalus  Large left cerebellar ischemic infarct with cerebral edema and mass-effect on fourth ventricle, Detected on MRI 6/22.  - s/p suboccipital decompression with craniectomy and C1 laminectomy  - s/p right frontal EVD, removed 6/27  - MAP goal greater than 65. Long term, BP goal of 110-130/60-80.   - Continue lasix   - Continue Lisinopril 40 mg, Amlodipine 5 mg  - Atorvastatin 40 mg - LDL goal less than 70  - PT/OT/SLP consult  -SLP consulted, passed barium swallow. Dietician following, continue nocturnal tube feeds and encourage PO daytime feeds.   - Continue to hold anti-coagulation.   - Repeat CT head 7/7,  If no hemorrhagic conversion, will start Eliquis 5mg BID      #Asymetric pupillary dilation   -Left > right dilation   - This has been present during hospitalization and is not new per review of records     #Urinary retention  -Urinary retention   -Muse placed   -Continue flomax  -Likely secondary to altered level of consciousness      # oral thrush  - Continue nystatin swabs     # Afib  - Rate controlled   - Continue Metoprolol BID   - Eventual plan for metoprolol succinate for longer acting, but for now must remain metoprolol tartrate BID since cortrak cannot tolerate succinate      #bilateral DVT of upper extremities  - Acute, partially occlusive DVT to the R IJ  with extension into the let innominate vein.    - Acute, occlusive DVT in on of the right paired brachial veins  - Acute,  occlusive DVT in Right paired ulnar veins within proximal forearm  - Occlusive superficial thrombophlebitis in a small segment of the right cephalic vein.   - Acute partially occlusive DVT in left paired brachial veins within the elbow region. Possible small extension into radial an ulnar veins   - Lower extremity US- negative for DVT  - Anticoagulation plan as outlined above   - Continue PT/OT  - Consider for IVC filter in future     # leukocytosis  No signs of infection, work up negative   -CTM       # goals of care  There have been multiple failed attempts to contact family.   -Mentation appears to be improving   -Plan to have further goals of care conversation     #Neurosyphilis  ID consulted   -resolved  -S/P 10 day treatment      # methamphetamine use  # tobacco use  Plan for further discussions on cessation when appropriate     Core Measures:  DVT PPX: SCDs  ABX: None  Lines: PIV  Fluids: NGT  PCP: none  CODE STATUS: FULL CODE     Dispo:  Inpatient for CVA recovery, long-term disposition unclear, may need LTAC, no DPOA so may need ethics consults vs. Guardian

## 2022-07-06 NOTE — PROGRESS NOTES
Report received from night shift RN. Pt resting in bed, NAD. Call light and personal belongings within reach, safety precautions in place. Cortrak in place. TF currently off due to pt laying completely flat in bed. [employer] residents rounding, POC discussed

## 2022-07-06 NOTE — THERAPY
"Speech Language Pathology  Daily Treatment     Patient Name: Alverto Duran  Age:  66 y.o., Sex:  male  Medical Record #: [id number]  Today's Date: 7/6/2022     Precautions  Precautions: Fall Risk, Nasogastric Tube    Assessment    Pt seen on this date for dysphagia therapy. Pt required mod to max encouragement to sit EOB for breakfast but at times was in semi-fowlers position. No overt s/sx of aspiration noted with trials of soft and bite size/thin liquid breakfast or trials of regular snacks. Pt missing teeth and pt endorsed that he avoids hard foods at baseline. He was able to feed and reposition self independently. Given pt's preference, will continue soft and bite size/thin liquid diet, but okay for regular texture snacks as tolerated. SLP following.    Plan    1) Given pt's preference, will continue soft and bite size/thin liquid diet, but okay for regular texture snacks as tolerated  2) encouragement for pt to sit up at 90* during meals or at EOB    Continue current treatment plan.    Discharge Recommendations: Recommend post-acute placement for additional speech therapy services prior to discharge home       Objective       07/06/22 1155   Precautions   Precautions Fall Risk;Nasogastric Tube   Vitals   O2 (LPM) 0   O2 Delivery Device None - Room Air   Patient / Family Goals   Patient / Family Goal #1 \"That's good\"- when given water   Goal #1 Outcome Progressing as expected   Short Term Goals   Short Term Goal # 1 Patient will consume PO trials with SLP only with no s/sx of aspiration   Goal Outcome # 1 Goal met, new goal added   Short Term Goal # 1 B  Pt will consume SB6/TN0 meal without any overt s/sx of aspiration   Goal Outcome  # 1 B Progressing as expected   Education Group   Education Provided Dysphagia   Dysphagia Patient Response Patient;Acceptance;Explanation;Verbal Demonstration;Reinforcement Needed   Anticipated Discharge Needs   Discharge Recommendations Recommend post-acute placement for " additional speech therapy services prior to discharge home   Therapy Recommendations Upon DC Dysphagia Training;Community Re-Integration;Patient / Family / Caregiver Education   Interdisciplinary Plan of Care Collaboration   IDT Collaboration with  Nursing;Physical Therapist   Patient Position at End of Therapy Seated;In Bed;Call Light within Reach;Tray Table within Reach

## 2022-07-06 NOTE — PROGRESS NOTES
Received report and assumed care of patient. Patient is alert and oriented x2. Patient is in no signs of distress. Patient was updated on the plan of care for the day. Call light within reach, bed in low position, 3 side rails up. All fall precautions in place. Tele sitter in place.

## 2022-07-06 NOTE — THERAPY
"Occupational Therapy  Daily Treatment     Patient Name: Alverto Duran  Age:  66 y.o., Sex:  male  Medical Record #: [id number]  Today's Date: 7/5/2022     Precautions  Precautions: Fall Risk, Nasogastric Tube    Assessment    Pt seen for OT tx session, w/ max encouragement pt agreeable to sit EOB and participate in seated grooming and UB dressing. Pt then refused further ADL participation and laid himself back in bed. Ed/trained pt on pathology of bedrest, CVA recovery, and GOC. Pt reports his only goal is to drink coffee. Pt demo'd impaired balance, functional mobility, and activity tolerance impacting functional independence. Will continue to follow.     Plan    Continue current treatment plan.    DC Equipment Recommendations: (P) Unable to determine at this time  Discharge Recommendations: (P) Recommend post-acute placement for additional occupational therapy services prior to discharge home    Subjective    \"Why would I do that?\" - referring to oral care     Objective       07/05/22 0921   Treatment Charges   Charges Yes   OT Self Care / ADL 1   Total Time Spent   OT Self Care / ADL (Minutes) 26   OT Total Time Spent (Calculated) 26   Precautions   Precautions Fall Risk;Nasogastric Tube   Vitals   O2 (LPM) 0   O2 Delivery Device None - Room Air   Cognition    Cognition / Consciousness X   Speech/ Communication Dysarthric   Level of Consciousness Alert   Safety Awareness Impaired;Impulsive   New Learning Impaired   Attention Impaired   Initiation Impaired   Comments pt does not appear to retain new education, poor motivation to participate   Active ROM Upper Body   Active ROM Upper Body  WDL   Strength Upper Body   Upper Body Strength  WDL   Balance   Sitting Balance (Static) Fair   Sitting Balance (Dynamic) Fair -   Standing Balance (Static)   (refused)   Weight Shift Sitting Poor   Skilled Intervention Tactile Cuing;Verbal Cuing;Facilitation   Bed Mobility    Supine to Sit Supervised   Sit to Supine " Supervised   Scooting Supervised   Skilled Intervention Verbal Cuing;Tactile Cuing   Activities of Daily Living   Grooming Minimal Assist;Seated  (oral care)   Upper Body Dressing Minimal Assist  (gown)   Skilled Intervention Verbal Cuing;Tactile Cuing   Comments refused further ADL participation   How much help from another person does the patient currently need...   Putting on and taking off regular lower body clothing? 2   Bathing (including washing, rinsing, and drying)? 2   Toileting, which includes using a toilet, bedpan, or urinal? 2   Putting on and taking off regular upper body clothing? 3   Taking care of personal grooming such as brushing teeth? 3   Eating meals? 1   6 Clicks Daily Activity Score 13   Functional Mobility   Sit to Stand Refused   Mobility sat EOB for 2-3 min prior to laying himself back down   Skilled Intervention Verbal Cuing;Sequencing;Tactile Cuing   Activity Tolerance   Sitting in Chair NT   Patient / Family Goals   Patient / Family Goal #1 To drink coffee   Short Term Goals   Short Term Goal # 1 Pt will demo LB dressing w/ SPV   Goal Outcome # 1 Progressing slower than expected   Short Term Goal # 2 Pt will demo toilet txf w/ SPV   Goal Outcome # 2 Progressing slower than expected   Short Term Goal # 3 Pt will demo standing grooming w/ SPV   Goal Outcome # 3 Progressing slower than expected   Education Group   Role of Occupational Therapist Patient Response Patient;Acceptance;Explanation;Demonstration;Verbal Demonstration   Stroke Patient Response Patient;Explanation;Demonstration;Nonacceptance;Reinforcement Needed   Pathology of Bedrest Patient Response Patient;Nonacceptance;Explanation;Demonstration;Reinforcement Needed   Anticipated Discharge Equipment and Recommendations   DC Equipment Recommendations Unable to determine at this time   Discharge Recommendations Recommend post-acute placement for additional occupational therapy services prior to discharge home   Interdisciplinary  Plan of Care Collaboration   IDT Collaboration with  Nursing   Patient Position at End of Therapy In Bed;Bed Alarm On;Call Light within Reach;Tray Table within Reach;Phone within Reach   Collaboration Comments report given   Session Information   Date / Session Number  7/5, 2 (2/4, 7/7)   Priority 2

## 2022-07-06 NOTE — PROGRESS NOTES
Pt refused AM meds with night shift RN. Speech therapy at bedside and pt is reluctant, but willing to take oral meds with applesauce. Tolerated well. Speech to continue to work with pt at bedside

## 2022-07-06 NOTE — DISCHARGE PLANNING
Received Choice form at [phone]  Agency/Facility Name: [facility]   Referral sent per Choice form @ [phone]

## 2022-07-06 NOTE — THERAPY
Physical Therapy   Daily Treatment     Patient Name: Alverto Duran  Age:  66 y.o., Sex:  male  Medical Record #: [id number]  Today's Date: 7/6/2022     Precautions: Fall Risk;Nasogastric Tube    Assessment  Pt continues to demo self-limiting behaviors and is not receptive to education. Pt briefly sat EOB today to drink coffee, however refused to sit > 30 seconds and became agitated quickly refusing to participate further or engage with therapist. Frequency dropped to 1x/wk as pt has had multiple refusals and demos ability to mobilize but chooses not to. Will trial seeing pt 1x/wk for incr participation, highly encouraged OOB activity with nursing to be able to progress with therapy.     Plan  Treatment plan modified to 1 time per week until therapy goals are met for the following treatments:  Bed Mobility, Gait Training, Neuro Re-Education / Balance, Self Care/Home Evaluation, Stair Training, Therapeutic Activities, and Therapeutic Exercises.  DC Equipment Recommendations: Unable to determine at this time  Discharge Recommendations: Recommend post-acute placement for additional physical therapy services prior to discharge home (likely will need LTC as participation in skilled therapy is limited by behaviors)       07/06/22 0903   Cognition    New Learning Impaired   Attention Impaired   Initiation Impaired   Comments self-limiting and very un-motivated. requires extensive education and encouragement to participate. pt keeping eyes mostly closed and questioning everything therapist says.   Balance   Sitting Balance (Static) Fair   Sitting Balance (Dynamic) Fair   Comments refused STS   Gait Analysis   Gait Level Of Assist Refused   Bed Mobility    Supine to Sit Supervised   Sit to Supine Supervised   Scooting Supervised   Skilled Intervention Verbal Cuing   Comments needs encouragement but completes on his own.   Functional Mobility   Comments pt refuses to sit EOB more than 1 min. refused STS and was not  participatory in any mobility. when asked to scoot fwd so feet would be on the floor, pt became irritable and returned self to bed stating he did not have to put his feet on the floor.   Short Term Goals    Short Term Goal # 1 Pt will perform supine <> sit without bed features with SPV within 6 visits in order to progress toward PLOF   Goal Outcome # 1 Goal met   Short Term Goal # 3 Pt will perform functional transfers with LRAD and SPV within 6 visits in order to progress OOB mobility   Goal Outcome # 3 Goal not met   Short Term Goal # 4 Pt will amb >50ft with LRAD and min A within 6 visits to progress back to PLOF.   Goal Outcome # 4 Goal not met

## 2022-07-06 NOTE — DIETARY
Nutrition Services: Update    Per RD screen, tube feeding has been paused since 7/4 as pt has been laying completely flat in bed. PO diet started per SLP to L6: soft and bite sized and thin liquids on 7/4 though per RN, pt has been reluctant to eat. Per discussion with RN, will transition tube feeds to nocturnal feeds to encourage PO intake though unsure whether pt will be able to receive tube feeds as he continues to want to lay flat. RD will continue to monitor tube feeds and provide interventions as needed.     Pt previously receiving Fibersource HN at a goal rate of 60 mL/hr providing 1728 kcals, 78 g protein, and 1166 mL of free water in 24 hrs.     Weight down since admit, this is likely fluid related.     Weight used in calculations: 57.4 kg via bed scale as this is likely most dry weight   Calculations/Estimations: MSJ X 1.2= 1638 kcals   Total calories needs: 2796-6133 kcal/day (27-30 kcal/kg)   Total Protein needs:  69-80 g pro/day (1.2-1.4 g pro/day).     Evaluation:  1. Per I/O's: -7.2 L fluid   2. Labs: Glucose 131, Bun 35, CRP 1.65 (improving since 6/27)  3. Meds: Lasix (refused), senna (refused)   4. Fibersource HN remains an appropriate tube feeding formula to meet nutritional needs     Recommendations/Plan:   1. Transition tube feeds of continuous Fibersource HN to nocturnal feeds from 8 pm-8 am at a rate of 80 mL/hr providing 1152 kcals, 52 g protein and 778 mL of free water in 12 hrs. (meets ~70% of estimated nutritional needs)   2. Provide Boost Plus TID to help bolster nutrition   3. Encourage intake of >50% of meals and supplements   4. Document intake as % of meals and supplements in ADL's   5. Fluids per MD  6. Monitor weights    RD following.

## 2022-07-07 NOTE — PROGRESS NOTES
Pt refusing AM meds. Resting in bed, NAD. Cortrak in place, tele sitter in place. RN attempted to educate pt on the importance of PO intake, but pt covered his face with the blankets. RN to attempt to encourage pt to eat his lunch. Safety precautions in place .

## 2022-07-07 NOTE — PROGRESS NOTES
Report received from night shift RN. Pt resting in bed, NAD. Call light and personal belongings within reach, safety precautions in place. Cortrak in place. Tele sitter in place

## 2022-07-07 NOTE — CARE PLAN
The patient is Watcher - Medium risk of patient condition declining or worsening    Shift Goals  Clinical Goals: Safety, Tube feeds  Patient Goals: rest  Family Goals: GLENDY    Progress made toward(s) clinical / shift goals:    Problem: Fall Risk  Goal: Patient will remain free from falls  Outcome: Progressing       Patient is not progressing towards the following goals:    Problem: Nutrition  Goal: Patient's nutritional and fluid intake will be adequate or improve  Outcome: Not Progressing  Note: Patient continues to refuse to lay at 30 degree angle despite HOB being elevated appropriately. Education provided however patient continues to be noncompliant. Nocturnal tube feeds frequently paused due to risk of aspiration.

## 2022-07-07 NOTE — PROGRESS NOTES
"[facility] [place] [facility] [place]ESS [place]     Attending: Dr. Blount     Senior Resident: Emory Urias D.O.  Family Medicine Resident PGY-3    PATIENT: Alverto Duran; [id number]; 1955 Hospital Day: 17     ID/Interval Events:   66 y.o. male PMHx of chronic methamphetamine use, tobacco dependence, HTN, right frontal CVA 2019, and syphilis (2020) who was admitted for headache and dizziness.   6/21- Patient admitted to [facility]   6/22- Altered level of conciousness. MRI with and without contrast showed significant edema with compression of the brain stem and obstructive hydrocephalus. He was transferred to ICU for intubation, EVD placement and hypertonic saline therapy.    6/23 - suboccipital craniectomy. New onset AFIB RVR.  6/24 - multiple DVT's BUE noted on US.    6/25 - ID consult, recommend 10 day neurosyphilis course with IV PCN q4h (end date 7/3/22). Extubated  6/26 - EVD no output overnight. Alteplase by Dr. Mello.   6/27 - IV Metoprolol for AFIB with rate to 170's  6/29 - [facility] re-assumed care, patient transferred to telemetry  7/1 - Telemetry DC'd  7/2- No acute events   7/3- Urinary retention overnight.   7/4- Passed barium swallow study, was started on diet.  7/5- No acute events        SUBJECTIVE: No acute events overnight. Patient says he has abdominal pain and nausea, but has appetite this morning.     OBJECTIVE:     Vitals:    07/06/22 0743 07/06/22 1552 07/06/22 2108 07/07/22 0540   BP: 111/61 105/66 110/69 128/74   Pulse: 78 99 98 99   Resp: 16 16 17 18   Temp: 36.4 °C (97.5 °F) 37.1 °C (98.7 °F) 36.2 °C (97.2 °F) 36.6 °C (97.8 °F)   TempSrc: Temporal Temporal Temporal Temporal   SpO2: 94% 95% 96% 96%   Weight: 57.4 kg (126 lb 8.7 oz)      Height: 1.778 m (5' 10\")          Intake/Output Summary (Last 24 hours) at 7/7/2022 0630  Last data filed at 7/7/2022 0400  Gross per 24 hour   Intake 0 ml   Output 700 ml   Net -700 ml       PE:  General: resting in bed, no acute distress  HEENT: surgical scar " "on scalp. NG tube in place.   Cardiovascular: RRR. No murmer.   Respiratory: CTAB, normal respiratory effort  Abdomen:  soft, nontender, nondistended  EXT:  No pitting edema noted  Neuro: No focal deficits. Moving all 4 extremities.     LABS:  No results for input(s): WBC, RBC, HEMOGLOBIN, HEMATOCRIT, MCV, MCH, RDW, PLATELETCT, MPV, NEUTSPOLYS, LYMPHOCYTES, MONOCYTES, EOSINOPHILS, BASOPHILS, RBCMORPHOLO in the last 72 hours.  Recent Labs     07/04/22  1158   SODIUM 138   POTASSIUM 4.4   CHLORIDE 104   CO2 24   BUN 35*   CREATININE 1.12   CALCIUM 9.1     Estimated GFR/CRCL = Estimated Creatinine Clearance: 52.7 mL/min (by C-G formula based on SCr of 1.12 mg/dL).  Recent Labs     07/04/22  1158   GLUCOSE 131*                 No results for input(s): INR, APTT, FIBRINOGEN in the last 72 hours.    Invalid input(s): DIMER    MICROBIOLOGY:   Results     Procedure Component Value Units Date/Time    BLOOD CULTURE [[id number]] Collected: 06/30/22 1606    Order Status: Completed Specimen: Blood from Peripheral Updated: 07/05/22 1700     Significant Indicator NEG     Source BLD     Site PERIPHERAL     Culture Result No growth after 5 days of incubation.    Narrative:      Per Hospital Policy: Only change Specimen Src: to \"Line\" if  specified by physician order.  Right AC    BLOOD CULTURE [[id number]] Collected: 06/30/22 1443    Order Status: Completed Specimen: Blood from Peripheral Updated: 07/05/22 1500     Significant Indicator NEG     Source BLD     Site PERIPHERAL     Culture Result No growth after 5 days of incubation.    Narrative:      Per Hospital Policy: Only change Specimen Src: to \"Line\" if  specified by physician order.  Right AC    URINALYSIS [[id number]]     Order Status: No result Specimen: Urine     URINE CULTURE(NEW) [[id number]] Collected: 06/30/22 1511    Order Status: Completed Specimen: Urine, [place]o Cath Updated: 07/02/22 0659     Significant Indicator NEG     Source UR     Site URINE, LONGO CATH     Culture " Result No growth at 48 hours.    Narrative:      Collected By: [id number] LINO MARTINEZ.  Indication for culture:->Evaluation for sepsis without a  clear source of infection  Collected By: [id number] LINO MARTINEZ.    CSF Culture [[id number]] Collected: 06/24/22 0834    Order Status: Completed Specimen: CSF from Shunt Updated: 07/01/22 0747     Significant Indicator NEG     Source CSF     Site SHUNT     Culture Result No growth at 7 days.     Gram Stain Result Rare WBCs.  No organisms seen.      Narrative:      Collected By: BELEN SYED  Collected By: BELEN SYED    URINALYSIS [[id number]]  (Abnormal) Collected: 06/30/22 1511    Order Status: Completed Specimen: Urine, Muse Cath Updated: 06/30/22 1616     Color Yellow     Character Clear     Specific Gravity 1.023     Ph 6.0     Glucose Negative mg/dL      Ketones Negative mg/dL      Protein Negative mg/dL      Bilirubin Negative     Urobilinogen, Urine 1.0     Nitrite Negative     Leukocyte Esterase Trace     Occult Blood Negative     Micro Urine Req Microscopic    Narrative:      Collected By: [id number] LINO MARTINEZ.  Indication for culture:->Evaluation for sepsis without a  clear source of infection            IMAGING:   DX-ABDOMEN FOR TUBE PLACEMENT   Final Result      1. The tip of the enteric tube terminates over the second portion of the duodenum.   2. The remainder of the bowel gas pattern is within normal limits.      DX-ESOPHAGUS - [id number]   Final Result      Fluoroscopy provided for cookie swallow evaluation. Please refer to speech pathology report for details      DX-ABDOMEN FOR TUBE PLACEMENT   Final Result         1.  Nonspecific bowel gas pattern.   2.  Dobbhoff tube tip overlying the expected location of the pylorus or first duodenal segment.      DX-CHEST-PORTABLE (1 VIEW)   Final Result      1.  No acute cardiopulmonary disease.   2.  Supportive tubing as described above.      MR-BRAIN-WITH & W/O   Final  Result         Subacute left inferior cerebellar infarct with minimal blood products within representing petechial microhemorrhages. Mass effect upon the fourth ventricle remains stable.      Posterior fossa is well decompressed by midline occipital craniotomy. Lateral ventricles are well decompressed by a right frontal approach EVD with its tip in the frontal horn of the right lateral ventricle.      Small right occipital tentorial subdural hematoma without mass effect.      Remote bilateral frontal infarct with encephalomalacia.      Age-related volume loss and chronic microvascular ischemic changes.         US-EXTREMITY VENOUS LOWER BILAT   Final Result      CT-HEAD W/O   Final Result      1.  There is a small amount of intraventricular blood that is new from 6/23/2022. The ventricles are stable in size.   2.  There is a right frontal approach ventriculostomy catheter with the tip terminating at the foramen of Monro. There is a punctate amount of parenchymal hemorrhage surrounding the catheter is unchanged.   3.  Right frontal and left cerebellar encephalomalacia.      EC-ECHOCARDIOGRAM COMPLETE W/O CONT   Final Result      US-CAROTID DOPPLER BILAT   Final Result      US-EXTREMITY VENOUS UPPER BILAT   Final Result      DX-CHEST-PORTABLE (1 VIEW)   Final Result         1.  No acute cardiopulmonary disease.   2.  Trace right pleural effusion   3.  Atherosclerosis      CT-CTA NECK WITH & W/O-POST PROCESSING   Final Result         1.  Severely hypoplastic right vertebral artery, central segment of the right vertebral artery is not visualized and may be occluded.         CT-CTA HEAD WITH & W/O-POST PROCESS   Final Result         1.  No large vessel occlusion or aneurysm identified   2.  Interval placement of right frontal approach ventriculostomy with postprocedural minimal hemorrhage and new pneumocephalus.   3.  Bilateral ventricular dilatation appears somewhat increased since prior study compatible with somewhat  worsened hydrocephalus.   4.  Right frontal and left cerebellar encephalomalacia      DX-CHEST-PORTABLE (1 VIEW)   Final Result         1.  No acute cardiopulmonary disease.   2.  Trace bilateral pleural effusion      DX-ABDOMEN FOR TUBE PLACEMENT   Final Result         1.  Nonspecific bowel gas pattern.   2.  Dobbhoff tube tip terminates overlying the expected location of the gastric antrum.   3.  Trace bilateral pleural effusions      DX-CHEST-PORTABLE (1 VIEW)   Final Result         1.  No acute cardiopulmonary disease.   2.  Trace bilateral pleural effusions   3.  Atherosclerosis      MR-BRAIN-WITH & W/O   Final Result   Addendum 1 of 1   ADDENDUM:      The case was discussed by telephone (call report) with DR. DAVONTE VIEIRA    on call for [facility], at 1854 hours.      Final      1.  Interval development of moderate obstructive hydrocephalus due to mass effect on the fourth ventricle. There is mild transependymal edema.   2.  Large area of acute infarction involving the left cerebellar hemisphere, inferior cerebellar vermis, and left cerebellar tonsil. PICA territory. No hemorrhagic transformation. This is the cause of the fourth ventricle effacement with obstructive    hydrocephalus. There is also mild left-sided cerebellar tonsillar herniation.   3.  Moderate supratentorial white matter disease most consistent with microvascular ischemic change.   4.  Old infarction right anterior frontal convexity.   5.  A Voalte message was sent to RICKEY HAYES at 1823 hours 6/22/2022. Prompt reply as of 6:41 PM not yet received. Efforts are ongoing to contact housestaff managing the patient at this time.   6.  Case was discussed (call report) with nurse SHASTA MAGILL at 6:47 PM. Request to initiate stat neurosurgery consult. Attempts to contact [facility] on-call housestaff are ongoing.      CT-HEAD W/O   Final Result      1.  No evidence of acute hemorrhage, mass or large territorial infarction   2.  LEFT  cerebellar and RIGHT frontal encephalomalacia   3.  Mild atrophy   4.  Mild white matter changes         CT-CSPINE WITHOUT PLUS RECONS   Final Result      1.  No acute fracture or traumatic listhesis in the cervical spine.   2.  Emphysema in the lung apices.      CT-HEAD W/O    (Results Pending)         MEDS:  Current Facility-Administered Medications   Medication Last Admin   • tamsulosin (FLOMAX) capsule 0.4 mg 0.4 mg at 07/06/22 1131   • nicotine (NICODERM) 14 MG/24HR 14 mg 14 mg at 07/05/22 0540   • nystatin (MYCOSTATIN) 916578 UNIT/ML suspension 500,000 Units 500,000 Units at 07/06/22 1751   • amLODIPine (NORVASC) tablet 5 mg 5 mg at 07/07/22 0608   • atorvastatin (LIPITOR) tablet 40 mg 40 mg at 07/06/22 1750   • metoprolol tartrate (LOPRESSOR) tablet 25 mg 25 mg at 07/07/22 0608   • hydrALAZINE (APRESOLINE) injection 20 mg 20 mg at 06/29/22 0049   • LORazepam (ATIVAN) injection 1 mg 1 mg at 07/01/22 0221   • furosemide (LASIX) injection 20 mg 20 mg at 07/07/22 0608   • lisinopril (PRINIVIL) tablet 40 mg 40 mg at 07/07/22 0608   • Metoprolol Tartrate (LOPRESSOR) injection 5 mg     • LORazepam (ATIVAN) injection 2-4 mg     • enalaprilat (Vasotec) injection 1.25 mg 1 mL 1.25 mg at 06/28/22 2032   • Respiratory Therapy Consult     • Pharmacy Consult: Enteral tube insertion - review meds/change route/product selection     • acetaminophen (Tylenol) tablet 650 mg 650 mg at 07/02/22 1746   • ondansetron (ZOFRAN ODT) dispertab 4 mg     • senna-docusate (PERICOLACE or SENOKOT S) 8.6-50 MG per tablet 2 Tablet 2 Tablet at 07/07/22 0608    And   • polyethylene glycol/lytes (MIRALAX) PACKET 1 Packet      And   • magnesium hydroxide (MILK OF MAGNESIA) suspension 30 mL      And   • bisacodyl (DULCOLAX) suppository 10 mg     • ondansetron (ZOFRAN) syringe/vial injection 4 mg     • Pharmacy Consult Request ...Pain Management Review 1 Each         PROBLEM LIST:  No problems updated.    ASSESSMENT/PLAN: 66 y.o. male PMHx  of chronic methamphetamine use, tobacco dependence, HTN, right frontal CVA 2019, and syphilis (2020) who admitted for headache and dizziness. He was found to have encephalomalacia in the left cerebellar and right frontal areas on CT head without contrast. Patient was initially admitted to the floor under the [facility] Family Medicine service, but later transferred to ICU for decreased level of consciousness. 6/22 he developed decreased LOC with obtundation. In ICU he underwent intubation, EVD placement and hypertonic saline therapy. He was eventually transferred out of ICU, [facility] resumed care.      # Acute CVA due to occlusion of the left cerebellar artery  # Obstructive hydrocephalus  Large left cerebellar ischemic infarct with cerebral edema and mass-effect on fourth ventricle, Detected on MRI 6/22.  - s/p suboccipital decompression with craniectomy and C1 laminectomy  - s/p right frontal EVD, removed 6/27  - MAP goal greater than 65. Long term, BP goal of 110-130/60-80.   - Continue lasix, Lisinopril 40 mg, Amlodipine 5 mg  - Atorvastatin 40 mg - LDL goal less than 70  - PT/OT/SLP consult  -SLP consulted, passed barium swallow. Dietician following, continue nocturnal tube feeds and encourage PO daytime feeds. Discontinue cortrak when patient has adequate PO intake.   - Continue to hold anti-coagulation.   - Repeat CT head 7/7,  If no hemorrhagic conversion, will start Eliquis 5mg BID  -Plan to consult with neurosurgery on staple removal and results of CT head      #Asymetric pupillary dilation   -Left > right dilation   - This has been present during hospitalization and is not new per review of records     #Urinary retention  -Urinary retention   -Continue hernandez  -Continue flomax  -Likely secondary to altered level of consciousness   -Consider voiding trial and discontinuing hernandez tomorrow     # Afib  - Rate controlled   - Continue Metoprolol BID   - Eventual plan for metoprolol succinate for longer acting, but for  now must remain metoprolol tartrate BID since cortrak cannot tolerate succinate      #bilateral DVT of upper extremities  - Acute, partially occlusive DVT to the R IJ with extension into the let innominate vein.    - Acute, occlusive DVT in on of the right paired brachial veins  - Acute,  occlusive DVT in Right paired ulnar veins within proximal forearm  - Occlusive superficial thrombophlebitis in a small segment of the right cephalic vein.   - Acute partially occlusive DVT in left paired brachial veins within the elbow region. Possible small extension into radial an ulnar veins   - Lower extremity US- negative for DVT  - Anticoagulation plan as outlined above   - Continue PT/OT  - Consider for IVC filter in future     # leukocytosis  No signs of infection, work up negative   -CTM       # goals of care  There have been multiple failed attempts to contact family.   -Mentation appears to be improving   -Plan to have further goals of care conversation      #Neurosyphilis  ID consulted   -resolved  -S/P 10 day treatment      # methamphetamine use  # tobacco use  Plan for further discussions on cessation when appropriate     Core Measures:  DVT PPX: SCDs  ABX: None  Lines: PIV  Fluids: NGT  PCP: none  CODE STATUS: FULL CODE     Dispo:  Inpatient for CVA recovery, long-term disposition unclear, may need LTAC, no DPOA so may need ethics consults vs. Guardian

## 2022-07-07 NOTE — CARE PLAN
Problem: Knowledge Deficit - Standard  Goal: Patient and family/care givers will demonstrate understanding of plan of care, disease process/condition, diagnostic tests and medications  Outcome: Progressing     Problem: Pain - Standard  Goal: Alleviation of pain or a reduction in pain to the patient’s comfort goal  Outcome: Progressing     Problem: Fall Risk  Goal: Patient will remain free from falls  Outcome: Progressing     Problem: Safety - Medical Restraint  Goal: Remains free of injury from restraints (Restraint for Interference with Medical Device)  Outcome: Progressing  Goal: Free from restraint(s) (Restraint for Interference with Medical Device)  Outcome: Progressing     Problem: Skin Integrity  Goal: Skin integrity is maintained or improved  Outcome: Progressing     Problem: Optimal Care of the Stroke Patient  Goal: Optimal emergency care for the stroke patient  Outcome: Progressing  Goal: Optimal acute care for the stroke patient  Outcome: Progressing     Problem: Knowledge Deficit - Stroke Education  Goal: Patient's knowledge of stroke and risk factors will improve  Outcome: Progressing     Problem: Psychosocial - Patient Condition  Goal: Patient's ability to verbalize feelings about condition will improve  Outcome: Progressing  Goal: Patient's ability to re-evaluate and adapt role responsibilities will improve  Outcome: Progressing     Problem: Discharge Planning - Stroke  Goal: Ensure Stroke Core Measures are met prior to discharge  Outcome: Progressing  Goal: Patient’s continuum of care needs will be met  Outcome: Progressing     Problem: Neuro Status  Goal: Neuro status will remain stable or improve  Outcome: Progressing     Problem: Hemodynamic Monitoring  Goal: Patient's hemodynamics, fluid balance and neurologic status will be stable or improve  Outcome: Progressing     Problem: Respiratory - Stroke Patient  Goal: Patient will achieve/maintain optimum respiratory rate/effort  Outcome:  Progressing     Problem: Dysphagia  Goal: Dysphagia will improve  Outcome: Progressing     Problem: Risk for Aspiration  Goal: Patient's risk for aspiration will be absent or decrease  Outcome: Progressing     Problem: Urinary Elimination  Goal: Establish and maintain regular urinary output  Outcome: Progressing     Problem: Bowel Elimination  Goal: Establish and maintain regular bowel function  Outcome: Progressing     Problem: Mobility - Stroke  Goal: Patient's capacity to carry out activities will improve  Outcome: Progressing  Goal: Spasticity will be prevented or improved  Outcome: Progressing  Goal: Subluxation will be prevented or improved  Outcome: Progressing     Problem: Self Care  Goal: Patient will have the ability to perform ADLs independently or with assistance (bathe, groom, dress, toilet and feed)  Outcome: Progressing     Problem: Nutrition  Goal: Patient's nutritional and fluid intake will be adequate or improve  Outcome: Progressing  Goal: Enteral nutrition will be maintained or improve  Outcome: Progressing  Goal: Enteral nutrition will be maintained or improve  Outcome: Progressing   The patient is Stable - Low risk of patient condition declining or worsening    Shift Goals  Clinical Goals: fall precautions  Patient Goals: rest  Family Goals: GLENDY    Progress made toward(s) clinical / shift goals:  progressing as tolerated    Patient is not progressing towards the following goals:

## 2022-07-08 NOTE — NON-PROVIDER
[facility] MEDICINE PROGRESS NOTE     Attending:  Domo Vargas MD     Resident: Emory Urias MD    PATIENT:   Alverto Duran    MRN:[id number]    : 1955    ID/Interval Events:  66 y.o. male PMHx of chronic methamphetamine use, tobacco dependence, HTN, right frontal CVA in , and syphilis  who was admitted for headache and dizziness.   - Patient admitted to [facility]   - Altered level of conciousness. MRI with and without contrast showed significant edema with compression of the brain stem and obstructive hydrocephalus. He was transferred to ICU for intubation, EVD placement and hypertonic saline therapy.     - suboccipital craniectomy. New onset AFIB RVR.   - multiple DVT's BUE noted on US.     - ID consult, recommend 10 day neurosyphilis course with IV PCN q4h (end date 7/3/22). Extubated   - EVD no output overnight. Alteplase by Dr. Mello.    - IV Metoprolol for AFIB with rate to 170's   - [facility] re-assumed care, patient transferred to telemetry   - Telemetry DC'd  - No acute events   7/3- Urinary retention overnight.   - Passed barium swallow study, was started on diet.  - No acute events   - no acute events  -No acute events  - No acute events     SUBJECTIVE:   Muse replaced overnight. This morning, patient refuses to take medication from nursing staff stating that medication does not help. Repeats that he has an appetite. Denies abdominal pain, headache.     OBJECTIVE:  Vitals:    22 1554 22 0514 22 0828   BP: 101/60 105/64 103/64 113/73   Pulse:  73 82 85   Resp:  16 12 15   Temp:  37.1 °C (98.8 °F) 36.7 °C (98.1 °F) 36.3 °C (97.4 °F)   TempSrc:  Temporal Temporal Temporal   SpO2:  97% 92% 92%   Weight:   56 kg (123 lb 7.3 oz)    Height:           Intake/Output Summary (Last 24 hours) at 2022 0959  Last data filed at 2022 0600  Gross per 24 hour   Intake 120 ml   Output --   Net 120 ml        PHYSICAL EXAM:  General: No acute distress, resting in bed, not conversational   HEENT: NG tube well approximated and in-place. No signs of infection. Well approximated surgical scar on scalp with staples in place. No erythema, swelling, or warmth noted.   Cardiovascular: RRR without murmurs, gallops, or rubs.   Respiratory:  Breath sounds equal bilterally no tachypnea, normal effort  Abdomen: normal bowel sounds, soft, nondistended  EXT: No edema noted  Neuro: Non-focal, able to move and adjust position with all 4 extremities.       LABS:  Recent Labs     07/07/22  1059   WBC 7.3   RBC 5.22   HEMOGLOBIN 14.2   HEMATOCRIT 43.9   MCV 84.1   MCH 27.2   RDW 43.3   PLATELETCT 520*   MPV 11.3   NEUTSPOLYS 58.00   LYMPHOCYTES 30.20   MONOCYTES 8.70   EOSINOPHILS 2.00   BASOPHILS 0.70     Recent Labs     07/07/22  1059   SODIUM 135   POTASSIUM 4.9   CHLORIDE 101   CO2 23   BUN 24*   CREATININE 1.03   CALCIUM 9.8   MAGNESIUM 2.3     Estimated GFR/CRCL = Estimated Creatinine Clearance: 55.9 mL/min (by C-G formula based on SCr of 1.03 mg/dL).  Recent Labs     07/07/22  1059   GLUCOSE 111*                 No results for input(s): INR, APTT, FIBRINOGEN in the last 72 hours.    Invalid input(s): DIMER    MICROBIOLOGY:   No results found for: BLOODCULTU, BLDCULT, BCHOLD     IMAGING:   CT-HEAD W/O   Final Result      1.  Interval removal of the right frontal approach EVD. No significant hydrocephalus.   2.  Evolving infarct of the left cerebellum with small amount of associated petechial hemorrhage.   3.  Resolution of the other intracranial hemorrhages. No new intracranial hemorrhage.   4.  Right frontal lobe encephalomalacia.   5.  Stable post surgical changes as above.         DX-ABDOMEN FOR TUBE PLACEMENT   Final Result      1. The tip of the enteric tube terminates over the second portion of the duodenum.   2. The remainder of the bowel gas pattern is within normal limits.      DX-ESOPHAGUS - [id number]   Final  Result      Fluoroscopy provided for cookie swallow evaluation. Please refer to speech pathology report for details      DX-ABDOMEN FOR TUBE PLACEMENT   Final Result         1.  Nonspecific bowel gas pattern.   2.  Dobbhoff tube tip overlying the expected location of the pylorus or first duodenal segment.      DX-CHEST-PORTABLE (1 VIEW)   Final Result      1.  No acute cardiopulmonary disease.   2.  Supportive tubing as described above.      MR-BRAIN-WITH & W/O   Final Result         Subacute left inferior cerebellar infarct with minimal blood products within representing petechial microhemorrhages. Mass effect upon the fourth ventricle remains stable.      Posterior fossa is well decompressed by midline occipital craniotomy. Lateral ventricles are well decompressed by a right frontal approach EVD with its tip in the frontal horn of the right lateral ventricle.      Small right occipital tentorial subdural hematoma without mass effect.      Remote bilateral frontal infarct with encephalomalacia.      Age-related volume loss and chronic microvascular ischemic changes.         US-EXTREMITY VENOUS LOWER BILAT   Final Result      CT-HEAD W/O   Final Result      1.  There is a small amount of intraventricular blood that is new from 6/23/2022. The ventricles are stable in size.   2.  There is a right frontal approach ventriculostomy catheter with the tip terminating at the foramen of Monro. There is a punctate amount of parenchymal hemorrhage surrounding the catheter is unchanged.   3.  Right frontal and left cerebellar encephalomalacia.      EC-ECHOCARDIOGRAM COMPLETE W/O CONT   Final Result      US-CAROTID DOPPLER BILAT   Final Result      US-EXTREMITY VENOUS UPPER BILAT   Final Result      DX-CHEST-PORTABLE (1 VIEW)   Final Result         1.  No acute cardiopulmonary disease.   2.  Trace right pleural effusion   3.  Atherosclerosis      CT-CTA NECK WITH & W/O-POST PROCESSING   Final Result         1.  Severely  hypoplastic right vertebral artery, central segment of the right vertebral artery is not visualized and may be occluded.         CT-CTA HEAD WITH & W/O-POST PROCESS   Final Result         1.  No large vessel occlusion or aneurysm identified   2.  Interval placement of right frontal approach ventriculostomy with postprocedural minimal hemorrhage and new pneumocephalus.   3.  Bilateral ventricular dilatation appears somewhat increased since prior study compatible with somewhat worsened hydrocephalus.   4.  Right frontal and left cerebellar encephalomalacia      DX-CHEST-PORTABLE (1 VIEW)   Final Result         1.  No acute cardiopulmonary disease.   2.  Trace bilateral pleural effusion      DX-ABDOMEN FOR TUBE PLACEMENT   Final Result         1.  Nonspecific bowel gas pattern.   2.  Dobbhoff tube tip terminates overlying the expected location of the gastric antrum.   3.  Trace bilateral pleural effusions      DX-CHEST-PORTABLE (1 VIEW)   Final Result         1.  No acute cardiopulmonary disease.   2.  Trace bilateral pleural effusions   3.  Atherosclerosis      MR-BRAIN-WITH & W/O   Final Result   Addendum 1 of 1   ADDENDUM:      The case was discussed by telephone (call report) with DR. DAVONTE VIEIRA    on call for [facility], at 1854 hours.      Final      1.  Interval development of moderate obstructive hydrocephalus due to mass effect on the fourth ventricle. There is mild transependymal edema.   2.  Large area of acute infarction involving the left cerebellar hemisphere, inferior cerebellar vermis, and left cerebellar tonsil. PICA territory. No hemorrhagic transformation. This is the cause of the fourth ventricle effacement with obstructive    hydrocephalus. There is also mild left-sided cerebellar tonsillar herniation.   3.  Moderate supratentorial white matter disease most consistent with microvascular ischemic change.   4.  Old infarction right anterior frontal convexity.   5.  A Voalte message was  "sent to RICKEY HAYES at 1823 hours 6/22/2022. Prompt reply as of 6:41 PM not yet received. Efforts are ongoing to contact housestaff managing the patient at this time.   6.  Case was discussed (call report) with nurse SHASTA MAGILL at 6:47 PM. Request to initiate stat neurosurgery consult. Attempts to contact [facility] on-call housestaff are ongoing.      CT-HEAD W/O   Final Result      1.  No evidence of acute hemorrhage, mass or large territorial infarction   2.  LEFT cerebellar and RIGHT frontal encephalomalacia   3.  Mild atrophy   4.  Mild white matter changes         CT-CSPINE WITHOUT PLUS RECONS   Final Result      1.  No acute fracture or traumatic listhesis in the cervical spine.   2.  Emphysema in the lung apices.          CULTURES:   Results     Procedure Component Value Units Date/Time    BLOOD CULTURE [[id number]] Collected: 06/30/22 1606    Order Status: Completed Specimen: Blood from Peripheral Updated: 07/05/22 1700     Significant Indicator NEG     Source BLD     Site PERIPHERAL     Culture Result No growth after 5 days of incubation.    Narrative:      Per Hospital Policy: Only change Specimen Src: to \"Line\" if  specified by physician order.  Right AC    BLOOD CULTURE [[id number]] Collected: 06/30/22 1443    Order Status: Completed Specimen: Blood from Peripheral Updated: 07/05/22 1500     Significant Indicator NEG     Source BLD     Site PERIPHERAL     Culture Result No growth after 5 days of incubation.    Narrative:      Per Hospital Policy: Only change Specimen Src: to \"Line\" if  specified by physician order.  Right AC    URINALYSIS [[id number]]     Order Status: No result Specimen: Urine     URINE CULTURE(NEW) [[id number]] Collected: 06/30/22 1511    Order Status: Completed Specimen: Urine, [place] Cath Updated: 07/02/22 0659     Significant Indicator NEG     Source UR     Site URINE, [facility] CATH     Culture Result No growth at 48 hours.    Narrative:      Collected By: [id number] LINO MEDRANO " SERGEY  Indication for culture:->Evaluation for sepsis without a  clear source of infection  Collected By: [id number] LINO RINCON          MEDS:  Current Facility-Administered Medications   Medication Last Admin   • omeprazole (FIRST-OMEPRAZOLE) 2 mg/mL oral susp 40 mg     • tamsulosin (FLOMAX) capsule 0.4 mg 0.4 mg at 07/06/22 1131   • nicotine (NICODERM) 14 MG/24HR 14 mg 14 mg at 07/05/22 0540   • nystatin (MYCOSTATIN) 499452 UNIT/ML suspension 500,000 Units 500,000 Units at 07/06/22 1751   • amLODIPine (NORVASC) tablet 5 mg 5 mg at 07/07/22 0608   • atorvastatin (LIPITOR) tablet 40 mg 40 mg at 07/07/22 1836   • metoprolol tartrate (LOPRESSOR) tablet 25 mg 25 mg at 07/07/22 1836   • hydrALAZINE (APRESOLINE) injection 20 mg 20 mg at 06/29/22 0049   • LORazepam (ATIVAN) injection 1 mg 1 mg at 07/01/22 0221   • furosemide (LASIX) injection 20 mg 20 mg at 07/07/22 0608   • lisinopril (PRINIVIL) tablet 40 mg 40 mg at 07/07/22 0608   • Metoprolol Tartrate (LOPRESSOR) injection 5 mg     • LORazepam (ATIVAN) injection 2-4 mg     • enalaprilat (Vasotec) injection 1.25 mg 1 mL 1.25 mg at 06/28/22 2032   • Respiratory Therapy Consult     • Pharmacy Consult: Enteral tube insertion - review meds/change route/product selection     • acetaminophen (Tylenol) tablet 650 mg 650 mg at 07/02/22 1746   • ondansetron (ZOFRAN ODT) dispertab 4 mg     • senna-docusate (PERICOLACE or SENOKOT S) 8.6-50 MG per tablet 2 Tablet 2 Tablet at 07/08/22 0550    And   • polyethylene glycol/lytes (MIRALAX) PACKET 1 Packet      And   • magnesium hydroxide (MILK OF MAGNESIA) suspension 30 mL      And   • bisacodyl (DULCOLAX) suppository 10 mg     • ondansetron (ZOFRAN) syringe/vial injection 4 mg     • Pharmacy Consult Request ...Pain Management Review 1 Each         ASSESSMENT/PLAN: 66 y.o. male admitted for:    #Obstructive hydrocephalus  -large left cerebellar ischemic infarct with cerebral edema and mass effect on the fourth ventricle. Detected  on MRI 6/22  - patient is status post suboccipital decompression with craniectomy and C1 laminectomy  - s/p right frontal EVD, removed 6/27  - MAP goal greater than 65. Long term, BP goal of 110-130/60-80.   - Continue lasix, Lisinopril 40 mg, Amlodipine 5 mg  - Atorvastatin 40 mg - LDL goal less than 70  - PT/OT/SLP consult  -Patient passed barium swallow study. Dietician recommended continued nocturnal tube feeds and encouraged   -consult neurosurgery about staple removal   -CT shows no hemorrhagic conversion    #Urinary retention  -hernandez placed. Continue to monitor   -Continue flomax     #Asymetric pupillary dilation  -left>right dilation  -present during hospitalization and not a new symptom    # Afib  - Rate currently controlled   - Continue Metoprolol BID   - Plan to switch to metoprolol succinate for longer acting, but for now must remain metoprolol tartrate BID since pt is not taking PO medication     #bilateral DVT of upper extremities  - Acute, partially occlusive DVT to the R IJ with extension into the let innominate vein.    - Acute, occlusive DVT in on of the right paired brachial veins  - Acute,  occlusive DVT in Right paired ulnar veins within proximal forearm  - Occlusive superficial thrombophlebitis in a small segment of the right cephalic vein.   - Acute partially occlusive DVT in left paired brachial veins within the elbow region. Possible small extension into radial an ulnar veins   - Lower extremity US- negative for DVT  - Plan to resume anti-coagulation      # goals of care  -Case management was able to contact patient's sister-in-law for possible DPOA    -Plan to have further goals of care conversation     #Neurosyphilis  ID consulted   -resolved  -S/P 10 day treatment      # methamphetamine use  # tobacco use  Plan for further discussions on cessation when appropriate    Core Measures:  LINES: PIV  FLUIDS: NGT  ABX: None  DVT PPX: SCDs  PCP: none  CODE STATUS: Full code    Disposition:  Inpatient for CVA recovery, long-term disposition unclear. May require LTAC. Capacity and cognition testing may determine patient choice in treatment vs DPOA.

## 2022-07-08 NOTE — DISCHARGE PLANNING
LSW made phone call to Maile Del Toro, listed as pt's sister. Maile answered the phone, however reported she is not this pt's sister as she does not even know an Alverto Duran.    LSW made phone call to Gina Duran [phone] from [facility] results. Gina reported she is the pt's sister-in-law. Gina stated the pt was previously  to Lizz Duran, however does not know whether or not they are still legally  and does not have Lizz's contact information. When asked if she knew of any other family members of the pt or had any contact information for anyone Gina reported she is currently at work and does not have anyone's contact information. LSW left direct number for Gina to call back if she does have contact information for any NOK of pt.    W sent Voalte to DO Zachariash to request capacity eval be done.    Addendum @5761  LSW received call from Gina who wanted to confirm that pt is . Gina reported she is going to call around after work to try to get contact information for pt's family members.

## 2022-07-08 NOTE — PROGRESS NOTES
Report received from [facility] shift RN, assumed patient care. Patient is calmly resting in bed, no signs of distress, even and unlabored breathing noted. Pt on room air. Patient has call light within reach, fall precautions in place. Will continue to monitor.     normal... Well appearing, well nourished, awake, alert, oriented to person, place, time/situation and in no apparent distress.

## 2022-07-08 NOTE — THERAPY
"Missed Therapy     Patient Name: Alverto Duran  Age:  66 y.o., Sex:  male  Medical Record #: [id number]  Today's Date: 7/8/2022    Discussed missed therapy with RN       07/08/22 1610   Treatment Variance   Reason For Missed Therapy Non-Medical - Patient Refused   Interdisciplinary Plan of Care Collaboration   IDT Collaboration with  Nursing   Collaboration Comments This SLP attempted to see patient for dysphagia tx session. Patient sighed and said \"Ugh, if it's not one thing it's another\" and shook head no when asked if he wanted to participate. SLP will re-attempt as able and appropriate.     "

## 2022-07-08 NOTE — PROGRESS NOTES
Handoff report received from day shift nurse. Patient care assumed. Patient is currently resting in bed. Plan of care discussed with the patient and the patient verbalizes no questions at this time. Patient is A&O x3, on room air, and vital signs are stable. Patient states their pain is 0/10 at this time. Call light and belongings within reach, patient educated on the use of call light. All fall precautions are in place, bed is in locked and lowest position. Hourly rounding in place. Will continue plan of care.

## 2022-07-08 NOTE — PROGRESS NOTES
[facility] FAMILY MEDICINE PROGRESS NOTE     Attending: Dr. Blount      Senior Resident: Emory Urias D.O.  Family Medicine Resident PGY-3     PATIENT: Alverto Duran; [id number]; 1955     ID/Interval Events:   66 y.o. male PMHx of chronic methamphetamine use, tobacco dependence, HTN, right frontal CVA 2019, and syphilis (2020) who was admitted for headache and dizziness.   6/21- Patient admitted to [facility]   6/22- Altered level of conciousness. MRI with and without contrast showed significant edema with compression of the brain stem and obstructive hydrocephalus. He was transferred to ICU for intubation, EVD placement and hypertonic saline therapy.    6/23 - suboccipital craniectomy. New onset AFIB RVR.  6/24 - multiple DVT's BUE noted on US.    6/25 - ID consult, recommend 10 day neurosyphilis course with IV PCN q4h (end date 7/3/22). Extubated  6/26 - EVD no output overnight. Alteplase by Dr. Mello.   6/27 - IV Metoprolol for AFIB with rate to 170's  6/29 - [facility] re-assumed care, patient transferred to telemetry  7/1 - Telemetry DC'd  7/2- No acute events   7/3- Urinary retention overnight.   7/4- Passed barium swallow study, was started on diet.  7/5- No acute events      SUBJECTIVE: Patient had hernandez placed yesterday. He was refusing his medications this morning per nursing staff. After agreeing to take medication, he later refused medications again.     OBJECTIVE:     Vitals:    07/07/22 1552 07/07/22 1554 07/07/22 2014 07/08/22 0514   BP: (!) 80/45 101/60 105/64 103/64   Pulse: 72  73 82   Resp: 16  16 12   Temp: 36.8 °C (98.2 °F)  37.1 °C (98.8 °F) 36.7 °C (98.1 °F)   TempSrc: Temporal  Temporal Temporal   SpO2: 93%  97% 92%   Weight:    56 kg (123 lb 7.3 oz)   Height:           Intake/Output Summary (Last 24 hours) at 7/8/2022 0613  Last data filed at 7/7/2022 2030  Gross per 24 hour   Intake 60 ml   Output --   Net 60 ml       PE:  General: resting in bed, no acute distress  HEENT: surgical scar on  "scalp. NG tube in place.   Cardiovascular: RRR. No murmer.   Respiratory: CTAB, normal respiratory effort  Abdomen:  soft, nontender, nondistended  EXT:  No pitting edema noted  Neuro: No focal deficits. Moving all 4 extremities.     LABS:  Recent Labs     07/07/22  1059   WBC 7.3   RBC 5.22   HEMOGLOBIN 14.2   HEMATOCRIT 43.9   MCV 84.1   MCH 27.2   RDW 43.3   PLATELETCT 520*   MPV 11.3   NEUTSPOLYS 58.00   LYMPHOCYTES 30.20   MONOCYTES 8.70   EOSINOPHILS 2.00   BASOPHILS 0.70     Recent Labs     07/07/22  1059   SODIUM 135   POTASSIUM 4.9   CHLORIDE 101   CO2 23   BUN 24*   CREATININE 1.03   CALCIUM 9.8   MAGNESIUM 2.3     Estimated GFR/CRCL = Estimated Creatinine Clearance: 55.9 mL/min (by C-G formula based on SCr of 1.03 mg/dL).  Recent Labs     07/07/22  1059   GLUCOSE 111*                 No results for input(s): INR, APTT, FIBRINOGEN in the last 72 hours.    Invalid input(s): DIMER    MICROBIOLOGY:   Results     Procedure Component Value Units Date/Time    BLOOD CULTURE [[id number]] Collected: 06/30/22 1606    Order Status: Completed Specimen: Blood from Peripheral Updated: 07/05/22 1700     Significant Indicator NEG     Source BLD     Site PERIPHERAL     Culture Result No growth after 5 days of incubation.    Narrative:      Per Hospital Policy: Only change Specimen Src: to \"Line\" if  specified by physician order.  Right AC    BLOOD CULTURE [[id number]] Collected: 06/30/22 1443    Order Status: Completed Specimen: Blood from Peripheral Updated: 07/05/22 1500     Significant Indicator NEG     Source BLD     Site PERIPHERAL     Culture Result No growth after 5 days of incubation.    Narrative:      Per Hospital Policy: Only change Specimen Src: to \"Line\" if  specified by physician order.  Right AC    URINALYSIS [[id number]]     Order Status: No result Specimen: Urine     URINE CULTURE(NEW) [[id number]] Collected: 06/30/22 1511    Order Status: Completed Specimen: Urine, Muse Cath Updated: 07/02/22 0659     " Significant Indicator NEG     Source UR     Site URINE, LONGO CATH     Culture Result No growth at 48 hours.    Narrative:      Collected By: [id number] LINO MARTINEZ.  Indication for culture:->Evaluation for sepsis without a  clear source of infection  Collected By: [id number] LINO MARTINEZ.            IMAGING:   CT-HEAD W/O   Final Result      1.  Interval removal of the right frontal approach EVD. No significant hydrocephalus.   2.  Evolving infarct of the left cerebellum with small amount of associated petechial hemorrhage.   3.  Resolution of the other intracranial hemorrhages. No new intracranial hemorrhage.   4.  Right frontal lobe encephalomalacia.   5.  Stable post surgical changes as above.         DX-ABDOMEN FOR TUBE PLACEMENT   Final Result      1. The tip of the enteric tube terminates over the second portion of the duodenum.   2. The remainder of the bowel gas pattern is within normal limits.      DX-ESOPHAGUS - [id number]   Final Result      Fluoroscopy provided for cookie swallow evaluation. Please refer to speech pathology report for details      DX-ABDOMEN FOR TUBE PLACEMENT   Final Result         1.  Nonspecific bowel gas pattern.   2.  Dobbhoff tube tip overlying the expected location of the pylorus or first duodenal segment.      DX-CHEST-PORTABLE (1 VIEW)   Final Result      1.  No acute cardiopulmonary disease.   2.  Supportive tubing as described above.      MR-BRAIN-WITH & W/O   Final Result         Subacute left inferior cerebellar infarct with minimal blood products within representing petechial microhemorrhages. Mass effect upon the fourth ventricle remains stable.      Posterior fossa is well decompressed by midline occipital craniotomy. Lateral ventricles are well decompressed by a right frontal approach EVD with its tip in the frontal horn of the right lateral ventricle.      Small right occipital tentorial subdural hematoma without mass effect.      Remote bilateral  frontal infarct with encephalomalacia.      Age-related volume loss and chronic microvascular ischemic changes.         US-EXTREMITY VENOUS LOWER BILAT   Final Result      CT-HEAD W/O   Final Result      1.  There is a small amount of intraventricular blood that is new from 6/23/2022. The ventricles are stable in size.   2.  There is a right frontal approach ventriculostomy catheter with the tip terminating at the foramen of Monro. There is a punctate amount of parenchymal hemorrhage surrounding the catheter is unchanged.   3.  Right frontal and left cerebellar encephalomalacia.      EC-ECHOCARDIOGRAM COMPLETE W/O CONT   Final Result      US-CAROTID DOPPLER BILAT   Final Result      US-EXTREMITY VENOUS UPPER BILAT   Final Result      DX-CHEST-PORTABLE (1 VIEW)   Final Result         1.  No acute cardiopulmonary disease.   2.  Trace right pleural effusion   3.  Atherosclerosis      CT-CTA NECK WITH & W/O-POST PROCESSING   Final Result         1.  Severely hypoplastic right vertebral artery, central segment of the right vertebral artery is not visualized and may be occluded.         CT-CTA HEAD WITH & W/O-POST PROCESS   Final Result         1.  No large vessel occlusion or aneurysm identified   2.  Interval placement of right frontal approach ventriculostomy with postprocedural minimal hemorrhage and new pneumocephalus.   3.  Bilateral ventricular dilatation appears somewhat increased since prior study compatible with somewhat worsened hydrocephalus.   4.  Right frontal and left cerebellar encephalomalacia      DX-CHEST-PORTABLE (1 VIEW)   Final Result         1.  No acute cardiopulmonary disease.   2.  Trace bilateral pleural effusion      DX-ABDOMEN FOR TUBE PLACEMENT   Final Result         1.  Nonspecific bowel gas pattern.   2.  Dobbhoff tube tip terminates overlying the expected location of the gastric antrum.   3.  Trace bilateral pleural effusions      DX-CHEST-PORTABLE (1 VIEW)   Final Result         1.  No  acute cardiopulmonary disease.   2.  Trace bilateral pleural effusions   3.  Atherosclerosis      MR-BRAIN-WITH & W/O   Final Result   Addendum 1 of 1   ADDENDUM:      The case was discussed by telephone (call report) with DR. DAVONTE VIEIRA    on call for [facility], at 1854 hours.      Final      1.  Interval development of moderate obstructive hydrocephalus due to mass effect on the fourth ventricle. There is mild transependymal edema.   2.  Large area of acute infarction involving the left cerebellar hemisphere, inferior cerebellar vermis, and left cerebellar tonsil. PICA territory. No hemorrhagic transformation. This is the cause of the fourth ventricle effacement with obstructive    hydrocephalus. There is also mild left-sided cerebellar tonsillar herniation.   3.  Moderate supratentorial white matter disease most consistent with microvascular ischemic change.   4.  Old infarction right anterior frontal convexity.   5.  A Voalte message was sent to RICKEY HAYES at 1823 hours 6/22/2022. Prompt reply as of 6:41 PM not yet received. Efforts are ongoing to contact housestaff managing the patient at this time.   6.  Case was discussed (call report) with nurse SHASTA MAGILL at 6:47 PM. Request to initiate stat neurosurgery consult. Attempts to contact [facility] on-call housestaff are ongoing.      CT-HEAD W/O   Final Result      1.  No evidence of acute hemorrhage, mass or large territorial infarction   2.  LEFT cerebellar and RIGHT frontal encephalomalacia   3.  Mild atrophy   4.  Mild white matter changes         CT-CSPINE WITHOUT PLUS RECONS   Final Result      1.  No acute fracture or traumatic listhesis in the cervical spine.   2.  Emphysema in the lung apices.            MEDS:  Current Facility-Administered Medications   Medication Last Admin   • omeprazole (FIRST-OMEPRAZOLE) 2 mg/mL oral susp 40 mg     • tamsulosin (FLOMAX) capsule 0.4 mg 0.4 mg at 07/06/22 1131   • nicotine (NICODERM) 14 MG/24HR  14 mg 14 mg at 07/05/22 0540   • nystatin (MYCOSTATIN) 198241 UNIT/ML suspension 500,000 Units 500,000 Units at 07/06/22 1751   • amLODIPine (NORVASC) tablet 5 mg 5 mg at 07/07/22 0608   • atorvastatin (LIPITOR) tablet 40 mg 40 mg at 07/07/22 1836   • metoprolol tartrate (LOPRESSOR) tablet 25 mg 25 mg at 07/07/22 1836   • hydrALAZINE (APRESOLINE) injection 20 mg 20 mg at 06/29/22 0049   • LORazepam (ATIVAN) injection 1 mg 1 mg at 07/01/22 0221   • furosemide (LASIX) injection 20 mg 20 mg at 07/07/22 0608   • lisinopril (PRINIVIL) tablet 40 mg 40 mg at 07/07/22 0608   • Metoprolol Tartrate (LOPRESSOR) injection 5 mg     • LORazepam (ATIVAN) injection 2-4 mg     • enalaprilat (Vasotec) injection 1.25 mg 1 mL 1.25 mg at 06/28/22 2032   • Respiratory Therapy Consult     • Pharmacy Consult: Enteral tube insertion - review meds/change route/product selection     • acetaminophen (Tylenol) tablet 650 mg 650 mg at 07/02/22 1746   • ondansetron (ZOFRAN ODT) dispertab 4 mg     • senna-docusate (PERICOLACE or SENOKOT S) 8.6-50 MG per tablet 2 Tablet 2 Tablet at 07/08/22 0550    And   • polyethylene glycol/lytes (MIRALAX) PACKET 1 Packet      And   • magnesium hydroxide (MILK OF MAGNESIA) suspension 30 mL      And   • bisacodyl (DULCOLAX) suppository 10 mg     • ondansetron (ZOFRAN) syringe/vial injection 4 mg     • Pharmacy Consult Request ...Pain Management Review 1 Each         PROBLEM LIST:  No problems updated.    ASSESSMENT/PLAN: 66 y.o. male PMHx of chronic methamphetamine use, tobacco dependence, HTN, right frontal CVA 2019, and syphilis (2020) who admitted for headache and dizziness. He was found to have encephalomalacia in the left cerebellar and right frontal areas on CT head without contrast. Patient was initially admitted to the floor under the [facility] Family Medicine service, but later transferred to ICU for decreased level of consciousness. 6/22 he developed decreased LOC with obtundation. In ICU he underwent  intubation, EVD placement and hypertonic saline therapy. He was eventually transferred out of ICU, [facility] resumed care.      # Acute CVA due to occlusion of the left cerebellar artery  # Obstructive hydrocephalus  Large left cerebellar ischemic infarct with cerebral edema and mass-effect on fourth ventricle, Detected on MRI 6/22.  - s/p suboccipital decompression with craniectomy and C1 laminectomy  - s/p right frontal EVD, removed 6/27  - MAP goal greater than 65. Long term, BP goal of 110-130/60-80.   - Continue lasix, Lisinopril 40 mg, Amlodipine 5 mg  - Atorvastatin 40 mg - LDL goal less than 70  - PT/OT/SLP consult  - SLP consulted, passed barium swallow. Dietician following, continue nocturnal tube feeds and encourage PO daytime feeds. Discontinue cortrak when patient has adequate PO intake.   - Per neurosurgery, plan for repeat CT head  6 weeks (8/19) and okay to resume anti-coagulation   - Plan for staple removal tomorrow      #Asymetric pupillary dilation   -Left > right dilation   - This has been present during hospitalization and is not new per review of records     #Urinary retention  -Urinary retention   -Continue hernandez  -Continue flomax  -Likely secondary to altered level of consciousness      # Afib  - Rate controlled   - Continue Metoprolol BID   - Eventual plan for metoprolol succinate for longer acting, but for now must remain metoprolol tartrate BID since he is not taking PO medications       #bilateral DVT of upper extremities  - Acute, partially occlusive DVT to the R IJ with extension into the let innominate vein.    - Acute, occlusive DVT in on of the right paired brachial veins  - Acute,  occlusive DVT in Right paired ulnar veins within proximal forearm  - Occlusive superficial thrombophlebitis in a small segment of the right cephalic vein.   - Acute partially occlusive DVT in left paired brachial veins within the elbow region. Possible small extension into radial an ulnar veins   - Lower  extremity US- negative for DVT  - Plan to resume anti-coagulation      # goals of care  There have been multiple failed attempts to contact family.   -Mentation appears to be improving   -Plan to have further goals of care conversation   -SLP consulted for cog evaluation      #Neurosyphilis  ID consulted   -resolved  -S/P 10 day treatment      # methamphetamine use  # tobacco use  Plan for further discussions on cessation when appropriate     Core Measures:  DVT PPX: SCDs, resume anticoagulation   ABX: None  Lines: PIV  Fluids: NGT  PCP: none  CODE STATUS: FULL CODE     Dispo:  Inpatient for CVA recovery, long-term disposition unclear, may need LTAC, no DPOA so may need ethics consults vs. Guardian

## 2022-07-08 NOTE — CARE PLAN
The patient is Watcher - Medium risk of patient condition declining or worsening    Shift Goals  Clinical Goals: fall precautions  Patient Goals: rest  Family Goals: GLENDY    Progress made toward(s) clinical / shift goals:    Problem: Knowledge Deficit - Standard  Goal: Patient and family/care givers will demonstrate understanding of plan of care, disease process/condition, diagnostic tests and medications  Outcome: Progressing     Problem: Pain - Standard  Goal: Alleviation of pain or a reduction in pain to the patient’s comfort goal  Outcome: Progressing     Problem: Fall Risk  Goal: Patient will remain free from falls  Outcome: Progressing       Patient is not progressing towards the following goals:

## 2022-07-08 NOTE — DISCHARGE PLANNING
Case Management Discharge Planning    Admission Date: 6/21/2022  GMLOS: 7.1  ALOS: 17    6-Clicks ADL Score: 16  6-Clicks Mobility Score: 13  PT and/or OT Eval ordered: Yes  Post-acute Referrals Ordered: Yes  Post-acute Choice Obtained: Yes  Has referral(s) been sent to post-acute provider:  Yes      Anticipated Discharge Dispo: Discharge Disposition: D/T to SNF with Medicare cert in anticipation of skilled care (03) - no accepting to date; expand referrals    Discharge Address: unknown    DME Needed: No    Action(s) Taken: OTHER    Escalations Completed: None    Medically Clear: No    Next Steps:  f/u with pt and medical team to discuss dc needs and barriers.      Barriers to Discharge: Medical clearance, Pending Placement, Refusing treatment(s), No Social Support and Homelessness

## 2022-07-08 NOTE — PROGRESS NOTES
Pt continues to be extremely combative. RN had a male RN at bedside to attempt to calm pt, but this was unsuccessful. BUE and BLE soft limb restraints were placed for hernandez placement. RN paged Dr Laurent Tomas, the on call resident. RN discussed current pt's behavior. Orders received for soft upper restraints. BLE restraints removed. Restraint protocol initiated.

## 2022-07-08 NOTE — CARE PLAN
The patient is Stable - Low risk of patient condition declining or worsening    Shift Goals  Clinical Goals: Safety  Patient Goals: Sleep  Family Goals: GLENDY    Progress made toward(s) clinical / shift goals:        Problem: Pain - Standard  Goal: Alleviation of pain or a reduction in pain to the patient’s comfort goal  Outcome: Progressing     Problem: Fall Risk  Goal: Patient will remain free from falls  Outcome: Progressing     Problem: Skin Integrity  Goal: Skin integrity is maintained or improved  Outcome: Progressing     Problem: Optimal Care of the Stroke Patient  Goal: Optimal emergency care for the stroke patient  Outcome: Progressing  Goal: Optimal acute care for the stroke patient  Outcome: Progressing     Problem: Neuro Status  Goal: Neuro status will remain stable or improve  Outcome: Progressing     Problem: Hemodynamic Monitoring  Goal: Patient's hemodynamics, fluid balance and neurologic status will be stable or improve  Outcome: Progressing     Problem: Respiratory - Stroke Patient  Goal: Patient will achieve/maintain optimum respiratory rate/effort  Outcome: Progressing

## 2022-07-08 NOTE — PROGRESS NOTES
Hernandez removed by night shift this AM. Pt still has not voided and is refusing bladder scan. Pt also refusing to attempt to void or sit on the bedside commode. RN attempted to educate pt but he was very agitated and refused any attempt at voiding. RN notified Bridgett YOST. Orders placed to place hernandez catheter.

## 2022-07-08 NOTE — PROGRESS NOTES
Neurosurgery Progress Note    Subjective:  No acute events    Exam:  A&O, fluent speech   FAC   Pt minimally cooperative for exam  FC with coaching, TORRES  EOM intact, PERRL  Incision CDI, staples intact     BP  Min: 80/45  Max: 113/73  Pulse  Av  Min: 72  Max: 85  Resp  Av.8  Min: 12  Max: 16  Temp  Av.7 °C (98.1 °F)  Min: 36.3 °C (97.4 °F)  Max: 37.1 °C (98.8 °F)  SpO2  Av.5 %  Min: 92 %  Max: 97 %    No data recorded    Recent Labs     22  1059   WBC 7.3   RBC 5.22   HEMOGLOBIN 14.2   HEMATOCRIT 43.9   MCV 84.1   MCH 27.2   MCHC 32.3*   RDW 43.3   PLATELETCT 520*   MPV 11.3     Recent Labs     22  1059   SODIUM 135   POTASSIUM 4.9   CHLORIDE 101   CO2 23   GLUCOSE 111*   BUN 24*   CREATININE 1.03   CALCIUM 9.8               Intake/Output                       22 - 22 0659 22 - 22 0659      0569-6152 Total  7695-3244 Total                 Intake    P.O.  --  120 120  --  -- --    P.O. -- 120 120 -- -- --    Total Intake -- 120 120 -- -- --       Output    Total Output -- -- -- -- -- --       Net I/O     -- 120 120 -- -- --            Intake/Output Summary (Last 24 hours) at 2022 0935  Last data filed at 2022 0600  Gross per 24 hour   Intake 120 ml   Output --   Net 120 ml            • omeprazole  40 mg DAILY   • tamsulosin  0.4 mg AFTER BREAKFAST   • nicotine  14 mg Daily-06   • nystatin  5 mL 4X/DAY   • amLODIPine  5 mg Q DAY   • atorvastatin  40 mg Q EVENING   • metoprolol tartrate  25 mg TWICE DAILY   • hydrALAZINE  20 mg Q6HRS PRN   • LORazepam  1 mg Q4HRS PRN   • furosemide  20 mg Q DAY   • lisinopril  40 mg Q DAY   • Metoprolol Tartrate  5 mg Q5 MIN PRN   • LORazepam  2-4 mg Q4HRS PRN   • enalaprilat  1.25 mg Q6HRS PRN   • Respiratory Therapy Consult   Continuous RT   • Pharmacy  1 Each PHARMACY TO DOSE   • acetaminophen  650 mg Q6HRS PRN   • ondansetron  4 mg Q4HRS PRN   • senna-docusate  2 Tablet BID    And   •  polyethylene glycol/lytes  1 Packet QDAY PRN    And   • magnesium hydroxide  30 mL QDAY PRN    And   • bisacodyl  10 mg QDAY PRN   • ondansetron  4 mg Q4HRS PRN   • Pharmacy Consult Request  1 Each PHARMACY TO DOSE       Assessment and Plan:  Hospital day #17 left cerebellar stroke  POD #14 right frontal EVD  POD #15 craniectomy, C1 lami  Prophylactic anticoagulation: no         Start date/time: tbd    Plan:   Neuro exam stable  DC staples, keep incision clean and dry   Okay to DC per NSGY POV   CT head stable   Okay for ASA or anticoags   Will f/u in 6 weeks with CT Head w/o

## 2022-07-08 NOTE — DISCHARGE PLANNING
Received Choice form at 0803  Agency/Facility Name: [facility]   Referral sent per Choice form @ 0807     0916  Agency/Facility Name: [facility]   Spoke To: Radha   Outcome: Declined due to non-contracted insurance.

## 2022-07-09 NOTE — CARE PLAN
The patient is Watcher - Medium risk of patient condition declining or worsening    Shift Goals  Clinical Goals: Safety  Patient Goals: Sleep  Family Goals: GLENDY    Problem: Knowledge Deficit - Standard  Goal: Patient and family/care givers will demonstrate understanding of plan of care, disease process/condition, diagnostic tests and medications  Outcome: Not Progressing  Note: Patient noncompliant. When explaining things to the patient, the patient has no desire to listen.      Problem: Neuro Status  Goal: Neuro status will remain stable or improve  Outcome: Not Progressing  Note: GLENDY due to patient refusing to answer orientation questions.      Problem: Hemodynamic Monitoring  Goal: Patient's hemodynamics, fluid balance and neurologic status will be stable or improve  Outcome: Not Progressing     Problem: Urinary Elimination  Goal: Establish and maintain regular urinary output  Outcome: Not Progressing  Note: Retaining urine, patient has hernandez.      Problem: Knowledge Deficit - Standard  Goal: Patient and family/care givers will demonstrate understanding of plan of care, disease process/condition, diagnostic tests and medications  Outcome: Not Progressing  Note: Patient noncompliant. When explaining things to the patient, the patient has no desire to listen.      Problem: Neuro Status  Goal: Neuro status will remain stable or improve  Outcome: Not Progressing  Note: GLENDY due to patient refusing to answer orientation questions.      Problem: Hemodynamic Monitoring  Goal: Patient's hemodynamics, fluid balance and neurologic status will be stable or improve  Outcome: Not Progressing     Problem: Urinary Elimination  Goal: Establish and maintain regular urinary output  Outcome: Not Progressing  Note: Retaining urine, patient has hrenandez.

## 2022-07-09 NOTE — PROGRESS NOTES
Bedside report received from NOC RN. Assumed care of pt. Pt awake, laying in bed. A/Ox2-3, VSS. No concerns, complaints or distress. Pt educated to call before getting out of bed. POC reviewed and white board updated.   pt is medical.  Call light in reach. Bed locked in lowest position with 2 upper bed rails up. Bed alarm on.

## 2022-07-09 NOTE — PROGRESS NOTES
Went in to patient room to start tube feeds, and asked if patient if he would please sit up with head elevated to 30 degrees or higher. Patient stated that he wasn't going to keep his head at that height. Patient was educated on the importance of keeping head elevated, but patient refusing at this time. Holding tube feeds due to patient being noncompliant with instructions. Patient also refusing his Doxazosin at this time. Patient wants to be left alone. Will continue to monitor.

## 2022-07-09 NOTE — PROGRESS NOTES
"Bedside report received from nurse. Assumed care of patient. Patient resting comfortably in bed. Unable to assess orientation status due to patient refusing. When asked, patient stated \"Everything is fine.\" VSS,  All needs met. POC reviewed and white board updated. Medical patient, no tele. Call light in reach. Bed locked in lowest position with 2 upper bed rails up. No pain or complaints at this time.   "

## 2022-07-09 NOTE — PROGRESS NOTES
[facility] [place] [facility] PROGRESS NOTE     Attending: Dr. Blount      Senior Resident: Emory Urias D.O.  Family Medicine Resident PGY-3     PATIENT: Alverto Duran; [id number]; 1955     ID/Interval Events:   66 y.o. male PMHx of chronic methamphetamine use, tobacco dependence, HTN, right frontal CVA 2019, and syphilis (2020) who was admitted for headache and dizziness.   6/21- Patient admitted to [facility]   6/22- Altered level of conciousness. MRI with and without contrast showed significant edema with compression of the brain stem and obstructive hydrocephalus. He was transferred to ICU for intubation, EVD placement and hypertonic saline therapy.    6/23 - suboccipital craniectomy. New onset AFIB RVR.  6/24 - multiple DVT's BUE noted on US.    6/25 - ID consult, recommend 10 day neurosyphilis course with IV PCN q4h (end date 7/3/22). Extubated  6/26 - EVD no output overnight. Alteplase by Dr. Mello.   6/27 - IV Metoprolol for AFIB with rate to 170's  6/29 - [facility] re-assumed care, patient transferred to telemetry  7/1 - Telemetry DC'd  7/2- No acute events   7/3- Urinary retention overnight.   7/4- Passed barium swallow study, was started on diet.  7/5- No acute events     SUBJECTIVE: No acute events overnight. Patient says he has appetite today.     OBJECTIVE:     Vitals:    07/08/22 0828 07/08/22 1614 07/08/22 1941 07/09/22 0449   BP: 113/73 113/73 103/62 116/75   Pulse: 85 87 69 67   Resp: 15 16 16 16   Temp: 36.3 °C (97.4 °F) 36.3 °C (97.4 °F) 36.6 °C (97.8 °F) 36.6 °C (97.8 °F)   TempSrc: Temporal Temporal Temporal Temporal   SpO2: 92% 94% 96% 95%   Weight:   57.5 kg (126 lb 12.2 oz)    Height:           Intake/Output Summary (Last 24 hours) at 7/9/2022 0626  Last data filed at 7/8/2022 0900  Gross per 24 hour   Intake 200 ml   Output --   Net 200 ml       PE:  General: resting in bed, no acute distress  HEENT: surgical scar on scalp. NG tube in place.   Respiratory: non-labored  EXT:  No pitting  "edema noted  Neuro: No focal deficits. Moving all 4 extremities.     LABS:  Recent Labs     07/07/22  1059   WBC 7.3   RBC 5.22   HEMOGLOBIN 14.2   HEMATOCRIT 43.9   MCV 84.1   MCH 27.2   RDW 43.3   PLATELETCT 520*   MPV 11.3   NEUTSPOLYS 58.00   LYMPHOCYTES 30.20   MONOCYTES 8.70   EOSINOPHILS 2.00   BASOPHILS 0.70     Recent Labs     07/07/22  1059   SODIUM 135   POTASSIUM 4.9   CHLORIDE 101   CO2 23   BUN 24*   CREATININE 1.03   CALCIUM 9.8   MAGNESIUM 2.3     Estimated GFR/CRCL = Estimated Creatinine Clearance: 57.4 mL/min (by C-G formula based on SCr of 1.03 mg/dL).  Recent Labs     07/07/22  1059   GLUCOSE 111*                 No results for input(s): INR, APTT, FIBRINOGEN in the last 72 hours.    Invalid input(s): DIMER    MICROBIOLOGY:   Results     Procedure Component Value Units Date/Time    BLOOD CULTURE [[id number]] Collected: 06/30/22 1606    Order Status: Completed Specimen: Blood from Peripheral Updated: 07/05/22 1700     Significant Indicator NEG     Source BLD     Site PERIPHERAL     Culture Result No growth after 5 days of incubation.    Narrative:      Per Hospital Policy: Only change Specimen Src: to \"Line\" if  specified by physician order.  Right AC    BLOOD CULTURE [[id number]] Collected: 06/30/22 1443    Order Status: Completed Specimen: Blood from Peripheral Updated: 07/05/22 1500     Significant Indicator NEG     Source BLD     Site PERIPHERAL     Culture Result No growth after 5 days of incubation.    Narrative:      Per Hospital Policy: Only change Specimen Src: to \"Line\" if  specified by physician order.  Right AC    URINALYSIS [[id number]]     Order Status: No result Specimen: Urine     URINE CULTURE(NEW) [[id number]] Collected: 06/30/22 1511    Order Status: Completed Specimen: Urine, [place] Cath Updated: 07/02/22 0659     Significant Indicator NEG     Source UR     Site URINE, LONGO CATH     Culture Result No growth at 48 hours.    Narrative:      Collected By: [id number] LINO MEDRANO " MICHELLE.  Indication for culture:->Evaluation for sepsis without a  clear source of infection  Collected By: [id number] LINO CAMACHOAH SERGEY            IMAGING:   CT-HEAD W/O   Final Result      1.  Interval removal of the right frontal approach EVD. No significant hydrocephalus.   2.  Evolving infarct of the left cerebellum with small amount of associated petechial hemorrhage.   3.  Resolution of the other intracranial hemorrhages. No new intracranial hemorrhage.   4.  Right frontal lobe encephalomalacia.   5.  Stable post surgical changes as above.         DX-ABDOMEN FOR TUBE PLACEMENT   Final Result      1. The tip of the enteric tube terminates over the second portion of the duodenum.   2. The remainder of the bowel gas pattern is within normal limits.      DX-ESOPHAGUS - [id number]   Final Result      Fluoroscopy provided for cookie swallow evaluation. Please refer to speech pathology report for details      DX-ABDOMEN FOR TUBE PLACEMENT   Final Result         1.  Nonspecific bowel gas pattern.   2.  Dobbhoff tube tip overlying the expected location of the pylorus or first duodenal segment.      DX-CHEST-PORTABLE (1 VIEW)   Final Result      1.  No acute cardiopulmonary disease.   2.  Supportive tubing as described above.      MR-BRAIN-WITH & W/O   Final Result         Subacute left inferior cerebellar infarct with minimal blood products within representing petechial microhemorrhages. Mass effect upon the fourth ventricle remains stable.      Posterior fossa is well decompressed by midline occipital craniotomy. Lateral ventricles are well decompressed by a right frontal approach EVD with its tip in the frontal horn of the right lateral ventricle.      Small right occipital tentorial subdural hematoma without mass effect.      Remote bilateral frontal infarct with encephalomalacia.      Age-related volume loss and chronic microvascular ischemic changes.         US-EXTREMITY VENOUS LOWER BILAT   Final Result       CT-HEAD W/O   Final Result      1.  There is a small amount of intraventricular blood that is new from 6/23/2022. The ventricles are stable in size.   2.  There is a right frontal approach ventriculostomy catheter with the tip terminating at the foramen of Monro. There is a punctate amount of parenchymal hemorrhage surrounding the catheter is unchanged.   3.  Right frontal and left cerebellar encephalomalacia.      EC-ECHOCARDIOGRAM COMPLETE W/O CONT   Final Result      US-CAROTID DOPPLER BILAT   Final Result      US-EXTREMITY VENOUS UPPER BILAT   Final Result      DX-CHEST-PORTABLE (1 VIEW)   Final Result         1.  No acute cardiopulmonary disease.   2.  Trace right pleural effusion   3.  Atherosclerosis      CT-CTA NECK WITH & W/O-POST PROCESSING   Final Result         1.  Severely hypoplastic right vertebral artery, central segment of the right vertebral artery is not visualized and may be occluded.         CT-CTA HEAD WITH & W/O-POST PROCESS   Final Result         1.  No large vessel occlusion or aneurysm identified   2.  Interval placement of right frontal approach ventriculostomy with postprocedural minimal hemorrhage and new pneumocephalus.   3.  Bilateral ventricular dilatation appears somewhat increased since prior study compatible with somewhat worsened hydrocephalus.   4.  Right frontal and left cerebellar encephalomalacia      DX-CHEST-PORTABLE (1 VIEW)   Final Result         1.  No acute cardiopulmonary disease.   2.  Trace bilateral pleural effusion      DX-ABDOMEN FOR TUBE PLACEMENT   Final Result         1.  Nonspecific bowel gas pattern.   2.  Dobbhoff tube tip terminates overlying the expected location of the gastric antrum.   3.  Trace bilateral pleural effusions      DX-CHEST-PORTABLE (1 VIEW)   Final Result         1.  No acute cardiopulmonary disease.   2.  Trace bilateral pleural effusions   3.  Atherosclerosis      MR-BRAIN-WITH & W/O   Final Result   Addendum 1 of 1   ADDENDUM:      The  case was discussed by telephone (call report) with DR. DAVONTE VIEIRA    on call for [facility], at 1854 hours.      Final      1.  Interval development of moderate obstructive hydrocephalus due to mass effect on the fourth ventricle. There is mild transependymal edema.   2.  Large area of acute infarction involving the left cerebellar hemisphere, inferior cerebellar vermis, and left cerebellar tonsil. PICA territory. No hemorrhagic transformation. This is the cause of the fourth ventricle effacement with obstructive    hydrocephalus. There is also mild left-sided cerebellar tonsillar herniation.   3.  Moderate supratentorial white matter disease most consistent with microvascular ischemic change.   4.  Old infarction right anterior frontal convexity.   5.  A Voalte message was sent to RICKEY HAYES at 1823 hours 6/22/2022. Prompt reply as of 6:41 PM not yet received. Efforts are ongoing to contact housestaff managing the patient at this time.   6.  Case was discussed (call report) with nurse SHASTA MAGILL at 6:47 PM. Request to initiate stat neurosurgery consult. Attempts to contact [facility] on-call housestaff are ongoing.      CT-HEAD W/O   Final Result      1.  No evidence of acute hemorrhage, mass or large territorial infarction   2.  LEFT cerebellar and RIGHT frontal encephalomalacia   3.  Mild atrophy   4.  Mild white matter changes         CT-CSPINE WITHOUT PLUS RECONS   Final Result      1.  No acute fracture or traumatic listhesis in the cervical spine.   2.  Emphysema in the lung apices.            MEDS:  Current Facility-Administered Medications   Medication Last Admin   • apixaban (ELIQUIS) tablet 5 mg 5 mg at 07/09/22 0618   • doxazosin (CARDURA) tablet 1 mg     • furosemide (LASIX) tablet 40 mg 40 mg at 07/09/22 0617   • omeprazole (FIRST-OMEPRAZOLE) 2 mg/mL oral susp 40 mg 40 mg at 07/09/22 0622   • nicotine (NICODERM) 14 MG/24HR 14 mg 14 mg at 07/09/22 0616   • amLODIPine (NORVASC) tablet  5 mg 5 mg at 07/09/22 0618   • atorvastatin (LIPITOR) tablet 40 mg 40 mg at 07/08/22 1746   • metoprolol tartrate (LOPRESSOR) tablet 25 mg 25 mg at 07/09/22 0617   • hydrALAZINE (APRESOLINE) injection 20 mg 20 mg at 06/29/22 0049   • LORazepam (ATIVAN) injection 1 mg 1 mg at 07/01/22 0221   • lisinopril (PRINIVIL) tablet 40 mg 40 mg at 07/09/22 0617   • Metoprolol Tartrate (LOPRESSOR) injection 5 mg     • LORazepam (ATIVAN) injection 2-4 mg     • enalaprilat (Vasotec) injection 1.25 mg 1 mL 1.25 mg at 06/28/22 2032   • Respiratory Therapy Consult     • Pharmacy Consult: Enteral tube insertion - review meds/change route/product selection     • acetaminophen (Tylenol) tablet 650 mg 650 mg at 07/02/22 1746   • ondansetron (ZOFRAN ODT) dispertab 4 mg     • senna-docusate (PERICOLACE or SENOKOT S) 8.6-50 MG per tablet 2 Tablet 2 Tablet at 07/09/22 0617    And   • polyethylene glycol/lytes (MIRALAX) PACKET 1 Packet      And   • magnesium hydroxide (MILK OF MAGNESIA) suspension 30 mL      And   • bisacodyl (DULCOLAX) suppository 10 mg     • ondansetron (ZOFRAN) syringe/vial injection 4 mg     • Pharmacy Consult Request ...Pain Management Review 1 Each         PROBLEM LIST:  No problems updated.    ASSESSMENT/PLAN: 66 y.o. male PMHx of chronic methamphetamine use, tobacco dependence, HTN, right frontal CVA 2019, and syphilis (2020) who admitted for headache and dizziness. He was found to have encephalomalacia in the left cerebellar and right frontal areas on CT head without contrast. Patient was initially admitted to the floor under the [facility] Family Medicine service, but later transferred to ICU for decreased level of consciousness. 6/22 he developed decreased LOC with obtundation. In ICU he underwent intubation, EVD placement and hypertonic saline therapy. He was eventually transferred out of ICU, [facility] FM resumed care.      # Acute CVA due to occlusion of the left cerebellar artery  # Obstructive hydrocephalus  Large left  cerebellar ischemic infarct with cerebral edema and mass-effect on fourth ventricle, Detected on MRI 6/22.  - s/p suboccipital decompression with craniectomy and C1 laminectomy  - s/p right frontal EVD, removed 6/27  - MAP goal greater than 65. Long term, BP goal of 110-130/60-80.   - Continue lasix, Lisinopril 40 mg, Amlodipine 5 mg  - Atorvastatin 40 mg - LDL goal less than 70  - PT/OT/SLP consult  - SLP consulted, passed barium swallow. Dietician following, continue nocturnal tube feeds and encourage PO daytime feeds. Discontinue cortrak when patient has adequate PO intake.   - Per neurosurgery, plan for repeat CT head 8/19   - Consider mirtazapine to increase appetite and help with mood      #Asymetric pupillary dilation   -Left > right dilation   - This has been present during hospitalization and is not new per review of records     #Urinary retention  -Urinary retention   -Continue hernandez  -Continue flomax     # Afib  - Rate controlled   - Continue Metoprolol BID   - Eventual plan for metoprolol succinate for longer acting, but for now must remain metoprolol tartrate BID since he is not taking PO medications       #bilateral DVT of upper extremities  - Acute, partially occlusive DVT to the R IJ with extension into the let innominate vein.    - Acute, occlusive DVT in on of the right paired brachial veins  - Acute,  occlusive DVT in Right paired ulnar veins within proximal forearm  - Occlusive superficial thrombophlebitis in a small segment of the right cephalic vein.   - Acute partially occlusive DVT in left paired brachial veins within the elbow region. Possible small extension into radial an ulnar veins   - Lower extremity US- negative for DVT  - Plan to resume anti-coagulation       # goals of care  There have been multiple failed attempts to contact family.   -Mentation appears to be improved   -Plan to have further goals of care conversation   -Consider Psychiatry consultation for capacity evaluation       #Neurosyphilis  ID consulted   -resolved  -S/P 10 day treatment      # methamphetamine use  # tobacco use  Plan for further discussions on cessation when appropriate     Core Measures:  DVT PPX: SCDs, Elequis   ABX: None  Lines: PIV  Fluids: NGT  PCP: none  CODE STATUS: FULL CODE     Dispo:  Inpatient for CVA recovery, long-term disposition unclear, may need LTAC, no DPOA so may need ethics consults vs. Guardian

## 2022-07-10 NOTE — CARE PLAN
The patient is Stable - Low risk of patient condition declining or worsening    Shift Goals  Clinical Goals: increase PO intake, reorient  Patient Goals: rest, comfort  Family Goals: GLENDY    Progress made toward(s) clinical / shift goals:  yes    Patient is not progressing towards the following goals:      Problem: Psychosocial - Patient Condition  Goal: Patient's ability to verbalize feelings about condition will improve  Outcome: Not Progressing     Problem: Pain - Standard  Goal: Alleviation of pain or a reduction in pain to the patient’s comfort goal  Outcome: Progressing     Problem: Fall Risk  Goal: Patient will remain free from falls  Outcome: Progressing     Problem: Skin Integrity  Goal: Skin integrity is maintained or improved  Outcome: Progressing

## 2022-07-10 NOTE — PROGRESS NOTES
"Went in to patient's room to give morning medications and patient stated \"I don't need any meds, thank you.\" I asked the patient again if I could give them to him and he said, \"No.\"  Charted as refused.   "

## 2022-07-10 NOTE — PROGRESS NOTES
Dr Simms notified, pt reporting 9/10 headache. Per pt, headache got worse over time. Per Dr Simms, he will be at the bedside to talk to pt.

## 2022-07-10 NOTE — CARE PLAN
The patient is Stable - Low risk of patient condition declining or worsening    Shift Goals  Clinical Goals: reorient, increase PO intake  Patient Goals: GLENDY  Family Goals: GLENDY    Progress made toward(s) clinical / shift goals:  yes    Patient is not progressing towards the following goals:      Problem: Psychosocial - Patient Condition  Goal: Patient's ability to verbalize feelings about condition will improve  Outcome: Not Progressing     Problem: Fall Risk  Goal: Patient will remain free from falls  Outcome: Progressing     Problem: Skin Integrity  Goal: Skin integrity is maintained or improved  Outcome: Progressing     Problem: Neuro Status  Goal: Neuro status will remain stable or improve  Outcome: Progressing

## 2022-07-10 NOTE — PROGRESS NOTES
Bedside report received from nurse. Assumed care of patient. Patient resting comfortably in bed. A/Ox2. Disoriented to situation and time, VSS,  All needs met. POC reviewed and white board updated. Medical patient, no tele box. Call light in reach. Bed locked in lowest position with 2 upper bed rails up. No pain or complaints at this time. Telesitter in room due to patient being a high fall risk and being impulsive.

## 2022-07-10 NOTE — PROGRESS NOTES
"Patient's linens soiled with a pudding. Patient declining linen change. Pt stating,\" Just leave me alone\". Will attempt to offer linen change again later.  "

## 2022-07-10 NOTE — PROGRESS NOTES
"Attempted to give patient his Doxazosin medication through [employer], but patient refused and said, \"I don't need any medicine.\" Patient was educated on the importance, but continued to refuse.   Patient is also to receive Fibersource HF through [employer] from [id number], but patient refuses to keep head above 30 degrees. Patient also educated on the importance of this as well. Will continue to monitor.   "

## 2022-07-10 NOTE — CARE PLAN
The patient is Watcher - Medium risk of patient condition declining or worsening    Shift Goals  Clinical Goals: Safety, Education, Reorientation  Patient Goals: Rest  Family Goals: GLENDY    Progress made toward(s) clinical / shift goals:    Problem: Knowledge Deficit - Standard  Goal: Patient and family/care givers will demonstrate understanding of plan of care, disease process/condition, diagnostic tests and medications  Outcome: Not Progressing  Note: Patient not compliant     Problem: Knowledge Deficit - Stroke Education  Goal: Patient's knowledge of stroke and risk factors will improve  Outcome: Not Progressing  Note: Non compliant.      Problem: Psychosocial - Patient Condition  Goal: Patient's ability to verbalize feelings about condition will improve  Outcome: Not Progressing  Note: Patient refusing care/treatment at times.      Problem: Neuro Status  Goal: Neuro status will remain stable or improve  Outcome: Not Progressing  Note: Unsure of baseline, but patient alert and oriented X 2. Disoriented to time and situation.      Problem: Self Care  Goal: Patient will have the ability to perform ADLs independently or with assistance (bathe, groom, dress, toilet and feed)  Outcome: Not Progressing  Note: Patient non compliant and refuses to do a lot     Problem: Pain - Standard  Goal: Alleviation of pain or a reduction in pain to the patient’s comfort goal  Outcome: Progressing  Note: No pain.      Problem: Fall Risk  Goal: Patient will remain free from falls  Outcome: Progressing  Note: Bed alarm on, bed locked in lowest position, room close to nursing station, call light within reach, fall risk sign on door, fall risk bracelet on, telesitter in room.      Problem: Skin Integrity  Goal: Skin integrity is maintained or improved  Outcome: Progressing  Note: Patient repositions self.      Problem: Hemodynamic Monitoring  Goal: Patient's hemodynamics, fluid balance and neurologic status will be stable or  improve  Outcome: Progressing  Note: Vitals stable.       Patient is not progressing towards the following goals:      Problem: Knowledge Deficit - Standard  Goal: Patient and family/care givers will demonstrate understanding of plan of care, disease process/condition, diagnostic tests and medications  Outcome: Not Progressing  Note: Patient not compliant     Problem: Knowledge Deficit - Stroke Education  Goal: Patient's knowledge of stroke and risk factors will improve  Outcome: Not Progressing  Note: Non compliant.      Problem: Psychosocial - Patient Condition  Goal: Patient's ability to verbalize feelings about condition will improve  Outcome: Not Progressing  Note: Patient refusing care/treatment at times.      Problem: Neuro Status  Goal: Neuro status will remain stable or improve  Outcome: Not Progressing  Note: Unsure of baseline, but patient alert and oriented X 2. Disoriented to time and situation.      Problem: Self Care  Goal: Patient will have the ability to perform ADLs independently or with assistance (bathe, groom, dress, toilet and feed)  Outcome: Not Progressing  Note: Patient non compliant and refuses to do a lot

## 2022-07-10 NOTE — PROGRESS NOTES
Bedside report received from NOC RN. Assumed care of pt. Pt awake, laying in bed. A/Ox1, disoriented to time, place, event. VSS. No concerns, complaints or distress. Pt educated to call before getting out of bed. POC reviewed and white board updated. Tele box on. Pt is medical.  Call light in reach. Bed locked in lowest position with 2 upper bed rails up. Bed alarm on.

## 2022-07-10 NOTE — PROGRESS NOTES
[facility] FAMILY MEDICINE PROGRESS NOTE     Attending: Domo Vargas MD  Senior Resident: Timo Simms MD (PGY-3)  Giuseppe Resident: Herbert Martinez MD (PGY-1)    PATIENT: Alverto Duran; [id number]; 1955    Subjective: No acute overnight events. Patient refusing oral medication this morning. Eating more than 50% of meals per nursing staff. Alverto minimally conversive with physicians.    OBJECTIVE:  Temp:  [36.3 °C (97.4 °F)-37.2 °C (98.9 °F)] 36.4 °C (97.5 °F)  Pulse:  [75-87] 80  Resp:  [14-16] 16  BP: ()/(60-68) 100/68  SpO2:  [98 %-100 %] 98 %    Intake/Output Summary (Last 24 hours) at 7/10/2022 1301  Last data filed at 7/10/2022 0618  Gross per 24 hour   Intake 360 ml   Output 700 ml   Net -340 ml       PE:  General: Cachectic appearing man in no acute distress, resting on arrival to room  HEENT: Normocephalic, atraumatic  Cardiovascular: RRR, no murmurs, gallops, or rubs  Pulmonary: CTAB, symmetrical chest expansion, no rales, rhonchi, or wheezes  Abdominal: Non-tender to palpation, no guarding, rigidity, or distension  Extremities: Moves all spontaneously, bilateral calves non-tender to palpation, no pedal edema  Neurological: Alert, orientation not assessed     LABS:  No new labs in past 72 hours.      IMAGING:  CT-HEAD W/O   Final Result      1.  Interval removal of the right frontal approach EVD. No significant hydrocephalus.   2.  Evolving infarct of the left cerebellum with small amount of associated petechial hemorrhage.   3.  Resolution of the other intracranial hemorrhages. No new intracranial hemorrhage.   4.  Right frontal lobe encephalomalacia.   5.  Stable post surgical changes as above.         DX-ABDOMEN FOR TUBE PLACEMENT   Final Result      1. The tip of the enteric tube terminates over the second portion of the duodenum.   2. The remainder of the bowel gas pattern is within normal limits.      DX-ESOPHAGUS - [id number]   Final Result      Fluoroscopy provided for cookie  swallow evaluation. Please refer to speech pathology report for details      DX-ABDOMEN FOR TUBE PLACEMENT   Final Result         1.  Nonspecific bowel gas pattern.   2.  Dobbhoff tube tip overlying the expected location of the pylorus or first duodenal segment.      DX-CHEST-PORTABLE (1 VIEW)   Final Result      1.  No acute cardiopulmonary disease.   2.  Supportive tubing as described above.      MR-BRAIN-WITH & W/O   Final Result         Subacute left inferior cerebellar infarct with minimal blood products within representing petechial microhemorrhages. Mass effect upon the fourth ventricle remains stable.      Posterior fossa is well decompressed by midline occipital craniotomy. Lateral ventricles are well decompressed by a right frontal approach EVD with its tip in the frontal horn of the right lateral ventricle.      Small right occipital tentorial subdural hematoma without mass effect.      Remote bilateral frontal infarct with encephalomalacia.      Age-related volume loss and chronic microvascular ischemic changes.         US-EXTREMITY VENOUS LOWER BILAT   Final Result      CT-HEAD W/O   Final Result      1.  There is a small amount of intraventricular blood that is new from 6/23/2022. The ventricles are stable in size.   2.  There is a right frontal approach ventriculostomy catheter with the tip terminating at the foramen of Monro. There is a punctate amount of parenchymal hemorrhage surrounding the catheter is unchanged.   3.  Right frontal and left cerebellar encephalomalacia.      EC-ECHOCARDIOGRAM COMPLETE W/O CONT   Final Result      US-CAROTID DOPPLER BILAT   Final Result      US-EXTREMITY VENOUS UPPER BILAT   Final Result      DX-CHEST-PORTABLE (1 VIEW)   Final Result         1.  No acute cardiopulmonary disease.   2.  Trace right pleural effusion   3.  Atherosclerosis      CT-CTA NECK WITH & W/O-POST PROCESSING   Final Result         1.  Severely hypoplastic right vertebral artery, central segment  of the right vertebral artery is not visualized and may be occluded.         CT-CTA HEAD WITH & W/O-POST PROCESS   Final Result         1.  No large vessel occlusion or aneurysm identified   2.  Interval placement of right frontal approach ventriculostomy with postprocedural minimal hemorrhage and new pneumocephalus.   3.  Bilateral ventricular dilatation appears somewhat increased since prior study compatible with somewhat worsened hydrocephalus.   4.  Right frontal and left cerebellar encephalomalacia      DX-CHEST-PORTABLE (1 VIEW)   Final Result         1.  No acute cardiopulmonary disease.   2.  Trace bilateral pleural effusion      DX-ABDOMEN FOR TUBE PLACEMENT   Final Result         1.  Nonspecific bowel gas pattern.   2.  Dobbhoff tube tip terminates overlying the expected location of the gastric antrum.   3.  Trace bilateral pleural effusions      DX-CHEST-PORTABLE (1 VIEW)   Final Result         1.  No acute cardiopulmonary disease.   2.  Trace bilateral pleural effusions   3.  Atherosclerosis      MR-BRAIN-WITH & W/O   Final Result   Addendum 1 of 1   ADDENDUM:      The case was discussed by telephone (call report) with DR. DAVONTE VIEIRA    on call for [facility], at 1854 hours.      Final      1.  Interval development of moderate obstructive hydrocephalus due to mass effect on the fourth ventricle. There is mild transependymal edema.   2.  Large area of acute infarction involving the left cerebellar hemisphere, inferior cerebellar vermis, and left cerebellar tonsil. PICA territory. No hemorrhagic transformation. This is the cause of the fourth ventricle effacement with obstructive    hydrocephalus. There is also mild left-sided cerebellar tonsillar herniation.   3.  Moderate supratentorial white matter disease most consistent with microvascular ischemic change.   4.  Old infarction right anterior frontal convexity.   5.  A Voalte message was sent to RICKEY HAYES at 1823 hours 6/22/2022.  Prompt reply as of 6:41 PM not yet received. Efforts are ongoing to contact housestaff managing the patient at this time.   6.  Case was discussed (call report) with nurse SHASTA MAGILL at 6:47 PM. Request to initiate stat neurosurgery consult. Attempts to contact [facility] on-call housestaff are ongoing.      CT-HEAD W/O   Final Result      1.  No evidence of acute hemorrhage, mass or large territorial infarction   2.  LEFT cerebellar and RIGHT frontal encephalomalacia   3.  Mild atrophy   4.  Mild white matter changes         CT-CSPINE WITHOUT PLUS RECONS   Final Result      1.  No acute fracture or traumatic listhesis in the cervical spine.   2.  Emphysema in the lung apices.          MEDS:  Current Facility-Administered Medications   Medication Last Admin   • [START ON 7/11/2022] amLODIPine (NORVASC) tablet 5 mg     • apixaban (ELIQUIS) tablet 5 mg     • atorvastatin (LIPITOR) tablet 40 mg     • doxazosin (CARDURA) tablet 1 mg     • [START ON 7/11/2022] furosemide (LASIX) tablet 40 mg     • [START ON 7/11/2022] lisinopril (PRINIVIL) tablet 40 mg     • metoprolol tartrate (LOPRESSOR) tablet 25 mg     • [START ON 7/11/2022] omeprazole (FIRST-OMEPRAZOLE) 2 mg/mL oral susp 40 mg     • acetaminophen (Tylenol) tablet 650 mg     • nicotine (NICODERM) 14 MG/24HR 14 mg 14 mg at 07/09/22 0616   • hydrALAZINE (APRESOLINE) injection 20 mg 20 mg at 06/29/22 0049   • Metoprolol Tartrate (LOPRESSOR) injection 5 mg     • enalaprilat (Vasotec) injection 1.25 mg 1 mL 1.25 mg at 06/28/22 2032   • Respiratory Therapy Consult     • Pharmacy Consult: Enteral tube insertion - review meds/change route/product selection     • ondansetron (ZOFRAN ODT) dispertab 4 mg     • senna-docusate (PERICOLACE or SENOKOT S) 8.6-50 MG per tablet 2 Tablet 2 Tablet at 07/09/22 1750    And   • polyethylene glycol/lytes (MIRALAX) PACKET 1 Packet      And   • magnesium hydroxide (MILK OF MAGNESIA) suspension 30 mL      And   • bisacodyl (DULCOLAX) suppository  10 mg     • ondansetron (ZOFRAN) syringe/vial injection 4 mg     • Pharmacy Consult Request ...Pain Management Review 1 Each         ASSESSMENT/PLAN: Alverto Duran is a 66 y.o. male with chronic methamphetamine use, tobacco dependence, HTN, right frontal CVA 2019, and syphilis (2020) admitted 6/21/22 for headache and dizziness, found to have encephalomalacia in the left cerebellar and right frontal cortex.     # Acute CVA due to occlusion of the left cerebellar artery  # Obstructive hydrocephalus  Large left cerebellar ischemic infarct with cerebral edema and mass-effect on fourth ventricle, detected on MRI 6/22. s/p suboccipital decompression with craniectomy and C1 laminectomy, s/p right frontal EVD, removed 6/27.  - MAP goal greater than 65. Long term, BP goal of 110-130/60-80.   - Continue lasix, Lisinopril 40 mg, Amlodipine 5 mg  - Atorvastatin 40 mg - LDL goal less than 70  - PT/OT/SLP consult  - Per neurosurgery, plan for repeat CT head 8/19   - Consider mirtazapine to increase appetite and help with mood       #Urinary retention  -Continue hernandez  -Continue flomax     # Afib  Rate controlled.   - Continue Metoprolol BID, will transition to succinate once tolerating PO meds     #bilateral DVT of upper extremities  Acute, partially occlusive DVT to the R IJ with extension into the let innominate vein, right paired brachial veins, right paired ulnar veins within proximal forearm, occlusive superficial thrombophlebitis in a small segment of the right cephalic vein, partially occlusive DVT in left paired brachial veins within the elbow region. Possible small extension into radial an ulnar veins. Lower extremity US negative for DVT  - Apixaban 5 mg PO BID      #goals of care  There have been multiple failed attempts to contact family. Mentation appears to be improved .    #Cachexia  #Protein calorie malnutrition  BMI 18.1. Previously on Cortrak for NG feeds. Nursing notes patient eating well in hospital. Suspect  social circumstances and methamphetamine use play large part in poor outpatient oral intake.   - Remove Cortrak today, stop tube feeds  - Daily weights  - Can resume feeds if necessary     # methamphetamine use  # tobacco use  Plan for further discussions on cessation when appropriate    #Asymetric pupillary dilation   Left > right dilation. This has been present during hospitalization and is not new per review of records.  - Monitor only    #Neurosyphilis (resolved)  ID consulted, S/P 10 day treatment.     #Diet / Fluids  - Remove Cortrak  - Soft diet     #Bowel Regimen  - Senna/docusate, polyethylene glycol, milk of magnesia, bisacodyl PRN     #DVT Prophylaxis  - Apixaban    #Disposition  - Medically cleared; may need LTAC vs SNF pending conversation of goals of care    Timo Simms M.D.  Family Medicine Resident  PGY-4

## 2022-07-11 NOTE — DIETARY
Nutrition Services: Update   Day 20 of admit.  Alverto Duran is a 66 y.o. male with admitting DX of Hypertensive urgency [I16.0]  Acute cerebrovascular accident (CVA) due to occlusion of left cerebellar artery (HCC) [I63.542]    Per RN, pt's Cortrak was removed yesterday. TF was last at goal of 80 ml/hr on 7/9. TF order for nocturnal feeding was cancelled yesterday. Pt is is currently on level 6-soft and bite sized with level 0-thin liquids diet. Pt is currently getting Boost Plus supplements TID. PO intake >50% since 7/8 per ADL's.     Wt 7/10: 68.8 kg via bed scale - Weight trending up since admit.     Malnutrition Risk: No new risk identified.     Recommendations/Plan:  1. Continue current PO diet  2. Boost Plus TID  3. Encourage intake of meals  4. Document intake of all meals as % taken in ADL's to provide interdisciplinary communication across all shifts.   5. Monitor weight.  6. Nutrition rep will continue to see patient for ongoing meal and snack preferences.    RD following

## 2022-07-11 NOTE — CARE PLAN
The patient is Watcher - Medium risk of patient condition declining or worsening    Shift Goals  Clinical Goals: Reorient, educate  Patient Goals: Rest  Family Goals: GLENDY    Progress made toward(s) clinical / shift goals:    Problem: Knowledge Deficit - Standard  Goal: Patient and family/care givers will demonstrate understanding of plan of care, disease process/condition, diagnostic tests and medications  Outcome: Not Progressing  Note: Patient is educated, but refused to be receptive.      Problem: Knowledge Deficit - Stroke Education  Goal: Patient's knowledge of stroke and risk factors will improve  Outcome: Not Progressing     Problem: Neuro Status  Goal: Neuro status will remain stable or improve  Outcome: Not Progressing  Note: Unsure of baseline, but patient disoriented to time and situation.      Problem: Self Care  Goal: Patient will have the ability to perform ADLs independently or with assistance (bathe, groom, dress, toilet and feed)  Outcome: Not Progressing  Note: Patient does not participate in his own care.      Problem: Pain - Standard  Goal: Alleviation of pain or a reduction in pain to the patient’s comfort goal  Outcome: Progressing  Note: Appropriate use of pain scale.      Problem: Fall Risk  Goal: Patient will remain free from falls  Outcome: Progressing  Note: Fall risk sign on door, bed alarm on, bed locked in lowest position, call light within reach, room close to nursing station, tele sitter in room.      Problem: Skin Integrity  Goal: Skin integrity is maintained or improved  Outcome: Progressing  Note: Patient rotates from side to side and repositions self.      Problem: Hemodynamic Monitoring  Goal: Patient's hemodynamics, fluid balance and neurologic status will be stable or improve  Outcome: Progressing  Note: Vitals stable.      Problem: Dysphagia  Goal: Dysphagia will improve  Outcome: Progressing  Note: Cortrak removed on day shift. Patient having adequate oral intake. Advance  diet as tolerated.      Problem: Urinary Elimination  Goal: Establish and maintain regular urinary output  Outcome: Progressing  Note: Catheter in for urinary retention. Adequate urine output.      Problem: Nutrition  Goal: Patient's nutritional and fluid intake will be adequate or improve  Outcome: Progressing  Note: Patient's intake is improving.       Patient is not progressing towards the following goals:      Problem: Knowledge Deficit - Standard  Goal: Patient and family/care givers will demonstrate understanding of plan of care, disease process/condition, diagnostic tests and medications  Outcome: Not Progressing  Note: Patient is educated, but refused to be receptive.      Problem: Knowledge Deficit - Stroke Education  Goal: Patient's knowledge of stroke and risk factors will improve  Outcome: Not Progressing     Problem: Neuro Status  Goal: Neuro status will remain stable or improve  Outcome: Not Progressing  Note: Unsure of baseline, but patient disoriented to time and situation.      Problem: Self Care  Goal: Patient will have the ability to perform ADLs independently or with assistance (bathe, groom, dress, toilet and feed)  Outcome: Not Progressing  Note: Patient does not participate in his own care.

## 2022-07-11 NOTE — CONSULTS
Please reach out to Dr Norbert Baeza for capacity consult request.   Consult is being canceled.

## 2022-07-11 NOTE — DISCHARGE PLANNING
Case Management Discharge Planning    Admission Date: 6/21/2022  GMLOS: 7.1  ALOS: 20    6-Clicks ADL Score: 16  6-Clicks Mobility Score: 13  PT and/or OT Eval ordered: Yes  Post-acute Referrals Ordered: Yes  Post-acute Choice Obtained: Yes  Has referral(s) been sent to post-acute provider:  Yes      Anticipated Discharge Dispo: Discharge Disposition: D/T to SNF with Medicare cert in anticipation of skilled care (03)  Discharge Address: unknown    DME Needed: TBD    Action(s) Taken: OTHER    Escalations Completed: Long Length of Stay Committee , Leadership and Social Work    Medically Clear: No    Next Steps:  f/u with pt and medical team to discuss dc needs and barriers.    Barriers to Discharge: Pending Placement, No Social Support and Homelessness

## 2022-07-11 NOTE — PROGRESS NOTES
[facility] FAMILY MEDICINE PROGRESS NOTE     Attending: Dr. Mccray     Senior Resident: Emory rUias D.O.  Family Medicine Resident PGY-3    Giuseppe Resident: Emory Urias, PGY-1    PATIENT: Alverto Duran; [id number]; 1955 Hospital Day: 21    ID/Interval Events:   66 y.o. male PMHx of chronic methamphetamine use, tobacco dependence, HTN, right frontal CVA 2019, and syphilis (2020) who was admitted for headache and dizziness.   6/21- Patient admitted to [facility]   6/22- Altered level of conciousness. MRI with and without contrast showed significant edema with compression of the brain stem and obstructive hydrocephalus. He was transferred to ICU for intubation, EVD placement and hypertonic saline therapy.    6/23 - suboccipital craniectomy. New onset AFIB RVR.  6/24 - multiple DVT's BUE noted on US.    6/25 - ID consult, recommend 10 day neurosyphilis course with IV PCN q4h (end date 7/3/22). Extubated  6/26 - EVD no output overnight. Alteplase by Dr. Mello.   6/27 - IV Metoprolol for AFIB with rate to 170's  6/29 - [facility] re-assumed care, patient transferred to telemetry  7/1 - Telemetry DC'd  7/2- No acute events   7/3- Urinary retention overnight.   7/4- Passed barium swallow study, was started on diet.  7/5- No acute events     SUBJECTIVE: No acute events overnight. Patient says he feels well today.     OBJECTIVE:     Vitals:    07/11/22 0348 07/11/22 0550 07/11/22 0551 07/11/22 0744   BP: 107/67 131/87 131/87 112/78   Pulse: 93 91  78   Resp: 18   18   Temp: 36.8 °C (98.2 °F)   36.7 °C (98 °F)   TempSrc: Temporal   Temporal   SpO2: 100%   96%   Weight:       Height:           Intake/Output Summary (Last 24 hours) at 7/11/2022 0824  Last data filed at 7/11/2022 0600  Gross per 24 hour   Intake 720 ml   Output 900 ml   Net -180 ml       PE:  General: resting in bed, no acute distress  HEENT: surgical scar on scalp.   CV: RRR.   Respiratory: non-labored  EXT:  No pitting edema noted  Neuro: No focal deficits.  "Moving all 4 extremities.     LABS:  No results for input(s): WBC, RBC, HEMOGLOBIN, HEMATOCRIT, MCV, MCH, RDW, PLATELETCT, MPV, NEUTSPOLYS, LYMPHOCYTES, MONOCYTES, EOSINOPHILS, BASOPHILS, RBCMORPHOLO in the last 72 hours.  Recent Labs     07/11/22  0211   SODIUM 136   POTASSIUM 5.1   CHLORIDE 101   CO2 26   BUN 24*   CREATININE 1.08   CALCIUM 9.8     Estimated GFR/CRCL = Estimated Creatinine Clearance: 65.5 mL/min (by C-G formula based on SCr of 1.08 mg/dL).  Recent Labs     07/11/22  0211   GLUCOSE 98                 No results for input(s): INR, APTT, FIBRINOGEN in the last 72 hours.    Invalid input(s): DIMER    MICROBIOLOGY:   Results     Procedure Component Value Units Date/Time    BLOOD CULTURE [[id number]] Collected: 06/30/22 1606    Order Status: Completed Specimen: Blood from Peripheral Updated: 07/05/22 1700     Significant Indicator NEG     Source BLD     Site PERIPHERAL     Culture Result No growth after 5 days of incubation.    Narrative:      Per Hospital Policy: Only change Specimen Src: to \"Line\" if  specified by physician order.  Right AC    BLOOD CULTURE [[id number]] Collected: 06/30/22 1443    Order Status: Completed Specimen: Blood from Peripheral Updated: 07/05/22 1500     Significant Indicator NEG     Source BLD     Site PERIPHERAL     Culture Result No growth after 5 days of incubation.    Narrative:      Per Hospital Policy: Only change Specimen Src: to \"Line\" if  specified by physician order.  Right AC            IMAGING:   CT-HEAD W/O   Final Result      1.  Interval removal of the right frontal approach EVD. No significant hydrocephalus.   2.  Evolving infarct of the left cerebellum with small amount of associated petechial hemorrhage.   3.  Resolution of the other intracranial hemorrhages. No new intracranial hemorrhage.   4.  Right frontal lobe encephalomalacia.   5.  Stable post surgical changes as above.         DX-ABDOMEN FOR TUBE PLACEMENT   Final Result      1. The tip of the enteric " tube terminates over the second portion of the duodenum.   2. The remainder of the bowel gas pattern is within normal limits.      DX-ESOPHAGUS - [id number]   Final Result      Fluoroscopy provided for cookie swallow evaluation. Please refer to speech pathology report for details      DX-ABDOMEN FOR TUBE PLACEMENT   Final Result         1.  Nonspecific bowel gas pattern.   2.  Dobbhoff tube tip overlying the expected location of the pylorus or first duodenal segment.      DX-CHEST-PORTABLE (1 VIEW)   Final Result      1.  No acute cardiopulmonary disease.   2.  Supportive tubing as described above.      MR-BRAIN-WITH & W/O   Final Result         Subacute left inferior cerebellar infarct with minimal blood products within representing petechial microhemorrhages. Mass effect upon the fourth ventricle remains stable.      Posterior fossa is well decompressed by midline occipital craniotomy. Lateral ventricles are well decompressed by a right frontal approach EVD with its tip in the frontal horn of the right lateral ventricle.      Small right occipital tentorial subdural hematoma without mass effect.      Remote bilateral frontal infarct with encephalomalacia.      Age-related volume loss and chronic microvascular ischemic changes.         US-EXTREMITY VENOUS LOWER BILAT   Final Result      CT-HEAD W/O   Final Result      1.  There is a small amount of intraventricular blood that is new from 6/23/2022. The ventricles are stable in size.   2.  There is a right frontal approach ventriculostomy catheter with the tip terminating at the foramen of Monro. There is a punctate amount of parenchymal hemorrhage surrounding the catheter is unchanged.   3.  Right frontal and left cerebellar encephalomalacia.      EC-ECHOCARDIOGRAM COMPLETE W/O CONT   Final Result      US-CAROTID DOPPLER BILAT   Final Result      US-EXTREMITY VENOUS UPPER BILAT   Final Result      DX-CHEST-PORTABLE (1 VIEW)   Final Result         1.  No acute  cardiopulmonary disease.   2.  Trace right pleural effusion   3.  Atherosclerosis      CT-CTA NECK WITH & W/O-POST PROCESSING   Final Result         1.  Severely hypoplastic right vertebral artery, central segment of the right vertebral artery is not visualized and may be occluded.         CT-CTA HEAD WITH & W/O-POST PROCESS   Final Result         1.  No large vessel occlusion or aneurysm identified   2.  Interval placement of right frontal approach ventriculostomy with postprocedural minimal hemorrhage and new pneumocephalus.   3.  Bilateral ventricular dilatation appears somewhat increased since prior study compatible with somewhat worsened hydrocephalus.   4.  Right frontal and left cerebellar encephalomalacia      DX-CHEST-PORTABLE (1 VIEW)   Final Result         1.  No acute cardiopulmonary disease.   2.  Trace bilateral pleural effusion      DX-ABDOMEN FOR TUBE PLACEMENT   Final Result         1.  Nonspecific bowel gas pattern.   2.  Dobbhoff tube tip terminates overlying the expected location of the gastric antrum.   3.  Trace bilateral pleural effusions      DX-CHEST-PORTABLE (1 VIEW)   Final Result         1.  No acute cardiopulmonary disease.   2.  Trace bilateral pleural effusions   3.  Atherosclerosis      MR-BRAIN-WITH & W/O   Final Result   Addendum 1 of 1   ADDENDUM:      The case was discussed by telephone (call report) with DR. DAVONTE VIEIRA    on call for [facility], at 1854 hours.      Final      1.  Interval development of moderate obstructive hydrocephalus due to mass effect on the fourth ventricle. There is mild transependymal edema.   2.  Large area of acute infarction involving the left cerebellar hemisphere, inferior cerebellar vermis, and left cerebellar tonsil. PICA territory. No hemorrhagic transformation. This is the cause of the fourth ventricle effacement with obstructive    hydrocephalus. There is also mild left-sided cerebellar tonsillar herniation.   3.  Moderate  supratentorial white matter disease most consistent with microvascular ischemic change.   4.  Old infarction right anterior frontal convexity.   5.  A Voalte message was sent to RICKEY WALE HAYES at 1823 hours 6/22/2022. Prompt reply as of 6:41 PM not yet received. Efforts are ongoing to contact housestaff managing the patient at this time.   6.  Case was discussed (call report) with nurse SHASTA MAGILL at 6:47 PM. Request to initiate stat neurosurgery consult. Attempts to contact [facility] on-call housestaff are ongoing.      CT-HEAD W/O   Final Result      1.  No evidence of acute hemorrhage, mass or large territorial infarction   2.  LEFT cerebellar and RIGHT frontal encephalomalacia   3.  Mild atrophy   4.  Mild white matter changes         CT-CSPINE WITHOUT PLUS RECONS   Final Result      1.  No acute fracture or traumatic listhesis in the cervical spine.   2.  Emphysema in the lung apices.            MEDS:  Current Facility-Administered Medications   Medication Last Admin   • amLODIPine (NORVASC) tablet 5 mg 5 mg at 07/11/22 0551   • apixaban (ELIQUIS) tablet 5 mg 5 mg at 07/11/22 0551   • atorvastatin (LIPITOR) tablet 40 mg 40 mg at 07/10/22 1712   • doxazosin (CARDURA) tablet 1 mg     • furosemide (LASIX) tablet 40 mg 40 mg at 07/11/22 0551   • lisinopril (PRINIVIL) tablet 40 mg 40 mg at 07/11/22 0550   • metoprolol tartrate (LOPRESSOR) tablet 25 mg 25 mg at 07/11/22 0550   • omeprazole (PRILOSEC) capsule 20 mg 20 mg at 07/11/22 0550   • polyethylene glycol/lytes (MIRALAX) PACKET 1 Packet      And   • senna-docusate (PERICOLACE or SENOKOT S) 8.6-50 MG per tablet 2 Tablet 2 Tablet at 07/11/22 0550    And   • magnesium hydroxide (MILK OF MAGNESIA) suspension 30 mL      And   • bisacodyl (DULCOLAX) suppository 10 mg     • ondansetron (ZOFRAN ODT) dispertab 4 mg     • acetaminophen (Tylenol) tablet 650 mg 650 mg at 07/10/22 1423   • nicotine (NICODERM) 14 MG/24HR 14 mg 14 mg at 07/09/22 0616   • hydrALAZINE  (APRESOLINE) injection 20 mg 20 mg at 06/29/22 0049   • Metoprolol Tartrate (LOPRESSOR) injection 5 mg     • enalaprilat (Vasotec) injection 1.25 mg 1 mL 1.25 mg at 06/28/22 2032   • Respiratory Therapy Consult     • ondansetron (ZOFRAN) syringe/vial injection 4 mg     • Pharmacy Consult Request ...Pain Management Review 1 Each         PROBLEM LIST:  No problems updated.    ASSESSMENT/PLAN:66 y.o. male PMHx of chronic methamphetamine use, tobacco dependence, HTN, right frontal CVA 2019, and syphilis (2020) who admitted for headache and dizziness. He was found to have encephalomalacia in the left cerebellar and right frontal areas on CT head without contrast. Patient was initially admitted to the floor under the [facility] Family Medicine service, but later transferred to ICU for decreased level of consciousness. 6/22 he developed decreased LOC with obtundation. In ICU he underwent intubation, EVD placement and hypertonic saline therapy. He was eventually transferred out of ICU, [facility] FM resumed care.      # Acute CVA due to occlusion of the left cerebellar artery  # Obstructive hydrocephalus  Large left cerebellar ischemic infarct with cerebral edema and mass-effect on fourth ventricle, Detected on MRI 6/22.  - s/p suboccipital decompression with craniectomy and C1 laminectomy  - s/p right frontal EVD, removed 6/27  - MAP goal greater than 65. Long term, BP goal of 110-130/60-80.   - Continue lasix, Lisinopril 40 mg, Amlodipine 5 mg  - Atorvastatin 40 mg - LDL goal less than 70  - PT/OT/SLP consulted   -Tolerating PO, cortrak removed   - Per neurosurgery, plan for repeat CT head 8/19   - Consider mirtazapine to increase appetite and help with mood      #Asymetric pupillary dilation   -Left > right dilation   - This has been present during hospitalization and is not new per review of records     #Urinary retention  -Urinary retention   -Continue hernandez  -Continue flomax     # Afib  - Rate controlled   - Continue Metoprolol  BID   - Plan to change to metoprolol succinate for longer acting from metoprolol tartrate BID since he is now taking PO medications       #bilateral DVT of upper extremities  - Acute, partially occlusive DVT to the R IJ with extension into the let innominate vein.    - Acute, occlusive DVT in on of the right paired brachial veins  - Acute,  occlusive DVT in Right paired ulnar veins within proximal forearm  - Occlusive superficial thrombophlebitis in a small segment of the right cephalic vein.   - Acute partially occlusive DVT in left paired brachial veins within the elbow region. Possible small extension into radial an ulnar veins   - Lower extremity US- negative for DVT  - Continue Elequis anticoagulation      # goals of care  There have been multiple failed attempts to contact family.   -Mentation appears to be improved   -Plan to have further goals of care conversation   -Consider Psychiatry consultation for capacity evaluation      #Neurosyphilis  ID consulted   -resolved  -S/P 10 day treatment      # methamphetamine use  # tobacco use  Plan for further discussions on cessation when appropriate     Core Measures:  DVT PPX: SCDs, Elequis   ABX: None  Lines: PIV  Fluids: NGT  PCP: none  CODE STATUS: FULL CODE     Dispo:  Inpatient for CVA recovery, long-term disposition unclear, may need LTAC, no DPOA so may need ethics consults vs. Guardian

## 2022-07-11 NOTE — PALLIATIVE CARE
"Palliative Care follow-up  PC RN visited Alverto at . He was curled up in bed, intermittently answering this RN's questions. When asked where he's at, he states \"I don't know.\" He shook his head \"No\" when asking about a wife or kids. He states he has siblings but \"I don't know where they are.\" PC asked him where he lived at prior to coming to the hospital and he says \"[place].\" PC asked if he was in an apartment, shelter, etc., and he states \"I don't know.\" PC attempted to understand some of his values as it pertains to his medical care but he was not interested in answering questions about this. He tells PC that he enjoys \"Working. Period.\" He was unable to tell this RN what type of work he does and eventually covered his head with his blankets. PC touched bases with CM and MD. Plan for formal capacity evaluation- difficult to appreciate whether pt is confused or just doesn't care to respond. PC will remain available as needed to discuss goals/POC is clinical picture allows.    Updated:  RN/CM/MD    Plan: follow and assist as needed    Thank you for allowing Palliative Care to support this patient and family. Contact [phone] for additional assistance, change in patient status, or with any questions/concerns.     "

## 2022-07-11 NOTE — PROGRESS NOTES
Bedside report received from NOC RN. Assumed care of pt. Pt awake, laying in bed. A/Ox1-2, VSS. No concerns, complaints or distress. Pt educated to call before getting out of bed. POC reviewed and white board updated. Pt is medical.  Call light in reach. Bed locked in lowest position with 2 upper bed rails up. Bed alarm on.

## 2022-07-11 NOTE — PROGRESS NOTES
Bedside report received from nurse. Assumed care of patient. Patient resting comfortably in bed. A/Ox2, disoriented to time and situation, VSS,  All needs met. POC reviewed and white board updated. Medical patient, no tele. Call light in reach. Bed locked in lowest position with 2 upper bed rails up. No pain or complaints at this time.

## 2022-07-11 NOTE — THERAPY
Speech Language Pathology  Daily Treatment     Patient Name: Alverto Duran  Age:  66 y.o., Sex:  male  Medical Record #: [id number]  Today's Date: 7/11/2022     Precautions  Precautions: (P) Fall Risk    Assessment    Pt seen this date for dysphagia intervention. Per RN, pt tolerating current diet of SB/TN and pills whole with liquid wash without difficulty or s/sx of aspiration. PO trials of easy to chew and thins by straw assessed. Timely swallow initiation and clear vocal quality appreciated. Pt demonstrated mildly prolonged but functional mastication of easy to chew. No s/sx of aspiration appreciated with any consistencies consumed.    Recommend continuation of soft and bite size/thins with adherence to safe swallow strategies (upright for PO, slow rate, small bites/sips, straws okay, meds as tolerated).  Okay for regular items as requested.     Plan    Discharge secondary to goals met.    Discharge Recommendations: (P) Anticipate that the patient will have no further speech therapy needs after discharge from the hospital    Subjective    Pt awake, required mod cues to participate, c/o right sided headache.      Objective       07/11/22 1429   Charge Group   SLP Swallowing Dysfunction Treatment Swallowing Dysfunction Treatment   Total Treatment Time   SLP Time Spent Yes   SLP Swallowing Dysfunction Treatment (Mins) 10   Precautions   Precautions Fall Risk   Vitals   O2 (LPM) 0   O2 Delivery Device None - Room Air   Pain 0 - 10 Group   Therapist Pain Assessment Post Activity Pain Same as Prior to Activity;Nurse Notified  (right sided headache)   Dysphagia    Dysphagia X   Positioning / Behavior Modification Self Monitoring;Modulate Rate or Bite Size   Other Treatments PO trials EC7,TN0   Diet / Liquid Recommendation Thin (0);Soft & Bite-Sized (6) - (Dysphagia III)   Nutritional Liquid Intake Rating Scale Non thickened beverages   Nutritional Food Intake Rating Scale Total oral diet with multiple consistencies  "without special preparation but with specific food limitations   Skilled Intervention Compensatory Strategies;Verbal Cueing   Recommended Route of Medication Administration   Medication Administration  Whole with Liquid Wash   Patient / Family Goals   Patient / Family Goal #1 \"That's good\"- when given water   Goal #1 Outcome Goal met   Short Term Goals   Short Term Goal # 1 B  Pt will consume SB6/TN0 meal without any overt s/sx of aspiration   Goal Outcome  # 1 B Goal met   Education Group   Education Provided Dysphagia   Dysphagia Patient Response Patient;Acceptance;Explanation;Demonstration;Verbal Demonstration   Anticipated Discharge Needs   Discharge Recommendations Anticipate that the patient will have no further speech therapy needs after discharge from the hospital   Therapy Recommendations Upon DC Not Indicated   Interdisciplinary Plan of Care Collaboration   IDT Collaboration with  Nursing   Patient Position at End of Therapy Seated;In Bed;Bed Alarm On;Call Light within Reach;Tray Table within Reach   Collaboration Comments RN aware of results/recs     "

## 2022-07-11 NOTE — DISCHARGE PLANNING
"LSW received VM from Gina requesting additional information to verify if they are related to this pt. LSW made return call and verified pt's  and height. Gina stated she is  to the pt's brother, Vicente. When asked for Vicente's contact information Gina stated \"he's out of town right now. You're not able to contact him\". Gina stated the pt also has a sister, however she has not been able to get a hold of pt's sister. Gina reported she believes the pt's sister is somewhere in [place] and she will continue to reach out to her. Gina will also try to get a hold of her niece or any other family. LSW requested Gina give pt's direct family this LSW's number to call since she is not willing to provide their numbers to this LSW.  "

## 2022-07-12 NOTE — PROGRESS NOTES
Bedside report received from nurse. Assumed care of patient. Patient resting comfortably in bed. A/Ox2, disoriented to time and situation, VSS,  All needs met. POC reviewed and white board updated. Medical patient, no tele. Call light in reach. Bed locked in lowest position with 2 upper bed rails up. No pain or complaints at this time. Patient refusing medications at this time.

## 2022-07-12 NOTE — PROGRESS NOTES
8125  Report received from SHAREE Arriola.    [id number]  Pt arrived to unit via hospital bed. A&O x 2, pain 0/10, on 0L O2, with all belongings at bedside. Pt oriented to unit, call light and belongings within reach, educated to call for assistance.

## 2022-07-12 NOTE — CARE PLAN
The patient is Stable - Low risk of patient condition declining or worsening    Shift Goals  Clinical Goals: safety, reorientation  Patient Goals: rest  Family Goals: GLENDY    Progress made toward(s) clinical / shift goals:        Patient is not progressing towards the following goals:

## 2022-07-12 NOTE — PROGRESS NOTES
[facility] [place] [facility] PROGRESS NOTE     Attending: Dr. Mccray      Senior Resident: Emory Urias D.O.  Family Medicine Resident PGY-3    PATIENT: Alverto Duran; [id number]; 1955 Hospital Day: 22    ID/Interval Events:   66 y.o. male PMHx of chronic methamphetamine use, tobacco dependence, HTN, right frontal CVA 2019, and syphilis (2020) who was admitted for headache and dizziness.   6/21- Patient admitted to [facility]   6/22- Altered level of conciousness. MRI with and without contrast showed significant edema with compression of the brain stem and obstructive hydrocephalus. He was transferred to ICU for intubation, EVD placement and hypertonic saline therapy.    6/23 - suboccipital craniectomy. New onset AFIB RVR.  6/24 - multiple DVT's BUE noted on US.    6/25 - ID consult, recommend 10 day neurosyphilis course with IV PCN q4h (end date 7/3/22). Extubated  6/26 - EVD no output overnight. Alteplase by Dr. Mello.   6/27 - IV Metoprolol for AFIB with rate to 170's  6/29 - [facility] re-assumed care, patient transferred to telemetry  7/1 - Telemetry DC'd  7/2- No acute events   7/3- Urinary retention overnight.   7/4- Passed barium swallow study, was started on diet.  7/5- No acute events      SUBJECTIVE: No acute events overnight. Patient says he has headache this morning. He has been experiencing blurry vision, but has been ongoing.     OBJECTIVE:     Vitals:    07/11/22 1732 07/11/22 2000 07/11/22 2200 07/12/22 0430   BP: 111/74 (!) 94/61 100/73 100/49   Pulse: (!) 105 (!) 111 (!) 109 (!) 106   Resp:  16 16 16   Temp:  37.2 °C (98.9 °F) 36.6 °C (97.8 °F) 36.1 °C (97 °F)   TempSrc:  Temporal Temporal Temporal   SpO2:  94% 96% 94%   Weight:       Height:           Intake/Output Summary (Last 24 hours) at 7/12/2022 0551  Last data filed at 7/11/2022 0800  Gross per 24 hour   Intake 240 ml   Output 400 ml   Net -160 ml       PE:  General: resting in bed, no acute distress  HEENT: surgical scar on  "scalp.   Respiratory: non-labored  EXT:  No pitting edema noted  Neuro: No focal deficits. Moving all 4 extremities.      LABS:  No results for input(s): WBC, RBC, HEMOGLOBIN, HEMATOCRIT, MCV, MCH, RDW, PLATELETCT, MPV, NEUTSPOLYS, LYMPHOCYTES, MONOCYTES, EOSINOPHILS, BASOPHILS, RBCMORPHOLO in the last 72 hours.  Recent Labs     07/11/22  0211   SODIUM 136   POTASSIUM 5.1   CHLORIDE 101   CO2 26   BUN 24*   CREATININE 1.08   CALCIUM 9.8     Estimated GFR/CRCL = Estimated Creatinine Clearance: 65.5 mL/min (by C-G formula based on SCr of 1.08 mg/dL).  Recent Labs     07/11/22  0211   GLUCOSE 98                 No results for input(s): INR, APTT, FIBRINOGEN in the last 72 hours.    Invalid input(s): DIMER    MICROBIOLOGY:   Results     Procedure Component Value Units Date/Time    BLOOD CULTURE [[id number]] Collected: 06/30/22 1606    Order Status: Completed Specimen: Blood from Peripheral Updated: 07/05/22 1700     Significant Indicator NEG     Source BLD     Site PERIPHERAL     Culture Result No growth after 5 days of incubation.    Narrative:      Per Hospital Policy: Only change Specimen Src: to \"Line\" if  specified by physician order.  Right AC    BLOOD CULTURE [[id number]] Collected: 06/30/22 1443    Order Status: Completed Specimen: Blood from Peripheral Updated: 07/05/22 1500     Significant Indicator NEG     Source BLD     Site PERIPHERAL     Culture Result No growth after 5 days of incubation.    Narrative:      Per Hospital Policy: Only change Specimen Src: to \"Line\" if  specified by physician order.  Right AC            IMAGING:   CT-HEAD W/O   Final Result      1.  Interval removal of the right frontal approach EVD. No significant hydrocephalus.   2.  Evolving infarct of the left cerebellum with small amount of associated petechial hemorrhage.   3.  Resolution of the other intracranial hemorrhages. No new intracranial hemorrhage.   4.  Right frontal lobe encephalomalacia.   5.  Stable post surgical changes as " above.         DX-ABDOMEN FOR TUBE PLACEMENT   Final Result      1. The tip of the enteric tube terminates over the second portion of the duodenum.   2. The remainder of the bowel gas pattern is within normal limits.      DX-ESOPHAGUS - [id number]   Final Result      Fluoroscopy provided for cookie swallow evaluation. Please refer to speech pathology report for details      DX-ABDOMEN FOR TUBE PLACEMENT   Final Result         1.  Nonspecific bowel gas pattern.   2.  Dobbhoff tube tip overlying the expected location of the pylorus or first duodenal segment.      DX-CHEST-PORTABLE (1 VIEW)   Final Result      1.  No acute cardiopulmonary disease.   2.  Supportive tubing as described above.      MR-BRAIN-WITH & W/O   Final Result         Subacute left inferior cerebellar infarct with minimal blood products within representing petechial microhemorrhages. Mass effect upon the fourth ventricle remains stable.      Posterior fossa is well decompressed by midline occipital craniotomy. Lateral ventricles are well decompressed by a right frontal approach EVD with its tip in the frontal horn of the right lateral ventricle.      Small right occipital tentorial subdural hematoma without mass effect.      Remote bilateral frontal infarct with encephalomalacia.      Age-related volume loss and chronic microvascular ischemic changes.         US-EXTREMITY VENOUS LOWER BILAT   Final Result      CT-HEAD W/O   Final Result      1.  There is a small amount of intraventricular blood that is new from 6/23/2022. The ventricles are stable in size.   2.  There is a right frontal approach ventriculostomy catheter with the tip terminating at the foramen of Monro. There is a punctate amount of parenchymal hemorrhage surrounding the catheter is unchanged.   3.  Right frontal and left cerebellar encephalomalacia.      EC-ECHOCARDIOGRAM COMPLETE W/O CONT   Final Result      US-CAROTID DOPPLER BILAT   Final Result      US-EXTREMITY VENOUS UPPER  BILAT   Final Result      DX-CHEST-PORTABLE (1 VIEW)   Final Result         1.  No acute cardiopulmonary disease.   2.  Trace right pleural effusion   3.  Atherosclerosis      CT-CTA NECK WITH & W/O-POST PROCESSING   Final Result         1.  Severely hypoplastic right vertebral artery, central segment of the right vertebral artery is not visualized and may be occluded.         CT-CTA HEAD WITH & W/O-POST PROCESS   Final Result         1.  No large vessel occlusion or aneurysm identified   2.  Interval placement of right frontal approach ventriculostomy with postprocedural minimal hemorrhage and new pneumocephalus.   3.  Bilateral ventricular dilatation appears somewhat increased since prior study compatible with somewhat worsened hydrocephalus.   4.  Right frontal and left cerebellar encephalomalacia      DX-CHEST-PORTABLE (1 VIEW)   Final Result         1.  No acute cardiopulmonary disease.   2.  Trace bilateral pleural effusion      DX-ABDOMEN FOR TUBE PLACEMENT   Final Result         1.  Nonspecific bowel gas pattern.   2.  Dobbhoff tube tip terminates overlying the expected location of the gastric antrum.   3.  Trace bilateral pleural effusions      DX-CHEST-PORTABLE (1 VIEW)   Final Result         1.  No acute cardiopulmonary disease.   2.  Trace bilateral pleural effusions   3.  Atherosclerosis      MR-BRAIN-WITH & W/O   Final Result   Addendum 1 of 1   ADDENDUM:      The case was discussed by telephone (call report) with DR. DAVONTE VIEIRA    on call for [facility], at 1854 hours.      Final      1.  Interval development of moderate obstructive hydrocephalus due to mass effect on the fourth ventricle. There is mild transependymal edema.   2.  Large area of acute infarction involving the left cerebellar hemisphere, inferior cerebellar vermis, and left cerebellar tonsil. PICA territory. No hemorrhagic transformation. This is the cause of the fourth ventricle effacement with obstructive     hydrocephalus. There is also mild left-sided cerebellar tonsillar herniation.   3.  Moderate supratentorial white matter disease most consistent with microvascular ischemic change.   4.  Old infarction right anterior frontal convexity.   5.  A Voalte message was sent to RICKEY ECHEVARRIA BIJANCARLOS at 1823 hours 6/22/2022. Prompt reply as of 6:41 PM not yet received. Efforts are ongoing to contact housestaff managing the patient at this time.   6.  Case was discussed (call report) with nurse SHASTA MAGILL at 6:47 PM. Request to initiate stat neurosurgery consult. Attempts to contact [facility] on-call housestaff are ongoing.      CT-HEAD W/O   Final Result      1.  No evidence of acute hemorrhage, mass or large territorial infarction   2.  LEFT cerebellar and RIGHT frontal encephalomalacia   3.  Mild atrophy   4.  Mild white matter changes         CT-CSPINE WITHOUT PLUS RECONS   Final Result      1.  No acute fracture or traumatic listhesis in the cervical spine.   2.  Emphysema in the lung apices.            MEDS:  Current Facility-Administered Medications   Medication Last Admin   • metoprolol SR (TOPROL XL) tablet 50 mg     • amLODIPine (NORVASC) tablet 5 mg 5 mg at 07/11/22 0551   • apixaban (ELIQUIS) tablet 5 mg 5 mg at 07/12/22 0548   • atorvastatin (LIPITOR) tablet 40 mg 40 mg at 07/10/22 1712   • doxazosin (CARDURA) tablet 1 mg     • furosemide (LASIX) tablet 40 mg 40 mg at 07/12/22 0549   • lisinopril (PRINIVIL) tablet 40 mg 40 mg at 07/12/22 0549   • omeprazole (PRILOSEC) capsule 20 mg 20 mg at 07/12/22 0548   • polyethylene glycol/lytes (MIRALAX) PACKET 1 Packet 1 Packet at 07/11/22 1325    And   • senna-docusate (PERICOLACE or SENOKOT S) 8.6-50 MG per tablet 2 Tablet 2 Tablet at 07/12/22 0548    And   • magnesium hydroxide (MILK OF MAGNESIA) suspension 30 mL      And   • bisacodyl (DULCOLAX) suppository 10 mg     • ondansetron (ZOFRAN ODT) dispertab 4 mg     • acetaminophen (Tylenol) tablet 650 mg 650 mg at 07/10/22  1423   • nicotine (NICODERM) 14 MG/24HR 14 mg 14 mg at 07/12/22 0549   • hydrALAZINE (APRESOLINE) injection 20 mg 20 mg at 06/29/22 0049   • enalaprilat (Vasotec) injection 1.25 mg 1 mL 1.25 mg at 06/28/22 2032   • Respiratory Therapy Consult     • ondansetron (ZOFRAN) syringe/vial injection 4 mg     • Pharmacy Consult Request ...Pain Management Review 1 Each         PROBLEM LIST:  No problems updated.    ASSESSMENT/PLAN: 66 y.o. male PMHx of chronic methamphetamine use, tobacco dependence, HTN, right frontal CVA 2019, and syphilis (2020) who admitted for headache and dizziness. He was found to have encephalomalacia in the left cerebellar and right frontal areas on CT head without contrast. Patient was initially admitted to the floor under the [facility] Family Medicine service, but later transferred to ICU for decreased level of consciousness. 6/22 he developed decreased LOC with obtundation. In ICU he underwent intubation, EVD placement and hypertonic saline therapy. He was eventually transferred out of ICU, [facility] FM resumed care.      # Acute CVA due to occlusion of the left cerebellar artery  # Obstructive hydrocephalus  Large left cerebellar ischemic infarct with cerebral edema and mass-effect on fourth ventricle, Detected on MRI 6/22.  - s/p suboccipital decompression with craniectomy and C1 laminectomy  - s/p right frontal EVD, removed 6/27  - MAP goal greater than 65. Long term, BP goal of 110-130/60-80.   - Continue lasix, Lisinopril 40 mg, Amlodipine 5 mg  - Atorvastatin 40 mg - LDL goal less than 70  - PT/OT/SLP consulted   -Tolerating PO, cortrak removed   - Per neurosurgery, plan for repeat CT head 8/19   - Consider mirtazapine to increase appetite and help with mood      #Asymetric pupillary dilation   -Left > right dilation   - This has been present during hospitalization and is not new per review of records     #Urinary retention  -Urinary retention   -Continue hernandez  -Continue flomax     # Afib  - Rate  controlled   - Continue Metoprolol succinate   - Continue anticoagulation      #bilateral DVT of upper extremities  - Acute, partially occlusive DVT to the R IJ with extension into the let innominate vein.  - Acute, occlusive DVT in on of the right paired brachial veins  - Acute,  occlusive DVT in Right paired ulnar veins within proximal forearm  - Occlusive superficial thrombophlebitis in a small segment of the right cephalic vein.   - Acute partially occlusive DVT in left paired brachial veins within the elbow region. Possible small extension into radial an ulnar veins   - Lower extremity US- negative for DVT  - Continue Elequis anticoagulation      # goals of care  There have been multiple failed attempts to contact family.   -Mentation appears to be improved.  Patient minimally cooperative with discussions regarding goals of care.  Unclear if patient has capacity at this time to make decisions.  -Plan to have further goals of care conversation   -Consider ethics consult, due to patient lacking capacity to make decisions     #Neurosyphilis  ID consulted   -resolved  -S/P 10 day treatment      # methamphetamine use  # tobacco use  Plan for further discussions on cessation when appropriate     Core Measures:  DVT PPX: SCDs, Elequis   ABX: None  Lines: PIV  Fluids: NGT  PCP: none  CODE STATUS: FULL CODE     Dispo:  Inpatient for CVA recovery, long-term disposition unclear, may need LTAC, no DPOA so may need ethics consults vs. [facility]

## 2022-07-12 NOTE — PROGRESS NOTES
Pt took medications this am. When this RN left to get the pt more water the pt had spit up some of the medications and then proceded to refuse them. One medication was a capsule (the Prilosec), two were the stool softeners (senokot), one was the blood thinner (eliquis), and the last pill was unidentifiable and it is unclear if it was the lasix or lisinopril

## 2022-07-12 NOTE — PROGRESS NOTES
2127: Report called to SHAREE Lama    2140: Patient was picked up and taken to [facility] by transport. Patient is medical, no tele monitor needed. Patient's belongings were sent with patient. Tele sitter was discontinued.

## 2022-07-13 NOTE — CARE PLAN
The patient is Watcher - Medium risk of patient condition declining or worsening    Shift Goals  Clinical Goals: safety, reorientation  Patient Goals: rest  Family Goals: GLENDY    Progress made toward(s) clinical / shift goals:        Patient is not progressing towards the following goals:

## 2022-07-13 NOTE — PROGRESS NOTES
[facility] [facility] [place]     Attending: Dr. Mccray      Senior Resident: Emory Urias D.O.  Family Medicine Resident PGY-3     PATIENT: Alverto Duran; [id number]; 1955 Hospital Day: 23    ID/Interval Events:   66 y.o. male PMHx of chronic methamphetamine use, tobacco dependence, HTN, right frontal CVA 2019, and syphilis (2020) who was admitted for headache and dizziness.   6/21- Patient admitted to [facility]   6/22- Altered level of conciousness. MRI with and without contrast showed significant edema with compression of the brain stem and obstructive hydrocephalus. He was transferred to ICU for intubation, EVD placement and hypertonic saline therapy.    6/23 - suboccipital craniectomy. New onset AFIB RVR.  6/24 - multiple DVT's BUE noted on US.    6/25 - ID consult, recommend 10 day neurosyphilis course with IV PCN q4h (end date 7/3/22). Extubated  6/26 - EVD no output overnight. Alteplase by Dr. Mello.   6/27 - IV Metoprolol for AFIB with rate to 170's  6/29 - [facility] re-assumed care, patient transferred to telemetry  7/1 - Telemetry DC'd  7/2- No acute events   7/3- Urinary retention overnight.   7/4- Passed barium swallow study, was started on diet.  7/5- No acute events     SUBJECTIVE: No acute events overnight. Patient says he is not experiencing pain. He says he is tired. His responses to questions were limited.     OBJECTIVE:     Vitals:    07/12/22 0430 07/12/22 0730 07/12/22 1500 07/12/22 2000   BP: 100/49 100/68 105/62 102/65   Pulse: (!) 106 98 96 86   Resp: 16 16 15 15   Temp: 36.1 °C (97 °F) 36.6 °C (97.8 °F) 36.4 °C (97.5 °F) 36.1 °C (97 °F)   TempSrc: Temporal Temporal Temporal Temporal   SpO2: 94% 95% 96% 94%   Weight:       Height:           Intake/Output Summary (Last 24 hours) at 7/13/2022 0712  Last data filed at 7/12/2022 1700  Gross per 24 hour   Intake 480 ml   Output 800 ml   Net -320 ml       PE:  General: resting in bed, no acute distress  HEENT: surgical scar on scalp. Dry  mucosa.   CV: S1S2  Respiratory: non-labored  EXT:  No pitting edema noted  Neuro: No focal deficits. Moving all 4 extremities.     LABS:  No results for input(s): WBC, RBC, HEMOGLOBIN, HEMATOCRIT, MCV, MCH, RDW, PLATELETCT, MPV, NEUTSPOLYS, LYMPHOCYTES, MONOCYTES, EOSINOPHILS, BASOPHILS, RBCMORPHOLO in the last 72 hours.  Recent Labs     07/11/22  0211   SODIUM 136   POTASSIUM 5.1   CHLORIDE 101   CO2 26   BUN 24*   CREATININE 1.08   CALCIUM 9.8     Estimated GFR/CRCL = Estimated Creatinine Clearance: 65.5 mL/min (by C-G formula based on SCr of 1.08 mg/dL).  Recent Labs     07/11/22  0211   GLUCOSE 98                 No results for input(s): INR, APTT, FIBRINOGEN in the last 72 hours.    Invalid input(s): DIMER    MICROBIOLOGY:   Results     ** No results found for the last 168 hours. **            IMAGING:   CT-HEAD W/O   Final Result      1.  Interval removal of the right frontal approach EVD. No significant hydrocephalus.   2.  Evolving infarct of the left cerebellum with small amount of associated petechial hemorrhage.   3.  Resolution of the other intracranial hemorrhages. No new intracranial hemorrhage.   4.  Right frontal lobe encephalomalacia.   5.  Stable post surgical changes as above.         DX-ABDOMEN FOR TUBE PLACEMENT   Final Result      1. The tip of the enteric tube terminates over the second portion of the duodenum.   2. The remainder of the bowel gas pattern is within normal limits.      DX-ESOPHAGUS - JMNA-RFLNH-AC   Final Result      Fluoroscopy provided for cookie swallow evaluation. Please refer to speech pathology report for details      DX-ABDOMEN FOR TUBE PLACEMENT   Final Result         1.  Nonspecific bowel gas pattern.   2.  Dobbhoff tube tip overlying the expected location of the pylorus or first duodenal segment.      DX-CHEST-PORTABLE (1 VIEW)   Final Result      1.  No acute cardiopulmonary disease.   2.  Supportive tubing as described above.      MR-BRAIN-WITH & W/O   Final Result          Subacute left inferior cerebellar infarct with minimal blood products within representing petechial microhemorrhages. Mass effect upon the fourth ventricle remains stable.      Posterior fossa is well decompressed by midline occipital craniotomy. Lateral ventricles are well decompressed by a right frontal approach EVD with its tip in the frontal horn of the right lateral ventricle.      Small right occipital tentorial subdural hematoma without mass effect.      Remote bilateral frontal infarct with encephalomalacia.      Age-related volume loss and chronic microvascular ischemic changes.         US-EXTREMITY VENOUS LOWER BILAT   Final Result      CT-HEAD W/O   Final Result      1.  There is a small amount of intraventricular blood that is new from 6/23/2022. The ventricles are stable in size.   2.  There is a right frontal approach ventriculostomy catheter with the tip terminating at the foramen of Monro. There is a punctate amount of parenchymal hemorrhage surrounding the catheter is unchanged.   3.  Right frontal and left cerebellar encephalomalacia.      EC-ECHOCARDIOGRAM COMPLETE W/O CONT   Final Result      US-CAROTID DOPPLER BILAT   Final Result      US-EXTREMITY VENOUS UPPER BILAT   Final Result      DX-CHEST-PORTABLE (1 VIEW)   Final Result         1.  No acute cardiopulmonary disease.   2.  Trace right pleural effusion   3.  Atherosclerosis      CT-CTA NECK WITH & W/O-POST PROCESSING   Final Result         1.  Severely hypoplastic right vertebral artery, central segment of the right vertebral artery is not visualized and may be occluded.         CT-CTA HEAD WITH & W/O-POST PROCESS   Final Result         1.  No large vessel occlusion or aneurysm identified   2.  Interval placement of right frontal approach ventriculostomy with postprocedural minimal hemorrhage and new pneumocephalus.   3.  Bilateral ventricular dilatation appears somewhat increased since prior study compatible with somewhat worsened  hydrocephalus.   4.  Right frontal and left cerebellar encephalomalacia      DX-CHEST-PORTABLE (1 VIEW)   Final Result         1.  No acute cardiopulmonary disease.   2.  Trace bilateral pleural effusion      DX-ABDOMEN FOR TUBE PLACEMENT   Final Result         1.  Nonspecific bowel gas pattern.   2.  Dobbhoff tube tip terminates overlying the expected location of the gastric antrum.   3.  Trace bilateral pleural effusions      DX-CHEST-PORTABLE (1 VIEW)   Final Result         1.  No acute cardiopulmonary disease.   2.  Trace bilateral pleural effusions   3.  Atherosclerosis      MR-BRAIN-WITH & W/O   Final Result   Addendum 1 of 1   ADDENDUM:      The case was discussed by telephone (call report) with DR. DAVONTE VIEIRA    on call for [facility], at 1854 hours.      Final      1.  Interval development of moderate obstructive hydrocephalus due to mass effect on the fourth ventricle. There is mild transependymal edema.   2.  Large area of acute infarction involving the left cerebellar hemisphere, inferior cerebellar vermis, and left cerebellar tonsil. PICA territory. No hemorrhagic transformation. This is the cause of the fourth ventricle effacement with obstructive    hydrocephalus. There is also mild left-sided cerebellar tonsillar herniation.   3.  Moderate supratentorial white matter disease most consistent with microvascular ischemic change.   4.  Old infarction right anterior frontal convexity.   5.  A Voalte message was sent to RICKEY HAYES at 1823 hours 6/22/2022. Prompt reply as of 6:41 PM not yet received. Efforts are ongoing to contact housestaff managing the patient at this time.   6.  Case was discussed (call report) with nurse SHASTA MAGILL at 6:47 PM. Request to initiate stat neurosurgery consult. Attempts to contact [facility] on-call housestaff are ongoing.      CT-HEAD W/O   Final Result      1.  No evidence of acute hemorrhage, mass or large territorial infarction   2.  LEFT cerebellar  and RIGHT frontal encephalomalacia   3.  Mild atrophy   4.  Mild white matter changes         CT-CSPINE WITHOUT PLUS RECONS   Final Result      1.  No acute fracture or traumatic listhesis in the cervical spine.   2.  Emphysema in the lung apices.            MEDS:  Current Facility-Administered Medications   Medication Last Admin   • metoprolol SR (TOPROL XL) tablet 50 mg     • amLODIPine (NORVASC) tablet 5 mg 5 mg at 07/11/22 0551   • apixaban (ELIQUIS) tablet 5 mg 5 mg at 07/11/22 0551   • atorvastatin (LIPITOR) tablet 40 mg 40 mg at 07/10/22 1712   • doxazosin (CARDURA) tablet 1 mg     • furosemide (LASIX) tablet 40 mg 40 mg at 07/12/22 0549   • lisinopril (PRINIVIL) tablet 40 mg 40 mg at 07/12/22 0549   • omeprazole (PRILOSEC) capsule 20 mg 20 mg at 07/11/22 0550   • polyethylene glycol/lytes (MIRALAX) PACKET 1 Packet 1 Packet at 07/11/22 1325    And   • senna-docusate (PERICOLACE or SENOKOT S) 8.6-50 MG per tablet 2 Tablet 2 Tablet at 07/11/22 0550    And   • magnesium hydroxide (MILK OF MAGNESIA) suspension 30 mL      And   • bisacodyl (DULCOLAX) suppository 10 mg     • ondansetron (ZOFRAN ODT) dispertab 4 mg     • acetaminophen (Tylenol) tablet 650 mg 650 mg at 07/10/22 1423   • nicotine (NICODERM) 14 MG/24HR 14 mg 14 mg at 07/12/22 0549   • hydrALAZINE (APRESOLINE) injection 20 mg 20 mg at 06/29/22 0049   • enalaprilat (Vasotec) injection 1.25 mg 1 mL 1.25 mg at 06/28/22 2032   • Respiratory Therapy Consult     • ondansetron (ZOFRAN) syringe/vial injection 4 mg     • Pharmacy Consult Request ...Pain Management Review 1 Each         PROBLEM LIST:  No problems updated.    ASSESSMENT/PLAN: 66 y.o. male PMHx of chronic methamphetamine use, tobacco dependence, HTN, right frontal CVA 2019, and syphilis (2020) who admitted for headache and dizziness. He was found to have encephalomalacia in the left cerebellar and right frontal areas on CT head without contrast. Patient was initially admitted to the floor under the  [facility] Family [facility] service, but later transferred to ICU for decreased level of consciousness. 6/22 he developed decreased LOC with obtundation. In ICU he underwent intubation, EVD placement and hypertonic saline therapy. He was eventually transferred out of ICU, [facility] resumed care.      # Acute CVA due to occlusion of the left cerebellar artery  # Obstructive hydrocephalus  Large left cerebellar ischemic infarct with cerebral edema and mass-effect on fourth ventricle, Detected on MRI 6/22.  - s/p suboccipital decompression with craniectomy and C1 laminectomy  - s/p right frontal EVD, removed 6/27  - MAP goal greater than 65. Long term, BP goal of 110-130/60-80.   - Continue lasix, Lisinopril 40 mg, Amlodipine 5 mg  - Atorvastatin 40 mg - LDL goal less than 70  - PT/OT/SLP consulted   -Tolerating PO   - Per neurosurgery, plan for repeat CT head 8/19   - Plan to start mirtazapine to increase appetite and help with mood      #Asymetric pupillary dilation   -Left > right dilation   - This has been present during hospitalization and is not new per review of records     #Urinary retention  -Urinary retention   -Continue hernandez  -Continue flomax     # Afib  - Rate controlled   - Continue Metoprolol succinate   - Continue anticoagulation      #bilateral DVT of upper extremities  - Acute, partially occlusive DVT to the R IJ with extension into the let innominate vein.  - Acute, occlusive DVT in on of the right paired brachial veins  - Acute,  occlusive DVT in Right paired ulnar veins within proximal forearm  - Occlusive superficial thrombophlebitis in a small segment of the right cephalic vein.   - Acute partially occlusive DVT in left paired brachial veins within the elbow region. Possible small extension into radial an ulnar veins   - Lower extremity US- negative for DVT  - Continue Elequis anticoagulation      # goals of care  There have been multiple failed attempts to contact family.   -Mentation appears to  be improved.  Patient minimally cooperative with discussions regarding goals of care.  Unclear if patient has capacity at this time to make decisions.   -Consider ethics consult, due to patient lacking capacity to make decisions     #Neurosyphilis  ID consulted   -resolved  -S/P 10 day treatment      # methamphetamine use  # tobacco use  Plan for further discussions on cessation when appropriate     Core Measures:  DVT PPX: SCDs, Elequis   ABX: None  Lines: PIV  Fluids: None  PCP: none  CODE STATUS: FULL CODE     Dispo:  Inpatient for CVA recovery, long-term disposition unclear, medically clear for discharge to SNF. Challenging disposition may need ethics consult if patient lacks capacity.

## 2022-07-13 NOTE — THERAPY
Missed Therapy     Patient Name: Alverto Duran  Age:  66 y.o., Sex:  male  Medical Record #: [id number]  Today's Date: 7/13/2022    Attempted to see pt for OT session. Pt lethargic and req max encouragement to even move blanket from over his head. Pt declined OT; unclear why as pt very difficult to understand with slurred, quiet speech. Agreeable to have catheter tubing unwrapped from around ankle and leg after explanation. Refused eating and recovered head with blanket. Unclear if behavioral or related to depressed-like mood; may benefit from psych eval if related to the latter, as pt unlikely to participate with any further therapy if severely depressed. RN aware.

## 2022-07-13 NOTE — PROGRESS NOTES
Pt refused assessment. Pt did allow me to view the back of his head at his incision. Incision YADIEL with no drainage, no redness, well approximated. Pt refused further assessment and all medications. Pt has been sleeping all day. Muse draining clear, yellow urine. Pt has been turning self frequently in bed. Pt tolerating a L6 s/bite sized 1:1 supervision diet. Pt hasn't had BM since 7/4 but is refusing all medications. Pt educated on importance of eliquis and medications.

## 2022-07-13 NOTE — DISCHARGE PLANNING
LSW received VM from pt's SHANTELLE Gina who reported she got a hold of pt's wife, Shu, and gave her this LSW's direct number to call. Gina also still trying to get a hold of pt's sister, Erlinda as well.    LSW received VM from pt's wife, Shu who reported they have been  for a while, however are still legally . Shu's number is [phone]. VM forwarded to pt's current RN CM Pat.

## 2022-07-13 NOTE — PROGRESS NOTES
Attempted to complete head to toe assessment on patient this morning at 0845. Patient refusing majority of assessment, but did allow me to look at his incision which was clean, dry and intact with defined edges that are well approximated, no drainage. Offered previously refused morning medication to patient, still declining at this time. Educated patient on importance of nursing assessment and medications, but patient still refusing at this time.

## 2022-07-13 NOTE — CARE PLAN
The patient is Watcher - Medium risk of patient condition declining or worsening    Shift Goals  Clinical Goals: Safety, orient patient, mobilize patient, discharge planning, [facility] neuro [facility].  Patient Goals: Rest  Family Goals: GLENDY    Progress made toward(s) clinical / shift goals:    Problem: Fall Risk  Goal: Patient will remain free from falls  Outcome: Progressing  Note: Fall precautions in place, frame alarm on bed is on. Patient does not attempt to mobilize without assistance       Patient is not progressing towards the following goals:      Problem: Knowledge Deficit - Standard  Goal: Patient and family/care givers will demonstrate understanding of plan of care, disease process/condition, diagnostic tests and medications  Outcome: Not Progressing  Note: Attempted to discuss the plan of care with the patient, but he refused to discuss his plan at this time and wished to be left alone. Education also refused.      Problem: Mobility - Stroke  Goal: Patient's capacity to carry out activities will improve  Outcome: Not Progressing  Note: Patient refusing to mobilize with staff at this time.

## 2022-07-14 PROBLEM — A41.9 SEPSIS (HCC): Status: ACTIVE | Noted: 2022-01-01

## 2022-07-14 NOTE — DISCHARGE PLANNING
Hospital Care Management- Medical Social Work      LMSW met with pt at bedside to assess. Pt barely aroused to loud voice and provided no engagement or responses to this writer. Pt barely opened eyes. Per IDT rounds and Dr. Ovalle, she will wait to consult psych for capacity evaluation, as pt now has a fever and she'd like to do further work-up to rule out acute delirium.     LMSW attempted to contact pt's estranged wife, Shu ([phone]) via phone to obtain further information, however, no answer and voicemail box has not been set up. LMSW to continue attempting to contact Shu, she she appears to be closest NOK based on chart review.     LMSW to gather information on pt's previous living situation, finances, mental health history, and support system. Anticipate pt will need placement in either SNF or group home, possibly with group home waiver. LMSW unable to complete group home waiver without financial information.     Update 1147: LMSW spoke with estranged wife Shu via phone. She has not spoken to pt in about 4 years, when he was still living in [place]. At that time, she reports pt lived in an apartment, had no mental health issues, and has no children. She is unaware of pt's current living situation or finances. She states that she is willing to assist with medical decision making if necessary, and is okay with involving other members of the family. She reports that to her knowledge, pt's brother Vicente, his wife Gina, and sister Erlinda may be best family members to involve. It appears all family lives in [facility].

## 2022-07-14 NOTE — DIETARY
Nutrition Services: Update   Day 23 of admit.  Alverto Duran is a 66 y.o. male with admitting DX of Hypertensive urgency, Acute cerebrovascular accident (CVA) due to occlusion of left cerebellar artery     Problem: Nutritional:  Goal: Achieve adequate nutritional intake  Description: Patient will consume >50% of meals  Outcome: MET    Pt appears to be meeting nutrition needs on soft and bite sized thin liquids. Pt also receives Boost Plus TID, this remains appropriate as pt does appear to sporadically refuse meals. Will reduce to BID to prevent displacement of food with supplemental shake.     Malnutrition Risk: No new risk     Recommendations/Plan:  1. RD to adjust Boost Plus to BID   2. Encourage intake of meals  3. Document intake of all meals as % taken in ADL's to provide interdisciplinary communication across all shifts.   4. Monitor weight.  5. Nutrition rep will continue to see patient for ongoing meal and snack preferences.    RD to remain available PRN, will continue to monitor weekly per department policy and provide interventions accordingly.

## 2022-07-14 NOTE — CARE PLAN
The patient is Watcher - Medium risk of patient condition declining or worsening    Shift Goals  Clinical Goals: safety, neuro checks  Patient Goals: GLENDY  Family Goals: not prseent    Progress made toward(s) clinical / shift goals:  yes      Problem: Knowledge Deficit - Standard  Goal: Patient and family/care givers will demonstrate understanding of plan of care, disease process/condition, diagnostic tests and medications  Outcome: Progressing     Problem: Pain - Standard  Goal: Alleviation of pain or a reduction in pain to the patient’s comfort goal  Outcome: Progressing     Problem: Fall Risk  Goal: Patient will remain free from falls  Outcome: Progressing     Problem: Safety - Medical Restraint  Goal: Remains free of injury from restraints (Restraint for Interference with Medical Device)  Outcome: Progressing  Goal: Free from restraint(s) (Restraint for Interference with Medical Device)  Outcome: Progressing     Problem: Skin Integrity  Goal: Skin integrity is maintained or improved  Outcome: Progressing     Problem: Optimal Care of the Stroke Patient  Goal: Optimal emergency care for the stroke patient  Outcome: Progressing  Goal: Optimal acute care for the stroke patient  Outcome: Progressing     Problem: Knowledge Deficit - Stroke Education  Goal: Patient's knowledge of stroke and risk factors will improve  Outcome: Progressing     Problem: Psychosocial - Patient Condition  Goal: Patient's ability to verbalize feelings about condition will improve  Outcome: Progressing  Goal: Patient's ability to re-evaluate and adapt role responsibilities will improve  Outcome: Progressing     Problem: Discharge Planning - Stroke  Goal: Ensure Stroke Core Measures are met prior to discharge  Outcome: Progressing  Goal: Patient’s continuum of care needs will be met  Outcome: Progressing     Problem: Neuro Status  Goal: Neuro status will remain stable or improve  Outcome: Progressing     Problem: Hemodynamic Monitoring  Goal:  Patient's hemodynamics, fluid balance and neurologic status will be stable or improve  Outcome: Progressing     Problem: Respiratory - Stroke Patient  Goal: Patient will achieve/maintain optimum respiratory rate/effort  Outcome: Progressing     Problem: Dysphagia  Goal: Dysphagia will improve  Outcome: Progressing     Problem: Risk for Aspiration  Goal: Patient's risk for aspiration will be absent or decrease  Outcome: Progressing     Problem: Urinary Elimination  Goal: Establish and maintain regular urinary output  Outcome: Progressing     Problem: Bowel Elimination  Goal: Establish and maintain regular bowel function  Outcome: Progressing     Problem: Mobility - Stroke  Goal: Patient's capacity to carry out activities will improve  Outcome: Progressing  Goal: Spasticity will be prevented or improved  Outcome: Progressing  Goal: Subluxation will be prevented or improved  Outcome: Progressing     Problem: Self Care  Goal: Patient will have the ability to perform ADLs independently or with assistance (bathe, groom, dress, toilet and feed)  Outcome: Progressing     Problem: Nutrition  Goal: Patient's nutritional and fluid intake will be adequate or improve  Outcome: Progressing  Goal: Enteral nutrition will be maintained or improve  Outcome: Progressing  Goal: Enteral nutrition will be maintained or improve  Outcome: Progressing

## 2022-07-14 NOTE — ASSESSMENT & PLAN NOTE
This is Sepsis Not present on admission  SIRS criteria identified on my evaluation include: Fever, with temperature greater than 101 deg F, Tachycardia, with heart rate greater than 90 BPM and Leukocytosis, with WBC greater than 12,000  Source is unknown  Sepsis protocol initiated  Fluid resuscitation ordered per protocol  Crystalloid Fluid Administration: Fluid resuscitation ordered per standard protocol - 30 mL/kg per current or ideal body weight  IV antibiotics as appropriate for source of sepsis  Reassessment: I have reassessed the patient's hemodynamic status  CT head, blood and urine culture   Procalcitonin, lactic acid  IV fluid  Vancomycin and Zosyn if patient will agree

## 2022-07-14 NOTE — PROGRESS NOTES
[facility] Medicine Daily Progress Note    Date of Service  7/14/2022    Chief Complaint  Alverto Duran is a 66 y.o. male admitted 6/21/2022 with Dizziness, headaches    Hospital Course  66-year-old male past medical history of meth abuse, tobacco abuse, previous frontal stroke 2019, hypertension, syphilis initially presented to [facility] emergency room with acute dizziness 6/20/2022 and afterwards he was sent home.  Following day 6/21/2022 he presented to [facility] emergency room where he complained of unable to ambulate, dizziness.  Head CT was done and reported left cerebral and right frontal encephalopathy Carrie.  His mentation was continued to worsen.  MRI 6/22 showed acute PICA infarct with cerebral edema compressing the fourth ventricle causing obstructive hydrocephalus and early herniation syndrome.  He was transferred to ICU for emergent intubation, mental treatment, neurosurgery evaluation.  He did have occipital craniotomy and C1 laminectomy Dr. Rizzo 6/23.  During hospital course patient was also treated syphilis,  Day IV penicillin from infectious disease.  EVD was removed 6/27/2022.  He also developed atrial fibrillation and was started on anticoagulation, Eliquis.  During this hospital stay patient mentation has not fully recovered.  He refuses multiple treatment, blood pressure medication.  He refuses IV line placement.  Per nursing patient is agitated intermittently.  7/14/2022 patient started to have fever, WBC 18.3, tachycardic, septic.  No clear source of infection.  Sepsis protocol started however patient refused absolutely all treatment.  Family was attempted to but none of the phone is available.  Start palliative consult in urgent ethic committee meeting was called      Interval Problem Update  Patient refuses examination IV line placement or even medication.  He moves all extremities.  Not verbal.  Not following commands  Febrile, tachycardic WBC 18.  Clearly septic with no clear source  of infection  Previous labs showed central hypothyroidism.  Started on replacement however patient again refusing treatment.  Multiple attempts to call his ex wife who her voicemail is not even set up.  Called his sister-in-law Gina and still her phone number is not working.  I discussed with palliative.  I did discuss even with Dr. Mccray who was his previous attending and patient cooperation and admit patient has been the same in the past with no improvement.  Because of co morbidities, recent stroke, recent craniotomy, poor prognosis and pt refuses all treatment he should not be full code and I do not see any good outcome if he truly been resuscitated.  I feel like all the above treatment are futile I will just add more burden and hours the patient's quality of life.   I did review medication history. patient has been refusing blood pressure medication, atorvastatin, Eliquis for many days now.  I change status to DNAR/DNI  I did request an urgent ethics meeting and stat palliative consult.  In the meantime we will still continue to offer patient if he will agree for IV line and IV antibiotic, however patient has the right to refuse all the above treatment.  I have started on vancomycin, Zosyn IV IV fluid.  I did hold Lasix  Blood cultures, urine test and cultures, procalcitonin, lactic acid is ordered and needs to follow  Last bowel movements 10 days ago per nursing report.  I tried to push on his abdomen which is soft.  No guarding or grimace noted.  No new focal neurodeficits.  Patient does not follow commands, moves all extremities.    I have discussed this patient's plan of care and discharge plan at IDT rounds today with Case Management, Nursing, Nursing leadership, and other members of the IDT team.    Consultants/Specialty  critical care, infectious disease, neurosurgery and psychiatry    Code Status  DNAR/DNI    Disposition  Patient is not medically cleared for discharge.   Anticipate discharge to to be  determine.  I have placed the appropriate orders for post-discharge needs.    Review of Systems  Review of Systems   Unable to perform ROS: Mental acuity        Physical Exam  Temp:  [36.4 °C (97.6 °F)-38.4 °C (101.1 °F)] 36.4 °C (97.6 °F)  Pulse:  [] 98  Resp:  [17-18] 17  BP: (120-140)/(75-95) 136/75  SpO2:  [92 %-96 %] 96 %    Physical Exam  Vitals and nursing note reviewed.   Constitutional:       General: He is in acute distress.      Appearance: He is ill-appearing and toxic-appearing.      Comments: frail   HENT:      Head: Normocephalic and atraumatic.      Comments: Back of his head, surgical wound no fluid collection or discharges   Eyes:      General: No scleral icterus.  Cardiovascular:      Heart sounds: No murmur heard.  Pulmonary:      Effort: Pulmonary effort is normal.      Breath sounds: No wheezing or rales.   Abdominal:      Palpations: Abdomen is soft.      Tenderness: There is no abdominal tenderness. There is no guarding.   Musculoskeletal:         General: No swelling.   Skin:     General: Skin is dry.   Neurological:      Mental Status: He is disoriented.         Fluids  No intake or output data in the 24 hours ending 07/14/22 1623    Laboratory  Recent Labs     07/14/22  1246   WBC 18.3*   RBC 5.57   HEMOGLOBIN 15.3   HEMATOCRIT 46.8   MCV 84.0   MCH 27.5   MCHC 32.7*   RDW 44.8   PLATELETCT 422   MPV 12.1     Recent Labs     07/13/22  1624   SODIUM 137   POTASSIUM 4.5   CHLORIDE 98   CO2 25   GLUCOSE 134*   BUN 24*   CREATININE 1.18   CALCIUM 9.9                   Imaging  CT-HEAD W/O   Final Result      1.  Interval removal of the right frontal approach EVD. No significant hydrocephalus.   2.  Evolving infarct of the left cerebellum with small amount of associated petechial hemorrhage.   3.  Resolution of the other intracranial hemorrhages. No new intracranial hemorrhage.   4.  Right frontal lobe encephalomalacia.   5.  Stable post surgical changes as above.         DX-ABDOMEN FOR  TUBE PLACEMENT   Final Result      1. The tip of the enteric tube terminates over the second portion of the duodenum.   2. The remainder of the bowel gas pattern is within normal limits.      DX-ESOPHAGUS - TPMY-YHPRY-FA   Final Result      Fluoroscopy provided for cookie swallow evaluation. Please refer to speech pathology report for details      DX-ABDOMEN FOR TUBE PLACEMENT   Final Result         1.  Nonspecific bowel gas pattern.   2.  Dobbhoff tube tip overlying the expected location of the pylorus or first duodenal segment.      DX-CHEST-PORTABLE (1 VIEW)   Final Result      1.  No acute cardiopulmonary disease.   2.  Supportive tubing as described above.      MR-BRAIN-WITH & W/O   Final Result         Subacute left inferior cerebellar infarct with minimal blood products within representing petechial microhemorrhages. Mass effect upon the fourth ventricle remains stable.      Posterior fossa is well decompressed by midline occipital craniotomy. Lateral ventricles are well decompressed by a right frontal approach EVD with its tip in the frontal horn of the right lateral ventricle.      Small right occipital tentorial subdural hematoma without mass effect.      Remote bilateral frontal infarct with encephalomalacia.      Age-related volume loss and chronic microvascular ischemic changes.         US-EXTREMITY VENOUS LOWER BILAT   Final Result      CT-HEAD W/O   Final Result      1.  There is a small amount of intraventricular blood that is new from 6/23/2022. The ventricles are stable in size.   2.  There is a right frontal approach ventriculostomy catheter with the tip terminating at the foramen of Monro. There is a punctate amount of parenchymal hemorrhage surrounding the catheter is unchanged.   3.  Right frontal and left cerebellar encephalomalacia.      EC-ECHOCARDIOGRAM COMPLETE W/O CONT   Final Result      US-CAROTID DOPPLER BILAT   Final Result      US-EXTREMITY VENOUS UPPER BILAT   Final Result       DX-CHEST-PORTABLE (1 VIEW)   Final Result         1.  No acute cardiopulmonary disease.   2.  Trace right pleural effusion   3.  Atherosclerosis      CT-CTA NECK WITH & W/O-POST PROCESSING   Final Result         1.  Severely hypoplastic right vertebral artery, central segment of the right vertebral artery is not visualized and may be occluded.         CT-CTA HEAD WITH & W/O-POST PROCESS   Final Result         1.  No large vessel occlusion or aneurysm identified   2.  Interval placement of right frontal approach ventriculostomy with postprocedural minimal hemorrhage and new pneumocephalus.   3.  Bilateral ventricular dilatation appears somewhat increased since prior study compatible with somewhat worsened hydrocephalus.   4.  Right frontal and left cerebellar encephalomalacia      DX-CHEST-PORTABLE (1 VIEW)   Final Result         1.  No acute cardiopulmonary disease.   2.  Trace bilateral pleural effusion      DX-ABDOMEN FOR TUBE PLACEMENT   Final Result         1.  Nonspecific bowel gas pattern.   2.  Dobbhoff tube tip terminates overlying the expected location of the gastric antrum.   3.  Trace bilateral pleural effusions      DX-CHEST-PORTABLE (1 VIEW)   Final Result         1.  No acute cardiopulmonary disease.   2.  Trace bilateral pleural effusions   3.  Atherosclerosis      MR-BRAIN-WITH & W/O   Final Result   Addendum 1 of 1   ADDENDUM:      The case was discussed by telephone (call report) with DR. DAVONTE VIEIRA    on call for [facility], at 1854 hours.      Final      1.  Interval development of moderate obstructive hydrocephalus due to mass effect on the fourth ventricle. There is mild transependymal edema.   2.  Large area of acute infarction involving the left cerebellar hemisphere, inferior cerebellar vermis, and left cerebellar tonsil. PICA territory. No hemorrhagic transformation. This is the cause of the fourth ventricle effacement with obstructive    hydrocephalus. There is also mild  left-sided cerebellar tonsillar herniation.   3.  Moderate supratentorial white matter disease most consistent with microvascular ischemic change.   4.  Old infarction right anterior frontal convexity.   5.  A Voalte message was sent to RICKEY HAYES at 1823 hours 6/22/2022. Prompt reply as of 6:41 PM not yet received. Efforts are ongoing to contact housestaff managing the patient at this time.   6.  Case was discussed (call report) with nurse SHASTA MAGILL at 6:47 PM. Request to initiate stat neurosurgery consult. Attempts to contact [facility] on-call housestaff are ongoing.      CT-HEAD W/O   Final Result      1.  No evidence of acute hemorrhage, mass or large territorial infarction   2.  LEFT cerebellar and RIGHT frontal encephalomalacia   3.  Mild atrophy   4.  Mild white matter changes         CT-CSPINE WITHOUT PLUS RECONS   Final Result      1.  No acute fracture or traumatic listhesis in the cervical spine.   2.  Emphysema in the lung apices.      CT-HEAD W/O    (Results Pending)        Assessment/Plan  * Acute cerebrovascular accident (CVA) due to occlusion of left cerebellar artery (HCC)- (present on admission)  Assessment & Plan  No new deficitis  Needs to be on eliquis  Pt not cooperative   Support care if pt allows     Sepsis (HCC)- (present on admission)  Assessment & Plan  This is Sepsis Not present on admission  SIRS criteria identified on my evaluation include: Fever, with temperature greater than 101 deg F, Tachycardia, with heart rate greater than 90 BPM and Leukocytosis, with WBC greater than 12,000  Source is unknown  Sepsis protocol initiated  Fluid resuscitation ordered per protocol  Crystalloid Fluid Administration: Fluid resuscitation ordered per standard protocol - 30 mL/kg per current or ideal body weight  IV antibiotics as appropriate for source of sepsis  Reassessment: I have reassessed the patient's hemodynamic status  CT head, blood and urine culture   Procalcitonin, lactic acid  IV  fluid  Vancomycin and Zosyn if patient will agree        New onset a-fib (HCC)- (present on admission)  Assessment & Plan  Refuses eliquis  Tachy, not rate controled.   Refuses meds    Acute bilateral deep vein thrombosis (DVT) of upper extremities (HCC)- (present on admission)  Assessment & Plan  On eliquis. But pt refuses     Goals of care, counseling/discussion- (present on admission)  Assessment & Plan  Change CODE STATUS to DNR/DNI.  See above  Patient not excepting any further treatment  His prognosis is very poor  Not cooperative.  Refusing all above treatment.  Full code is not appropriate and it is futile.r    Syphilis- (present on admission)  Assessment & Plan  Already treated  No improvement on mentation       VTE prophylaxis: therapeutic anticoagulation with eliquis    I have performed a physical exam and reviewed and updated ROS and Plan today (7/14/2022). In review of yesterday's note (7/13/2022), there are no changes except as documented above.

## 2022-07-14 NOTE — HOSPITAL COURSE
66-year-old male past medical history of meth abuse, tobacco abuse, previous frontal stroke 2019, hypertension, syphilis initially presented to [facility] emergency room with acute dizziness 6/20/2022 and afterwards he was sent home.  Following day 6/21/2022 he presented to [facility] emergency room where he complained of unable to ambulate, dizziness.  Head CT was done and reported left cerebral and right frontal encephalopathy Carrie.  His mentation was continued to worsen.  MRI 6/22 showed acute PICA infarct with cerebral edema compressing the fourth ventricle causing obstructive hydrocephalus and early herniation syndrome.  He was transferred to ICU for emergent intubation, mental treatment, neurosurgery evaluation.  He did have occipital craniotomy and C1 laminectomy Dr. Rizzo 6/23.  During hospital course patient was also treated syphilis,  Day IV penicillin from infectious disease.  EVD was removed 6/27/2022.  He also developed atrial fibrillation and was started on anticoagulation, Eliquis.  During this hospital stay patient mentation has not fully recovered.  He refuses multiple treatment, blood pressure medication.  He refuses IV line placement.  Per nursing patient is agitated intermittently.  7/14/2022 patient started to have fever, WBC 18.3, tachycardic, septic.  Sepsis protocol. Head CT gas, and concerned for abscess, however neurosurgery did recommend MRI brain for further eval. PT was started on IVF, IV antibiotic, lactic acid slight elevated. Procal elevated. Blood cultures NGT  Patient refused absolutely all treatment.  No antibiotic was able to start or IVF. Cultures remain negative. Mentation continues to worsen. Family was attempted to but none of the phone is available. Stat palliative consult in urgent ethic committee meeting was called. Following day family did come, to see pt. After family meeting all treatment that either way pt did refuse, seemed futile and pt medical condition is not  reversible. He was transition to comfort care 7/16/2022

## 2022-07-14 NOTE — ASSESSMENT & PLAN NOTE
Change CODE STATUS to DNR/DNI.  See above  Patient not excepting any further treatment  His prognosis is very poor  Not cooperative.  Refusing all above treatment.  Full code is not appropriate and it is futile.  Transition to comfort care 7/16

## 2022-07-14 NOTE — PROGRESS NOTES
[facility] family medicine transferred care to [facility] service starting 0700 today.

## 2022-07-14 NOTE — CARE PLAN
Problem: Fall Risk  Goal: Patient will remain free from falls  Outcome: Progressing  Note: Fall precautions in place, bed alarm on, frequent rounding   The patient is Watcher - Medium risk of patient condition declining or worsening    Shift Goals  Clinical Goals: safety, stable neuros  Patient Goals: rest  Family Goals: GLENDY    Progress made toward(s) clinical / shift goals:        Patient is not progressing towards the following goals:      Problem: Mobility - Stroke  Goal: Patient's capacity to carry out activities will improve  Outcome: Not Progressing  Note: Pt refusing all medications, therapy, and assessments

## 2022-07-15 NOTE — CARE PLAN
The patient is Unstable - High likelihood or risk of patient condition declining or worsening    Shift Goals  Clinical Goals: Safety, stable vitals, neuro checks  Patient Goals: mario alberto  Family Goals: not prseent    Progress made toward(s) clinical / shift goals:    Problem: Fall Risk  Goal: Patient will remain free from falls  Outcome: Progressing       Patient is not progressing towards the following goals:      Problem: Knowledge Deficit - Standard  Goal: Patient and family/care givers will demonstrate understanding of plan of care, disease process/condition, diagnostic tests and medications  Outcome: Not Progressing

## 2022-07-15 NOTE — PROGRESS NOTES
"Pharmacy Vancomycin Kinetics Note for 7/14/2022     66 y.o. male on Vancomycin day # 1     Vancomycin Indication (AUC Dosing): Sepsis (unknown source)  Provider specified end date: 07/19/22    Active Antibiotics (From admission, onward)    Ordered     Ordering Provider       Thu Jul 14, 2022  3:51 PM    07/14/22 1551  vancomycin (VANCOCIN) 1,750 mg in  mL IVPB  (vancomycin (VANCOCIN) IV (LD + Maintenance))  ONCE         Eduardo Ovalle M.D.       Thu Jul 14, 2022  3:34 PM    07/14/22 1534  MD Alert...Vancomycin per Pharmacy  PHARMACY TO DOSE        Question:  Indication(s) for vancomycin?  Answer:  Unknown source of infection    Eduardo Ovalle M.D.    07/14/22 1534  piperacillin-tazobactam (ZOSYN) 4.5 g in  mL IVPB  (piperacillin-tazobactam (ZOSYN) IV (Extended-infusion) PANEL )  ONCE        \"And\" Linked Group Details    Eduardo Ovalle M.D.    07/14/22 1534  piperacillin-tazobactam (ZOSYN) 4.5 g in  mL IVPB  (piperacillin-tazobactam (ZOSYN) IV (Extended-infusion) PANEL )  EVERY 8 HOURS        \"And\" Linked Group Details    Eduardo Ovalle M.D.          Dosing Weight: 68.8 kg (151 lb 10.8 oz)      Admission History: Admitted on 6/21/2022 for Hypertensive urgency [I16.0]  Acute cerebrovascular accident (CVA) due to occlusion of left cerebellar artery (HCC) [I63.542]  Pertinent history: Initial admit for CVA, now with elevated WBC and other signs of sepsis, unclear source. Abx ordered, but pt does not have an IV currently and is refusing IV placement and medications currently.    Allergies:     Patient has no known allergies.     Pertinent cultures to date:     Results     Procedure Component Value Units Date/Time    COV-2, FLU A/B, AND RSV BY PCR (2-4 HOURS CEPHEID): Collect NP swab in VTM [[id number]]     Order Status: No result Specimen: Respirate from Nasopharyngeal     Urinalysis [[id number]]     Order Status: No result Specimen: Urine, Muse Cath     Urine Culture [[id number]]     Order Status: No result " "Specimen: Urine, Longo Cath     BLOOD CULTURE [[id number]] Collected: 07/14/22 1246    Order Status: Sent Specimen: Blood from Peripheral Updated: 07/14/22 1416    Narrative:      Per Hospital Policy: Only change Specimen Src: to \"Line\" if  specified by physician order.    BLOOD CULTURE [[id number]] Collected: 07/14/22 1246    Order Status: Sent Specimen: Blood from Peripheral Updated: 07/14/22 1415    Narrative:      Per Hospital Policy: Only change Specimen Src: to \"Line\" if  specified by physician order.    BLOOD CULTURE [[id number]] Collected: 06/30/22 1606    Order Status: Completed Specimen: Blood from Peripheral Updated: 07/05/22 1700     Significant Indicator NEG     Source BLD     Site PERIPHERAL     Culture Result No growth after 5 days of incubation.    Narrative:      Per Hospital Policy: Only change Specimen Src: to \"Line\" if  specified by physician order.  Right AC    BLOOD CULTURE [[id number]] Collected: 06/30/22 1443    Order Status: Completed Specimen: Blood from Peripheral Updated: 07/05/22 1500     Significant Indicator NEG     Source BLD     Site PERIPHERAL     Culture Result No growth after 5 days of incubation.    Narrative:      Per Hospital Policy: Only change Specimen Src: to \"Line\" if  specified by physician order.  Right AC    URINALYSIS [[id number]]     Order Status: Canceled Specimen: Urine     URINE CULTURE(NEW) [[id number]] Collected: 06/30/22 1511    Order Status: Completed Specimen: Urine, Longo Cath Updated: 07/02/22 0659     Significant Indicator NEG     Source UR     Site URINE, LONGO CATH     Culture Result No growth at 48 hours.    Narrative:      Collected By: [id number] LINO MARTINEZ.  Indication for culture:->Evaluation for sepsis without a  clear source of infection  Collected By: [id number] LINO MARTINEZ.    CSF Culture [[id number]] Collected: 06/24/22 0834    Order Status: Completed Specimen: CSF from Shunt Updated: 07/01/22 0747     Significant Indicator NEG     Source " "CSF     Site SHUNT     Culture Result No growth at 7 days.     Gram Stain Result Rare WBCs.  No organisms seen.      Narrative:      Collected By: BELEN SYED  Collected By: BELEN SYED          Labs:     Estimated Creatinine Clearance: 59.9 mL/min (by C-G formula based on SCr of 1.18 mg/dL).  Recent Labs     22  1246   WBC 18.3*   NEUTSPOLYS 82.00*     Recent Labs     22  1624   BUN 24*   CREATININE 1.18     No intake or output data in the 24 hours ending 22 1707   /75   Pulse 98   Temp 36.4 °C (97.6 °F) (Temporal)   Resp 17   Ht 1.778 m (5' 10\")   Wt 68.8 kg (151 lb 10.8 oz)   SpO2 96%  Temp (24hrs), Av.5 °C (99.5 °F), Min:36.4 °C (97.6 °F), Max:38.4 °C (101.1 °F)      List concerns for Vancomycin clearance:     BUN/Scr ratio greater than 20:1    Pharmacokinetics:     AUC kinetics:   Ke (hr ^-1): 0.0542 hr^-1  Half life: 12.79 hr  Clearance: 2.424  Estimated TDD: 1212  Estimated Dose: 747  Estimated interval: 14.8    A/P:     -  Vancomycin dose: 1750 mg IV x 1 load, then 1250 mg IV q24h    -  Next vancomycin level(s):    - to be ordered at steady state if abx continue    -  Predicted vancomycin AUC from initial AUC test calculator: 516 mg·hr/L    -  Comments: Doses ordered, but pt is refusing IV placement and most medications. Some labs are in process, last renal function is from . Will order levels if abx start and continue. MRSA nares ordered per protocol in case pt allows it - vanco is not a good choice if he will refuse labs and fluids and such, so de-escalation is encouraged.    Marilyn Diez, PharmD, BCOP  "

## 2022-07-15 NOTE — CARE PLAN
The patient is Watcher - Medium risk of patient condition declining or worsening    Shift Goals  Clinical Goals: safety, neuro checks  Patient Goals: GLENDY  Family Goals: not prseent    Progress made toward(s) clinical / shift goals:       Problem: Fall Risk  Goal: Patient will remain free from falls  Outcome: Progressing  Note: Bed is locked and in lowest position, treaded socks on, bed alarm on, DME out of sight, call light and belongings within reach, hourly rounding in place.       Problem: Skin Integrity  Goal: Skin integrity is maintained or improved  Outcome: Progressing  Note: Pt able to reposition self in bed frequently, frequent moisture/incontinent checks performed, skin intact.           Patient is not progressing towards the following goals:    Problem: Knowledge Deficit - Standard  Goal: Patient and family/care givers will demonstrate understanding of plan of care, disease process/condition, diagnostic tests and medications  Outcome: Not Progressing  Note: POC discussed with pt at bedside, head CT performed. No evidence of learning.

## 2022-07-15 NOTE — PROGRESS NOTES
[facility] Medicine Daily Progress Note    Date of Service  7/15/2022    Chief Complaint  Alverto Duran is a 66 y.o. male admitted 6/21/2022 with Dizziness, headaches    Hospital Course  66-year-old male past medical history of meth abuse, tobacco abuse, previous frontal stroke 2019, hypertension, syphilis initially presented to [place] emergency room with acute dizziness 6/20/2022 and afterwards he was sent home.  Following day 6/21/2022 he presented to [facility] emergency room where he complained of unable to ambulate, dizziness.  Head CT was done and reported left cerebral and right frontal encephalopathy Carrie.  His mentation was continued to worsen.  MRI 6/22 showed acute PICA infarct with cerebral edema compressing the fourth ventricle causing obstructive hydrocephalus and early herniation syndrome.  He was transferred to ICU for emergent intubation, mental treatment, neurosurgery evaluation.  He did have occipital craniotomy and C1 laminectomy Dr. Rizzo 6/23.  During hospital course patient was also treated syphilis,  Day IV penicillin from infectious disease.  EVD was removed 6/27/2022.  He also developed atrial fibrillation and was started on anticoagulation, Eliquis.  During this hospital stay patient mentation has not fully recovered.  He refuses multiple treatment, blood pressure medication.  He refuses IV line placement.  Per nursing patient is agitated intermittently.  7/14/2022 patient started to have fever, WBC 18.3, tachycardic, septic.  No clear source of infection.  Sepsis protocol started however patient refused absolutely all treatment.  Family was attempted to but none of the phone is available.  Start palliative consult in urgent ethic committee meeting was called      Interval Problem Update  Patient refuses examination IV line placement or even medication.  He moves all extremities.  Not verbal.  Not following commands  Febrile, tachycardic WBC 18.  Clearly septic with no clear source  of infection  Previous labs showed central hypothyroidism.  Started on replacement however patient again refusing treatment.  Multiple attempts to call his ex wife who her voicemail is not even set up.  Called his sister-in-law Gina and still her phone number is not working.  I discussed with palliative.  I did discuss even with Dr. Mccray who was his previous attending and patient cooperation and admit patient has been the same in the past with no improvement.  Because of co morbidities, recent stroke, recent craniotomy, poor prognosis and pt refuses all treatment he should not be full code and I do not see any good outcome if he truly been resuscitated.  I feel like all the above treatment are futile I will just add more burden and hours the patient's quality of life.   I did review medication history. patient has been refusing blood pressure medication, atorvastatin, Eliquis for many days now.  I change status to DNAR/DNI  I did request an urgent ethics meeting and stat palliative consult.  In the meantime we will still continue to offer patient if he will agree for IV line and IV antibiotic, however patient has the right to refuse all the above treatment.  I have started on vancomycin, Zosyn IV IV fluid.  I did hold Lasix  Blood cultures, urine test and cultures, procalcitonin, lactic acid is ordered and needs to follow  Last bowel movements 10 days ago per nursing report.  I tried to push on his abdomen which is soft.  No guarding or grimace noted.  No new focal neurodeficits.  Patient does not follow commands, moves all extremities.    7/15-patient still refuses further examination.  Is able to tell me only his name.  He did refuse other medications.  Remains tachycardic, also febrile  COVID test negative.  Urine no signs of infection  Blood cultures in process  Procalcitonin slightly elevated.  Ammonia level normal  Head CT showed gas formation, concerning for abscess.  Discussed with neurosurgery who did  not believe that that is an abscess.  MRI brain with contrast ordered.  Patient has no IV line and is not allowing nurses to put an IV line.  He also has some urinary retention and not allowing nurses to put a Muse  Discussed case with palliative team, ethics team.  At this point we cannot force any treatment which is in violation of patient right.  Pt was able to tell Palliative team his family memberns name, None for me. Family was contacted by palliative team and none of them are taking any medical decision however they defer further decisions to medical team.  Patient prognosis is poor and forcing any treatment that he does not want I see futile and those above treatment will not change the course. His condition is still not reversible. He is not able to take care of himself. I do recommend supportive treatment. Not a surgical candidate. If pt condition continues to worsen, I recommend only supportive. We can try IV antibiotic?? I don't see benefits of them., CT abdomen and MRI brain ?? Again that will not change his prognosis.  In the meantime I tried to switch to oral antibiotic and he still refusing them    I have discussed this patient's plan of care and discharge plan at IDT rounds today with Case Management, Nursing, Nursing leadership, and other members of the IDT team.    Consultants/Specialty  critical care, infectious disease, neurosurgery and psychiatry    Code Status  DNAR/DNI    Disposition  Patient is not medically cleared for discharge.   Anticipate discharge to to be determine.  I have placed the appropriate orders for post-discharge needs.    Review of Systems  Review of Systems   Unable to perform ROS: Mental acuity        Physical Exam  Temp:  [36.4 °C (97.6 °F)-37.9 °C (100.3 °F)] 37.7 °C (99.8 °F)  Pulse:  [] 134  Resp:  [17-18] 18  BP: (103-136)/(70-88) 133/88  SpO2:  [91 %-96 %] 91 %    Physical Exam  Vitals and nursing note reviewed.   Constitutional:       General: He is in acute  distress.      Appearance: He is ill-appearing and toxic-appearing.      Comments: frail   HENT:      Head: Normocephalic and atraumatic.      Comments: Back of his head, surgical wound no fluid collection or discharges   Eyes:      General: No scleral icterus.  Cardiovascular:      Heart sounds: No murmur heard.  Pulmonary:      Effort: Pulmonary effort is normal.      Breath sounds: No wheezing or rales.   Abdominal:      Palpations: Abdomen is soft.      Tenderness: There is no abdominal tenderness. There is no guarding.   Musculoskeletal:         General: No swelling.   Skin:     General: Skin is dry.   Neurological:      Mental Status: He is disoriented.         Fluids    Intake/Output Summary (Last 24 hours) at 7/15/2022 1550  Last data filed at 7/14/2022 1600  Gross per 24 hour   Intake --   Output 700 ml   Net -700 ml       Laboratory  Recent Labs     07/14/22  1246   WBC 18.3*   RBC 5.57   HEMOGLOBIN 15.3   HEMATOCRIT 46.8   MCV 84.0   MCH 27.5   MCHC 32.7*   RDW 44.8   PLATELETCT 422   MPV 12.1     Recent Labs     07/13/22  1624   SODIUM 137   POTASSIUM 4.5   CHLORIDE 98   CO2 25   GLUCOSE 134*   BUN 24*   CREATININE 1.18   CALCIUM 9.9     Recent Labs     07/14/22  1614   INR 1.34*               Imaging  CT-HEAD W/O   Final Result      Some increase in gas in the suboccipital craniectomy extradural fluid collection which has enlarged. This is concerning for infection/abscess formation      Slight new focus of gas appears to be intradural in the region of the subacute left inferior cerebellar infarction      Similar mild mass effect upon the fourth ventricle does not result in hydrocephalus      No new infarction or intracranial hemorrhage is seen. Right frontal encephalomalacia from remote infarction.      Voalte sent to GUILLERMINA RUTH on 7/15/2022 3:49 AM. It was read at 5:20 AM.      CT-HEAD W/O   Final Result      1.  Interval removal of the right frontal approach EVD. No significant hydrocephalus.   2.   Evolving infarct of the left cerebellum with small amount of associated petechial hemorrhage.   3.  Resolution of the other intracranial hemorrhages. No new intracranial hemorrhage.   4.  Right frontal lobe encephalomalacia.   5.  Stable post surgical changes as above.         DX-ABDOMEN FOR TUBE PLACEMENT   Final Result      1. The tip of the enteric tube terminates over the second portion of the duodenum.   2. The remainder of the bowel gas pattern is within normal limits.      DX-ESOPHAGUS - [id number]   Final Result      Fluoroscopy provided for cookie swallow evaluation. Please refer to speech pathology report for details      DX-ABDOMEN FOR TUBE PLACEMENT   Final Result         1.  Nonspecific bowel gas pattern.   2.  Dobbhoff tube tip overlying the expected location of the pylorus or first duodenal segment.      DX-CHEST-PORTABLE (1 VIEW)   Final Result      1.  No acute cardiopulmonary disease.   2.  Supportive tubing as described above.      MR-BRAIN-WITH & W/O   Final Result         Subacute left inferior cerebellar infarct with minimal blood products within representing petechial microhemorrhages. Mass effect upon the fourth ventricle remains stable.      Posterior fossa is well decompressed by midline occipital craniotomy. Lateral ventricles are well decompressed by a right frontal approach EVD with its tip in the frontal horn of the right lateral ventricle.      Small right occipital tentorial subdural hematoma without mass effect.      Remote bilateral frontal infarct with encephalomalacia.      Age-related volume loss and chronic microvascular ischemic changes.         US-EXTREMITY VENOUS LOWER BILAT   Final Result      CT-HEAD W/O   Final Result      1.  There is a small amount of intraventricular blood that is new from 6/23/2022. The ventricles are stable in size.   2.  There is a right frontal approach ventriculostomy catheter with the tip terminating at the foramen of Monro. There is a punctate  amount of parenchymal hemorrhage surrounding the catheter is unchanged.   3.  Right frontal and left cerebellar encephalomalacia.      EC-ECHOCARDIOGRAM COMPLETE W/O CONT   Final Result      US-CAROTID DOPPLER BILAT   Final Result      US-EXTREMITY VENOUS UPPER BILAT   Final Result      DX-CHEST-PORTABLE (1 VIEW)   Final Result         1.  No acute cardiopulmonary disease.   2.  Trace right pleural effusion   3.  Atherosclerosis      CT-CTA NECK WITH & W/O-POST PROCESSING   Final Result         1.  Severely hypoplastic right vertebral artery, central segment of the right vertebral artery is not visualized and may be occluded.         CT-CTA HEAD WITH & W/O-POST PROCESS   Final Result         1.  No large vessel occlusion or aneurysm identified   2.  Interval placement of right frontal approach ventriculostomy with postprocedural minimal hemorrhage and new pneumocephalus.   3.  Bilateral ventricular dilatation appears somewhat increased since prior study compatible with somewhat worsened hydrocephalus.   4.  Right frontal and left cerebellar encephalomalacia      DX-CHEST-PORTABLE (1 VIEW)   Final Result         1.  No acute cardiopulmonary disease.   2.  Trace bilateral pleural effusion      DX-ABDOMEN FOR TUBE PLACEMENT   Final Result         1.  Nonspecific bowel gas pattern.   2.  Dobbhoff tube tip terminates overlying the expected location of the gastric antrum.   3.  Trace bilateral pleural effusions      DX-CHEST-PORTABLE (1 VIEW)   Final Result         1.  No acute cardiopulmonary disease.   2.  Trace bilateral pleural effusions   3.  Atherosclerosis      MR-BRAIN-WITH & W/O   Final Result   Addendum 1 of 1   ADDENDUM:      The case was discussed by telephone (call report) with DR. DAVONTE VIEIRA    on call for [facility], at 1854 hours.      Final      1.  Interval development of moderate obstructive hydrocephalus due to mass effect on the fourth ventricle. There is mild transependymal edema.   2.   Large area of acute infarction involving the left cerebellar hemisphere, inferior cerebellar vermis, and left cerebellar tonsil. PICA territory. No hemorrhagic transformation. This is the cause of the fourth ventricle effacement with obstructive    hydrocephalus. There is also mild left-sided cerebellar tonsillar herniation.   3.  Moderate supratentorial white matter disease most consistent with microvascular ischemic change.   4.  Old infarction right anterior frontal convexity.   5.  A Voalte message was sent to RICKEY HAYES at 1823 hours 6/22/2022. Prompt reply as of 6:41 PM not yet received. Efforts are ongoing to contact housestaff managing the patient at this time.   6.  Case was discussed (call report) with nurse SHASTA MAGILL at 6:47 PM. Request to initiate stat neurosurgery consult. Attempts to contact [facility] on-call housestaff are ongoing.      CT-HEAD W/O   Final Result      1.  No evidence of acute hemorrhage, mass or large territorial infarction   2.  LEFT cerebellar and RIGHT frontal encephalomalacia   3.  Mild atrophy   4.  Mild white matter changes         CT-CSPINE WITHOUT PLUS RECONS   Final Result      1.  No acute fracture or traumatic listhesis in the cervical spine.   2.  Emphysema in the lung apices.      MR-BRAIN-WITH & W/O    (Results Pending)        Assessment/Plan  * Acute cerebrovascular accident (CVA) due to occlusion of left cerebellar artery (HCC)- (present on admission)  Assessment & Plan  No new deficitis  Needs to be on eliquis  Pt not cooperative   Support care if pt allows     Sepsis (HCC)- (present on admission)  Assessment & Plan  This is Sepsis Not present on admission  SIRS criteria identified on my evaluation include: Fever, with temperature greater than 101 deg F, Tachycardia, with heart rate greater than 90 BPM and Leukocytosis, with WBC greater than 12,000  Source is unknown  Sepsis protocol initiated  Fluid resuscitation ordered per protocol  Crystalloid Fluid  Administration: Fluid resuscitation ordered per standard protocol - 30 mL/kg per current or ideal body weight  IV antibiotics as appropriate for source of sepsis  Reassessment: I have reassessed the patient's hemodynamic status  CT head, blood and urine culture   Procalcitonin, lactic acid  IV fluid  Vancomycin and Zosyn if patient will agree        New onset a-fib (HCC)- (present on admission)  Assessment & Plan  Refuses eliquis  Tachy, not rate controled.   Refuses meds    Acute bilateral deep vein thrombosis (DVT) of upper extremities (HCC)- (present on admission)  Assessment & Plan  On eliquis. But pt refuses     Goals of care, counseling/discussion- (present on admission)  Assessment & Plan  Change CODE STATUS to DNR/DNI.  See above  Patient not excepting any further treatment  His prognosis is very poor  Not cooperative.  Refusing all above treatment.  Full code is not appropriate and it is futile.r    Syphilis- (present on admission)  Assessment & Plan  Already treated  No improvement on mentation    Hypokalemia- (present on admission)  Assessment & Plan  Replace if he will accept       VTE prophylaxis: therapeutic anticoagulation with eliquis    I have performed a physical exam and reviewed and updated ROS and Plan today (7/15/2022). In review of yesterday's note (7/14/2022), there are no changes except as documented above.

## 2022-07-15 NOTE — PROGRESS NOTES
2200  Head to to assessment attempted, pt has incomprehensible responses to questions, unable to assess orientation, although able to follow few commands. Pt able to raise both eyebrows and stick out tongue. Incision to back of head intact, well approximated. Refusing oral intake. Urinal provided for output after Muse removal. Education provided, no evidence of learning.     0030  Pt found to be incontinent of urine. Full bed linen change.    0150  Pt off unit for head CT.    0215  Pt returned to unit.

## 2022-07-15 NOTE — PROGRESS NOTES
0815 Patient refusing assessment, not answering neuro check questions, and pulling blanket over head, turning onto side. Attempted to educate patient on importance of assessment, patient simply grunting and not cooperating.    1200 Bladder scan showing greater than 500mL urine, patient not able to void upon request and patient has not had any episodes of incontinence. Muse catheter placed, 22g PIV placed in the left forearm.

## 2022-07-15 NOTE — PALLIATIVE CARE
"Palliative Care follow-up  PC RN discussed case with MD and Bioethics director. Appreciate the work of SW team in finding pt's family. PC visited with Alverto, who answered questions intermittently. PC asked what his sister's name is and he states \"I don't have a sister.\" PC asked \"What about a brother?\" and he states \"Vicente.\" PC asked \"Who is Erlinda?\" and he says \"My sister.\" PC asked \"Who is Shu?\" and he states \"My wife.\"    Call placed to estranged wife Shu and explained the role of this RN. She was told a little bit about Alverto's clinical picture and this RN provided updates on the clinical picture and current concerns. She tells PC that she has not seen or lived with Alverto in \"over a decade\" and last spoke with him \"4 or 5 years ago.\" She states not having any idea of Alverto's wishes or what he would want for himself, nor does she feel comfortable making decisions on his behalf. PC made a plan to reach out to Erlinda and she was in agreement with this.    Call to Erlidna ([phone]) to discuss the current situation and POC. She also expressed not feeling comfortable making decisions on behalf of her brother, but thought that maybe her and Vicente could do it together. PC asked if it was possible to get Vicente on the phone now and she states he does not have a phone. She expressed that if Vicente called her, she would 3-way this call this RN to have a discussion but was not aware of any plans for Vicente to call her. PC shared that if anything changed in Alverto's situation warranting further decisions to be made, the medical team would have to use the internal processes to address any needs until she and Vicente decide if they are going to play a part in the decision making process. She was in agreement with this plan. PC assured she had this RN's number for any updates.    Case discussed further with Bioethics director Brea Jain who will be available by Voalte as needed; Dr. Ovalle " updated.      Updated: MD/Bioethics    Plan: follow and support with POC. Will address POC if siblings call back    Thank you for allowing Palliative Care to support this patient and family. Contact [phone] for additional assistance, change in patient status, or with any questions/concerns.

## 2022-07-15 NOTE — PROGRESS NOTES
Notified Dr. Ovalle of patient appearing to have hallucinations, grasping at the air. In addition patient saturating at 81% on room air, refusing to wear nasal cannula or oxymask. Instructed by Dr. Ovalle to continue to monitor and attempt to provide patient with supplemental oxygen.

## 2022-07-16 NOTE — CARE PLAN
The patient is Watcher - Medium risk of patient condition declining or worsening    Shift Goals  Clinical Goals: safety, stable vitals, neuro checks, discharge planning, communicate with family, skin check  Patient Goals: unable to assess due to altered mentation  Family Goals: not present at bedside    Progress made toward(s) clinical / shift goals:      Problem: Pain - Standard  Goal: Alleviation of pain or a reduction in pain to the patient’s comfort goal  Outcome: Progressing  Note: Pt states no pain and is declining pain medications. pillows for comfort and repositioning.        Patient is not progressing towards the following goals:      Problem: Knowledge Deficit - Standard  Goal: Patient and family/care givers will demonstrate understanding of plan of care, disease process/condition, diagnostic tests and medications  Outcome: Not Progressing  Note: POC discussed with pt at bedside. Pt has altered mentation and was only able to mumble some incomprehensible words. Alert and oriented x0

## 2022-07-16 NOTE — PROGRESS NOTES
.4 Eyes Skin Assessment Completed by SHAREE Curtis and SHAREE Travis.    Head Incision YADIEL  Ears WDL  Nose WDL  Mouth WDL  Neck WDL  Breast/Chest WDL  Shoulder Blades WDL  Spine WDL  (R) Arm/Elbow/Hand WDL  (L) Arm/Elbow/Hand WDL  Abdomen WDL  Groin hernandez in place  Scrotum/Coccyx/Buttocks WDL  (R) Leg WDL  (L) Leg WDL  (R) Heel/Foot/Toe callus/sore on ball of foot  (L) Heel/Foot/Toe callus/sore on ball of foot          Devices In Places Blood Pressure Cuff, Pulse Ox and SCD's, Hernandez catheter       Interventions In Place NC W/Ear Foams, Pillows and Pressure Redistribution Mattress    Possible Skin Injury No    Pictures Uploaded Into Epic N/A  Wound Consult Placed N/A  RN Wound Prevention Protocol Ordered No

## 2022-07-16 NOTE — CARE PLAN
Problem: Pain - Standard  Goal: Alleviation of pain or a reduction in pain to the patient’s comfort goal  Outcome: Progressing     Problem: Fall Risk  Goal: Patient will remain free from falls  Outcome: Progressing   The patient is Watcher - Medium risk of patient condition declining or worsening    Shift Goals  Clinical Goals: pt safety, Q4 neuros  Patient Goals: rest  Family Goals: not present at bedside    Progress made toward(s) clinical / shift goals:  pt remained safe from falls and injury. Q4 neuro checks performed. Patient transitioned to comfort care.    Patient is not progressing towards the following goals:

## 2022-07-16 NOTE — PROGRESS NOTES
"Bedside report received.  Assessment complete.  A&O x person. Patient does not call appropriately.  Patient ambulates with two assist. Bed alarm on.   Patient has 0/10 pain. Pain managed with prescribed medications.  Denies N&V. Tolerating level six diet.  Surgical site CDI.  + void in david, GLENDY flatus, last BM 7/4.  Patient denies SOB.  SCD's refused.  Patient is confused, but cooperative with staff.  Review plan with of care with patient. Call light and personal belongings within reach. Hourly rounding in place. All needs met at this time.  /84   Pulse (!) 109   Temp 37.2 °C (98.9 °F) (Temporal)   Resp 17   Ht 1.778 m (5' 10\")   Wt 68.8 kg (151 lb 10.8 oz)   SpO2 95%   BMI 21.76 kg/m²     "

## 2022-07-16 NOTE — PROGRESS NOTES
[facility] Medicine Daily Progress Note    Date of Service  7/16/2022    Chief Complaint  Alverto Duran is a 66 y.o. male admitted 6/21/2022 with Dizziness, headaches    Hospital Course  66-year-old male past medical history of meth abuse, tobacco abuse, previous frontal stroke 2019, hypertension, syphilis initially presented to [facility] emergency room with acute dizziness 6/20/2022 and afterwards he was sent home.  Following day 6/21/2022 he presented to [facility] emergency room where he complained of unable to ambulate, dizziness.  Head CT was done and reported left cerebral and right frontal encephalopathy Carrie.  His mentation was continued to worsen.  MRI 6/22 showed acute PICA infarct with cerebral edema compressing the fourth ventricle causing obstructive hydrocephalus and early herniation syndrome.  He was transferred to ICU for emergent intubation, mental treatment, neurosurgery evaluation.  He did have occipital craniotomy and C1 laminectomy Dr. Rizzo 6/23.  During hospital course patient was also treated syphilis,  Day IV penicillin from infectious disease.  EVD was removed 6/27/2022.  He also developed atrial fibrillation and was started on anticoagulation, Eliquis.  During this hospital stay patient mentation has not fully recovered.  He refuses multiple treatment, blood pressure medication.  He refuses IV line placement.  Per nursing patient is agitated intermittently.  7/14/2022 patient started to have fever, WBC 18.3, tachycardic, septic.  No clear source of infection.  Sepsis protocol started however patient refused absolutely all treatment.  Family was attempted to but none of the phone is available.  Start palliative consult in urgent ethic committee meeting was called      Interval Problem Update  Patient refuses examination IV line placement or even medication.  He moves all extremities.  Not verbal.  Not following commands  Febrile, tachycardic WBC 18.  Clearly septic with no clear source  of infection  Previous labs showed central hypothyroidism.  Started on replacement however patient again refusing treatment.  Multiple attempts to call his ex wife who her voicemail is not even set up.  Called his sister-in-law Gina and still her phone number is not working.  I discussed with palliative.  I did discuss even with Dr. Mccray who was his previous attending and patient cooperation and admit patient has been the same in the past with no improvement.  Because of co morbidities, recent stroke, recent craniotomy, poor prognosis and pt refuses all treatment he should not be full code and I do not see any good outcome if he truly been resuscitated.  I feel like all the above treatment are futile I will just add more burden and hours the patient's quality of life.   I did review medication history. patient has been refusing blood pressure medication, atorvastatin, Eliquis for many days now.  I change status to DNAR/DNI  I did request an urgent ethics meeting and stat palliative consult.  In the meantime we will still continue to offer patient if he will agree for IV line and IV antibiotic, however patient has the right to refuse all the above treatment.  I have started on vancomycin, Zosyn IV IV fluid.  I did hold Lasix  Blood cultures, urine test and cultures, procalcitonin, lactic acid is ordered and needs to follow  Last bowel movements 10 days ago per nursing report.  I tried to push on his abdomen which is soft.  No guarding or grimace noted.  No new focal neurodeficits.  Patient does not follow commands, moves all extremities.    7/15-patient still refuses further examination.  Is able to tell me only his name.  He did refuse other medications.  Remains tachycardic, also febrile  COVID test negative.  Urine no signs of infection  Blood cultures in process  Procalcitonin slightly elevated.  Ammonia level normal  Head CT showed gas formation, concerning for abscess.  Discussed with neurosurgery who did  not believe that that is an abscess.  MRI brain with contrast ordered.  Patient has no IV line and is not allowing nurses to put an IV line.  He also has some urinary retention and not allowing nurses to put a Muse  Discussed case with palliative team, ethics team.  At this point we cannot force any treatment which is in violation of patient right.  Pt was able to tell Palliative team his family memberns name, None for me. Family was contacted by palliative team and none of them are taking any medical decision however they defer further decisions to medical team.  Patient prognosis is poor and forcing any treatment that he does not want I see futile and those above treatment will not change the course. His condition is still not reversible. He is not able to take care of himself. I do recommend supportive treatment. Not a surgical candidate. If pt condition continues to worsen, I recommend only supportive. We can try IV antibiotic?? I don't see benefits of them., CT abdomen and MRI brain ?? Again that will not change his prognosis.  In the meantime I tried to switch to oral antibiotic and he still refusing them    7/16-patient remains confused, not comprehensive.  Not allowing examination, he pushed me slightly when I tried to examine with stethoscope.   Remains tachycardic, febrile.  Refuses to keep oxygen.  He had refused all treatment.  Blood cultures no growth to date.  COVID testing negative.  Urine culture no growth to date.  He is not allowing any line placement or further testing.   At this point we will just continue to offer supportive measurement    I have discussed this patient's plan of care and discharge plan at IDT rounds today with Case Management, Nursing, Nursing leadership, and other members of the IDT team.    Consultants/Specialty  critical care, infectious disease, neurosurgery and psychiatry    Code Status  DNAR/DNI    Disposition  Patient is not medically cleared for discharge.   Anticipate  discharge to to be determine.  I have placed the appropriate orders for post-discharge needs.    Review of Systems  Review of Systems   Unable to perform ROS: Mental acuity        Physical Exam  Temp:  [36.7 °C (98 °F)-38.3 °C (100.9 °F)] 37.2 °C (98.9 °F)  Pulse:  [] 109  Resp:  [16-18] 17  BP: (105-133)/(64-88) 113/84  SpO2:  [58 %-100 %] 95 %    Physical Exam  Vitals and nursing note reviewed.   Constitutional:       General: He is in acute distress.      Appearance: He is ill-appearing and toxic-appearing.      Comments: frail   HENT:      Head: Normocephalic and atraumatic.      Comments: Back of his head, surgical wound no fluid collection or discharges   Eyes:      General: No scleral icterus.  Cardiovascular:      Heart sounds: No murmur heard.  Pulmonary:      Effort: Pulmonary effort is normal.      Breath sounds: No wheezing or rales.   Abdominal:      Palpations: Abdomen is soft.      Tenderness: There is no abdominal tenderness. There is no guarding.   Musculoskeletal:         General: No swelling.   Skin:     General: Skin is dry.   Neurological:      Mental Status: He is disoriented.         Fluids    Intake/Output Summary (Last 24 hours) at 7/16/2022 1056  Last data filed at 7/16/2022 0844  Gross per 24 hour   Intake 60 ml   Output 200 ml   Net -140 ml       Laboratory  Recent Labs     07/14/22  1246   WBC 18.3*   RBC 5.57   HEMOGLOBIN 15.3   HEMATOCRIT 46.8   MCV 84.0   MCH 27.5   MCHC 32.7*   RDW 44.8   PLATELETCT 422   MPV 12.1     Recent Labs     07/13/22  1624   SODIUM 137   POTASSIUM 4.5   CHLORIDE 98   CO2 25   GLUCOSE 134*   BUN 24*   CREATININE 1.18   CALCIUM 9.9     Recent Labs     07/14/22  1614   INR 1.34*               Imaging  CT-HEAD W/O   Final Result      Some increase in gas in the suboccipital craniectomy extradural fluid collection which has enlarged. This is concerning for infection/abscess formation      Slight new focus of gas appears to be intradural in the region of the  subacute left inferior cerebellar infarction      Similar mild mass effect upon the fourth ventricle does not result in hydrocephalus      No new infarction or intracranial hemorrhage is seen. Right frontal encephalomalacia from remote infarction.      Voalte sent to GUILLERMINA RUTH on 7/15/2022 3:49 AM. It was read at 5:20 AM.      CT-HEAD W/O   Final Result      1.  Interval removal of the right frontal approach EVD. No significant hydrocephalus.   2.  Evolving infarct of the left cerebellum with small amount of associated petechial hemorrhage.   3.  Resolution of the other intracranial hemorrhages. No new intracranial hemorrhage.   4.  Right frontal lobe encephalomalacia.   5.  Stable post surgical changes as above.         DX-ABDOMEN FOR TUBE PLACEMENT   Final Result      1. The tip of the enteric tube terminates over the second portion of the duodenum.   2. The remainder of the bowel gas pattern is within normal limits.      DX-ESOPHAGUS - [id number]   Final Result      Fluoroscopy provided for cookie swallow evaluation. Please refer to speech pathology report for details      DX-ABDOMEN FOR TUBE PLACEMENT   Final Result         1.  Nonspecific bowel gas pattern.   2.  Dobbhoff tube tip overlying the expected location of the pylorus or first duodenal segment.      DX-CHEST-PORTABLE (1 VIEW)   Final Result      1.  No acute cardiopulmonary disease.   2.  Supportive tubing as described above.      MR-BRAIN-WITH & W/O   Final Result         Subacute left inferior cerebellar infarct with minimal blood products within representing petechial microhemorrhages. Mass effect upon the fourth ventricle remains stable.      Posterior fossa is well decompressed by midline occipital craniotomy. Lateral ventricles are well decompressed by a right frontal approach EVD with its tip in the frontal horn of the right lateral ventricle.      Small right occipital tentorial subdural hematoma without mass effect.      Remote bilateral  frontal infarct with encephalomalacia.      Age-related volume loss and chronic microvascular ischemic changes.         US-EXTREMITY VENOUS LOWER BILAT   Final Result      CT-HEAD W/O   Final Result      1.  There is a small amount of intraventricular blood that is new from 6/23/2022. The ventricles are stable in size.   2.  There is a right frontal approach ventriculostomy catheter with the tip terminating at the foramen of Monro. There is a punctate amount of parenchymal hemorrhage surrounding the catheter is unchanged.   3.  Right frontal and left cerebellar encephalomalacia.      EC-ECHOCARDIOGRAM COMPLETE W/O CONT   Final Result      US-CAROTID DOPPLER BILAT   Final Result      US-EXTREMITY VENOUS UPPER BILAT   Final Result      DX-CHEST-PORTABLE (1 VIEW)   Final Result         1.  No acute cardiopulmonary disease.   2.  Trace right pleural effusion   3.  Atherosclerosis      CT-CTA NECK WITH & W/O-POST PROCESSING   Final Result         1.  Severely hypoplastic right vertebral artery, central segment of the right vertebral artery is not visualized and may be occluded.         CT-CTA HEAD WITH & W/O-POST PROCESS   Final Result         1.  No large vessel occlusion or aneurysm identified   2.  Interval placement of right frontal approach ventriculostomy with postprocedural minimal hemorrhage and new pneumocephalus.   3.  Bilateral ventricular dilatation appears somewhat increased since prior study compatible with somewhat worsened hydrocephalus.   4.  Right frontal and left cerebellar encephalomalacia      DX-CHEST-PORTABLE (1 VIEW)   Final Result         1.  No acute cardiopulmonary disease.   2.  Trace bilateral pleural effusion      DX-ABDOMEN FOR TUBE PLACEMENT   Final Result         1.  Nonspecific bowel gas pattern.   2.  Dobbhoff tube tip terminates overlying the expected location of the gastric antrum.   3.  Trace bilateral pleural effusions      DX-CHEST-PORTABLE (1 VIEW)   Final Result         1.  No  acute cardiopulmonary disease.   2.  Trace bilateral pleural effusions   3.  Atherosclerosis      MR-BRAIN-WITH & W/O   Final Result   Addendum 1 of 1   ADDENDUM:      The case was discussed by telephone (call report) with DR. DAVONTE VIEIRA    on call for [facility], at 1854 hours.      Final      1.  Interval development of moderate obstructive hydrocephalus due to mass effect on the fourth ventricle. There is mild transependymal edema.   2.  Large area of acute infarction involving the left cerebellar hemisphere, inferior cerebellar vermis, and left cerebellar tonsil. PICA territory. No hemorrhagic transformation. This is the cause of the fourth ventricle effacement with obstructive    hydrocephalus. There is also mild left-sided cerebellar tonsillar herniation.   3.  Moderate supratentorial white matter disease most consistent with microvascular ischemic change.   4.  Old infarction right anterior frontal convexity.   5.  A Voalte message was sent to RICKEY HAYES at 1823 hours 6/22/2022. Prompt reply as of 6:41 PM not yet received. Efforts are ongoing to contact housestaff managing the patient at this time.   6.  Case was discussed (call report) with nurse SHASTA MAGILL at 6:47 PM. Request to initiate stat neurosurgery consult. Attempts to contact [facility] on-call housestaff are ongoing.      CT-HEAD W/O   Final Result      1.  No evidence of acute hemorrhage, mass or large territorial infarction   2.  LEFT cerebellar and RIGHT frontal encephalomalacia   3.  Mild atrophy   4.  Mild white matter changes         CT-CSPINE WITHOUT PLUS RECONS   Final Result      1.  No acute fracture or traumatic listhesis in the cervical spine.   2.  Emphysema in the lung apices.      MR-BRAIN-WITH & W/O    (Results Pending)        Assessment/Plan  * Acute cerebrovascular accident (CVA) due to occlusion of left cerebellar artery (HCC)- (present on admission)  Assessment & Plan  No new deficitis  Needs to be on  eliquis  Pt not cooperative   Support care if pt allows     Sepsis (HCC)- (present on admission)  Assessment & Plan  This is Sepsis Not present on admission  SIRS criteria identified on my evaluation include: Fever, with temperature greater than 101 deg F, Tachycardia, with heart rate greater than 90 BPM and Leukocytosis, with WBC greater than 12,000  Source is unknown  Sepsis protocol initiated  Fluid resuscitation ordered per protocol  Crystalloid Fluid Administration: Fluid resuscitation ordered per standard protocol - 30 mL/kg per current or ideal body weight  IV antibiotics as appropriate for source of sepsis  Reassessment: I have reassessed the patient's hemodynamic status  CT head, blood and urine culture   Procalcitonin, lactic acid  IV fluid  Vancomycin and Zosyn if patient will agree        New onset a-fib (HCC)- (present on admission)  Assessment & Plan  Refuses eliquis  Tachy, not rate controled.   Refuses meds    Acute bilateral deep vein thrombosis (DVT) of upper extremities (HCC)- (present on admission)  Assessment & Plan  On eliquis. But pt refuses     Goals of care, counseling/discussion- (present on admission)  Assessment & Plan  Change CODE STATUS to DNR/DNI.  See above  Patient not excepting any further treatment  His prognosis is very poor  Not cooperative.  Refusing all above treatment.  Full code is not appropriate and it is futile.r    Syphilis- (present on admission)  Assessment & Plan  Already treated  No improvement on mentation    Hypokalemia- (present on admission)  Assessment & Plan  Replace if he will accept       VTE prophylaxis: therapeutic anticoagulation with eliquis but pt refuses.     I have performed a physical exam and reviewed and updated ROS and Plan today (7/16/2022). In review of yesterday's note (7/15/2022), there are no changes except as documented above.

## 2022-07-16 NOTE — PROGRESS NOTES
Pt continues to worsen on breathing status. He does not allow any further care.  Family at the bedside. Two sisters, sister in law Erlinda Fuentes, the older sister, niece. Also the other niece on the phone from [place].   I did explain to family that Alverto's medical condition is continuing to worse every day.  He is becoming septic.  He is not allowing medical team to treat including IV line placement taking medications.  I did explain that he is becoming more hypoxic or shortness of breath.  I did explain to them that actually Alverto has been doing this for almost a month.  They stated that it is very typical of him to refuse treatment.   I did explain that he did have surgery. His mentation is not right. I did explain that Alverto with his action is not allowing any more intervention and at this point his current medical condition and status is irreversible.   They all agreed that at this point comfort care is appropriate care and only care for current pt condition. His quality, his wishes is their priority and those align with pt comfort care.   Comfort care orders in place.    I discussed advance care planning face to face with the patient's family for at least 27 minutes, including diagnosis, prognosis, plan of care, risks and benefits of any therapies that could be offered, as well as alternatives including palliation and hospice, as appropriate.  Forms were completed and placed in the chart.  A hospice consult was placed.    Eduardo Ovalle M.D.

## 2022-07-16 NOTE — PROGRESS NOTES
2000 Head to to assessment attempted, pt has incomprehensible responses to questions and unable to assess orientation. Alert and oriented x0. Incision to back of head intact, well approximated    0030 Alert and oriented x1 (self)

## 2022-07-17 PROBLEM — A41.9 SEPSIS (HCC): Status: RESOLVED | Noted: 2022-01-01 | Resolved: 2022-01-01

## 2022-07-17 NOTE — PROGRESS NOTES
"Bedside report received.  Assessment complete.  A&O x person. Patient does not use call light appropriately. Comfort care measures in place.  Bed alarm on.   Patient shows no non-verbal signs of pain.   Tolerating level six diet, poor po intake.  Surgical sites CDI.  + void in hernandez, GLENDY flatus, last BM 7/4.  Patient denies SOB.  SCD's refused.  Patient is drowsy, refusing most care and treatment.  Review plan with of care. Hourly rounding in place. All needs met at this time.  /74   Pulse 60   Temp (!) 38.2 °C (100.7 °F) (Temporal)   Resp 16   Ht 1.778 m (5' 10\")   Wt 68.8 kg (151 lb 10.8 oz)   SpO2 92%   BMI 21.76 kg/m²     "

## 2022-07-17 NOTE — PROGRESS NOTES
[facility] Medicine Daily Progress Note    Date of Service  7/17/2022    Chief Complaint  Alverto Duran is a 66 y.o. male admitted 6/21/2022 with Dizziness, headaches    Hospital Course  66-year-old male past medical history of meth abuse, tobacco abuse, previous frontal stroke 2019, hypertension, syphilis initially presented to [facility] emergency room with acute dizziness 6/20/2022 and afterwards he was sent home.  Following day 6/21/2022 he presented to [facility] emergency room where he complained of unable to ambulate, dizziness.  Head CT was done and reported left cerebral and right frontal encephalopathy Carrie.  His mentation was continued to worsen.  MRI 6/22 showed acute PICA infarct with cerebral edema compressing the fourth ventricle causing obstructive hydrocephalus and early herniation syndrome.  He was transferred to ICU for emergent intubation, mental treatment, neurosurgery evaluation.  He did have occipital craniotomy and C1 laminectomy Dr. Rizzo 6/23.  During hospital course patient was also treated syphilis,  Day IV penicillin from infectious disease.  EVD was removed 6/27/2022.  He also developed atrial fibrillation and was started on anticoagulation, Eliquis.  During this hospital stay patient mentation has not fully recovered.  He refuses multiple treatment, blood pressure medication.  He refuses IV line placement.  Per nursing patient is agitated intermittently.  7/14/2022 patient started to have fever, WBC 18.3, tachycardic, septic.  No clear source of infection.  Sepsis protocol started however patient refused absolutely all treatment.  Family was attempted to but none of the phone is available.  Start palliative consult in urgent ethic committee meeting was called      Interval Problem Update  Patient refuses examination IV line placement or even medication.  He moves all extremities.  Not verbal.  Not following commands  Febrile, tachycardic WBC 18.  Clearly septic with no clear source  of infection  Previous labs showed central hypothyroidism.  Started on replacement however patient again refusing treatment.  Multiple attempts to call his ex wife who her voicemail is not even set up.  Called his sister-in-law Gina and still her phone number is not working.  I discussed with palliative.  I did discuss even with Dr. Mccray who was his previous attending and patient cooperation and admit patient has been the same in the past with no improvement.  Because of co morbidities, recent stroke, recent craniotomy, poor prognosis and pt refuses all treatment he should not be full code and I do not see any good outcome if he truly been resuscitated.  I feel like all the above treatment are futile I will just add more burden and hours the patient's quality of life.   I did review medication history. patient has been refusing blood pressure medication, atorvastatin, Eliquis for many days now.  I change status to DNAR/DNI  I did request an urgent ethics meeting and stat palliative consult.  In the meantime we will still continue to offer patient if he will agree for IV line and IV antibiotic, however patient has the right to refuse all the above treatment.  I have started on vancomycin, Zosyn IV IV fluid.  I did hold Lasix  Blood cultures, urine test and cultures, procalcitonin, lactic acid is ordered and needs to follow  Last bowel movements 10 days ago per nursing report.  I tried to push on his abdomen which is soft.  No guarding or grimace noted.  No new focal neurodeficits.  Patient does not follow commands, moves all extremities.    7/15-patient still refuses further examination.  Is able to tell me only his name.  He did refuse other medications.  Remains tachycardic, also febrile  COVID test negative.  Urine no signs of infection  Blood cultures in process  Procalcitonin slightly elevated.  Ammonia level normal  Head CT showed gas formation, concerning for abscess.  Discussed with neurosurgery who did  not believe that that is an abscess.  MRI brain with contrast ordered.  Patient has no IV line and is not allowing nurses to put an IV line.  He also has some urinary retention and not allowing nurses to put a Muse  Discussed case with palliative team, ethics team.  At this point we cannot force any treatment which is in violation of patient right.  Pt was able to tell Palliative team his family memberns name, None for me. Family was contacted by palliative team and none of them are taking any medical decision however they defer further decisions to medical team.  Patient prognosis is poor and forcing any treatment that he does not want I see futile and those above treatment will not change the course. His condition is still not reversible. He is not able to take care of himself. I do recommend supportive treatment. Not a surgical candidate. If pt condition continues to worsen, I recommend only supportive. We can try IV antibiotic?? I don't see benefits of them., CT abdomen and MRI brain ?? Again that will not change his prognosis.  In the meantime I tried to switch to oral antibiotic and he still refusing them    7/16-patient remains confused, not comprehensive.  Not allowing examination, he pushed me slightly when I tried to examine with stethoscope.   Remains tachycardic, febrile.  Refuses to keep oxygen.  He had refused all treatment.  Blood cultures no growth to date.  COVID testing negative.  Urine culture no growth to date.  He is not allowing any line placement or further testing.   At this point we will just continue to offer supportive measurement    7/17-patient remains incoherent.  Febrile overnight.  Blood pressure stable.  She refuses oxygen.  As per other notes patient transition to comfort care 7/16.  We will continue comfort care measurement    I have discussed this patient's plan of care and discharge plan at IDT rounds today with Case Management, Nursing, Nursing leadership, and other members of  the IDT team.    Consultants/Specialty  critical care, infectious disease, neurosurgery and psychiatry    Code Status  Comfort Care/DNR    Disposition  Patient is not medically cleared for discharge.   Anticipate discharge to to be determine.  I have placed the appropriate orders for post-discharge needs.    Review of Systems  Review of Systems   Unable to perform ROS: Mental acuity        Physical Exam  Temp:  [37.3 °C (99.1 °F)-38.2 °C (100.7 °F)] 38.2 °C (100.7 °F)  Pulse:  [] 60  Resp:  [16-17] 16  BP: (110-111)/(74-80) 111/74  SpO2:  [91 %-93 %] 92 %    Physical Exam  Vitals and nursing note reviewed.   Constitutional:       General: He is in acute distress.      Appearance: He is ill-appearing and toxic-appearing.      Comments: frail   HENT:      Head: Normocephalic and atraumatic.      Comments: Back of his head, surgical wound no fluid collection or discharges   Eyes:      General: No scleral icterus.  Cardiovascular:      Heart sounds: No murmur heard.  Pulmonary:      Effort: Pulmonary effort is normal.      Breath sounds: No wheezing or rales.   Abdominal:      Palpations: Abdomen is soft.      Tenderness: There is no abdominal tenderness. There is no guarding.   Musculoskeletal:         General: No swelling.   Skin:     General: Skin is dry.   Neurological:      Mental Status: He is disoriented.         Fluids    Intake/Output Summary (Last 24 hours) at 7/17/2022 0911  Last data filed at 7/17/2022 0530  Gross per 24 hour   Intake 40 ml   Output 400 ml   Net -360 ml       Laboratory  Recent Labs     07/14/22  1246   WBC 18.3*   RBC 5.57   HEMOGLOBIN 15.3   HEMATOCRIT 46.8   MCV 84.0   MCH 27.5   MCHC 32.7*   RDW 44.8   PLATELETCT 422   MPV 12.1         Recent Labs     07/14/22  1614   INR 1.34*               Imaging  CT-HEAD W/O   Final Result      Some increase in gas in the suboccipital craniectomy extradural fluid collection which has enlarged. This is concerning for infection/abscess formation       Slight new focus of gas appears to be intradural in the region of the subacute left inferior cerebellar infarction      Similar mild mass effect upon the fourth ventricle does not result in hydrocephalus      No new infarction or intracranial hemorrhage is seen. Right frontal encephalomalacia from remote infarction.      Voalte sent to GUILLERMINA RUTH on 7/15/2022 3:49 AM. It was read at 5:20 AM.      CT-HEAD W/O   Final Result      1.  Interval removal of the right frontal approach EVD. No significant hydrocephalus.   2.  Evolving infarct of the left cerebellum with small amount of associated petechial hemorrhage.   3.  Resolution of the other intracranial hemorrhages. No new intracranial hemorrhage.   4.  Right frontal lobe encephalomalacia.   5.  Stable post surgical changes as above.         DX-ABDOMEN FOR TUBE PLACEMENT   Final Result      1. The tip of the enteric tube terminates over the second portion of the duodenum.   2. The remainder of the bowel gas pattern is within normal limits.      DX-ESOPHAGUS - [id number]   Final Result      Fluoroscopy provided for cookie swallow evaluation. Please refer to speech pathology report for details      DX-ABDOMEN FOR TUBE PLACEMENT   Final Result         1.  Nonspecific bowel gas pattern.   2.  Dobbhoff tube tip overlying the expected location of the pylorus or first duodenal segment.      DX-CHEST-PORTABLE (1 VIEW)   Final Result      1.  No acute cardiopulmonary disease.   2.  Supportive tubing as described above.      MR-BRAIN-WITH & W/O   Final Result         Subacute left inferior cerebellar infarct with minimal blood products within representing petechial microhemorrhages. Mass effect upon the fourth ventricle remains stable.      Posterior fossa is well decompressed by midline occipital craniotomy. Lateral ventricles are well decompressed by a right frontal approach EVD with its tip in the frontal horn of the right lateral ventricle.      Small right  occipital tentorial subdural hematoma without mass effect.      Remote bilateral frontal infarct with encephalomalacia.      Age-related volume loss and chronic microvascular ischemic changes.         US-EXTREMITY VENOUS LOWER BILAT   Final Result      CT-HEAD W/O   Final Result      1.  There is a small amount of intraventricular blood that is new from 6/23/2022. The ventricles are stable in size.   2.  There is a right frontal approach ventriculostomy catheter with the tip terminating at the foramen of Monro. There is a punctate amount of parenchymal hemorrhage surrounding the catheter is unchanged.   3.  Right frontal and left cerebellar encephalomalacia.      EC-ECHOCARDIOGRAM COMPLETE W/O CONT   Final Result      US-CAROTID DOPPLER BILAT   Final Result      US-EXTREMITY VENOUS UPPER BILAT   Final Result      DX-CHEST-PORTABLE (1 VIEW)   Final Result         1.  No acute cardiopulmonary disease.   2.  Trace right pleural effusion   3.  Atherosclerosis      CT-CTA NECK WITH & W/O-POST PROCESSING   Final Result         1.  Severely hypoplastic right vertebral artery, central segment of the right vertebral artery is not visualized and may be occluded.         CT-CTA HEAD WITH & W/O-POST PROCESS   Final Result         1.  No large vessel occlusion or aneurysm identified   2.  Interval placement of right frontal approach ventriculostomy with postprocedural minimal hemorrhage and new pneumocephalus.   3.  Bilateral ventricular dilatation appears somewhat increased since prior study compatible with somewhat worsened hydrocephalus.   4.  Right frontal and left cerebellar encephalomalacia      DX-CHEST-PORTABLE (1 VIEW)   Final Result         1.  No acute cardiopulmonary disease.   2.  Trace bilateral pleural effusion      DX-ABDOMEN FOR TUBE PLACEMENT   Final Result         1.  Nonspecific bowel gas pattern.   2.  Dobbhoff tube tip terminates overlying the expected location of the gastric antrum.   3.  Trace bilateral  pleural effusions      DX-CHEST-PORTABLE (1 VIEW)   Final Result         1.  No acute cardiopulmonary disease.   2.  Trace bilateral pleural effusions   3.  Atherosclerosis      MR-BRAIN-WITH & W/O   Final Result   Addendum 1 of 1   ADDENDUM:      The case was discussed by telephone (call report) with DR. DAVONTE VIEIRA    on call for [facility], at 1854 hours.      Final      1.  Interval development of moderate obstructive hydrocephalus due to mass effect on the fourth ventricle. There is mild transependymal edema.   2.  Large area of acute infarction involving the left cerebellar hemisphere, inferior cerebellar vermis, and left cerebellar tonsil. PICA territory. No hemorrhagic transformation. This is the cause of the fourth ventricle effacement with obstructive    hydrocephalus. There is also mild left-sided cerebellar tonsillar herniation.   3.  Moderate supratentorial white matter disease most consistent with microvascular ischemic change.   4.  Old infarction right anterior frontal convexity.   5.  A Voalte message was sent to RICKEY HAYES at 1823 hours 6/22/2022. Prompt reply as of 6:41 PM not yet received. Efforts are ongoing to contact housestaff managing the patient at this time.   6.  Case was discussed (call report) with nurse SHASTA MAGILL at 6:47 PM. Request to initiate stat neurosurgery consult. Attempts to contact [facility] on-call housestaff are ongoing.      CT-HEAD W/O   Final Result      1.  No evidence of acute hemorrhage, mass or large territorial infarction   2.  LEFT cerebellar and RIGHT frontal encephalomalacia   3.  Mild atrophy   4.  Mild white matter changes         CT-CSPINE WITHOUT PLUS RECONS   Final Result      1.  No acute fracture or traumatic listhesis in the cervical spine.   2.  Emphysema in the lung apices.           Assessment/Plan  * Acute cerebrovascular accident (CVA) due to occlusion of left cerebellar artery (HCC)- (present on admission)  Assessment &  Plan  Comfort care    New onset a-fib (HCC)- (present on admission)  Assessment & Plan  Refuses eliquis  Tachy, not rate controled.   7/17-transition to comfort care 7/16    Acute bilateral deep vein thrombosis (DVT) of upper extremities (HCC)- (present on admission)  Assessment & Plan  Continue comfort care measurement    Goals of care, counseling/discussion- (present on admission)  Assessment & Plan  Change CODE STATUS to DNR/DNI.  See above  Patient not excepting any further treatment  His prognosis is very poor  Not cooperative.  Refusing all above treatment.  Full code is not appropriate and it is futile.  Transition to comfort care 7/16    Syphilis- (present on admission)  Assessment & Plan  Already treated  No improvement on mentation    Hypokalemia- (present on admission)  Assessment & Plan  Replace if he will accept       VTE prophylaxis: therapeutic anticoagulation with eliquis but pt refuses.     I have performed a physical exam and reviewed and updated ROS and Plan today (7/17/2022). In review of yesterday's note (7/16/2022), there are no changes except as documented above.

## 2022-07-17 NOTE — CARE PLAN
Problem: Pain - Standard  Goal: Alleviation of pain or a reduction in pain to the patient’s comfort goal  Outcome: Progressing     Problem: Fall Risk  Goal: Patient will remain free from falls  Outcome: Progressing     Problem: Skin Integrity  Goal: Skin integrity is maintained or improved  Outcome: Progressing   The patient is Watcher - Medium risk of patient condition declining or worsening    Shift Goals  Clinical Goals: pt safety, comfort care  Patient Goals: rest  Family Goals: not present at bedside    Progress made toward(s) clinical / shift goals:  patient remained safe from falls and injury. Comfort care protocols in place.    Patient is not progressing towards the following goals:

## 2022-07-17 NOTE — CARE PLAN
The patient is Watcher - Medium risk of patient condition declining or worsening    Shift Goals  Clinical Goals: Comfort Care; Safety  Patient Goals: Rest; Comfort  Family Goals: not present at bedside    Progress made toward(s) clinical / shift goals:    Problem: Knowledge Deficit - Standard  Goal: Patient and family/care givers will demonstrate understanding of plan of care, disease process/condition, diagnostic tests and medications  Outcome: Progressing   Plan of care discussed with patient, verbalizes understanding. Encouraged to voice feelings or concerns.     Problem: Pain - Standard  Goal: Alleviation of pain or a reduction in pain to the patient’s comfort goal  Outcome: Progressing     Problem: Fall Risk  Goal: Patient will remain free from falls  Outcome: Progressing   Fall precautions in place. Patient rounded on hourly. Clutter-free environment maintained. Bed alarm on, bed locked and in lowest position. Call light in reach.

## 2022-07-17 NOTE — HOSPICE
Patient unable to verbalize if in pain. He mumbles when you ask him questions. Patient appears comfortable at this time.     No family bedside.     Will continue to follow.

## 2022-07-18 NOTE — CARE PLAN
The patient is Stable - Low risk of patient condition declining or worsening    Shift Goals  Clinical Goals: safety, comfort care  Patient Goals: GLENDY, pt mumbles only  Family Goals: no family memer at this time    Progress made toward(s) clinical / shift goals:      Patient is not progressing towards the following goals:    Problem: Knowledge Deficit - Standard  Goal: Patient and family/care givers will demonstrate understanding of plan of care, disease process/condition, diagnostic tests and medications  Outcome: Progressing   Plan of care discussed with patient. Encouraged to voice feelings or concerns.      Problem: Pain - Standard  Goal: Alleviation of pain or a reduction in pain to the patient’s comfort goal  Outcome: Progressing      Educated on pain scale. Encouraged to verbalize pain. Will medicate as per MAR.     Problem: Fall Risk  Goal: Patient will remain free from falls  Outcome: Progressing     Fall protocol in effect. Call light within reach. Reminded patient to call for assist.    Patient's confused, mumbles only. Re oriented.

## 2022-07-18 NOTE — CARE PLAN
The patient is Watcher - Medium risk of patient condition declining or worsening    Shift Goals  Clinical Goals: Safety; Comfort Care  Patient Goals: Rest  Family Goals: not present at bedside    Progress made toward(s) clinical / shift goals:    Problem: Knowledge Deficit - Standard  Goal: Patient and family/care givers will demonstrate understanding of plan of care, disease process/condition, diagnostic tests and medications  Outcome: Progressing   Plan of care discussed with patient, verbalizes understanding. Encouraged to voice feelings or concerns.     Problem: Pain - Standard  Goal: Alleviation of pain or a reduction in pain to the patient’s comfort goal  Outcome: Progressing   Available pain medications reviewed with patient. Pain managed by PRN and scheduled medications. Encouraged to call if pain is no longer managed.     Problem: Fall Risk  Goal: Patient will remain free from falls  Outcome: Progressing   Fall precautions in place. Patient rounded on hourly. Clutter-free environment maintained. Bed alarm on, bed locked and in lowest position. Call light in reach.

## 2022-07-18 NOTE — CONSULTS
ETHICS CONSULTATION:   NAME: Alverto Duran  MRN: [id number]  Date of Service: 7/15/2022     ETHICAL ISSUE:  An ethics consultation was requested for the patient.  The medical team is asking for assistance in determining the appropriate plan of care for the patient who lacks capacity and is refusing treatment.  Patient’s next of kin have been unwilling to participate in treatment decisions.     REASON FOR ADMISSION AND MEDICAL INDICATIONS:  Patient is a 66 year old male admitted 6/21/2022 with dizziness and headaches. 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 Patient has a past medical history of methamphetamine use, tobacco abuse and right frontal stroke in 2019.  Known history of syphilis with positive antibody which per notes was never treated. Patient initially presented to [place] ED complaining of headaches and falls.  The patient was discharged to home and subsequently presented to [facility] with complaints of headaches and falls. The patient was admitted and had worsening encephalopathy and became obtunded. MRI brain on 6/22/2022 noted interval development of moderate obstructive hydrocephalus due to mass-effect on the fourth ventricle. Large acute cerebral infarct.  Patient was intubated and transferred to the ICU. Neurosurgery was consulted and he underwent EVD placement 6/23/2022 with craniectomy and C1 laminectomy.  EVD was removed 6/27/2022.  Patient developed atrial fibrillation and was started on Eliquis.  The patient’s encephalopathy has not improved during his hospital course.  The patient has refused medications, IVs and Muse.  Patient is also reported to be agitated at times.  Patient now with concern for sepsis no clear source identified.  Sepsis protocol started however patient is refusing all treatment.    DISCUSSIONS WITH MEDICAL TEAM:  Case discussed with Dr. Kaminski.  Patient lacks capacity to participate in medical decisions. Patient refuses  examinations, IV line placement, Muse and oral medications. Patient moves all extremities but is not verbal and doesn’t follow commands.  Patient is clearly septic with no clear source of infection. Dr. Kaminski has made multiple attempts to call the patient's ex-wife who has a voicemail box that is not set up. Call placed to patient’s sister-in-law Gina and the phone number is not working. Dr. Kaminski also spoke to Dr. Mccray who was the patient’s previous attending and the patient was not cooperative with treatment and showed no improvement.  Based on the patient’s co- morbidities, recent stroke, recent craniectomy, poor prognosis and patient refusal of all treatment the patient should not be full code as resuscitation would of no clinical benefit and would only cause the patient harm and suffering. Head CT showed gas formation, concerning for abscess.  Discussed with neurosurgery who did not believe that this is an abscess.  MRI brain with contrast ordered. Family was contacted by palliative team and none of them are willing to make any medical decision and are deferring decisions to medical team.  The patient's prognosis is poor and forcing treatment that he does not want seems futile.  In addition ongoing treatment will likely not change the patient's course. The patient’s clinical condition is not reversible. Patient is not a surgical candidate. The patient is not able to take care of himself. Dr. Kaminski’s recommendation is for supportive treatment and if his condition continues to worsen focusing on the patient’s comfort.     Case discussed with [facility] RN.  Palliative Care consultation and discussion with patient’s wife and family reviewed in detail.    CODE STATUS AT THE TIME OF ETHICS CONSULTATION REQUEST:  DNR/DNI     PATIENT’S DECISION MAKING CAPACITY:  The patient lacks capacity per the medical team. The patient is oriented to person only.     ADVANCED DIRECTIVE/POLST FORM:  There are no advance  directives or POLST form on file for the patient.      HEALTH CARE DECISION MAKER:  The patient lack capacity per the medical team.  The patient has an estranged wife and siblings.  Dr. Kaminski has attempted to contact the patient's family without success.  [facility] has spoken to the patient's estranged wife who is unwilling to participate in medical decisions for the patient.  The patient’s wife is deferring to the patient's siblings who have also declined to participate in medical decisions. Patient surrogates must be both willing and available to participate in medical decisions.  At this time the patient is unrepresented.      DISCUSSIONS WITH PATIENT/PATIETN REPRESENTATIVE:  Patient lacks capacity and is unable to participate in discussions.  Family have been difficult to contact and are currently unwilling to assist with decisions.    SOCIAL HISTORY/LIVING SITUATION PRIOR TO HOSPITALIZATION:  Unknown. Family reported to the Hospital Care Management Team that the patient lives alone in an apartment and was independent.  The patient has no children.       RECOMMENDATIONS:    1. Patient should be provided with all appropriate medical treatment as determined by the patient’s attending physician. The patient retains the right to self-determination and therefore treatment should not be provided over the patient's objection or without his assent except in exigent circumstances that could result in serious bodily harm or death.  2. Agree with the medical team’s plan to change the goals of the patient’s care to focus on his comfort. Based in the patient’s current clinical condition, co-morbidities, poor prognosis the patient is unlikely to receive clinical benefit from ongoing treatment.  In addition the patient is refusing most treatment.  There is no ethical obligation to provide non-beneficial treatment. Examples of non-beneficial treatment may include, but not limited to; treatment that cannot  accomplish the intended clinical outcome; or: treatment that will produce no beneficial medical effects that can reasonably be expected to be experienced by the patient; or: treatment in which the risk of harm may reasonably outweigh the benefit to the patient.  3. Efforts should continue to encourage the patient’s wife and siblings to participate in discussions and treatment decisions for the patient.  Surrogates should be both available and willing and act in the best interest of the patient.  Decisions should be consistent with the patient’s prior expressed values and preferences.   4. If patient regains capacity goals of care and plan of care should be established with the patient and the patient should be encouraged to execute advance directive and/or POLST documents.  5. The patient may need a guardian if he remains unrepresented.      Thank you for involving the Clinical Ethics Committee in the care of this patient. Please let me know if you have any other questions or concerns.     Respectfully submitted,   Brea Jain RN, MSN [facility]  Ethics Consultant  Office [phone]  Cell [phone] or Voalte

## 2022-07-18 NOTE — PROGRESS NOTES
Hospital Medicine Daily Progress Note    Date of Service  7/18/2022    Chief Complaint  Alverto Duran is a 66 y.o. male admitted 6/21/2022 with Dizziness, headaches    Hospital Course  66-year-old male past medical history of meth abuse, tobacco abuse, previous frontal stroke 2019, hypertension, syphilis initially presented to [place] emergency room with acute dizziness 6/20/2022 and afterwards he was sent home.  Following day 6/21/2022 he presented to [facility] emergency room where he complained of unable to ambulate, dizziness.  Head CT was done and reported left cerebral and right frontal encephalopathy Carrie.  His mentation was continued to worsen.  MRI 6/22 showed acute PICA infarct with cerebral edema compressing the fourth ventricle causing obstructive hydrocephalus and early herniation syndrome.  He was transferred to ICU for emergent intubation, mental treatment, neurosurgery evaluation.  He did have occipital craniotomy and C1 laminectomy Dr. Rizzo 6/23.  During hospital course patient was also treated syphilis,  Day IV penicillin from infectious disease.  EVD was removed 6/27/2022.  He also developed atrial fibrillation and was started on anticoagulation, Eliquis.  During this hospital stay patient mentation has not fully recovered.  He refuses multiple treatment, blood pressure medication.  He refuses IV line placement.  Per nursing patient is agitated intermittently.  7/14/2022 patient started to have fever, WBC 18.3, tachycardic, septic.  Sepsis protocol. Head CT gas, and concerned for abscess, however neurosurgery did recommend MRI brain for further eval. PT was started on IVF, IV antibiotic, lactic acid slight elevated. Procal elevated. Blood cultures NGT  Patient refused absolutely all treatment.  No antibiotic was able to start or IVF. Cultures remain negative. Mentation continues to worsen. Family was attempted to but none of the phone is available. Stat palliative consult in urgent ethic  committee meeting was called. Following day family did come, to see pt. After family meeting all treatment that either way pt did refuse, seemed futile and pt medical condition is not reversible. He was transition to comfort care 7/16/2022      Interval Problem Update  Patient refuses examination IV line placement or even medication.  He moves all extremities.  Not verbal.  Not following commands  Febrile, tachycardic WBC 18.  Clearly septic with no clear source of infection  Previous labs showed central hypothyroidism.  Started on replacement however patient again refusing treatment.  Multiple attempts to call his ex wife who her voicemail is not even set up.  Called his sister-in-law Gina and still her phone number is not working.  I discussed with palliative.  I did discuss even with Dr. Mccray who was his previous attending and patient cooperation and admit patient has been the same in the past with no improvement.  Because of co morbidities, recent stroke, recent craniotomy, poor prognosis and pt refuses all treatment he should not be full code and I do not see any good outcome if he truly been resuscitated.  I feel like all the above treatment are futile I will just add more burden and hours the patient's quality of life.   I did review medication history. patient has been refusing blood pressure medication, atorvastatin, Eliquis for many days now.  I change status to DNAR/DNI  I did request an urgent ethics meeting and stat palliative consult.  In the meantime we will still continue to offer patient if he will agree for IV line and IV antibiotic, however patient has the right to refuse all the above treatment.  I have started on vancomycin, Zosyn IV IV fluid.  I did hold Lasix  Blood cultures, urine test and cultures, procalcitonin, lactic acid is ordered and needs to follow  Last bowel movements 10 days ago per nursing report.  I tried to push on his abdomen which is soft.  No guarding or grimace noted.  No  new focal neurodeficits.  Patient does not follow commands, moves all extremities.    7/15-patient still refuses further examination.  Is able to tell me only his name.  He did refuse other medications.  Remains tachycardic, also febrile  COVID test negative.  Urine no signs of infection  Blood cultures in process  Procalcitonin slightly elevated.  Ammonia level normal  Head CT showed gas formation, concerning for abscess.  Discussed with neurosurgery who did not believe that that is an abscess.  MRI brain with contrast ordered.  Patient has no IV line and is not allowing nurses to put an IV line.  He also has some urinary retention and not allowing nurses to put a Muse  Discussed case with palliative team, ethics team.  At this point we cannot force any treatment which is in violation of patient right.  Pt was able to tell Palliative team his family memberns name, None for me. Family was contacted by palliative team and none of them are taking any medical decision however they defer further decisions to medical team.  Patient prognosis is poor and forcing any treatment that he does not want I see futile and those above treatment will not change the course. His condition is still not reversible. He is not able to take care of himself. I do recommend supportive treatment. Not a surgical candidate. If pt condition continues to worsen, I recommend only supportive. We can try IV antibiotic?? I don't see benefits of them., CT abdomen and MRI brain ?? Again that will not change his prognosis.  In the meantime I tried to switch to oral antibiotic and he still refusing them    7/16-patient remains confused, not comprehensive.  Not allowing examination, he pushed me slightly when I tried to examine with stethoscope.   Remains tachycardic, febrile.  Refuses to keep oxygen.  He had refused all treatment.  Blood cultures no growth to date.  COVID testing negative.  Urine culture no growth to date.  He is not allowing any line  placement or further testing.   At this point we will just continue to offer supportive measurement    7/17-patient remains incoherent.  Febrile overnight.  Blood pressure stable.  She refuses oxygen.  As per other notes patient transition to comfort care 7/16.  We will continue comfort care measurement    7/18- no event. Febrile. Incoherent. Refuses oxygen. On comfort care. Discuss with  about group home, hospice care.     I have discussed this patient's plan of care and discharge plan at IDT rounds today with Case Management, Nursing, Nursing leadership, and other members of the IDT team.    Consultants/Specialty  critical care, infectious disease, neurosurgery and psychiatry    Code Status  Comfort Care/DNR    Disposition  Patient is not medically cleared for discharge.   Anticipate discharge to to be determine.  I have placed the appropriate orders for post-discharge needs.    Review of Systems  Review of Systems   Unable to perform ROS: Mental acuity        Physical Exam  Temp:  [36.2 °C (97.1 °F)-36.3 °C (97.3 °F)] 36.2 °C (97.1 °F)  Pulse:  [75-85] 85  Resp:  [16] 16  BP: (101-106)/(57-70) 101/57  SpO2:  [95 %-98 %] 95 %    Physical Exam  Vitals and nursing note reviewed.   Constitutional:       General: He is in acute distress.      Appearance: He is ill-appearing and toxic-appearing.      Comments: frail   HENT:      Head: Normocephalic and atraumatic.      Comments: Back of his head, surgical wound no fluid collection or discharges   Eyes:      General: No scleral icterus.  Cardiovascular:      Heart sounds: No murmur heard.  Pulmonary:      Effort: Pulmonary effort is normal.      Breath sounds: No wheezing or rales.   Abdominal:      Palpations: Abdomen is soft.      Tenderness: There is no abdominal tenderness. There is no guarding.   Musculoskeletal:         General: No swelling.   Skin:     General: Skin is dry.   Neurological:      Mental Status: He is disoriented.          Fluids    Intake/Output Summary (Last 24 hours) at 7/18/2022 1313  Last data filed at 7/17/2022 2000  Gross per 24 hour   Intake --   Output 320 ml   Net -320 ml       Laboratory                        Imaging  CT-HEAD W/O   Final Result      Some increase in gas in the suboccipital craniectomy extradural fluid collection which has enlarged. This is concerning for infection/abscess formation      Slight new focus of gas appears to be intradural in the region of the subacute left inferior cerebellar infarction      Similar mild mass effect upon the fourth ventricle does not result in hydrocephalus      No new infarction or intracranial hemorrhage is seen. Right frontal encephalomalacia from remote infarction.      Voalte sent to GUILLERMINA RUTH on 7/15/2022 3:49 AM. It was read at 5:20 AM.      CT-HEAD W/O   Final Result      1.  Interval removal of the right frontal approach EVD. No significant hydrocephalus.   2.  Evolving infarct of the left cerebellum with small amount of associated petechial hemorrhage.   3.  Resolution of the other intracranial hemorrhages. No new intracranial hemorrhage.   4.  Right frontal lobe encephalomalacia.   5.  Stable post surgical changes as above.         DX-ABDOMEN FOR TUBE PLACEMENT   Final Result      1. The tip of the enteric tube terminates over the second portion of the duodenum.   2. The remainder of the bowel gas pattern is within normal limits.      DX-ESOPHAGUS - [id number]   Final Result      Fluoroscopy provided for cookie swallow evaluation. Please refer to speech pathology report for details      DX-ABDOMEN FOR TUBE PLACEMENT   Final Result         1.  Nonspecific bowel gas pattern.   2.  Dobbhoff tube tip overlying the expected location of the pylorus or first duodenal segment.      DX-CHEST-PORTABLE (1 VIEW)   Final Result      1.  No acute cardiopulmonary disease.   2.  Supportive tubing as described above.      MR-BRAIN-WITH & W/O   Final Result          Subacute left inferior cerebellar infarct with minimal blood products within representing petechial microhemorrhages. Mass effect upon the fourth ventricle remains stable.      Posterior fossa is well decompressed by midline occipital craniotomy. Lateral ventricles are well decompressed by a right frontal approach EVD with its tip in the frontal horn of the right lateral ventricle.      Small right occipital tentorial subdural hematoma without mass effect.      Remote bilateral frontal infarct with encephalomalacia.      Age-related volume loss and chronic microvascular ischemic changes.         US-EXTREMITY VENOUS LOWER BILAT   Final Result      CT-HEAD W/O   Final Result      1.  There is a small amount of intraventricular blood that is new from 6/23/2022. The ventricles are stable in size.   2.  There is a right frontal approach ventriculostomy catheter with the tip terminating at the foramen of Monro. There is a punctate amount of parenchymal hemorrhage surrounding the catheter is unchanged.   3.  Right frontal and left cerebellar encephalomalacia.      EC-ECHOCARDIOGRAM COMPLETE W/O CONT   Final Result      US-CAROTID DOPPLER BILAT   Final Result      US-EXTREMITY VENOUS UPPER BILAT   Final Result      DX-CHEST-PORTABLE (1 VIEW)   Final Result         1.  No acute cardiopulmonary disease.   2.  Trace right pleural effusion   3.  Atherosclerosis      CT-CTA NECK WITH & W/O-POST PROCESSING   Final Result         1.  Severely hypoplastic right vertebral artery, central segment of the right vertebral artery is not visualized and may be occluded.         CT-CTA HEAD WITH & W/O-POST PROCESS   Final Result         1.  No large vessel occlusion or aneurysm identified   2.  Interval placement of right frontal approach ventriculostomy with postprocedural minimal hemorrhage and new pneumocephalus.   3.  Bilateral ventricular dilatation appears somewhat increased since prior study compatible with somewhat worsened  hydrocephalus.   4.  Right frontal and left cerebellar encephalomalacia      DX-CHEST-PORTABLE (1 VIEW)   Final Result         1.  No acute cardiopulmonary disease.   2.  Trace bilateral pleural effusion      DX-ABDOMEN FOR TUBE PLACEMENT   Final Result         1.  Nonspecific bowel gas pattern.   2.  Dobbhoff tube tip terminates overlying the expected location of the gastric antrum.   3.  Trace bilateral pleural effusions      DX-CHEST-PORTABLE (1 VIEW)   Final Result         1.  No acute cardiopulmonary disease.   2.  Trace bilateral pleural effusions   3.  Atherosclerosis      MR-BRAIN-WITH & W/O   Final Result   Addendum 1 of 1   ADDENDUM:      The case was discussed by telephone (call report) with DR. DAVONTE VIEIRA    on call for [facility], at 1854 hours.      Final      1.  Interval development of moderate obstructive hydrocephalus due to mass effect on the fourth ventricle. There is mild transependymal edema.   2.  Large area of acute infarction involving the left cerebellar hemisphere, inferior cerebellar vermis, and left cerebellar tonsil. PICA territory. No hemorrhagic transformation. This is the cause of the fourth ventricle effacement with obstructive    hydrocephalus. There is also mild left-sided cerebellar tonsillar herniation.   3.  Moderate supratentorial white matter disease most consistent with microvascular ischemic change.   4.  Old infarction right anterior frontal convexity.   5.  A Voalte message was sent to RICKEY HAYES at 1823 hours 6/22/2022. Prompt reply as of 6:41 PM not yet received. Efforts are ongoing to contact housestaff managing the patient at this time.   6.  Case was discussed (call report) with nurse SHASTA MAGILL at 6:47 PM. Request to initiate stat neurosurgery consult. Attempts to contact [facility] on-call housestaff are ongoing.      CT-HEAD W/O   Final Result      1.  No evidence of acute hemorrhage, mass or large territorial infarction   2.  LEFT cerebellar  and RIGHT frontal encephalomalacia   3.  Mild atrophy   4.  Mild white matter changes         CT-CSPINE WITHOUT PLUS RECONS   Final Result      1.  No acute fracture or traumatic listhesis in the cervical spine.   2.  Emphysema in the lung apices.           Assessment/Plan  * Acute cerebrovascular accident (CVA) due to occlusion of left cerebellar artery (HCC)- (present on admission)  Assessment & Plan  Comfort care    New onset a-fib (HCC)- (present on admission)  Assessment & Plan  Refuses eliquis  Tachy, not rate controled.   7/17-transition to comfort care 7/16    Acute bilateral deep vein thrombosis (DVT) of upper extremities (HCC)- (present on admission)  Assessment & Plan  Continue comfort care measurement    Goals of care, counseling/discussion- (present on admission)  Assessment & Plan  Change CODE STATUS to DNR/DNI.  See above  Patient not excepting any further treatment  His prognosis is very poor  Not cooperative.  Refusing all above treatment.  Full code is not appropriate and it is futile.  Transition to comfort care 7/16    Syphilis- (present on admission)  Assessment & Plan  Already treated  No improvement on mentation    Hypokalemia- (present on admission)  Assessment & Plan  Replace if he will accept       VTE prophylaxis: therapeutic anticoagulation with eliquis but pt refuses.     I have performed a physical exam and reviewed and updated ROS and Plan today (7/18/2022). In review of yesterday's note (7/17/2022), there are no changes except as documented above.

## 2022-07-19 NOTE — DISCHARGE SUMMARY
Death Summary    Cause of Death  Cardiopulmonary arrest due to multiple organ failure due to CVA and deep venous thrombosis due to new onset of atrial fibrillation.    Comorbid Conditions at the Time of Death  Principal Problem:    Acute cerebrovascular accident (CVA) due to occlusion of left cerebellar artery (HCC) POA: Yes  Active Problems:    Methamphetamine use (HCC) (Chronic) POA: Yes    Hypokalemia POA: Yes    Tobacco dependence POA: Yes    Syphilis POA: Yes    Goals of care, counseling/discussion POA: Yes    Acute bilateral deep vein thrombosis (DVT) of upper extremities (HCC) POA: Yes    New onset a-fib (HCC) POA: Yes  Resolved Problems:    On mechanically assisted ventilation (HCC) POA: Unknown    Sepsis (HCC) POA: Yes      History of Presenting Illness and Hospital Course  This is a 66 y.o. male admitted 6/21/2022 with headache.  On presentation he underwent CTA and he found to have encephalomalacia in left cerebral and right frontal areas.  He also found to have elevated blood pressure.  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  He was transferred to ICU for emergent intubation, mental treatment, neurosurgery evaluation.  He did have occipital craniotomy and C1 laminectomy Dr. Rizzo 6/23.  During hospital course patient was also treated syphilis,  Day IV penicillin from infectious disease.  EVD was removed 6/27/2022.  He also developed atrial fibrillation and was started on anticoagulation, Eliquis.  During this hospital stay patient mentation has not fully recovered.  He refuses multiple treatment, blood pressure medication.  He refuses IV line placement.  Per nursing patient is agitated intermittently.  7/14/2022 patient started to have fever, WBC 18.3, tachycardic, septic.  Sepsis protocol. Head CT gas, and concerned for abscess, however neurosurgery did recommend MRI brain for  further eval. PT was started on IVF, IV antibiotic, lactic acid slight elevated. Procal elevated. Blood cultures NGT  Patient refused absolutely all treatment.  No antibiotic was able to start or IVF. Cultures remain negative. Mentation continues to worsen. Family was attempted to but none of the phone is available. Stat palliative consult in urgent ethic committee meeting was called. Following day family did come, to see pt. After family meeting all treatment that either way pt did refuse, seemed futile and pt medical condition is not reversible. He was transition to comfort care 7/16/2022.    Today on July 19, 2022 I received a call from bedside RN that patient passed away.  I called patient's Sister Erlinda on her phone [phone] and I updated her that Mr. Duran passed away.  I answered all her questions.     Death Date: 07/19/22   Death Time: 0915      Pronounced By (RN1): Luna Westbrook  Pronounced By (RN2): Hillary Fleischer

## 2022-07-19 NOTE — CARE PLAN
The patient is Watcher - Medium risk of patient condition declining or worsening    Shift Goals  Clinical Goals: Safety; Comfort Care  Patient Goals: Rest  Family Goals: no family memer at this time    Progress made toward(s) clinical / shift goals:    Problem: Knowledge Deficit - Standard  Goal: Patient and family/care givers will demonstrate understanding of plan of care, disease process/condition, diagnostic tests and medications  Outcome: Progressing   Plan of care discussed with patient, verbalizes understanding. Encouraged to voice feelings or concerns.     Problem: Pain - Standard  Goal: Alleviation of pain or a reduction in pain to the patient’s comfort goal  Outcome: Progressing   Available pain medications reviewed with patient. Pain managed by PRN and scheduled medications. Encouraged to call if pain is no longer managed.     Problem: Fall Risk  Goal: Patient will remain free from falls  Outcome: Progressing   Fall precautions in place. Patient rounded on hourly. Clutter-free environment maintained. Bed alarm on, bed locked and in lowest position. Call light in reach.     Problem: Skin Integrity  Goal: Skin integrity is maintained or improved  Outcome: Progressing  Patient refused Q2 Turns. Pillows in use for support and position. Patient on waffle mattress. Checked frequently for incontinence. Muse Catheter in place.

## 2022-07-19 NOTE — PROGRESS NOTES
0915 Patient's in bed. Auscultated heart sounds for one full minute - HR - 0; palpated carotid pulse for one full minute - 0; auscultated breath sounds for one minute - 0, pupillary check for reactivity - fixed /dilated. Verified with Hillary Fleischer, RN..    0927 Placed a call to Dr Baeza, awaiting for response.    0933 Received a call from Dr Baeza. Notified the said MD that patient  @0915 (Two  RN's may pronounce). Per the said MD to call next of kin (Erlinda Duran - sister) and if she has any questions he'll call her back.    0940 Placed a call to Erlinda Duran (sister), awaiting for response.    0954 Placed a call to  and spoke to Elo Bell, stated patient's no a case.    1005 Placed a call to [facility], spoke to [place], stated to wait fro a call from Donation Coordinator and if in 15 minutes hadn't received a call to call back the [facility].    1048 Placed a Call to [facility] and spoke to Kaitlynn Garza and notified that haven't received a call yet from Donation Coordinator. Per Kaitlynn Garza patient's not suitable for organ donation.    1200 piv and hernandez catheter removed. Patient's belongings (one bag) sent to locked safekeeping closet in T3.    1247 Patient's body was picked up/released to Lonnie Kaiser (signed paper)  from [facility] via  gurney. Patient Expiration papers given to the said [facility].

## 2022-08-01 NOTE — DOCUMENTATION QUERY
[facility]                                                                       Query Response Note      PATIENT:               JOSE POSADA  ACCT #:                  [id number]  MRN:                     [id number]  :                      1955  ADMIT DATE:       2022 7:44 AM  DISCH DATE:        2022 9:15 AM  RESPONDING  PROVIDER #:        [id number]           QUERY TEXT:    Sepsis is documented as POA in the Discharge Summary.  Based on the clinical indicators and treatment please clarify the POA status.     NOTE:  If an appropriate response is not listed below, please respond with a new note.        The patient's Clinical Indicators include:  Patient admitted on  with CVA  Sepsis documented beginning .  Discharge Summary indicates Sepsis as POA  Clinical Indicators - PCN IV given for potentially untreated syphilis                                   - afebrile, VSS until                                    - WBC normal on admission                                   -  patient developed fever, tachycardia, elevated lactic acid and leukocytosis                                   - NS @ 100, Zosyn and Vanco ordered on  (not given d/t patient refusal)  Thank you,  Iris Jimenez RN, CCDS  Clinical   Connect via [employer] Messenger  Options provided:   -- Sepsis was not present at the time of inpatient admission   -- Sepsis was present at the time of inpatient admission, please indicate supportive clinical indicators _____________________________   -- Other, please specify _________________________________________      Query created by: Iris Jimenez on 2022 1:39 PM    RESPONSE TEXT:    Sepsis was not present at the time of inpatient admission          Electronically signed by:  BRADY ROLLINS MD 2022 6:26 AM

## 2025-03-31 NOTE — PROGRESS NOTES
0926 Attempted head to toe assessment, patient refusing assessment. Educated patient on importance of assessment, no evidence of learning. Patient covers head with blanket and has incomprehensible speech.      1510 Notified hospitalist of WBC of 18.3 New orders placed. Attempted to administer oral medications to patient. Patient refused to drink fluids or take pills. Patient refusing IV insert, batting my hands away and covering his head with a blanket. Patient's speech is incomprehensible. Hospitalist aware.    1830 Removed hernandez per order.    Attempted to call patient, LVM    Will try again next week.  Keeping encounter open to f/u.

## (undated) DEVICE — SUCTION INSTRUMENT YANKAUER BULBOUS TIP W/O VENT (50EA/CA)

## (undated) DEVICE — SUTURE 4-0 NUROLON CR/8 TF - (12/BX) ETHICON

## (undated) DEVICE — MASK ANESTHESIA ADULT  - (100/CA)

## (undated) DEVICE — KIT ANESTHESIA W/CIRCUIT & 3/LT BAG W/FILTER (20EA/CA)

## (undated) DEVICE — CANISTER SUCTION 3000ML MECHANICAL FILTER AUTO SHUTOFF MEDI-VAC NONSTERILE LF DISP  (40EA/CA)

## (undated) DEVICE — CHLORAPREP 26 ML APPLICATOR - ORANGE TINT(25/CA)

## (undated) DEVICE — GLOVESZ 8.5 BIOGEL PI MICRO - PF LF (50PR/BX)

## (undated) DEVICE — SCALP CLIP RANEY 20-1037 (10EA/PK 20PK/CA)

## (undated) DEVICE — SUTURE GENERAL

## (undated) DEVICE — NEPTUNE 4 PORT MANIFOLD - (20/PK)

## (undated) DEVICE — SET LEADWIRE 5 LEAD BEDSIDE DISPOSABLE ECG (1SET OF 5/EA)

## (undated) DEVICE — LACTATED RINGERS INJ 1000 ML - (14EA/CA 60CA/PF)

## (undated) DEVICE — COVER LIGHT HANDLE ALC PLUS DISP (18EA/BX)

## (undated) DEVICE — ELECTRODE DUAL RETURN W/ CORD - (50/PK)

## (undated) DEVICE — PROTECTOR ULNA NERVE - (36PR/CA)

## (undated) DEVICE — ELECTRODE 850 FOAM ADHESIVE - HYDROGEL RADIOTRNSPRNT (50/PK)

## (undated) DEVICE — DRAPE STRLE REG TOWEL 18X24 - (10/BX 4BX/CA)"

## (undated) DEVICE — SUTURE 0 VICRYL PLUS CT-1 - 8 X 18 INCH (12/BX)

## (undated) DEVICE — TUBING C&T SET FLYING LEADS DRAIN TUBING (10EA/BX)

## (undated) DEVICE — BOVIE BLADE COATED &INSULATED - 25/PK

## (undated) DEVICE — FORCEPS IRRIGATING 8 X 1.5MM (5EA/BX)

## (undated) DEVICE — PIN HEAD MAYFIELD DISP. (3EA/PK 12PK/BX)

## (undated) DEVICE — LACTATED RINGERS INJ. 500 ML - (24EA/CA)

## (undated) DEVICE — BONE WAX (12PK/BX)

## (undated) DEVICE — SURGIFOAM (SIZE 100) - (6EA/CA)

## (undated) DEVICE — SET EXTENSION WITH 2 PORTS (48EA/CA) ***PART #2C8610 IS A SUBSTITUTE*****

## (undated) DEVICE — MIDAS LUBRICATOR DIFFUSER PACK (4EA/CA)

## (undated) DEVICE — SUTURE 2-0 ETHILON FS - (36/BX) 18 INCH

## (undated) DEVICE — DISSECT TOOL MIDAS F2/8TA23

## (undated) DEVICE — BAG SPONGE COUNT 10.25 X 32 - BLUE (250/CA)

## (undated) DEVICE — DISSECT TOOL MIDAS

## (undated) DEVICE — TRAY SKIN SCRUB PVP WET (20EA/CA) PART #DYND70356 DISCONTINUED

## (undated) DEVICE — GOWN WARMING STANDARD FLEX - (30/CA)

## (undated) DEVICE — GLOVE BIOGEL PI INDICATOR SZ 6.5 SURGICAL PF LF - (50/BX 4BX/CA)

## (undated) DEVICE — GLOVE BIOGEL PI INDICATOR SZ 7.5 SURGICAL PF LF -(50/BX 4BX/CA)

## (undated) DEVICE — PACK CRANI - (1EA/CA)

## (undated) DEVICE — PATTIES SURG X-RAYCOTTONOID - 1/2 X 3 IN (200/CA)

## (undated) DEVICE — KIT SURGIFLO W/OUT THROMBIN - (6EA/CA)

## (undated) DEVICE — GLOVE BIOGEL PI INDICATOR SZ 8.5 SURGICAL PF LF - (50PR/BX 4BX/CA)

## (undated) DEVICE — TOWEL STOP TIMEOUT SAFETY FLAG (40EA/CA)

## (undated) DEVICE — SENSOR OXIMETER ADULT SPO2 RD SET (20EA/BX)

## (undated) DEVICE — BLADE CLIPPER FITS 2501 CLIPPER (BLUE)  (20EA/CA)

## (undated) DEVICE — GLOVE SZ 6 BIOGEL PI MICRO - PF LF (50PR/BX 4BX/CA)

## (undated) DEVICE — SUTURE 2-0 VICRYL PLUS CT-1 - 8 X 18 INCH(12/BX)

## (undated) DEVICE — TOWELS CLOTH SURGICAL - (4/PK 20PK/CA)

## (undated) DEVICE — TUBING CLEARLINK DUO-VENT - C-FLO (48EA/CA)

## (undated) DEVICE — SODIUM CHL IRRIGATION 0.9% 1000ML (12EA/CA)

## (undated) DEVICE — HEMOSTAT SURG ABSORBABLE - 4 X 8 IN SURGICEL (24EA/CA)